# Patient Record
Sex: FEMALE | Race: WHITE | NOT HISPANIC OR LATINO | Employment: OTHER | URBAN - NONMETROPOLITAN AREA
[De-identification: names, ages, dates, MRNs, and addresses within clinical notes are randomized per-mention and may not be internally consistent; named-entity substitution may affect disease eponyms.]

---

## 2018-03-15 PROBLEM — R31.0 GROSS HEMATURIA: Status: ACTIVE | Noted: 2018-03-15

## 2018-03-15 PROBLEM — R39.15 URGENCY OF MICTURITION: Status: ACTIVE | Noted: 2018-03-15

## 2018-03-15 PROBLEM — R35.0 FREQUENCY OF MICTURITION: Status: ACTIVE | Noted: 2018-03-15

## 2018-03-15 PROBLEM — R10.9 FLANK PAIN, ACUTE: Status: ACTIVE | Noted: 2018-03-15

## 2020-04-16 ENCOUNTER — HISTORICAL (OUTPATIENT)
Dept: ADMINISTRATIVE | Facility: HOSPITAL | Age: 65
End: 2020-04-16

## 2020-04-16 LAB
ADENOVIRUS: NOT DETECTED
BORDETELLA PARAPERTUSSIS (IS1001): NOT DETECTED
CHLAMYDIA PNEUMONIAE: NOT DETECTED
CORONAVIRUS 229E, COMMON COLD VIRUS: NOT DETECTED
CORONAVIRUS HKU1, COMMON COLD VIRUS: NOT DETECTED
CORONAVIRUS NL63, COMMON COLD VIRUS: NOT DETECTED
CORONAVIRUS OC43, COMMON COLD VIRUS: NOT DETECTED
FLUBV RNA NPH QL NAA+NON-PROBE: NOT DETECTED
HPIV1 RNA NPH QL NAA+NON-PROBE: NOT DETECTED
HPIV2 RNA NPH QL NAA+NON-PROBE: NOT DETECTED
HPIV3 RNA NPH QL NAA+NON-PROBE: NOT DETECTED
HPIV4 RNA NPH QL NAA+NON-PROBE: NOT DETECTED
HUMAN METAPNEUMOVIRUS: NOT DETECTED
INFLUENZA A (SUBTYPES H1,H1-2009,H3): NORMAL
INFLUENZA A (SUBTYPES H1,H1-2009,H3): NORMAL
INFLUENZA A - H1N1-09: NORMAL
INFLUENZA A, MOLECULAR: NOT DETECTED
MYCOPLASMA PNEUMONIAE: NOT DETECTED
RESPIRATORY PANEL CONTROL: NORMAL
RSV RNA NPH QL NAA+NON-PROBE: NOT DETECTED
RV+EV RNA NPH QL NAA+NON-PROBE: NOT DETECTED

## 2020-04-20 LAB — SARS-COV-2 RNA RESP QL NAA+PROBE: NOT DETECTED

## 2020-08-10 DIAGNOSIS — R14.0 BLOATING: ICD-10-CM

## 2020-08-10 DIAGNOSIS — R10.9 ABDOMINAL PAIN, UNSPECIFIED ABDOMINAL LOCATION: Primary | ICD-10-CM

## 2020-08-10 DIAGNOSIS — R10.9 UNSPECIFIED ABDOMINAL PAIN: ICD-10-CM

## 2020-08-10 DIAGNOSIS — C85.90 LYMPHOMA: ICD-10-CM

## 2020-08-12 ENCOUNTER — HOSPITAL ENCOUNTER (OUTPATIENT)
Dept: RADIOLOGY | Facility: HOSPITAL | Age: 65
Discharge: HOME | End: 2020-08-12
Attending: SURGERY
Payer: MEDICARE

## 2020-08-12 DIAGNOSIS — R10.9 UNSPECIFIED ABDOMINAL PAIN: ICD-10-CM

## 2020-08-12 PROCEDURE — 25500020 PHARM REV CODE 255: Performed by: SURGERY

## 2020-08-12 PROCEDURE — 74177 CT ABD & PELVIS W/CONTRAST: CPT | Mod: TC

## 2020-08-12 RX ADMIN — IOHEXOL 100 ML: 350 INJECTION, SOLUTION INTRAVENOUS at 09:08

## 2020-08-17 DIAGNOSIS — A04.72 COLITIS DUE TO CLOSTRIDIUM DIFFICILE: Primary | ICD-10-CM

## 2020-08-17 RX ORDER — SODIUM CHLORIDE 0.9 % (FLUSH) 0.9 %
10 SYRINGE (ML) INJECTION
Status: CANCELLED | OUTPATIENT
Start: 2020-08-17

## 2020-08-17 RX ORDER — SODIUM CHLORIDE 9 MG/ML
INJECTION, SOLUTION INTRAVENOUS CONTINUOUS
Status: CANCELLED | OUTPATIENT
Start: 2020-08-17

## 2020-09-14 ENCOUNTER — OFFICE VISIT (OUTPATIENT)
Dept: OBSTETRICS AND GYNECOLOGY | Facility: CLINIC | Age: 65
End: 2020-09-14
Payer: MEDICARE

## 2020-09-14 VITALS
DIASTOLIC BLOOD PRESSURE: 72 MMHG | HEIGHT: 62 IN | SYSTOLIC BLOOD PRESSURE: 133 MMHG | WEIGHT: 113 LBS | BODY MASS INDEX: 20.8 KG/M2 | HEART RATE: 76 BPM

## 2020-09-14 DIAGNOSIS — N39.0 URINARY TRACT INFECTION WITH HEMATURIA, SITE UNSPECIFIED: Primary | ICD-10-CM

## 2020-09-14 DIAGNOSIS — N89.8 VAGINAL DISCHARGE: ICD-10-CM

## 2020-09-14 DIAGNOSIS — R31.9 URINARY TRACT INFECTION WITH HEMATURIA, SITE UNSPECIFIED: Primary | ICD-10-CM

## 2020-09-14 DIAGNOSIS — N95.2 ATROPHIC VAGINITIS: ICD-10-CM

## 2020-09-14 DIAGNOSIS — R31.9 HEMATURIA, UNSPECIFIED TYPE: ICD-10-CM

## 2020-09-14 LAB
BILIRUB SERPL-MCNC: NEGATIVE MG/DL
BLOOD URINE, POC: 5
CLARITY, POC UA: CLEAR
COLOR, POC UA: ABNORMAL
GLUCOSE UR QL STRIP: NEGATIVE
KETONES UR QL STRIP: NEGATIVE
LEUKOCYTE ESTERASE URINE, POC: 75
NITRITE, POC UA: NEGATIVE
PH, POC UA: 5.5
PROTEIN, POC: NEGATIVE
SPECIFIC GRAVITY, POC UA: 1.01
UROBILINOGEN, POC UA: NORMAL

## 2020-09-14 PROCEDURE — 99212 PR OFFICE/OUTPT VISIT, EST, LEVL II, 10-19 MIN: ICD-10-PCS | Mod: S$PBB,,, | Performed by: NURSE PRACTITIONER

## 2020-09-14 PROCEDURE — 99999 PR PBB SHADOW E&M-EST. PATIENT-LVL IV: CPT | Mod: PBBFAC,,, | Performed by: NURSE PRACTITIONER

## 2020-09-14 PROCEDURE — 99999 PR PBB SHADOW E&M-EST. PATIENT-LVL IV: ICD-10-PCS | Mod: PBBFAC,,, | Performed by: NURSE PRACTITIONER

## 2020-09-14 PROCEDURE — 99214 OFFICE O/P EST MOD 30 MIN: CPT | Mod: PBBFAC | Performed by: NURSE PRACTITIONER

## 2020-09-14 PROCEDURE — 99212 OFFICE O/P EST SF 10 MIN: CPT | Mod: S$PBB,,, | Performed by: NURSE PRACTITIONER

## 2020-09-14 PROCEDURE — 81002 URINALYSIS NONAUTO W/O SCOPE: CPT | Mod: PBBFAC | Performed by: NURSE PRACTITIONER

## 2020-09-14 RX ORDER — METOCLOPRAMIDE 10 MG/1
TABLET ORAL
COMMUNITY
Start: 2020-07-23 | End: 2021-02-05

## 2020-09-14 RX ORDER — METHYLNALTREXONE BROMIDE 150 MG/1
TABLET ORAL
COMMUNITY
End: 2021-02-05

## 2020-09-14 RX ORDER — MEMANTINE HYDROCHLORIDE 28 MG/1
CAPSULE, EXTENDED RELEASE ORAL
COMMUNITY
End: 2021-02-05

## 2020-09-14 RX ORDER — HYDROXYZINE HYDROCHLORIDE 50 MG/1
TABLET, FILM COATED ORAL
COMMUNITY
Start: 2020-06-30 | End: 2021-02-05

## 2020-09-14 RX ORDER — DONEPEZIL HYDROCHLORIDE 10 MG/1
TABLET, FILM COATED ORAL
COMMUNITY
End: 2021-02-05

## 2020-09-14 RX ORDER — MORPHINE SULFATE 15 MG/1
TABLET, FILM COATED, EXTENDED RELEASE ORAL
COMMUNITY
End: 2021-08-04

## 2020-09-14 RX ORDER — NITROFURANTOIN 25; 75 MG/1; MG/1
100 CAPSULE ORAL 2 TIMES DAILY
Qty: 14 CAPSULE | Refills: 0 | Status: SHIPPED | OUTPATIENT
Start: 2020-09-14 | End: 2020-09-21

## 2020-09-14 RX ORDER — CARIPRAZINE 4.5 MG/1
CAPSULE, GELATIN COATED ORAL
COMMUNITY
Start: 2020-09-01 | End: 2021-02-05 | Stop reason: ALTCHOICE

## 2020-09-14 RX ORDER — DICYCLOMINE HYDROCHLORIDE 20 MG/1
TABLET ORAL
COMMUNITY
End: 2021-02-05

## 2020-09-14 RX ORDER — BUSPIRONE HYDROCHLORIDE 10 MG/1
TABLET ORAL
COMMUNITY
Start: 2020-07-30 | End: 2021-02-05

## 2020-09-14 RX ORDER — ONDANSETRON 8 MG/1
TABLET, ORALLY DISINTEGRATING ORAL
COMMUNITY
Start: 2020-09-01 | End: 2021-05-18 | Stop reason: SDUPTHER

## 2020-09-14 RX ORDER — OMEPRAZOLE 40 MG/1
CAPSULE, DELAYED RELEASE ORAL
COMMUNITY
End: 2021-05-18 | Stop reason: SDUPTHER

## 2020-09-14 RX ORDER — TERCONAZOLE 4 MG/G
1 CREAM VAGINAL NIGHTLY
Qty: 1 TUBE | Refills: 0 | Status: SHIPPED | OUTPATIENT
Start: 2020-09-14 | End: 2020-09-21

## 2020-09-14 RX ORDER — FLUTICASONE PROPIONATE 50 MCG
SPRAY, SUSPENSION (ML) NASAL
COMMUNITY
Start: 2020-07-29 | End: 2021-12-14

## 2020-09-14 RX ORDER — PANTOPRAZOLE SODIUM 40 MG/1
40 TABLET, DELAYED RELEASE ORAL DAILY
COMMUNITY
Start: 2020-08-24 | End: 2021-11-01

## 2020-09-14 NOTE — PROGRESS NOTES
Subjective:       Patient ID: Desiree Blake is a 65 y.o. female.    Chief Complaint: Vaginal Pain (for a month now, patient states this has never happened before) and Vaginal Discharge (patient states every time she wipes she has blood ,  she states the discharge has a bad odor.  )    Pt here with c/o vaginal pain and discharge with odor; also c/o blood when wiping    Review of Systems   Constitutional: Negative.    Gastrointestinal: Negative.    Genitourinary: Positive for vaginal discharge.   Allergic/Immunologic: Negative.    Neurological: Negative.    Psychiatric/Behavioral: Negative.      Past Medical History:  Past Medical History:   Diagnosis Date    Anticoagulant long-term use     Anxiety     Arthritis     Carotid artery disease     Cervical disc disorder     Chronic back pain     COPD (chronic obstructive pulmonary disease)     Coronary artery disease     Dementia     Depression     DVT (deep venous thrombosis)     CAROTID    GERD (gastroesophageal reflux disease)     Hematuria     Hiatal hernia     High cholesterol     Ill-fitting dentures     STATES DOESN'T WEAR    PVD (peripheral vascular disease)     Renal calculus     Sleep apnea     Sleep apnea     NO C PAP    Urinary frequency     Urinary urgency     Wears glasses     READING        Past Surgical History:   Procedure Laterality Date    BACK SURGERY      CAROTID ARTERY ANGIOPLASTY      CAROTID ARTERY ANGIOPLASTY      CAROTID ARTERY STENT Left     CAROTID ENDARTERECTOMY Right     CHOLECYSTECTOMY      COLONOSCOPY      CYSTOSCOPY      HYSTERECTOMY      TONSILLECTOMY         Social History     Tobacco Use    Smoking status: Current Every Day Smoker     Packs/day: 1.00     Types: Cigarettes     Start date: 1970    Smokeless tobacco: Never Used   Substance Use Topics    Alcohol use: No    Drug use: No       Family History   Problem Relation Age of Onset    Cancer Mother     Stroke Father        Outpatient Medications  Marked as Taking for the 9/14/20 encounter (Office Visit) with Shanika Sweeney NP   Medication Sig Dispense Refill    busPIRone (BUSPAR) 10 MG tablet       citalopram (CELEXA) 10 MG tablet Take 10 mg by mouth every morning.      citalopram hydrobromide (CELEXA ORAL) Celexa      clopidogrel (PLAVIX) 75 mg tablet Take 75 mg by mouth every morning.      dicyclomine (BENTYL) 20 mg tablet dicyclomine 20 mg tablet      donepeziL (ARICEPT) 10 MG tablet donepezil 10 mg tablet   TAKE 1 TABLET BY MOUTH DAILY      fluticasone propionate (FLONASE) 50 mcg/actuation nasal spray 2 SQUIRT IN THE NOSTRILS DAILY      hydrOXYzine (ATARAX) 50 MG tablet TAKE 1 TABLET BY MOUTH TWICE DAILY AND 2 TABLETS EVERY NIGHT AT BEDTIME      memantine (NAMENDA XR) 28 mg CSpX Take 28 mg by mouth every morning.      memantine (NAMENDA XR) 28 mg CSpX Namenda XR 28 mg capsule sprinkle,extended release      metoclopramide HCl (REGLAN) 10 MG tablet TAKE 1 TABLET BY MOUTH THREE TIMES DAILY WITH MEALS AS NEEDED      morphine (MS CONTIN) 15 MG 12 hr tablet morphine ER 15 mg tablet,extended release   TAKE ONE TABLET BY MOUTH EVERY TWELVE HOURS      morphine (MSIR) 15 MG tablet Take 15 mg by mouth 2 (two) times daily.      ondansetron (ZOFRAN-ODT) 8 MG TbDL       oxyCODONE-acetaminophen (PERCOCET) 7.5-325 mg per tablet Take 1 tablet by mouth every 4 (four) hours as needed for Pain.      pantoprazole (PROTONIX) 40 MG tablet Take 40 mg by mouth once daily.      pravastatin (PRAVACHOL) 20 MG tablet Take 20 mg by mouth every evening.      traZODone (DESYREL) 100 MG tablet Take 100 mg by mouth every evening.      VRAYLAR 4.5 mg Cap          Review of patient's allergies indicates:  No Known Allergies     .ob         Objective:      Physical Exam  Constitutional:       Appearance: Normal appearance. She is normal weight.   Genitourinary:      Pelvic exam was performed with patient in the lithotomy position.      Vulva exam comments: Atrophic  epithelium.      Vaginal discharge and atrophy present.   Neurological:      Mental Status: She is alert.   Skin:     General: Skin is warm and dry.   Psychiatric:         Mood and Affect: Mood normal.         Behavior: Behavior normal.   Vitals signs and nursing note reviewed.          Assessment:       1. Urinary tract infection with hematuria, site unspecified    2. Hematuria, unspecified type    3. Vaginal discharge    4. Atrophic vaginitis        Plan:       Urinary tract infection with hematuria, site unspecified    Hematuria, unspecified type  -     POCT URINE DIPSTICK WITHOUT MICROSCOPE    Vaginal discharge    Atrophic vaginitis    Other orders  -     nitrofurantoin, macrocrystal-monohydrate, (MACROBID) 100 MG capsule; Take 1 capsule (100 mg total) by mouth 2 (two) times daily. for 7 days  Dispense: 14 capsule; Refill: 0  -     terconazole (TERAZOL 7) 0.4 % Crea; Place 1 applicator vaginally every evening. for 7 days  Dispense: 1 Tube; Refill: 0             Shanika Sweeney NP   09/23/2020   10:44 AM

## 2020-09-22 DIAGNOSIS — N89.8 VAGINAL DISCHARGE: Primary | ICD-10-CM

## 2020-09-22 RX ORDER — FLUCONAZOLE 150 MG/1
150 TABLET ORAL ONCE
Qty: 1 TABLET | Refills: 0 | Status: SHIPPED | OUTPATIENT
Start: 2020-09-22 | End: 2020-09-23 | Stop reason: SDUPTHER

## 2020-09-23 ENCOUNTER — TELEPHONE (OUTPATIENT)
Dept: OBSTETRICS AND GYNECOLOGY | Facility: CLINIC | Age: 65
End: 2020-09-23

## 2020-09-23 DIAGNOSIS — N89.8 VAGINAL DISCHARGE: ICD-10-CM

## 2020-09-23 RX ORDER — FLUCONAZOLE 150 MG/1
150 TABLET ORAL ONCE
Qty: 1 TABLET | Refills: 1 | Status: SHIPPED | OUTPATIENT
Start: 2020-09-23 | End: 2020-09-23

## 2020-09-29 ENCOUNTER — TELEPHONE (OUTPATIENT)
Dept: OBSTETRICS AND GYNECOLOGY | Facility: CLINIC | Age: 65
End: 2020-09-29

## 2020-09-29 RX ORDER — METRONIDAZOLE 500 MG/1
500 TABLET ORAL EVERY 12 HOURS
Qty: 14 TABLET | Refills: 0 | Status: SHIPPED | OUTPATIENT
Start: 2020-09-29 | End: 2020-10-06

## 2020-09-29 NOTE — TELEPHONE ENCOUNTER
Pt states finished taking meds but still has odor and discharge; wants to know what else can be done  spitale

## 2020-10-28 ENCOUNTER — HOSPITAL ENCOUNTER (EMERGENCY)
Facility: HOSPITAL | Age: 65
Discharge: HOME | End: 2020-10-28
Attending: EMERGENCY MEDICINE
Payer: MEDICARE

## 2020-10-28 VITALS
HEART RATE: 108 BPM | HEIGHT: 65 IN | TEMPERATURE: 98 F | BODY MASS INDEX: 18.33 KG/M2 | OXYGEN SATURATION: 96 % | WEIGHT: 110 LBS | SYSTOLIC BLOOD PRESSURE: 119 MMHG | DIASTOLIC BLOOD PRESSURE: 79 MMHG | RESPIRATION RATE: 18 BRPM

## 2020-10-28 DIAGNOSIS — K52.9 GASTROENTERITIS: Primary | ICD-10-CM

## 2020-10-28 LAB
CTP QC/QA: YES
SARS-COV-2 RDRP RESP QL NAA+PROBE: NEGATIVE

## 2020-10-28 PROCEDURE — 99283 EMERGENCY DEPT VISIT LOW MDM: CPT

## 2020-10-28 PROCEDURE — U0002 COVID-19 LAB TEST NON-CDC: HCPCS | Performed by: EMERGENCY MEDICINE

## 2020-10-28 PROCEDURE — 25000003 PHARM REV CODE 250: Performed by: EMERGENCY MEDICINE

## 2020-10-28 RX ORDER — ONDANSETRON 4 MG/1
4 TABLET, FILM COATED ORAL EVERY 6 HOURS PRN
Qty: 12 TABLET | Refills: 0 | Status: SHIPPED | OUTPATIENT
Start: 2020-10-28 | End: 2021-02-05

## 2020-10-28 RX ORDER — LORAZEPAM 1 MG/1
1 TABLET ORAL
Status: COMPLETED | OUTPATIENT
Start: 2020-10-28 | End: 2020-10-28

## 2020-10-28 RX ORDER — ONDANSETRON 4 MG/1
8 TABLET, ORALLY DISINTEGRATING ORAL
Status: COMPLETED | OUTPATIENT
Start: 2020-10-28 | End: 2020-10-28

## 2020-10-28 RX ADMIN — LORAZEPAM 1 MG: 1 TABLET ORAL at 12:10

## 2020-10-28 RX ADMIN — ONDANSETRON 8 MG: 4 TABLET, ORALLY DISINTEGRATING ORAL at 12:10

## 2020-10-28 NOTE — ED PROVIDER NOTES
Encounter Date: 10/28/2020       History     Chief Complaint   Patient presents with    COVID-19 Concerns     I have been having diarrhea and vomiting for the past 2 days.  I cant keep anything down and I started with fever today.  I dont have anymore taste buds.      This is a 65-year-old white female complaining of nausea and diarrhea for the past 2 days.  Patient states she has had fever as well.  Also has lost her sense of taste.  Concerned about COVID.  Denies shortness of breath, denies cough        Review of patient's allergies indicates:  No Known Allergies  Past Medical History:   Diagnosis Date    Anticoagulant long-term use     Anxiety     Arthritis     Carotid artery disease     Cervical disc disorder     Chronic back pain     COPD (chronic obstructive pulmonary disease)     Coronary artery disease     Dementia     Depression     DVT (deep venous thrombosis)     CAROTID    GERD (gastroesophageal reflux disease)     Hematuria     Hiatal hernia     High cholesterol     Ill-fitting dentures     STATES DOESN'T WEAR    PVD (peripheral vascular disease)     Renal calculus     Sleep apnea     Sleep apnea     NO C PAP    Urinary frequency     Urinary urgency     Wears glasses     READING      Past Surgical History:   Procedure Laterality Date    BACK SURGERY      CAROTID ARTERY ANGIOPLASTY      CAROTID ARTERY ANGIOPLASTY      CAROTID ARTERY STENT Left     CAROTID ENDARTERECTOMY Right     CHOLECYSTECTOMY      COLONOSCOPY      CYSTOSCOPY      HYSTERECTOMY      TONSILLECTOMY       Family History   Problem Relation Age of Onset    Cancer Mother     Stroke Father      Social History     Tobacco Use    Smoking status: Current Every Day Smoker     Packs/day: 1.00     Types: Cigarettes     Start date: 1970    Smokeless tobacco: Never Used   Substance Use Topics    Alcohol use: No    Drug use: No     Review of Systems   Constitutional: Negative for fever.   HENT: Negative for sore  throat.    Respiratory: Negative for shortness of breath.    Cardiovascular: Negative for chest pain.   Gastrointestinal: Positive for diarrhea and nausea.   Genitourinary: Negative for dysuria.   Musculoskeletal: Negative for back pain.   Skin: Negative for rash.   Neurological: Negative for weakness.   Hematological: Does not bruise/bleed easily.   All other systems reviewed and are negative.      Physical Exam     Initial Vitals [10/28/20 1201]   BP Pulse Resp Temp SpO2   119/79 108 18 97.6 °F (36.4 °C) 96 %      MAP       --         Physical Exam    Nursing note and vitals reviewed.  Constitutional: She appears well-developed and well-nourished. She is not diaphoretic. No distress.   Anxious white female   HENT:   Head: Normocephalic and atraumatic.   Eyes: Conjunctivae and EOM are normal. Pupils are equal, round, and reactive to light.   Neck: Normal range of motion. Neck supple.   Cardiovascular: Normal rate, regular rhythm, normal heart sounds and intact distal pulses.   No murmur heard.  Pulmonary/Chest: Breath sounds normal. No respiratory distress. She has no wheezes. She has no rhonchi. She has no rales. She exhibits no tenderness.   Abdominal: Soft. Bowel sounds are normal.   Musculoskeletal: Normal range of motion. No tenderness or edema.   Neurological: She is alert and oriented to person, place, and time. She has normal strength and normal reflexes. No cranial nerve deficit. GCS score is 15. GCS eye subscore is 4. GCS verbal subscore is 5. GCS motor subscore is 6.   Skin: Skin is warm and dry. Capillary refill takes less than 2 seconds.         ED Course   Procedures  Labs Reviewed   SARS-COV-2 RDRP GENE    Narrative:     This test utilizes isothermal nucleic acid amplification   technology to detect the SARS-CoV-2 RdRp nucleic acid segment.   The analytical sensitivity (limit of detection) is 125 genome   equivalents/mL.   A POSITIVE result implies infection with the SARS-CoV-2 virus;   the patient  is presumed to be contagious.     A NEGATIVE result means that SARS-CoV-2 nucleic acids are not   present above the limit of detection. A NEGATIVE result should be   treated as presumptive. It does not rule out the possibility of   COVID-19 and should not be the sole basis for treatment decisions.   If COVID-19 is strongly suspected based on clinical and exposure   history, re-testing using an alternate molecular assay should be   considered.   This test is only for use under the Food and Drug   Administration s Emergency Use Authorization (EUA).   Commercial kits are provided by [employer].   Performance characteristics of the EUA have been independently   verified by [facility] Department of   Pathology and Laboratory Medicine.   _________________________________________________________________   The authorized Fact Sheet for Healthcare Providers and the authorized Fact   Sheet for Patients of the ID NOW COVID-19 are available on the FDA   website:     https://www.fda.gov/media/153286/download  https://www.fda.gov/media/646868/download              Imaging Results    None          Medical Decision Making:   Differential Diagnosis:   Gastroenteritis, COVID-19                   ED Course as of Oct 28 1236   Wed Oct 28, 2020   1230 Rapid COVID negative    [SD]      ED Course User Index  [SD] Marek Rai MD            Clinical Impression:     ICD-10-CM ICD-9-CM   1. Gastroenteritis  K52.9 558.9                          ED Disposition Condition    Discharge Stable        ED Prescriptions     Medication Sig Dispense Start Date End Date Auth. Provider    ondansetron (ZOFRAN) 4 MG tablet Take 1 tablet (4 mg total) by mouth every 6 (six) hours as needed for Nausea. 12 tablet 10/28/2020  Marek Rai MD        Follow-up Information     Follow up With Specialties Details Why Contact Info    Primary care physician  In 2 days                                         Marek Rai MD  10/28/20  1232       Marek Rai MD  10/28/20 1230

## 2020-11-11 DIAGNOSIS — Z13.820 OSTEOPOROSIS SCREENING: ICD-10-CM

## 2020-11-11 DIAGNOSIS — Z12.31 ENCOUNTER FOR SCREENING MAMMOGRAM FOR BREAST CANCER: Primary | ICD-10-CM

## 2020-11-11 DIAGNOSIS — M81.0 AGE-RELATED OSTEOPOROSIS WITHOUT CURRENT PATHOLOGICAL FRACTURE: ICD-10-CM

## 2020-12-10 ENCOUNTER — OFFICE VISIT (OUTPATIENT)
Dept: OBSTETRICS AND GYNECOLOGY | Facility: CLINIC | Age: 65
End: 2020-12-10
Attending: OBSTETRICS & GYNECOLOGY
Payer: MEDICARE

## 2020-12-10 VITALS
HEIGHT: 62 IN | BODY MASS INDEX: 20.06 KG/M2 | DIASTOLIC BLOOD PRESSURE: 63 MMHG | SYSTOLIC BLOOD PRESSURE: 135 MMHG | WEIGHT: 109 LBS | HEART RATE: 93 BPM

## 2020-12-10 DIAGNOSIS — R35.0 URINARY FREQUENCY: Primary | ICD-10-CM

## 2020-12-10 DIAGNOSIS — R82.71 BACTERIURIA: ICD-10-CM

## 2020-12-10 DIAGNOSIS — R31.9 HEMATURIA, UNSPECIFIED TYPE: ICD-10-CM

## 2020-12-10 DIAGNOSIS — N95.2 ATROPHIC VAGINITIS: ICD-10-CM

## 2020-12-10 LAB
BILIRUB SERPL-MCNC: ABNORMAL MG/DL
BLOOD URINE, POC: ABNORMAL
CLARITY, POC UA: CLEAR
COLOR, POC UA: ABNORMAL
GLUCOSE UR QL STRIP: ABNORMAL
KETONES UR QL STRIP: ABNORMAL
LEUKOCYTE ESTERASE URINE, POC: ABNORMAL
NITRITE, POC UA: ABNORMAL
PH, POC UA: 5.5
PROTEIN, POC: ABNORMAL
SPECIFIC GRAVITY, POC UA: 1.02
UROBILINOGEN, POC UA: ABNORMAL

## 2020-12-10 PROCEDURE — 99999 PR PBB SHADOW E&M-EST. PATIENT-LVL IV: ICD-10-PCS | Mod: PBBFAC,,, | Performed by: OBSTETRICS & GYNECOLOGY

## 2020-12-10 PROCEDURE — 99214 PR OFFICE/OUTPT VISIT, EST, LEVL IV, 30-39 MIN: ICD-10-PCS | Mod: S$PBB,,, | Performed by: OBSTETRICS & GYNECOLOGY

## 2020-12-10 PROCEDURE — 99999 PR PBB SHADOW E&M-EST. PATIENT-LVL IV: CPT | Mod: PBBFAC,,, | Performed by: OBSTETRICS & GYNECOLOGY

## 2020-12-10 PROCEDURE — 87086 URINE CULTURE/COLONY COUNT: CPT

## 2020-12-10 PROCEDURE — 99214 OFFICE O/P EST MOD 30 MIN: CPT | Mod: S$PBB,,, | Performed by: OBSTETRICS & GYNECOLOGY

## 2020-12-10 PROCEDURE — 81002 URINALYSIS NONAUTO W/O SCOPE: CPT | Mod: PBBFAC | Performed by: OBSTETRICS & GYNECOLOGY

## 2020-12-10 PROCEDURE — 99214 OFFICE O/P EST MOD 30 MIN: CPT | Mod: PBBFAC | Performed by: OBSTETRICS & GYNECOLOGY

## 2020-12-10 RX ORDER — ESTRADIOL 0.1 MG/G
CREAM VAGINAL
Qty: 42.5 G | Refills: 2 | Status: SHIPPED | OUTPATIENT
Start: 2020-12-10 | End: 2021-02-05

## 2020-12-10 RX ORDER — ALBUTEROL SULFATE 90 UG/1
AEROSOL, METERED RESPIRATORY (INHALATION)
COMMUNITY
Start: 2020-11-24 | End: 2021-07-31

## 2020-12-10 RX ORDER — NITROFURANTOIN 25; 75 MG/1; MG/1
100 CAPSULE ORAL 2 TIMES DAILY
Qty: 14 CAPSULE | Refills: 0 | Status: SHIPPED | OUTPATIENT
Start: 2020-12-10 | End: 2020-12-17

## 2020-12-10 RX ORDER — GABAPENTIN 100 MG/1
100 CAPSULE ORAL NIGHTLY
COMMUNITY
Start: 2020-11-09 | End: 2021-02-05

## 2020-12-10 NOTE — PROGRESS NOTES
"  Chief Complaint      Chief Complaint   Patient presents with    Urinary Frequency     she states it's the same thing as last time, she states urinating all the time, dark discharge, and when she wipes there is a lot of blood.  her last appointment was in sept 2020        History of Present Illness      Desiree Blake is a 65 y.o. female who presents for patient presents today complaining of of vaginal discharge with odor and pain in the lower pelvis and bladder area.  She says discharge is dark brown in color and has a history of hematuria with a negative cystoscope by urologist in 2018.  She does have blood in her urine dip today as well as 2+ leukocytes.    LMP:  No LMP recorded. Patient has had a hysterectomy.    PAP:      Vital Signs:     Vital Signs  Pulse: 93  BP: 135/63  Height and Weight  Height: 5' 2" (157.5 cm)  Weight: 49.4 kg (109 lb)  BSA (Calculated - sq m): 1.47 sq meters  BMI (Calculated): 19.9  Weight in (lb) to have BMI = 25: 136.4]    Past Medical History:  Past Medical History:   Diagnosis Date    Anticoagulant long-term use     Anxiety     Arthritis     Carotid artery disease     Cervical disc disorder     Chronic back pain     COPD (chronic obstructive pulmonary disease)     Coronary artery disease     Dementia     Depression     DVT (deep venous thrombosis)     CAROTID    GERD (gastroesophageal reflux disease)     Hematuria     Hiatal hernia     High cholesterol     Ill-fitting dentures     STATES DOESN'T WEAR    PVD (peripheral vascular disease)     Renal calculus     Sleep apnea     Sleep apnea     NO C PAP    Urinary frequency     Urinary urgency     Wears glasses     READING        Past Surgical History:   has a past surgical history that includes Hysterectomy; Cholecystectomy; Back surgery; Carotid angioplasty; CAROTID ARTERY STENT (Left); Tonsillectomy; Colonoscopy; Cystoscopy; Carotid endarterectomy (Right); and Carotid angioplasty.    Family History:  family " history includes Cancer in her mother; Stroke in her father.     Social History:  Social History     Tobacco Use    Smoking status: Current Every Day Smoker     Packs/day: 1.00     Types: Cigarettes     Start date: 1970    Smokeless tobacco: Never Used   Substance Use Topics    Alcohol use: No    Drug use: No       I personally reviewed all past medical, surgical, social and family history.    Review of Systems   Constitutional: Negative for chills and fever.   HENT: Negative for sore throat.    Respiratory: Negative for cough and shortness of breath.    Cardiovascular: Negative for chest pain.   Gastrointestinal: Negative for constipation, diarrhea, nausea and vomiting.   Endocrine: Negative for cold intolerance and heat intolerance.   Genitourinary: Positive for dysuria, pelvic pain, vaginal bleeding and vaginal discharge. Negative for urgency.   Musculoskeletal: Negative for arthralgias.   Neurological: Negative for syncope and headaches.   Psychiatric/Behavioral: Negative for suicidal ideas.      Physical Exam  Vitals signs and nursing note reviewed. Exam conducted with a chaperone present.   Constitutional:       Appearance: Normal appearance.   HENT:      Head: Normocephalic and atraumatic.   Neck:      Musculoskeletal: Normal range of motion.   Pulmonary:      Effort: Pulmonary effort is normal.   Genitourinary:     General: Normal vulva.   Skin:     General: Skin is warm and dry.      Findings: Lesion: Patient > 65yrs opf age.   Neurological:      General: No focal deficit present.      Mental Status: She is alert and oriented to person, place, and time.      Gait: Gait normal.   Psychiatric:         Mood and Affect: Mood normal.         Behavior: Behavior normal.         Judgment: Judgment normal.         Medications:  Current Outpatient Medications   Medication Sig Dispense Refill    busPIRone (BUSPAR) 10 MG tablet       citalopram (CELEXA) 10 MG tablet Take 10 mg by mouth every morning.       citalopram hydrobromide (CELEXA ORAL) Celexa      clopidogrel (PLAVIX) 75 mg tablet Take 75 mg by mouth every morning.      dicyclomine (BENTYL) 20 mg tablet dicyclomine 20 mg tablet      donepeziL (ARICEPT) 10 MG tablet donepezil 10 mg tablet   TAKE 1 TABLET BY MOUTH DAILY      esomeprazole (NEXIUM) 20 MG capsule Take 20 mg by mouth as needed.      fluticasone propionate (FLONASE) 50 mcg/actuation nasal spray 2 SQUIRT IN THE NOSTRILS DAILY      gabapentin (NEURONTIN) 100 MG capsule Take 100 mg by mouth nightly.      hydrOXYzine (ATARAX) 50 MG tablet TAKE 1 TABLET BY MOUTH TWICE DAILY AND 2 TABLETS EVERY NIGHT AT BEDTIME      memantine (NAMENDA XR) 28 mg CSpX Take 28 mg by mouth every morning.      memantine (NAMENDA XR) 28 mg CSpX Namenda XR 28 mg capsule sprinkle,extended release      methylnaltrexone (RELISTOR) 150 mg Tab Relistor 150 mg tablet   TAKE THREE TABLETS BY MOUTH DAILY      metoclopramide HCl (REGLAN) 10 MG tablet TAKE 1 TABLET BY MOUTH THREE TIMES DAILY WITH MEALS AS NEEDED      morphine (MS CONTIN) 15 MG 12 hr tablet morphine ER 15 mg tablet,extended release   TAKE ONE TABLET BY MOUTH EVERY TWELVE HOURS      morphine (MSIR) 15 MG tablet Take 15 mg by mouth 2 (two) times daily.      omeprazole (PRILOSEC) 40 MG capsule omeprazole 40 mg capsule,delayed release   Take 1 capsule(s) every day by oral route for 30 days.      ondansetron (ZOFRAN) 4 MG tablet Take 1 tablet (4 mg total) by mouth every 6 (six) hours as needed for Nausea. 12 tablet 0    ondansetron (ZOFRAN-ODT) 8 MG TbDL       oxyCODONE-acetaminophen (PERCOCET) 7.5-325 mg per tablet Take 1 tablet by mouth every 4 (four) hours as needed for Pain.      pantoprazole (PROTONIX) 40 MG tablet Take 40 mg by mouth once daily.      pravastatin (PRAVACHOL) 20 MG tablet Take 20 mg by mouth every evening.      PROAIR HFA 90 mcg/actuation inhaler       traZODone (DESYREL) 100 MG tablet Take 100 mg by mouth every evening.      VRAYLAR  4.5 mg Cap        No current facility-administered medications for this visit.        Allergies:  Review of patient's allergies indicates:  No Known Allergies    Health Maintenance:    There is no immunization history on file for this patient.   Health Maintenance   Topic Date Due    Hepatitis C Screening  1955    Lipid Panel  1955    TETANUS VACCINE  07/06/1973    Mammogram  07/06/1995    DEXA SCAN  07/06/1995    Pneumococcal Vaccine (65+ High/Highest Risk) (1 of 2 - PCV13) 07/06/2020        Physical Exam       Assessment/Plan     Desiree Blake is a 65 y.o.female with:    1. Urinary frequency  - POCT URINE DIPSTICK WITHOUT MICROSCOPE    2. Hematuria, unspecified type  - POCT URINE DIPSTICK WITHOUT MICROSCOPE     - Will treat for a UTI and send urine for culture   - Vaginal swab for infection collected and sent  - Will send vaginal estrogen cream for vaginal atrophy  - Patient to call to schedule mammogram and Dexa scan  - RTC 3 months  - Discussed no more Paps due to age>65  - Appx 30 min spent in face-to-face time    Eyal Serrano MD

## 2020-12-12 LAB — BACTERIA UR CULT: NO GROWTH

## 2020-12-15 ENCOUNTER — HOSPITAL ENCOUNTER (OUTPATIENT)
Dept: RADIOLOGY | Facility: HOSPITAL | Age: 65
Discharge: HOME | End: 2020-12-15
Attending: INTERNAL MEDICINE
Payer: MEDICARE

## 2020-12-15 VITALS — WEIGHT: 110 LBS | BODY MASS INDEX: 20.24 KG/M2 | HEIGHT: 62 IN

## 2020-12-15 DIAGNOSIS — M81.0 AGE-RELATED OSTEOPOROSIS WITHOUT CURRENT PATHOLOGICAL FRACTURE: ICD-10-CM

## 2020-12-15 DIAGNOSIS — Z13.820 OSTEOPOROSIS SCREENING: ICD-10-CM

## 2020-12-15 DIAGNOSIS — Z12.31 ENCOUNTER FOR SCREENING MAMMOGRAM FOR BREAST CANCER: ICD-10-CM

## 2020-12-15 PROCEDURE — 77080 DXA BONE DENSITY AXIAL: CPT | Mod: TC

## 2020-12-15 PROCEDURE — 77067 SCR MAMMO BI INCL CAD: CPT | Mod: TC

## 2021-02-05 PROBLEM — G47.00 INSOMNIA: Status: ACTIVE | Noted: 2021-02-05

## 2021-02-05 PROBLEM — F41.9 ANXIETY: Status: ACTIVE | Noted: 2021-02-05

## 2021-02-05 PROBLEM — E78.5 HYPERLIPIDEMIA: Status: ACTIVE | Noted: 2021-02-05

## 2021-02-22 ENCOUNTER — OFFICE VISIT (OUTPATIENT)
Dept: OBSTETRICS AND GYNECOLOGY | Facility: CLINIC | Age: 66
End: 2021-02-22
Payer: MEDICARE

## 2021-02-22 VITALS
WEIGHT: 106 LBS | HEIGHT: 61 IN | BODY MASS INDEX: 20.01 KG/M2 | DIASTOLIC BLOOD PRESSURE: 77 MMHG | SYSTOLIC BLOOD PRESSURE: 147 MMHG

## 2021-02-22 DIAGNOSIS — L02.91 CUTANEOUS ABSCESS, UNSPECIFIED SITE: Primary | ICD-10-CM

## 2021-02-22 PROCEDURE — 99999 PR PBB SHADOW E&M-EST. PATIENT-LVL III: CPT | Mod: PBBFAC,,, | Performed by: OBSTETRICS & GYNECOLOGY

## 2021-02-22 PROCEDURE — 99213 OFFICE O/P EST LOW 20 MIN: CPT | Mod: PBBFAC | Performed by: OBSTETRICS & GYNECOLOGY

## 2021-02-22 PROCEDURE — 99999 PR PBB SHADOW E&M-EST. PATIENT-LVL III: ICD-10-PCS | Mod: PBBFAC,,, | Performed by: OBSTETRICS & GYNECOLOGY

## 2021-02-22 PROCEDURE — 99214 OFFICE O/P EST MOD 30 MIN: CPT | Mod: S$PBB,,, | Performed by: OBSTETRICS & GYNECOLOGY

## 2021-02-22 PROCEDURE — 99214 PR OFFICE/OUTPT VISIT, EST, LEVL IV, 30-39 MIN: ICD-10-PCS | Mod: S$PBB,,, | Performed by: OBSTETRICS & GYNECOLOGY

## 2021-02-22 RX ORDER — CARIPRAZINE 4.5 MG/1
CAPSULE, GELATIN COATED ORAL
COMMUNITY
End: 2021-06-01

## 2021-02-22 RX ORDER — SULFAMETHOXAZOLE AND TRIMETHOPRIM 800; 160 MG/1; MG/1
1 TABLET ORAL 2 TIMES DAILY
Qty: 20 TABLET | Refills: 0 | Status: SHIPPED | OUTPATIENT
Start: 2021-02-22 | End: 2021-03-01 | Stop reason: ALTCHOICE

## 2021-03-01 DIAGNOSIS — L02.91 ABSCESS: Primary | ICD-10-CM

## 2021-03-01 RX ORDER — METRONIDAZOLE 500 MG/1
500 TABLET ORAL EVERY 12 HOURS
Qty: 14 TABLET | Refills: 0 | Status: SHIPPED | OUTPATIENT
Start: 2021-03-01 | End: 2021-03-08

## 2021-03-17 ENCOUNTER — OFFICE VISIT (OUTPATIENT)
Dept: OBSTETRICS AND GYNECOLOGY | Facility: CLINIC | Age: 66
End: 2021-03-17
Payer: MEDICARE

## 2021-03-17 VITALS
BODY MASS INDEX: 20.58 KG/M2 | DIASTOLIC BLOOD PRESSURE: 60 MMHG | SYSTOLIC BLOOD PRESSURE: 130 MMHG | WEIGHT: 109 LBS | HEIGHT: 61 IN

## 2021-03-17 DIAGNOSIS — R31.9 HEMATURIA, UNSPECIFIED TYPE: ICD-10-CM

## 2021-03-17 DIAGNOSIS — R10.2 PELVIC PAIN: ICD-10-CM

## 2021-03-17 DIAGNOSIS — N30.01 ACUTE CYSTITIS WITH HEMATURIA: ICD-10-CM

## 2021-03-17 DIAGNOSIS — N89.8 VAGINAL DISCHARGE: Primary | ICD-10-CM

## 2021-03-17 LAB
BILIRUB SERPL-MCNC: ABNORMAL MG/DL
BLOOD URINE, POC: ABNORMAL
CLARITY, POC UA: CLEAR
COLOR, POC UA: ABNORMAL
GLUCOSE UR QL STRIP: ABNORMAL
KETONES UR QL STRIP: ABNORMAL
LEUKOCYTE ESTERASE URINE, POC: ABNORMAL
NITRITE, POC UA: ABNORMAL
PH, POC UA: 6
PROTEIN, POC: ABNORMAL
SPECIFIC GRAVITY, POC UA: 1.01
UROBILINOGEN, POC UA: NORMAL

## 2021-03-17 PROCEDURE — 81002 URINALYSIS NONAUTO W/O SCOPE: CPT | Mod: PBBFAC | Performed by: OBSTETRICS & GYNECOLOGY

## 2021-03-17 PROCEDURE — 99999 PR PBB SHADOW E&M-EST. PATIENT-LVL IV: CPT | Mod: PBBFAC,,, | Performed by: OBSTETRICS & GYNECOLOGY

## 2021-03-17 PROCEDURE — 99213 OFFICE O/P EST LOW 20 MIN: CPT | Mod: S$PBB,,, | Performed by: OBSTETRICS & GYNECOLOGY

## 2021-03-17 PROCEDURE — 99213 PR OFFICE/OUTPT VISIT, EST, LEVL III, 20-29 MIN: ICD-10-PCS | Mod: S$PBB,,, | Performed by: OBSTETRICS & GYNECOLOGY

## 2021-03-17 PROCEDURE — 99999 PR PBB SHADOW E&M-EST. PATIENT-LVL IV: ICD-10-PCS | Mod: PBBFAC,,, | Performed by: OBSTETRICS & GYNECOLOGY

## 2021-03-17 PROCEDURE — 99214 OFFICE O/P EST MOD 30 MIN: CPT | Mod: PBBFAC | Performed by: OBSTETRICS & GYNECOLOGY

## 2021-03-17 PROCEDURE — 87086 URINE CULTURE/COLONY COUNT: CPT | Performed by: OBSTETRICS & GYNECOLOGY

## 2021-03-17 RX ORDER — NITROFURANTOIN 25; 75 MG/1; MG/1
100 CAPSULE ORAL 2 TIMES DAILY
Qty: 14 CAPSULE | Refills: 0 | Status: SHIPPED | OUTPATIENT
Start: 2021-03-17 | End: 2021-03-24

## 2021-03-19 ENCOUNTER — OFFICE VISIT (OUTPATIENT)
Dept: OBSTETRICS AND GYNECOLOGY | Facility: CLINIC | Age: 66
End: 2021-03-19
Payer: MEDICARE

## 2021-03-19 ENCOUNTER — TELEPHONE (OUTPATIENT)
Dept: OBSTETRICS AND GYNECOLOGY | Facility: CLINIC | Age: 66
End: 2021-03-19

## 2021-03-19 ENCOUNTER — PROCEDURE VISIT (OUTPATIENT)
Dept: OBSTETRICS AND GYNECOLOGY | Facility: CLINIC | Age: 66
End: 2021-03-19
Payer: MEDICARE

## 2021-03-19 VITALS
SYSTOLIC BLOOD PRESSURE: 141 MMHG | DIASTOLIC BLOOD PRESSURE: 78 MMHG | HEART RATE: 104 BPM | WEIGHT: 109 LBS | HEIGHT: 61 IN | BODY MASS INDEX: 20.58 KG/M2

## 2021-03-19 DIAGNOSIS — R10.2 PELVIC PAIN: Primary | ICD-10-CM

## 2021-03-19 DIAGNOSIS — N89.8 VAGINAL DISCHARGE: ICD-10-CM

## 2021-03-19 DIAGNOSIS — R35.0 URINARY FREQUENCY: ICD-10-CM

## 2021-03-19 LAB
BACTERIA UR CULT: NORMAL
BACTERIA UR CULT: NORMAL

## 2021-03-19 PROCEDURE — 99213 OFFICE O/P EST LOW 20 MIN: CPT | Mod: S$PBB,25,, | Performed by: OBSTETRICS & GYNECOLOGY

## 2021-03-19 PROCEDURE — 76830 TRANSVAGINAL US NON-OB: CPT | Mod: PBBFAC | Performed by: OBSTETRICS & GYNECOLOGY

## 2021-03-19 PROCEDURE — 99999 PR PBB SHADOW E&M-EST. PATIENT-LVL III: ICD-10-PCS | Mod: PBBFAC,,, | Performed by: OBSTETRICS & GYNECOLOGY

## 2021-03-19 PROCEDURE — 99213 PR OFFICE/OUTPT VISIT, EST, LEVL III, 20-29 MIN: ICD-10-PCS | Mod: S$PBB,25,, | Performed by: OBSTETRICS & GYNECOLOGY

## 2021-03-19 PROCEDURE — 99213 OFFICE O/P EST LOW 20 MIN: CPT | Mod: PBBFAC,25 | Performed by: OBSTETRICS & GYNECOLOGY

## 2021-03-19 PROCEDURE — 99999 PR PBB SHADOW E&M-EST. PATIENT-LVL III: CPT | Mod: PBBFAC,,, | Performed by: OBSTETRICS & GYNECOLOGY

## 2021-03-19 PROCEDURE — 76830 TRANSVAGINAL US NON-OB: CPT | Mod: 26,S$PBB,, | Performed by: OBSTETRICS & GYNECOLOGY

## 2021-03-19 PROCEDURE — 76830 PR  ECHOGRAPHY,TRANSVAGINAL: ICD-10-PCS | Mod: 26,S$PBB,, | Performed by: OBSTETRICS & GYNECOLOGY

## 2021-04-13 PROBLEM — K52.9 COLITIS: Status: ACTIVE | Noted: 2021-04-13

## 2021-04-13 PROBLEM — K59.00 CONSTIPATION: Status: ACTIVE | Noted: 2021-04-13

## 2021-04-13 PROBLEM — F03.90 DEMENTIA WITHOUT BEHAVIORAL DISTURBANCE: Status: ACTIVE | Noted: 2021-04-13

## 2021-04-13 PROBLEM — N39.3 STRESS INCONTINENCE: Status: ACTIVE | Noted: 2021-04-13

## 2021-04-27 PROBLEM — G47.10 HYPERSOMNOLENCE: Status: ACTIVE | Noted: 2021-04-27

## 2021-05-18 PROBLEM — R10.9 ABDOMINAL PAIN: Status: ACTIVE | Noted: 2021-05-18

## 2021-06-01 ENCOUNTER — LAB VISIT (OUTPATIENT)
Dept: LAB | Facility: HOSPITAL | Age: 66
End: 2021-06-01
Attending: INTERNAL MEDICINE
Payer: MEDICARE

## 2021-06-01 DIAGNOSIS — R19.7 DIARRHEA, UNSPECIFIED TYPE: ICD-10-CM

## 2021-06-01 LAB
ASTROVIRUS: NOT DETECTED
C COLI+JEJ+UPSA DNA STL QL NAA+NON-PROBE: NOT DETECTED
C DIF TOX TCDA+TCDB STL QL NAA+NON-PROBE: NORMAL
CYCLOSPORA CAYETANENSIS: NOT DETECTED
ENTEROAGGREGATIVE E COLI: NOT DETECTED
ENTEROPATHOGENIC E COLI: NOT DETECTED
GPP - ADENOVIRUS 40/41: NOT DETECTED
GPP - CRYPTOSPORIDIUM: NOT DETECTED
GPP - ENTAMOEBA HISTOLYTICA: NOT DETECTED
GPP - ENTEROTOXIGENIC E COLI (ETEC): NOT DETECTED
GPP - GIARDIA LAMBLIA: NOT DETECTED
GPP - NOROVIRUS GI/GII: NOT DETECTED
GPP - ROTAVIRUS A: NOT DETECTED
GPP - SALMONELLA: NOT DETECTED
GPP - VIBRIO CHOLERA: NOT DETECTED
GPP - YERSINIA ENTEROCOLITICA: NOT DETECTED
LACTATE PLASV-SCNC: NOT DETECTED MMOL/L
PLESIOMONAS SHIGELLOIDES: NOT DETECTED
SAPOVIRUS: NOT DETECTED
SHIGELLA SP+EIEC IPAH STL QL NAA+PROBE: NOT DETECTED
VIBRIO: NOT DETECTED

## 2021-06-01 PROCEDURE — 87507 IADNA-DNA/RNA PROBE TQ 12-25: CPT | Performed by: INTERNAL MEDICINE

## 2021-06-22 PROBLEM — E88.89 COENZYME Q DEFICIENCY: Status: ACTIVE | Noted: 2021-06-22

## 2021-06-22 PROBLEM — E55.9 VITAMIN D DEFICIENCY: Status: ACTIVE | Noted: 2021-06-22

## 2021-06-22 PROBLEM — E61.8 COENZYME Q DEFICIENCY: Status: ACTIVE | Noted: 2021-06-22

## 2021-06-22 PROBLEM — E63.0 ESSENTIAL FATTY ACID (EFA) DEFICIENCY: Status: ACTIVE | Noted: 2021-06-22

## 2021-07-29 ENCOUNTER — LAB VISIT (OUTPATIENT)
Dept: LAB | Facility: HOSPITAL | Age: 66
End: 2021-07-29
Attending: INTERNAL MEDICINE
Payer: MEDICARE

## 2021-07-29 DIAGNOSIS — J31.0 CHRONIC RHINITIS: ICD-10-CM

## 2021-07-29 LAB — SARS-COV-2 RNA RESP QL NAA+PROBE: NOT DETECTED

## 2021-07-29 PROCEDURE — U0002 COVID-19 LAB TEST NON-CDC: HCPCS | Performed by: INTERNAL MEDICINE

## 2021-07-31 ENCOUNTER — HOSPITAL ENCOUNTER (EMERGENCY)
Facility: HOSPITAL | Age: 66
Discharge: HOME | End: 2021-07-31
Attending: EMERGENCY MEDICINE
Payer: MEDICARE

## 2021-07-31 VITALS
TEMPERATURE: 99 F | BODY MASS INDEX: 19.15 KG/M2 | DIASTOLIC BLOOD PRESSURE: 76 MMHG | RESPIRATION RATE: 18 BRPM | HEIGHT: 61 IN | WEIGHT: 101.44 LBS | SYSTOLIC BLOOD PRESSURE: 152 MMHG | HEART RATE: 86 BPM | OXYGEN SATURATION: 99 %

## 2021-07-31 DIAGNOSIS — R11.2 NAUSEA VOMITING AND DIARRHEA: ICD-10-CM

## 2021-07-31 DIAGNOSIS — R19.7 NAUSEA VOMITING AND DIARRHEA: ICD-10-CM

## 2021-07-31 DIAGNOSIS — B34.9 VIRAL SYNDROME: Primary | ICD-10-CM

## 2021-07-31 DIAGNOSIS — R05.9 COUGH: ICD-10-CM

## 2021-07-31 LAB
CTP QC/QA: YES
SARS-COV-2 RDRP RESP QL NAA+PROBE: NEGATIVE

## 2021-07-31 PROCEDURE — 99284 EMERGENCY DEPT VISIT MOD MDM: CPT

## 2021-07-31 PROCEDURE — U0002 COVID-19 LAB TEST NON-CDC: HCPCS | Performed by: NURSE PRACTITIONER

## 2021-07-31 RX ORDER — PROMETHAZINE HYDROCHLORIDE 25 MG/1
25 SUPPOSITORY RECTAL EVERY 6 HOURS PRN
Qty: 10 SUPPOSITORY | Refills: 0 | Status: SHIPPED | OUTPATIENT
Start: 2021-07-31 | End: 2021-09-01 | Stop reason: SDUPTHER

## 2021-07-31 RX ORDER — DICYCLOMINE HYDROCHLORIDE 20 MG/1
20 TABLET ORAL 2 TIMES DAILY
Qty: 6 TABLET | Refills: 0 | Status: SHIPPED | OUTPATIENT
Start: 2021-07-31 | End: 2021-07-31 | Stop reason: SDUPTHER

## 2021-07-31 RX ORDER — DICYCLOMINE HYDROCHLORIDE 20 MG/1
20 TABLET ORAL 2 TIMES DAILY
Qty: 6 TABLET | Refills: 0 | Status: SHIPPED | OUTPATIENT
Start: 2021-07-31 | End: 2021-08-04

## 2021-07-31 RX ORDER — ALBUTEROL SULFATE 90 UG/1
1-2 AEROSOL, METERED RESPIRATORY (INHALATION)
Qty: 18 G | Refills: 0 | Status: SHIPPED | OUTPATIENT
Start: 2021-07-31 | End: 2022-07-31

## 2021-07-31 RX ORDER — PROMETHAZINE HYDROCHLORIDE 25 MG/1
25 SUPPOSITORY RECTAL EVERY 6 HOURS PRN
Qty: 10 SUPPOSITORY | Refills: 0 | Status: SHIPPED | OUTPATIENT
Start: 2021-07-31 | End: 2021-07-31 | Stop reason: SDUPTHER

## 2021-07-31 RX ORDER — ALBUTEROL SULFATE 90 UG/1
1-2 AEROSOL, METERED RESPIRATORY (INHALATION)
Qty: 18 G | Refills: 0 | Status: SHIPPED | OUTPATIENT
Start: 2021-07-31 | End: 2021-07-31 | Stop reason: SDUPTHER

## 2021-08-03 PROBLEM — B34.9 VIRAL SYNDROME: Status: ACTIVE | Noted: 2021-08-03

## 2021-08-04 PROBLEM — M54.9 CHRONIC BACK PAIN: Status: ACTIVE | Noted: 2021-08-04

## 2021-08-04 PROBLEM — G89.29 CHRONIC BACK PAIN: Status: ACTIVE | Noted: 2021-08-04

## 2021-09-01 PROBLEM — R05.9 COUGH: Status: ACTIVE | Noted: 2021-09-01

## 2021-09-01 PROBLEM — R11.2 NAUSEA AND VOMITING: Status: ACTIVE | Noted: 2021-09-01

## 2021-09-02 ENCOUNTER — LAB VISIT (OUTPATIENT)
Dept: LAB | Facility: HOSPITAL | Age: 66
End: 2021-09-02
Attending: FAMILY MEDICINE
Payer: MEDICARE

## 2021-09-02 DIAGNOSIS — R11.2 NAUSEA AND VOMITING, INTRACTABILITY OF VOMITING NOT SPECIFIED, UNSPECIFIED VOMITING TYPE: ICD-10-CM

## 2021-09-02 PROCEDURE — U0002 COVID-19 LAB TEST NON-CDC: HCPCS | Performed by: FAMILY MEDICINE

## 2021-09-03 LAB — SARS-COV-2 RNA RESP QL NAA+PROBE: NOT DETECTED

## 2021-09-29 PROBLEM — Z00.00 WELLNESS EXAMINATION: Status: ACTIVE | Noted: 2021-09-29

## 2021-11-01 PROBLEM — R19.7 DIARRHEA, UNSPECIFIED: Status: ACTIVE | Noted: 2021-11-01

## 2021-11-17 PROBLEM — G47.10 HYPERSOMNOLENCE: Status: RESOLVED | Noted: 2021-04-27 | Resolved: 2021-11-17

## 2021-12-14 PROBLEM — G62.9 NEUROPATHY: Status: ACTIVE | Noted: 2021-12-14

## 2021-12-14 PROBLEM — G25.81 RESTLESS LEG SYNDROME: Status: RESOLVED | Noted: 2021-12-14 | Resolved: 2021-12-14

## 2021-12-14 PROBLEM — G25.81 RESTLESS LEG SYNDROME: Status: ACTIVE | Noted: 2021-12-14

## 2022-01-03 PROBLEM — Z00.00 WELLNESS EXAMINATION: Status: RESOLVED | Noted: 2021-09-29 | Resolved: 2022-01-03

## 2022-01-08 ENCOUNTER — LAB VISIT (OUTPATIENT)
Dept: LAB | Facility: HOSPITAL | Age: 67
End: 2022-01-08
Payer: MEDICARE

## 2022-01-08 DIAGNOSIS — R19.7 DIARRHEA, UNSPECIFIED TYPE: ICD-10-CM

## 2022-01-08 PROCEDURE — 87449 NOS EACH ORGANISM AG IA: CPT | Performed by: INTERNAL MEDICINE

## 2022-01-08 PROCEDURE — 87507 IADNA-DNA/RNA PROBE TQ 12-25: CPT | Performed by: INTERNAL MEDICINE

## 2022-02-28 ENCOUNTER — LAB VISIT (OUTPATIENT)
Dept: LAB | Facility: HOSPITAL | Age: 67
End: 2022-02-28
Attending: INTERNAL MEDICINE
Payer: MEDICARE

## 2022-02-28 DIAGNOSIS — J06.9 UPPER RESPIRATORY TRACT INFECTION, UNSPECIFIED TYPE: ICD-10-CM

## 2022-02-28 LAB
INFLUENZA A, MOLECULAR: NEGATIVE
INFLUENZA B, MOLECULAR: NEGATIVE
SARS-COV-2 RNA RESP QL NAA+PROBE: NOT DETECTED
SPECIMEN SOURCE: NORMAL

## 2022-02-28 PROCEDURE — 87502 INFLUENZA DNA AMP PROBE: CPT | Performed by: FAMILY MEDICINE

## 2022-02-28 PROCEDURE — U0002 COVID-19 LAB TEST NON-CDC: HCPCS | Performed by: FAMILY MEDICINE

## 2022-03-09 PROBLEM — M25.551 RIGHT HIP PAIN: Status: ACTIVE | Noted: 2022-03-09

## 2022-03-24 ENCOUNTER — HOSPITAL ENCOUNTER (OUTPATIENT)
Dept: RADIOLOGY | Facility: HOSPITAL | Age: 67
Discharge: HOME | End: 2022-03-24
Attending: FAMILY MEDICINE
Payer: MEDICARE

## 2022-03-24 DIAGNOSIS — G89.29 CHRONIC BILATERAL LOW BACK PAIN WITH BILATERAL SCIATICA: ICD-10-CM

## 2022-03-24 DIAGNOSIS — M54.41 CHRONIC BILATERAL LOW BACK PAIN WITH BILATERAL SCIATICA: ICD-10-CM

## 2022-03-24 DIAGNOSIS — M54.42 CHRONIC BILATERAL LOW BACK PAIN WITH BILATERAL SCIATICA: ICD-10-CM

## 2022-03-24 PROCEDURE — 72110 X-RAY EXAM L-2 SPINE 4/>VWS: CPT | Mod: TC

## 2022-03-24 PROCEDURE — 73502 X-RAY EXAM HIP UNI 2-3 VIEWS: CPT | Mod: TC,RT

## 2022-06-07 PROBLEM — R51.9 HEADACHE: Status: ACTIVE | Noted: 2022-06-07

## 2022-07-30 ENCOUNTER — HOSPITAL ENCOUNTER (EMERGENCY)
Facility: HOSPITAL | Age: 67
Discharge: HOME | End: 2022-07-30
Attending: EMERGENCY MEDICINE
Payer: MEDICARE

## 2022-07-30 VITALS
BODY MASS INDEX: 20.13 KG/M2 | RESPIRATION RATE: 16 BRPM | SYSTOLIC BLOOD PRESSURE: 178 MMHG | HEART RATE: 95 BPM | WEIGHT: 106.63 LBS | DIASTOLIC BLOOD PRESSURE: 84 MMHG | TEMPERATURE: 98 F | OXYGEN SATURATION: 100 % | HEIGHT: 61 IN

## 2022-07-30 DIAGNOSIS — R53.1 WEAKNESS: ICD-10-CM

## 2022-07-30 DIAGNOSIS — K52.9 COLITIS: Primary | ICD-10-CM

## 2022-07-30 DIAGNOSIS — N39.0 URINARY TRACT INFECTION WITHOUT HEMATURIA, SITE UNSPECIFIED: ICD-10-CM

## 2022-07-30 LAB
ALBUMIN SERPL BCP-MCNC: 3.9 G/DL (ref 3.5–5.2)
ALP SERPL-CCNC: 79 U/L (ref 55–135)
ALT SERPL W/O P-5'-P-CCNC: 13 U/L (ref 10–44)
ANION GAP SERPL CALC-SCNC: 6 MMOL/L (ref 8–16)
AST SERPL-CCNC: 10 U/L (ref 10–40)
BACTERIA #/AREA URNS HPF: NEGATIVE /HPF
BASOPHILS # BLD AUTO: 0.04 K/UL (ref 0–0.2)
BASOPHILS NFR BLD: 0.4 % (ref 0–1.9)
BILIRUB SERPL-MCNC: 0.3 MG/DL (ref 0.1–1)
BILIRUB UR QL STRIP: NEGATIVE
BUN SERPL-MCNC: 10 MG/DL (ref 8–23)
CALCIUM SERPL-MCNC: 9 MG/DL (ref 8.7–10.5)
CHLORIDE SERPL-SCNC: 109 MMOL/L (ref 95–110)
CLARITY UR: ABNORMAL
CO2 SERPL-SCNC: 23 MMOL/L (ref 23–29)
COLOR UR: YELLOW
CREAT SERPL-MCNC: 0.7 MG/DL (ref 0.5–1.4)
CTP QC/QA: YES
CTP QC/QA: YES
DIFFERENTIAL METHOD: ABNORMAL
EOSINOPHIL # BLD AUTO: 0 K/UL (ref 0–0.5)
EOSINOPHIL NFR BLD: 0.3 % (ref 0–8)
ERYTHROCYTE [DISTWIDTH] IN BLOOD BY AUTOMATED COUNT: 13 % (ref 11.5–14.5)
EST. GFR  (AFRICAN AMERICAN): >60 ML/MIN/1.73 M^2
EST. GFR  (NON AFRICAN AMERICAN): >60 ML/MIN/1.73 M^2
GLUCOSE SERPL-MCNC: 115 MG/DL (ref 70–110)
GLUCOSE UR QL STRIP: NEGATIVE
HCT VFR BLD AUTO: 45.6 % (ref 37–48.5)
HGB BLD-MCNC: 15.7 G/DL (ref 12–16)
HGB UR QL STRIP: ABNORMAL
HYALINE CASTS #/AREA URNS LPF: 18.8 /LPF
IMM GRANULOCYTES # BLD AUTO: 0.04 K/UL (ref 0–0.04)
IMM GRANULOCYTES NFR BLD AUTO: 0.4 % (ref 0–0.5)
KETONES UR QL STRIP: ABNORMAL
LEUKOCYTE ESTERASE UR QL STRIP: ABNORMAL
LIPASE SERPL-CCNC: 80 U/L (ref 23–300)
LYMPHOCYTES # BLD AUTO: 2 K/UL (ref 1–4.8)
LYMPHOCYTES NFR BLD: 18.8 % (ref 18–48)
MCH RBC QN AUTO: 31.8 PG (ref 27–31)
MCHC RBC AUTO-ENTMCNC: 34.4 G/DL (ref 32–36)
MCV RBC AUTO: 93 FL (ref 82–98)
MICROSCOPIC COMMENT: ABNORMAL
MONOCYTES # BLD AUTO: 0.6 K/UL (ref 0.3–1)
MONOCYTES NFR BLD: 5.5 % (ref 4–15)
NEUTROPHILS # BLD AUTO: 8.1 K/UL (ref 1.8–7.7)
NEUTROPHILS NFR BLD: 74.6 % (ref 38–73)
NITRITE UR QL STRIP: NEGATIVE
NRBC BLD-RTO: 0 /100 WBC
PH UR STRIP: 6 [PH] (ref 5–8)
PLATELET # BLD AUTO: 250 K/UL (ref 150–450)
PMV BLD AUTO: 10 FL (ref 9.2–12.9)
POC MOLECULAR INFLUENZA A AGN: NEGATIVE
POC MOLECULAR INFLUENZA B AGN: NEGATIVE
POTASSIUM SERPL-SCNC: 3.7 MMOL/L (ref 3.5–5.1)
PROT SERPL-MCNC: 7.3 G/DL (ref 6–8.4)
PROT UR QL STRIP: NEGATIVE
RBC # BLD AUTO: 4.93 M/UL (ref 4–5.4)
RBC #/AREA URNS HPF: 16 /HPF (ref 0–4)
SARS-COV-2 RDRP RESP QL NAA+PROBE: NEGATIVE
SODIUM SERPL-SCNC: 138 MMOL/L (ref 136–145)
SP GR UR STRIP: 1.02 (ref 1–1.03)
SQUAMOUS #/AREA URNS HPF: 2 /HPF
URN SPEC COLLECT METH UR: ABNORMAL
UROBILINOGEN UR STRIP-ACNC: 1 EU/DL
WBC # BLD AUTO: 10.79 K/UL (ref 3.9–12.7)
WBC #/AREA URNS HPF: 69 /HPF (ref 0–5)

## 2022-07-30 PROCEDURE — 96365 THER/PROPH/DIAG IV INF INIT: CPT

## 2022-07-30 PROCEDURE — 80053 COMPREHEN METABOLIC PANEL: CPT | Performed by: CLINICAL NURSE SPECIALIST

## 2022-07-30 PROCEDURE — 83690 ASSAY OF LIPASE: CPT | Performed by: CLINICAL NURSE SPECIALIST

## 2022-07-30 PROCEDURE — 63600175 PHARM REV CODE 636 W HCPCS: Performed by: CLINICAL NURSE SPECIALIST

## 2022-07-30 PROCEDURE — 87086 URINE CULTURE/COLONY COUNT: CPT | Performed by: CLINICAL NURSE SPECIALIST

## 2022-07-30 PROCEDURE — 25000003 PHARM REV CODE 250: Performed by: CLINICAL NURSE SPECIALIST

## 2022-07-30 PROCEDURE — 96361 HYDRATE IV INFUSION ADD-ON: CPT

## 2022-07-30 PROCEDURE — 99285 EMERGENCY DEPT VISIT HI MDM: CPT | Mod: 25

## 2022-07-30 PROCEDURE — U0002 COVID-19 LAB TEST NON-CDC: HCPCS | Performed by: CLINICAL NURSE SPECIALIST

## 2022-07-30 PROCEDURE — 81000 URINALYSIS NONAUTO W/SCOPE: CPT | Performed by: CLINICAL NURSE SPECIALIST

## 2022-07-30 PROCEDURE — 96375 TX/PRO/DX INJ NEW DRUG ADDON: CPT

## 2022-07-30 PROCEDURE — 85025 COMPLETE CBC W/AUTO DIFF WBC: CPT | Performed by: CLINICAL NURSE SPECIALIST

## 2022-07-30 RX ORDER — AMOXICILLIN AND CLAVULANATE POTASSIUM 500; 125 MG/1; MG/1
1 TABLET, FILM COATED ORAL 3 TIMES DAILY
Qty: 21 TABLET | Refills: 0 | Status: ON HOLD | OUTPATIENT
Start: 2022-07-30 | End: 2022-08-05

## 2022-07-30 RX ORDER — ONDANSETRON 2 MG/ML
4 INJECTION INTRAMUSCULAR; INTRAVENOUS
Status: COMPLETED | OUTPATIENT
Start: 2022-07-30 | End: 2022-07-30

## 2022-07-30 RX ORDER — DIPHENHYDRAMINE HYDROCHLORIDE 50 MG/ML
25 INJECTION INTRAMUSCULAR; INTRAVENOUS
Status: COMPLETED | OUTPATIENT
Start: 2022-07-30 | End: 2022-07-30

## 2022-07-30 RX ORDER — ONDANSETRON 4 MG/1
4 TABLET, ORALLY DISINTEGRATING ORAL ONCE
Qty: 1 TABLET | Refills: 0 | Status: SHIPPED | OUTPATIENT
Start: 2022-07-30 | End: 2022-07-30

## 2022-07-30 RX ORDER — PROCHLORPERAZINE EDISYLATE 5 MG/ML
10 INJECTION INTRAMUSCULAR; INTRAVENOUS ONCE
Status: COMPLETED | OUTPATIENT
Start: 2022-07-30 | End: 2022-07-30

## 2022-07-30 RX ORDER — KETOROLAC TROMETHAMINE 30 MG/ML
15 INJECTION, SOLUTION INTRAMUSCULAR; INTRAVENOUS
Status: COMPLETED | OUTPATIENT
Start: 2022-07-30 | End: 2022-07-30

## 2022-07-30 RX ORDER — DIPHENOXYLATE HYDROCHLORIDE AND ATROPINE SULFATE 2.5; .025 MG/1; MG/1
1 TABLET ORAL 4 TIMES DAILY PRN
Qty: 20 TABLET | Refills: 0 | Status: SHIPPED | OUTPATIENT
Start: 2022-07-30 | End: 2022-08-04

## 2022-07-30 RX ADMIN — ONDANSETRON HYDROCHLORIDE 4 MG: 2 SOLUTION INTRAMUSCULAR; INTRAVENOUS at 11:07

## 2022-07-30 RX ADMIN — DIPHENHYDRAMINE HYDROCHLORIDE 25 MG: 50 INJECTION, SOLUTION INTRAMUSCULAR; INTRAVENOUS at 12:07

## 2022-07-30 RX ADMIN — SODIUM CHLORIDE 1000 ML: 0.9 INJECTION, SOLUTION INTRAVENOUS at 11:07

## 2022-07-30 RX ADMIN — PROCHLORPERAZINE EDISYLATE 10 MG: 5 INJECTION INTRAMUSCULAR; INTRAVENOUS at 12:07

## 2022-07-30 RX ADMIN — CEFTRIAXONE 1 G: 1 INJECTION, SOLUTION INTRAVENOUS at 12:07

## 2022-07-30 RX ADMIN — KETOROLAC TROMETHAMINE 15 MG: 30 INJECTION, SOLUTION INTRAMUSCULAR; INTRAVENOUS at 12:07

## 2022-07-30 NOTE — ED PROVIDER NOTES
Encounter Date: 7/30/2022       History     Chief Complaint   Patient presents with    Generalized Body Aches     Patient c/o body aches, lack of appetite, diarrhea beginning last night, nausea but unable to throw up. Patient has been seen at doctor twice but has never been tested for COVID     Desiree Blake is an 67 y.o. female who complains of generalized body aches, loss of appetite, weight loss, diarrhea, nausea the last 2-3 weeks.  Patient has seen a physician twice but never got tested for COVID.  Patient was placed on steroids and given steroid shots patient was given steroids by mouth and steroid shots..  History of anxiety, CAD, COPD, dementia, depression        Review of patient's allergies indicates:  No Known Allergies  Past Medical History:   Diagnosis Date    Anticoagulant long-term use     Anxiety     Arthritis     Carotid artery disease     Cervical disc disorder     Chronic back pain     COPD (chronic obstructive pulmonary disease)     Coronary artery disease     Dementia     Depression     Diarrhea, unspecified 11/1/2021    DVT (deep venous thrombosis)     CAROTID    GERD (gastroesophageal reflux disease)     Hematuria     Hiatal hernia     High cholesterol     Ill-fitting dentures     STATES DOESN'T WEAR    PVD (peripheral vascular disease)     Renal calculus     Sleep apnea     Sleep apnea     NO C PAP    Urinary frequency     Urinary urgency     Wears glasses     READING      Past Surgical History:   Procedure Laterality Date    BACK SURGERY      BREAST LUMPECTOMY Right     CAROTID ARTERY ANGIOPLASTY      CAROTID ARTERY ANGIOPLASTY      CAROTID ARTERY STENT Left     CAROTID ENDARTERECTOMY Right     CHOLECYSTECTOMY      COLONOSCOPY      CYSTOSCOPY      HYSTERECTOMY      OOPHORECTOMY      TONSILLECTOMY       Family History   Problem Relation Age of Onset    Cancer Mother     Stroke Father     No Known Problems Sister     No Known Problems Daughter     No  Known Problems Maternal Aunt     No Known Problems Maternal Uncle     No Known Problems Paternal Aunt     No Known Problems Paternal Uncle     No Known Problems Maternal Grandmother     No Known Problems Maternal Grandfather     No Known Problems Paternal Grandmother     No Known Problems Paternal Grandfather     Breast cancer Neg Hx     Ovarian cancer Neg Hx     BRCA 1/2 Neg Hx      Social History     Tobacco Use    Smoking status: Current Every Day Smoker     Packs/day: 1.00     Types: Cigarettes     Start date: 1970    Smokeless tobacco: Never Used   Substance Use Topics    Alcohol use: No    Drug use: No     Review of Systems   Constitutional: Positive for activity change, appetite change, fatigue and unexpected weight change. Negative for fever.   HENT: Negative for sore throat.    Respiratory: Negative for shortness of breath.    Cardiovascular: Negative for chest pain.   Gastrointestinal: Positive for diarrhea and nausea.   Genitourinary: Negative for dysuria.   Musculoskeletal: Positive for arthralgias. Negative for back pain.   Skin: Negative for rash.   Neurological: Positive for weakness.   Hematological: Does not bruise/bleed easily.   All other systems reviewed and are negative.      Physical Exam     Initial Vitals [07/30/22 1045]   BP Pulse Resp Temp SpO2   133/89 95 18 98.1 °F (36.7 °C) 96 %      MAP       --         Physical Exam    Nursing note and vitals reviewed.  Constitutional: She appears well-developed and well-nourished.   HENT:   Head: Normocephalic and atraumatic.   Eyes: Pupils are equal, round, and reactive to light.   Neck:   Normal range of motion.  Cardiovascular: Normal rate and regular rhythm.   Pulmonary/Chest: Breath sounds normal.   Abdominal: Abdomen is soft. Bowel sounds are normal.   Musculoskeletal:         General: Normal range of motion.      Cervical back: Normal range of motion.     Neurological: She is alert and oriented to person, place, and time.   Skin:  Skin is warm and dry.   Psychiatric: She has a normal mood and affect.         ED Course   Procedures  Labs Reviewed   CBC W/ AUTO DIFFERENTIAL - Abnormal; Notable for the following components:       Result Value    MCH 31.8 (*)     Gran # (ANC) 8.1 (*)     Gran % 74.6 (*)     All other components within normal limits   COMPREHENSIVE METABOLIC PANEL - Abnormal; Notable for the following components:    Glucose 115 (*)     Anion Gap 6 (*)     All other components within normal limits   URINALYSIS, REFLEX TO URINE CULTURE - Abnormal; Notable for the following components:    Appearance, UA Cloudy (*)     Ketones, UA 1+ (*)     Occult Blood UA 2+ (*)     Leukocytes, UA 2+ (*)     All other components within normal limits    Narrative:     Preferred Collection Type->Urine, Clean Catch  Specimen Source->Urine   URINALYSIS MICROSCOPIC - Abnormal; Notable for the following components:    RBC, UA 16 (*)     WBC, UA 69 (*)     Hyaline Casts, UA 18.8 (*)     All other components within normal limits    Narrative:     Preferred Collection Type->Urine, Clean Catch  Specimen Source->Urine   CULTURE, URINE   LIPASE   SARS-COV-2 RDRP GENE   POCT INFLUENZA A/B MOLECULAR          Imaging Results          CT Abdomen Pelvis  Without Contrast (Final result)  Result time 07/30/22 12:33:35    Final result by Mary Ann Javier MD (07/30/22 12:33:35)                 Impression:      Nonspecific descending colitis    No other evidence of an acute process      Electronically signed by: Mary Ann Javier MD  Date:    07/30/2022  Time:    12:33             Narrative:    EXAMINATION:  CT ABDOMEN PELVIS WITHOUT CONTRAST    CLINICAL HISTORY:  Weight loss, unintended;    CT/Cardiac Nuclear exams in prior 12 months: 0    TECHNIQUE:  Axial CT abdomen and pelvis without IV contrast.  Iterative reconstruction utilized.  Coronal reformats prepared.    COMPARISON:  CT abdomen pelvis 04/05/2021    FINDINGS:  Similar pattern to prior, wall thickening of  descending colon.    Liquid stool in the colon.  No dilated small bowel.  No free air or ascites.  Small hepatic cyst.  Prior cholecystectomy.  Right extrarenal pelvis.  Unremarkable adrenals, spleen, liver and pancreas.  Chronic vascular and osseous changes.  No suspicious mass or lymphadenopathy detected.                               X-Ray Chest AP Portable (Final result)  Result time 07/30/22 12:25:45    Final result by Mary Ann Javier MD (07/30/22 12:25:45)                 Impression:      No acute findings      Electronically signed by: Mary Ann Javier MD  Date:    07/30/2022  Time:    12:25             Narrative:    EXAMINATION:  XR CHEST AP PORTABLE    CLINICAL HISTORY:  Weakness    COMPARISON:  12/23/2018    FINDINGS:  No acute infiltrates or signs of CHF.  No change from prior.    Unremarkable cardiomediastinal silhouette.                                 Medications   cefTRIAXone (ROCEPHIN) 1 g/50 mL D5W IVPB (1 g Intravenous New Bag 7/30/22 1231)   sodium chloride 0.9% bolus 1,000 mL (0 mLs Intravenous Stopped 7/30/22 1216)   ondansetron injection 4 mg (4 mg Intravenous Given 7/30/22 1104)   diphenhydrAMINE injection 25 mg (25 mg Intravenous Given 7/30/22 1228)   prochlorperazine injection Soln 10 mg (10 mg Intravenous Given 7/30/22 1229)   ketorolac injection 15 mg (15 mg Intravenous Given 7/30/22 1228)     Medical Decision Making:   Differential Diagnosis:   COVID, flu, URI  Clinical Tests:   Lab Tests: Ordered and Reviewed                      Clinical Impression:   Final diagnoses:  [R53.1] Weakness  [K52.9] Colitis (Primary)  [N39.0] Urinary tract infection without hematuria, site unspecified          ED Disposition Condition    Discharge Stable        ED Prescriptions     Medication Sig Dispense Start Date End Date Auth. Provider    amoxicillin-clavulanate 500-125mg (AUGMENTIN) 500-125 mg Tab Take 1 tablet (500 mg total) by mouth 3 (three) times daily. 21 tablet 7/30/2022  Honey Abdi,  NP    ondansetron (ZOFRAN-ODT) 4 MG TbDL (Expires today) Take 1 tablet (4 mg total) by mouth once. for 1 dose 1 tablet 7/30/2022 7/30/2022 Honey Abdi NP    diphenoxylate-atropine 2.5-0.025 mg (LOMOTIL) 2.5-0.025 mg per tablet Take 1 tablet by mouth 4 (four) times daily as needed for Diarrhea. 20 tablet 7/30/2022 8/9/2022 Honey Abdi NP        Follow-up Information     Follow up With Specialties Details Why Contact Info    Kt Pozo Jr., MD Internal Medicine Schedule an appointment as soon as possible for a visit   [address] [place] [id number]  [phone]             Honey Abdi NP  07/30/22 3585

## 2022-07-31 LAB — BACTERIA UR CULT: NORMAL

## 2022-08-02 PROBLEM — R03.0 ELEVATED BP WITHOUT DIAGNOSIS OF HYPERTENSION: Status: ACTIVE | Noted: 2022-08-02

## 2022-08-02 PROBLEM — N39.0 UTI (URINARY TRACT INFECTION): Status: ACTIVE | Noted: 2022-08-02

## 2022-08-03 ENCOUNTER — LAB VISIT (OUTPATIENT)
Dept: LAB | Facility: HOSPITAL | Age: 67
End: 2022-08-03
Attending: FAMILY MEDICINE
Payer: MEDICARE

## 2022-08-03 DIAGNOSIS — K52.9 COLITIS: ICD-10-CM

## 2022-08-03 LAB
ASTROVIRUS: NOT DETECTED
C COLI+JEJ+UPSA DNA STL QL NAA+NON-PROBE: NOT DETECTED
C DIF TOX TCDA+TCDB STL QL NAA+NON-PROBE: NOT DETECTED
CYCLOSPORA CAYETANENSIS: NOT DETECTED
ENTEROAGGREGATIVE E COLI: NOT DETECTED
ENTEROPATHOGENIC E COLI: NOT DETECTED
GPP - ADENOVIRUS 40/41: NOT DETECTED
GPP - CRYPTOSPORIDIUM: NOT DETECTED
GPP - E COLI O157: NOT DETECTED
GPP - ENTAMOEBA HISTOLYTICA: NOT DETECTED
GPP - ENTEROTOXIGENIC E COLI (ETEC): NOT DETECTED
GPP - GIARDIA LAMBLIA: NOT DETECTED
GPP - NOROVIRUS GI/GII: NOT DETECTED
GPP - ROTAVIRUS A: NOT DETECTED
GPP - SALMONELLA: NOT DETECTED
GPP - VIBRIO CHOLERA: NOT DETECTED
GPP - YERSINIA ENTEROCOLITICA: NOT DETECTED
LACTATE PLASV-SCNC: NOT DETECTED MMOL/L
PLESIOMONAS SHIGELLOIDES: NOT DETECTED
SAPOVIRUS: NOT DETECTED
SHIGELLA SP+EIEC IPAH STL QL NAA+PROBE: NOT DETECTED
VIBRIO: NOT DETECTED

## 2022-08-03 PROCEDURE — 87493 C DIFF AMPLIFIED PROBE: CPT | Mod: 59 | Performed by: FAMILY MEDICINE

## 2022-08-03 PROCEDURE — 87449 NOS EACH ORGANISM AG IA: CPT | Performed by: FAMILY MEDICINE

## 2022-08-03 PROCEDURE — 87507 IADNA-DNA/RNA PROBE TQ 12-25: CPT | Performed by: FAMILY MEDICINE

## 2022-08-04 ENCOUNTER — HOSPITAL ENCOUNTER (EMERGENCY)
Facility: HOSPITAL | Age: 67
Discharge: HOME | End: 2022-08-04
Attending: STUDENT IN AN ORGANIZED HEALTH CARE EDUCATION/TRAINING PROGRAM
Payer: MEDICARE

## 2022-08-04 VITALS
BODY MASS INDEX: 18.3 KG/M2 | HEART RATE: 60 BPM | RESPIRATION RATE: 18 BRPM | HEIGHT: 64 IN | WEIGHT: 107.19 LBS | SYSTOLIC BLOOD PRESSURE: 156 MMHG | OXYGEN SATURATION: 99 % | DIASTOLIC BLOOD PRESSURE: 75 MMHG | TEMPERATURE: 98 F

## 2022-08-04 DIAGNOSIS — R19.7 NAUSEA VOMITING AND DIARRHEA: ICD-10-CM

## 2022-08-04 DIAGNOSIS — R11.2 NAUSEA VOMITING AND DIARRHEA: ICD-10-CM

## 2022-08-04 DIAGNOSIS — A04.72 C. DIFFICILE DIARRHEA: Primary | ICD-10-CM

## 2022-08-04 LAB
ALBUMIN SERPL BCP-MCNC: 3.9 G/DL (ref 3.5–5.2)
ALP SERPL-CCNC: 76 U/L (ref 55–135)
ALT SERPL W/O P-5'-P-CCNC: 24 U/L (ref 10–44)
ANION GAP SERPL CALC-SCNC: 3 MMOL/L (ref 8–16)
AST SERPL-CCNC: 19 U/L (ref 10–40)
BASOPHILS # BLD AUTO: 0.05 K/UL (ref 0–0.2)
BASOPHILS NFR BLD: 0.4 % (ref 0–1.9)
BILIRUB SERPL-MCNC: 0.2 MG/DL (ref 0.1–1)
BUN SERPL-MCNC: 10 MG/DL (ref 8–23)
C DIFF GDH STL QL: POSITIVE
C DIFF TOX A+B STL QL IA: NEGATIVE
C DIFF TOX GENS STL QL NAA+PROBE: NEGATIVE
CALCIUM SERPL-MCNC: 9.1 MG/DL (ref 8.7–10.5)
CHLORIDE SERPL-SCNC: 110 MMOL/L (ref 95–110)
CO2 SERPL-SCNC: 28 MMOL/L (ref 23–29)
CREAT SERPL-MCNC: 0.7 MG/DL (ref 0.5–1.4)
CTP QC/QA: YES
DIFFERENTIAL METHOD: ABNORMAL
EOSINOPHIL # BLD AUTO: 0.1 K/UL (ref 0–0.5)
EOSINOPHIL NFR BLD: 0.8 % (ref 0–8)
ERYTHROCYTE [DISTWIDTH] IN BLOOD BY AUTOMATED COUNT: 13.2 % (ref 11.5–14.5)
EST. GFR  (NO RACE VARIABLE): >60 ML/MIN/1.73 M^2
GLUCOSE SERPL-MCNC: 96 MG/DL (ref 70–110)
HCT VFR BLD AUTO: 46 % (ref 37–48.5)
HGB BLD-MCNC: 15.8 G/DL (ref 12–16)
IMM GRANULOCYTES # BLD AUTO: 0.03 K/UL (ref 0–0.04)
IMM GRANULOCYTES NFR BLD AUTO: 0.3 % (ref 0–0.5)
LIPASE SERPL-CCNC: 172 U/L (ref 23–300)
LYMPHOCYTES # BLD AUTO: 2.3 K/UL (ref 1–4.8)
LYMPHOCYTES NFR BLD: 19.4 % (ref 18–48)
MAGNESIUM SERPL-MCNC: 2.2 MG/DL (ref 1.6–2.6)
MCH RBC QN AUTO: 31.9 PG (ref 27–31)
MCHC RBC AUTO-ENTMCNC: 34.3 G/DL (ref 32–36)
MCV RBC AUTO: 93 FL (ref 82–98)
MONOCYTES # BLD AUTO: 0.6 K/UL (ref 0.3–1)
MONOCYTES NFR BLD: 5.2 % (ref 4–15)
NEUTROPHILS # BLD AUTO: 8.6 K/UL (ref 1.8–7.7)
NEUTROPHILS NFR BLD: 73.9 % (ref 38–73)
NRBC BLD-RTO: 0 /100 WBC
PLATELET # BLD AUTO: 244 K/UL (ref 150–450)
PMV BLD AUTO: 9.7 FL (ref 9.2–12.9)
POTASSIUM SERPL-SCNC: 3.8 MMOL/L (ref 3.5–5.1)
PROT SERPL-MCNC: 7.5 G/DL (ref 6–8.4)
RBC # BLD AUTO: 4.95 M/UL (ref 4–5.4)
SARS-COV-2 RDRP RESP QL NAA+PROBE: NEGATIVE
SODIUM SERPL-SCNC: 141 MMOL/L (ref 136–145)
WBC # BLD AUTO: 11.57 K/UL (ref 3.9–12.7)

## 2022-08-04 PROCEDURE — 96372 THER/PROPH/DIAG INJ SC/IM: CPT | Performed by: STUDENT IN AN ORGANIZED HEALTH CARE EDUCATION/TRAINING PROGRAM

## 2022-08-04 PROCEDURE — 96375 TX/PRO/DX INJ NEW DRUG ADDON: CPT

## 2022-08-04 PROCEDURE — 96374 THER/PROPH/DIAG INJ IV PUSH: CPT

## 2022-08-04 PROCEDURE — S0166 INJ OLANZAPINE 2.5MG: HCPCS | Performed by: STUDENT IN AN ORGANIZED HEALTH CARE EDUCATION/TRAINING PROGRAM

## 2022-08-04 PROCEDURE — U0002 COVID-19 LAB TEST NON-CDC: HCPCS | Performed by: STUDENT IN AN ORGANIZED HEALTH CARE EDUCATION/TRAINING PROGRAM

## 2022-08-04 PROCEDURE — 99284 EMERGENCY DEPT VISIT MOD MDM: CPT | Mod: 25

## 2022-08-04 PROCEDURE — 85025 COMPLETE CBC W/AUTO DIFF WBC: CPT | Performed by: STUDENT IN AN ORGANIZED HEALTH CARE EDUCATION/TRAINING PROGRAM

## 2022-08-04 PROCEDURE — 96361 HYDRATE IV INFUSION ADD-ON: CPT

## 2022-08-04 PROCEDURE — 80053 COMPREHEN METABOLIC PANEL: CPT | Performed by: STUDENT IN AN ORGANIZED HEALTH CARE EDUCATION/TRAINING PROGRAM

## 2022-08-04 PROCEDURE — 36415 COLL VENOUS BLD VENIPUNCTURE: CPT | Performed by: STUDENT IN AN ORGANIZED HEALTH CARE EDUCATION/TRAINING PROGRAM

## 2022-08-04 PROCEDURE — 25000003 PHARM REV CODE 250: Performed by: STUDENT IN AN ORGANIZED HEALTH CARE EDUCATION/TRAINING PROGRAM

## 2022-08-04 PROCEDURE — 83690 ASSAY OF LIPASE: CPT | Performed by: STUDENT IN AN ORGANIZED HEALTH CARE EDUCATION/TRAINING PROGRAM

## 2022-08-04 PROCEDURE — 63600175 PHARM REV CODE 636 W HCPCS: Performed by: STUDENT IN AN ORGANIZED HEALTH CARE EDUCATION/TRAINING PROGRAM

## 2022-08-04 PROCEDURE — 83735 ASSAY OF MAGNESIUM: CPT | Performed by: STUDENT IN AN ORGANIZED HEALTH CARE EDUCATION/TRAINING PROGRAM

## 2022-08-04 RX ORDER — OLANZAPINE 10 MG/2ML
5 INJECTION, POWDER, FOR SOLUTION INTRAMUSCULAR ONCE AS NEEDED
Status: COMPLETED | OUTPATIENT
Start: 2022-08-04 | End: 2022-08-04

## 2022-08-04 RX ORDER — ACETAMINOPHEN 325 MG/1
650 TABLET ORAL
Status: COMPLETED | OUTPATIENT
Start: 2022-08-04 | End: 2022-08-04

## 2022-08-04 RX ORDER — LABETALOL HCL 20 MG/4 ML
10 SYRINGE (ML) INTRAVENOUS
Status: COMPLETED | OUTPATIENT
Start: 2022-08-04 | End: 2022-08-04

## 2022-08-04 RX ORDER — KETOROLAC TROMETHAMINE 30 MG/ML
15 INJECTION, SOLUTION INTRAMUSCULAR; INTRAVENOUS
Status: COMPLETED | OUTPATIENT
Start: 2022-08-04 | End: 2022-08-04

## 2022-08-04 RX ORDER — ONDANSETRON 2 MG/ML
4 INJECTION INTRAMUSCULAR; INTRAVENOUS
Status: DISCONTINUED | OUTPATIENT
Start: 2022-08-04 | End: 2022-08-04

## 2022-08-04 RX ORDER — ALPRAZOLAM 0.5 MG/1
0.5 TABLET ORAL
Status: COMPLETED | OUTPATIENT
Start: 2022-08-04 | End: 2022-08-04

## 2022-08-04 RX ORDER — FAMOTIDINE 10 MG/ML
20 INJECTION INTRAVENOUS
Status: COMPLETED | OUTPATIENT
Start: 2022-08-04 | End: 2022-08-04

## 2022-08-04 RX ADMIN — FAMOTIDINE 20 MG: 10 INJECTION INTRAVENOUS at 12:08

## 2022-08-04 RX ADMIN — ALPRAZOLAM 0.5 MG: 0.5 TABLET ORAL at 01:08

## 2022-08-04 RX ADMIN — OLANZAPINE 5 MG: 10 INJECTION, POWDER, LYOPHILIZED, FOR SOLUTION INTRAMUSCULAR at 11:08

## 2022-08-04 RX ADMIN — LABETALOL HYDROCHLORIDE 10 MG: 5 INJECTION, SOLUTION INTRAVENOUS at 02:08

## 2022-08-04 RX ADMIN — SODIUM CHLORIDE, SODIUM LACTATE, POTASSIUM CHLORIDE, AND CALCIUM CHLORIDE 1000 ML: .6; .31; .03; .02 INJECTION, SOLUTION INTRAVENOUS at 11:08

## 2022-08-04 RX ADMIN — ACETAMINOPHEN 325MG 650 MG: 325 TABLET ORAL at 12:08

## 2022-08-04 RX ADMIN — KETOROLAC TROMETHAMINE 15 MG: 30 INJECTION, SOLUTION INTRAMUSCULAR at 12:08

## 2022-08-04 NOTE — ED PROVIDER NOTES
Encounter Date: 8/4/2022       History     Chief Complaint   Patient presents with    Diarrhea     I've been sick with stomach pains, diarrhea, and weakness.  Also c/o bad headache and not able to sleep.  Jessica at [facility]'s office called me this morning and told me that I had C Diff.  She called me in something and I was too sick to go get it so I came here.     67-year-old male with history of coronary artery disease with history of COPD, high cholesterol presents with crampy abdominal pain generalized weakness and nonbloody diarrhea for the past few days.  Patient was seen here a few days ago for similar complaint and found to have C diff.  Patient was prescribed medication but has been unable to take medication.  Patient denies any chest pain, fever, sick contacts        Review of patient's allergies indicates:  No Known Allergies  Past Medical History:   Diagnosis Date    Anticoagulant long-term use     Anxiety     Arthritis     Carotid artery disease     Cervical disc disorder     Chronic back pain     COPD (chronic obstructive pulmonary disease)     Coronary artery disease     Dementia     Depression     Diarrhea, unspecified 11/1/2021    DVT (deep venous thrombosis)     CAROTID    GERD (gastroesophageal reflux disease)     Hematuria     Hiatal hernia     High cholesterol     Ill-fitting dentures     STATES DOESN'T WEAR    PVD (peripheral vascular disease)     Renal calculus     Sleep apnea     Sleep apnea     NO C PAP    Urinary frequency     Urinary urgency     Wears glasses     READING      Past Surgical History:   Procedure Laterality Date    BACK SURGERY      BREAST LUMPECTOMY Right     CAROTID ARTERY ANGIOPLASTY      CAROTID ARTERY ANGIOPLASTY      CAROTID ARTERY STENT Left     CAROTID ENDARTERECTOMY Right     CHOLECYSTECTOMY      COLONOSCOPY      CYSTOSCOPY      HYSTERECTOMY      OOPHORECTOMY      TONSILLECTOMY       Family History   Problem Relation Age of Onset     Cancer Mother     Stroke Father     No Known Problems Sister     No Known Problems Daughter     No Known Problems Maternal Aunt     No Known Problems Maternal Uncle     No Known Problems Paternal Aunt     No Known Problems Paternal Uncle     No Known Problems Maternal Grandmother     No Known Problems Maternal Grandfather     No Known Problems Paternal Grandmother     No Known Problems Paternal Grandfather     Breast cancer Neg Hx     Ovarian cancer Neg Hx     BRCA 1/2 Neg Hx      Social History     Tobacco Use    Smoking status: Current Every Day Smoker     Packs/day: 0.50     Types: Cigarettes     Start date: 1970    Smokeless tobacco: Never Used   Substance Use Topics    Alcohol use: No    Drug use: No     Review of Systems   Constitutional: Positive for fatigue.   HENT: Negative.    Respiratory: Negative.    Cardiovascular: Negative.    Gastrointestinal: Positive for abdominal pain, diarrhea, nausea and vomiting.   Genitourinary: Negative.    Musculoskeletal: Negative.    Skin: Negative.    Neurological: Positive for weakness.   Psychiatric/Behavioral: Negative.    All other systems reviewed and are negative.      Physical Exam     Initial Vitals   BP Pulse Resp Temp SpO2   08/04/22 1045 08/04/22 1045 08/04/22 1039 08/04/22 1045 08/04/22 1045   (!) 186/91 98 18 98.1 °F (36.7 °C) 99 %      MAP       --                Physical Exam    Nursing note and vitals reviewed.  Constitutional: Vital signs are normal.   Frail    HENT:   Head: Normocephalic and atraumatic.   Eyes: Conjunctivae and lids are normal.   Neck: Trachea normal. Neck supple.   Cardiovascular: Normal rate, regular rhythm, normal heart sounds, intact distal pulses and normal pulses.   No murmur heard.  Pulmonary/Chest: Breath sounds normal. No respiratory distress.   Abdominal: Abdomen is soft. Bowel sounds are normal. She exhibits no distension. There is abdominal tenderness. There is guarding.   Musculoskeletal:          General: Normal range of motion.      Cervical back: Neck supple.     Neurological: She is alert and oriented to person, place, and time. She has normal strength. No cranial nerve deficit or sensory deficit.   Skin: Skin is warm. Capillary refill takes less than 2 seconds.   Psychiatric: She has a normal mood and affect. Her speech is normal. Thought content normal.         ED Course   Procedures  Labs Reviewed   CBC W/ AUTO DIFFERENTIAL - Abnormal; Notable for the following components:       Result Value    MCH 31.9 (*)     Gran # (ANC) 8.6 (*)     Gran % 73.9 (*)     All other components within normal limits   COMPREHENSIVE METABOLIC PANEL - Abnormal; Notable for the following components:    Anion Gap 3 (*)     All other components within normal limits   LIPASE   MAGNESIUM   SARS-COV-2 RDRP GENE    Narrative:     This test utilizes isothermal nucleic acid amplification   technology to detect the SARS-CoV-2 RdRp nucleic acid segment.   The analytical sensitivity (limit of detection) is 125 genome   equivalents/mL.   A POSITIVE result implies infection with the SARS-CoV-2 virus;   the patient is presumed to be contagious.     A NEGATIVE result means that SARS-CoV-2 nucleic acids are not   present above the limit of detection. A NEGATIVE result should be   treated as presumptive. It does not rule out the possibility of   COVID-19 and should not be the sole basis for treatment decisions.   If COVID-19 is strongly suspected based on clinical and exposure   history, re-testing using an alternate molecular assay should be   considered.   This test is only for use under the Food and Drug   Administration s Emergency Use Authorization (EUA).   Commercial kits are provided by [employer].   Performance characteristics of the EUA have been independently   verified by [facility] Department of   Pathology and Laboratory Medicine.   _________________________________________________________________   The  authorized Fact Sheet for Healthcare Providers and the authorized Fact   Sheet for Patients of the ID NOW COVID-19 are available on the FDA   website:     https://www.fda.gov/media/425277/download  https://www.fda.gov/media/564687/download                  Imaging Results    None          Medications   lactated ringers bolus 1,000 mL (0 mLs Intravenous Stopped 8/4/22 1207)   OLANZapine injection 5 mg (5 mg Intramuscular Given 8/4/22 1127)   ketorolac injection 15 mg (15 mg Intravenous Given 8/4/22 1218)   famotidine (PF) injection 20 mg (20 mg Intravenous Given 8/4/22 1219)   acetaminophen tablet 650 mg (650 mg Oral Given 8/4/22 1259)   ALPRAZolam tablet 0.5 mg (0.5 mg Oral Given 8/4/22 1310)   labetalol 20 mg/4 mL (5 mg/mL) IV syring (10 mg Intravenous Given 8/4/22 1408)     Medical Decision Making:   Initial Assessment:   67-year-old male with history of coronary artery disease with history of COPD, high cholesterol presents with crampy abdominal pain generalized weakness and nonbloody diarrhea for the past few days.  Afebrile vitals stable.  Physical noted.  Will get labs and imaging to rule out electrolyte derangement or COVID.  Fluid.  Symptomatic treatment.  Re-evaluate  Clinical Tests:   Lab Tests: Ordered and Reviewed  The following lab test(s) were unremarkable: CBC and CMP             ED Course as of 08/04/22 1424   Thu Aug 04, 2022   1423 Abdomen remains soft non peritonitic.  Patient tolerating p.o..  Will discharge patient home.  Patient already has antibiotics for C diff  [HD]      ED Course User Index  [HD] Vinicio Gautam MD             Clinical Impression:   Final diagnoses:  [R11.2, R19.7] Nausea vomiting and diarrhea  [A04.72] C. difficile diarrhea (Primary)          ED Disposition Condition    Discharge Stable        ED Prescriptions     None        Follow-up Information     Follow up With Specialties Details Why Contact Info    Kt Pozo Jr., MD Internal Medicine In 2 days  [address]  [facility] [address]  [phone]             Vinicio Gautam MD  08/04/22 0820

## 2022-08-05 ENCOUNTER — HOSPITAL ENCOUNTER (OUTPATIENT)
Facility: HOSPITAL | Age: 67
Discharge: HOME | End: 2022-08-09
Attending: STUDENT IN AN ORGANIZED HEALTH CARE EDUCATION/TRAINING PROGRAM | Admitting: EMERGENCY MEDICINE
Payer: MEDICARE

## 2022-08-05 DIAGNOSIS — E87.6 HYPOKALEMIA: ICD-10-CM

## 2022-08-05 DIAGNOSIS — R11.2 NON-INTRACTABLE VOMITING WITH NAUSEA, UNSPECIFIED VOMITING TYPE: ICD-10-CM

## 2022-08-05 DIAGNOSIS — F41.1 GAD (GENERALIZED ANXIETY DISORDER): ICD-10-CM

## 2022-08-05 DIAGNOSIS — A04.72 C. DIFFICILE COLITIS: Primary | ICD-10-CM

## 2022-08-05 LAB
ALBUMIN SERPL BCP-MCNC: 3.4 G/DL (ref 3.5–5.2)
ALP SERPL-CCNC: 64 U/L (ref 55–135)
ALT SERPL W/O P-5'-P-CCNC: 18 U/L (ref 10–44)
ANION GAP SERPL CALC-SCNC: 3 MMOL/L (ref 8–16)
AST SERPL-CCNC: 11 U/L (ref 10–40)
BASOPHILS # BLD AUTO: 0.05 K/UL (ref 0–0.2)
BASOPHILS NFR BLD: 0.3 % (ref 0–1.9)
BILIRUB SERPL-MCNC: 0.2 MG/DL (ref 0.1–1)
BUN SERPL-MCNC: 11 MG/DL (ref 8–23)
CALCIUM SERPL-MCNC: 8.7 MG/DL (ref 8.7–10.5)
CHLORIDE SERPL-SCNC: 110 MMOL/L (ref 95–110)
CO2 SERPL-SCNC: 28 MMOL/L (ref 23–29)
CREAT SERPL-MCNC: 1 MG/DL (ref 0.5–1.4)
DIFFERENTIAL METHOD: ABNORMAL
EOSINOPHIL # BLD AUTO: 0.2 K/UL (ref 0–0.5)
EOSINOPHIL NFR BLD: 1 % (ref 0–8)
ERYTHROCYTE [DISTWIDTH] IN BLOOD BY AUTOMATED COUNT: 13.6 % (ref 11.5–14.5)
EST. GFR  (NO RACE VARIABLE): >60 ML/MIN/1.73 M^2
GLUCOSE SERPL-MCNC: 96 MG/DL (ref 70–110)
HCT VFR BLD AUTO: 45.6 % (ref 37–48.5)
HGB BLD-MCNC: 15.7 G/DL (ref 12–16)
IMM GRANULOCYTES # BLD AUTO: 0.06 K/UL (ref 0–0.04)
IMM GRANULOCYTES NFR BLD AUTO: 0.4 % (ref 0–0.5)
LIPASE SERPL-CCNC: 120 U/L (ref 23–300)
LYMPHOCYTES # BLD AUTO: 2.2 K/UL (ref 1–4.8)
LYMPHOCYTES NFR BLD: 13.9 % (ref 18–48)
MAGNESIUM SERPL-MCNC: 2.1 MG/DL (ref 1.6–2.6)
MCH RBC QN AUTO: 31.3 PG (ref 27–31)
MCHC RBC AUTO-ENTMCNC: 34.4 G/DL (ref 32–36)
MCV RBC AUTO: 91 FL (ref 82–98)
MONOCYTES # BLD AUTO: 0.9 K/UL (ref 0.3–1)
MONOCYTES NFR BLD: 5.6 % (ref 4–15)
NEUTROPHILS # BLD AUTO: 12.3 K/UL (ref 1.8–7.7)
NEUTROPHILS NFR BLD: 78.8 % (ref 38–73)
NRBC BLD-RTO: 0 /100 WBC
PLATELET # BLD AUTO: 262 K/UL (ref 150–450)
PMV BLD AUTO: 9.8 FL (ref 9.2–12.9)
POTASSIUM SERPL-SCNC: 3.2 MMOL/L (ref 3.5–5.1)
PROT SERPL-MCNC: 6.6 G/DL (ref 6–8.4)
RBC # BLD AUTO: 5.01 M/UL (ref 4–5.4)
SODIUM SERPL-SCNC: 141 MMOL/L (ref 136–145)
WBC # BLD AUTO: 15.57 K/UL (ref 3.9–12.7)

## 2022-08-05 PROCEDURE — 63600175 PHARM REV CODE 636 W HCPCS: Performed by: STUDENT IN AN ORGANIZED HEALTH CARE EDUCATION/TRAINING PROGRAM

## 2022-08-05 PROCEDURE — 96375 TX/PRO/DX INJ NEW DRUG ADDON: CPT

## 2022-08-05 PROCEDURE — G0378 HOSPITAL OBSERVATION PER HR: HCPCS

## 2022-08-05 PROCEDURE — 25000003 PHARM REV CODE 250: Performed by: INTERNAL MEDICINE

## 2022-08-05 PROCEDURE — 85025 COMPLETE CBC W/AUTO DIFF WBC: CPT | Performed by: STUDENT IN AN ORGANIZED HEALTH CARE EDUCATION/TRAINING PROGRAM

## 2022-08-05 PROCEDURE — 96365 THER/PROPH/DIAG IV INF INIT: CPT

## 2022-08-05 PROCEDURE — 25000003 PHARM REV CODE 250: Performed by: STUDENT IN AN ORGANIZED HEALTH CARE EDUCATION/TRAINING PROGRAM

## 2022-08-05 PROCEDURE — 99285 EMERGENCY DEPT VISIT HI MDM: CPT | Mod: 25

## 2022-08-05 PROCEDURE — S0030 INJECTION, METRONIDAZOLE: HCPCS | Performed by: STUDENT IN AN ORGANIZED HEALTH CARE EDUCATION/TRAINING PROGRAM

## 2022-08-05 PROCEDURE — 83735 ASSAY OF MAGNESIUM: CPT | Performed by: STUDENT IN AN ORGANIZED HEALTH CARE EDUCATION/TRAINING PROGRAM

## 2022-08-05 PROCEDURE — 36415 COLL VENOUS BLD VENIPUNCTURE: CPT | Performed by: STUDENT IN AN ORGANIZED HEALTH CARE EDUCATION/TRAINING PROGRAM

## 2022-08-05 PROCEDURE — 83690 ASSAY OF LIPASE: CPT | Performed by: STUDENT IN AN ORGANIZED HEALTH CARE EDUCATION/TRAINING PROGRAM

## 2022-08-05 PROCEDURE — 80053 COMPREHEN METABOLIC PANEL: CPT | Performed by: STUDENT IN AN ORGANIZED HEALTH CARE EDUCATION/TRAINING PROGRAM

## 2022-08-05 PROCEDURE — 96361 HYDRATE IV INFUSION ADD-ON: CPT

## 2022-08-05 RX ORDER — AMLODIPINE BESYLATE 5 MG/1
5 TABLET ORAL DAILY
Status: DISCONTINUED | OUTPATIENT
Start: 2022-08-05 | End: 2022-08-08

## 2022-08-05 RX ORDER — SODIUM CHLORIDE 9 MG/ML
INJECTION, SOLUTION INTRAVENOUS CONTINUOUS
Status: DISCONTINUED | OUTPATIENT
Start: 2022-08-05 | End: 2022-08-08

## 2022-08-05 RX ORDER — TALC
6 POWDER (GRAM) TOPICAL NIGHTLY PRN
Status: DISCONTINUED | OUTPATIENT
Start: 2022-08-05 | End: 2022-08-09 | Stop reason: HOSPADM

## 2022-08-05 RX ORDER — ONDANSETRON 2 MG/ML
4 INJECTION INTRAMUSCULAR; INTRAVENOUS EVERY 8 HOURS PRN
Status: DISCONTINUED | OUTPATIENT
Start: 2022-08-05 | End: 2022-08-05

## 2022-08-05 RX ORDER — KETOROLAC TROMETHAMINE 30 MG/ML
15 INJECTION, SOLUTION INTRAMUSCULAR; INTRAVENOUS
Status: COMPLETED | OUTPATIENT
Start: 2022-08-05 | End: 2022-08-05

## 2022-08-05 RX ORDER — KETOROLAC TROMETHAMINE 30 MG/ML
15 INJECTION, SOLUTION INTRAMUSCULAR; INTRAVENOUS EVERY 6 HOURS PRN
Status: DISPENSED | OUTPATIENT
Start: 2022-08-05 | End: 2022-08-08

## 2022-08-05 RX ORDER — SODIUM CHLORIDE 0.9 % (FLUSH) 0.9 %
10 SYRINGE (ML) INJECTION
Status: DISCONTINUED | OUTPATIENT
Start: 2022-08-05 | End: 2022-08-09 | Stop reason: HOSPADM

## 2022-08-05 RX ORDER — METRONIDAZOLE 500 MG/100ML
500 INJECTION, SOLUTION INTRAVENOUS
Status: DISCONTINUED | OUTPATIENT
Start: 2022-08-05 | End: 2022-08-07

## 2022-08-05 RX ORDER — ONDANSETRON 2 MG/ML
4 INJECTION INTRAMUSCULAR; INTRAVENOUS EVERY 6 HOURS PRN
Status: DISCONTINUED | OUTPATIENT
Start: 2022-08-05 | End: 2022-08-09 | Stop reason: HOSPADM

## 2022-08-05 RX ORDER — METOCLOPRAMIDE HYDROCHLORIDE 5 MG/ML
10 INJECTION INTRAMUSCULAR; INTRAVENOUS EVERY 6 HOURS PRN
Status: DISCONTINUED | OUTPATIENT
Start: 2022-08-05 | End: 2022-08-09 | Stop reason: HOSPADM

## 2022-08-05 RX ORDER — SUMATRIPTAN SUCCINATE 25 MG/1
25 TABLET ORAL
Status: DISCONTINUED | OUTPATIENT
Start: 2022-08-05 | End: 2022-08-09 | Stop reason: HOSPADM

## 2022-08-05 RX ORDER — ALPRAZOLAM 0.5 MG/1
0.5 TABLET ORAL
Status: COMPLETED | OUTPATIENT
Start: 2022-08-05 | End: 2022-08-05

## 2022-08-05 RX ORDER — IBUPROFEN 200 MG
1 TABLET ORAL DAILY
Status: DISCONTINUED | OUTPATIENT
Start: 2022-08-06 | End: 2022-08-09 | Stop reason: HOSPADM

## 2022-08-05 RX ORDER — TRAZODONE HYDROCHLORIDE 50 MG/1
50 TABLET ORAL NIGHTLY
Status: DISCONTINUED | OUTPATIENT
Start: 2022-08-05 | End: 2022-08-09 | Stop reason: HOSPADM

## 2022-08-05 RX ORDER — QUETIAPINE FUMARATE 100 MG/1
200 TABLET, FILM COATED ORAL EVERY EVENING
Status: DISCONTINUED | OUTPATIENT
Start: 2022-08-05 | End: 2022-08-09 | Stop reason: HOSPADM

## 2022-08-05 RX ADMIN — SUMATRIPTAN SUCCINATE 25 MG: 25 TABLET ORAL at 08:08

## 2022-08-05 RX ADMIN — QUETIAPINE FUMARATE 200 MG: 100 TABLET ORAL at 08:08

## 2022-08-05 RX ADMIN — SODIUM CHLORIDE: 0.9 INJECTION, SOLUTION INTRAVENOUS at 10:08

## 2022-08-05 RX ADMIN — METRONIDAZOLE 500 MG: 500 INJECTION, SOLUTION INTRAVENOUS at 05:08

## 2022-08-05 RX ADMIN — ALPRAZOLAM 0.5 MG: 0.5 TABLET ORAL at 05:08

## 2022-08-05 RX ADMIN — TRAZODONE HYDROCHLORIDE 50 MG: 50 TABLET ORAL at 08:08

## 2022-08-05 RX ADMIN — KETOROLAC TROMETHAMINE 15 MG: 30 INJECTION, SOLUTION INTRAMUSCULAR at 04:08

## 2022-08-05 RX ADMIN — AMLODIPINE BESYLATE 5 MG: 5 TABLET ORAL at 10:08

## 2022-08-05 RX ADMIN — SODIUM CHLORIDE 1000 ML: 0.9 INJECTION, SOLUTION INTRAVENOUS at 04:08

## 2022-08-05 RX ADMIN — POTASSIUM BICARBONATE 20 MEQ: 391 TABLET, EFFERVESCENT ORAL at 05:08

## 2022-08-05 NOTE — ED PROVIDER NOTES
Encounter Date: 8/5/2022       History     Chief Complaint   Patient presents with    Vomiting     Dx with C Diff yesterday. Returns to ED with continued diarrhea and states she is vomiting up the antibiotics.      67-year-old female with multiple comorbidities recently discharged with C diff colitis presents with intractable vomiting and diarrhea.  This is patient's 3rd visit for similar complaint.  Patient unable to take her antibiotics due to vomiting.  Says that she continued to have similar crampy abdominal pain.  Denies any fever, chills,        Review of patient's allergies indicates:  No Known Allergies  Past Medical History:   Diagnosis Date    Anticoagulant long-term use     Anxiety     Arthritis     Carotid artery disease     Cervical disc disorder     Chronic back pain     COPD (chronic obstructive pulmonary disease)     Coronary artery disease     Dementia     Depression     Diarrhea, unspecified 11/1/2021    DVT (deep venous thrombosis)     CAROTID    GERD (gastroesophageal reflux disease)     Hematuria     Hiatal hernia     High cholesterol     Ill-fitting dentures     STATES DOESN'T WEAR    PVD (peripheral vascular disease)     Renal calculus     Sleep apnea     Sleep apnea     NO C PAP    Urinary frequency     Urinary urgency     Wears glasses     READING      Past Surgical History:   Procedure Laterality Date    BACK SURGERY      BREAST LUMPECTOMY Right     CAROTID ARTERY ANGIOPLASTY      CAROTID ARTERY ANGIOPLASTY      CAROTID ARTERY STENT Left     CAROTID ENDARTERECTOMY Right     CHOLECYSTECTOMY      COLONOSCOPY      CYSTOSCOPY      HYSTERECTOMY      OOPHORECTOMY      TONSILLECTOMY       Family History   Problem Relation Age of Onset    Cancer Mother     Stroke Father     No Known Problems Sister     No Known Problems Daughter     No Known Problems Maternal Aunt     No Known Problems Maternal Uncle     No Known Problems Paternal Aunt     No Known  Problems Paternal Uncle     No Known Problems Maternal Grandmother     No Known Problems Maternal Grandfather     No Known Problems Paternal Grandmother     No Known Problems Paternal Grandfather     Breast cancer Neg Hx     Ovarian cancer Neg Hx     BRCA 1/2 Neg Hx      Social History     Tobacco Use    Smoking status: Current Every Day Smoker     Packs/day: 0.50     Types: Cigarettes     Start date: 1970    Smokeless tobacco: Never Used   Substance Use Topics    Alcohol use: No    Drug use: No     Review of Systems   Constitutional: Negative.    HENT: Negative.    Respiratory: Negative.    Cardiovascular: Negative.    Gastrointestinal: Positive for abdominal pain, diarrhea, nausea and vomiting.   Genitourinary: Negative.    Musculoskeletal: Negative.    Skin: Negative.    Neurological: Negative.    Psychiatric/Behavioral: Negative.    All other systems reviewed and are negative.      Physical Exam     Initial Vitals [08/05/22 1438]   BP Pulse Resp Temp SpO2   (!) 166/93 91 18 98.3 °F (36.8 °C) 97 %      MAP       --         Physical Exam    Nursing note and vitals reviewed.  Constitutional: Vital signs are normal. She appears well-developed and well-nourished.   HENT:   Head: Normocephalic and atraumatic.   Eyes: Conjunctivae and lids are normal.   Neck: Trachea normal. Neck supple.   Cardiovascular: Normal rate, regular rhythm, normal heart sounds, intact distal pulses and normal pulses.   No murmur heard.  Pulmonary/Chest: Breath sounds normal. No respiratory distress.   Abdominal: Abdomen is soft. Bowel sounds are normal. There is abdominal tenderness.   Diffuse tenderness to palpation none peritonitic There is guarding.   Musculoskeletal:         General: Normal range of motion.      Cervical back: Neck supple.     Neurological: She is alert and oriented to person, place, and time. She has normal strength. No cranial nerve deficit or sensory deficit.   Skin: Skin is warm. Capillary refill takes less  than 2 seconds.   Psychiatric: She has a normal mood and affect. Her speech is normal. Thought content normal.         ED Course   Procedures  Labs Reviewed   CBC W/ AUTO DIFFERENTIAL - Abnormal; Notable for the following components:       Result Value    WBC 15.57 (*)     MCH 31.3 (*)     Gran # (ANC) 12.3 (*)     Immature Grans (Abs) 0.06 (*)     Gran % 78.8 (*)     Lymph % 13.9 (*)     All other components within normal limits   COMPREHENSIVE METABOLIC PANEL - Abnormal; Notable for the following components:    Potassium 3.2 (*)     Albumin 3.4 (*)     Anion Gap 3 (*)     All other components within normal limits   LIPASE   MAGNESIUM          Imaging Results    None          Medications   sodium chloride 0.9% bolus 1,000 mL (1,000 mLs Intravenous New Bag 8/5/22 1643)   metronidazole IVPB 500 mg (500 mg Intravenous New Bag 8/5/22 1701)   ondansetron injection 4 mg (has no administration in time range)   metoclopramide HCl injection 10 mg (has no administration in time range)   potassium bicarbonate disintegrating tablet 20 mEq (has no administration in time range)   ketorolac injection 15 mg (15 mg Intravenous Given 8/5/22 1643)   ALPRAZolam tablet 0.5 mg (0.5 mg Oral Given 8/5/22 1707)     Medical Decision Making:   Initial Assessment:   67-year-old female with multiple comorbidities recently discharged with C diff colitis presents with intractable vomiting and diarrhea.  Afebrile vitals stable.  Physical noted.  Electrolyte shows hypokalemia.  Will admit patient for failed outpatient therapy  Clinical Tests:   Lab Tests: Ordered and Reviewed  The following lab test(s) were unremarkable: CBC and CMP                      Clinical Impression:   Final diagnoses:  [R11.2] Non-intractable vomiting with nausea, unspecified vomiting type  [A04.72] C. difficile colitis (Primary)  [E87.6] Hypokalemia          ED Disposition Condition    Observation               Vinicio Gautam MD  08/05/22 [id number]       Vinicio Gautam  MD  08/05/22 9786

## 2022-08-06 PROBLEM — R11.10 NON-INTRACTABLE VOMITING: Status: ACTIVE | Noted: 2021-09-01

## 2022-08-06 PROBLEM — Z72.0 TOBACCO ABUSE: Status: ACTIVE | Noted: 2022-08-06

## 2022-08-06 PROBLEM — E87.6 HYPOKALEMIA: Status: ACTIVE | Noted: 2022-08-06

## 2022-08-06 PROBLEM — I10 HTN (HYPERTENSION): Status: ACTIVE | Noted: 2022-08-06

## 2022-08-06 PROBLEM — A04.72 C. DIFFICILE COLITIS: Status: ACTIVE | Noted: 2022-08-06

## 2022-08-06 LAB
ALBUMIN SERPL BCP-MCNC: 3 G/DL (ref 3.5–5.2)
ALP SERPL-CCNC: 61 U/L (ref 55–135)
ALT SERPL W/O P-5'-P-CCNC: 14 U/L (ref 10–44)
ANION GAP SERPL CALC-SCNC: 4 MMOL/L (ref 8–16)
AST SERPL-CCNC: 10 U/L (ref 10–40)
BASOPHILS # BLD AUTO: 0.04 K/UL (ref 0–0.2)
BASOPHILS NFR BLD: 0.4 % (ref 0–1.9)
BILIRUB SERPL-MCNC: 0.3 MG/DL (ref 0.1–1)
BUN SERPL-MCNC: 8 MG/DL (ref 8–23)
CALCIUM SERPL-MCNC: 8.5 MG/DL (ref 8.7–10.5)
CHLORIDE SERPL-SCNC: 116 MMOL/L (ref 95–110)
CO2 SERPL-SCNC: 24 MMOL/L (ref 23–29)
CREAT SERPL-MCNC: 0.5 MG/DL (ref 0.5–1.4)
DIFFERENTIAL METHOD: ABNORMAL
EOSINOPHIL # BLD AUTO: 0.2 K/UL (ref 0–0.5)
EOSINOPHIL NFR BLD: 2.3 % (ref 0–8)
ERYTHROCYTE [DISTWIDTH] IN BLOOD BY AUTOMATED COUNT: 13.5 % (ref 11.5–14.5)
EST. GFR  (NO RACE VARIABLE): >60 ML/MIN/1.73 M^2
GLUCOSE SERPL-MCNC: 95 MG/DL (ref 70–110)
HCT VFR BLD AUTO: 44.8 % (ref 37–48.5)
HGB BLD-MCNC: 15.1 G/DL (ref 12–16)
IMM GRANULOCYTES # BLD AUTO: 0.03 K/UL (ref 0–0.04)
IMM GRANULOCYTES NFR BLD AUTO: 0.3 % (ref 0–0.5)
LYMPHOCYTES # BLD AUTO: 1.8 K/UL (ref 1–4.8)
LYMPHOCYTES NFR BLD: 17.8 % (ref 18–48)
MCH RBC QN AUTO: 31.3 PG (ref 27–31)
MCHC RBC AUTO-ENTMCNC: 33.7 G/DL (ref 32–36)
MCV RBC AUTO: 93 FL (ref 82–98)
MONOCYTES # BLD AUTO: 0.7 K/UL (ref 0.3–1)
MONOCYTES NFR BLD: 6.3 % (ref 4–15)
NEUTROPHILS # BLD AUTO: 7.5 K/UL (ref 1.8–7.7)
NEUTROPHILS NFR BLD: 72.9 % (ref 38–73)
NRBC BLD-RTO: 0 /100 WBC
PLATELET # BLD AUTO: 221 K/UL (ref 150–450)
PMV BLD AUTO: 10.1 FL (ref 9.2–12.9)
POTASSIUM SERPL-SCNC: 3.3 MMOL/L (ref 3.5–5.1)
PROT SERPL-MCNC: 5.8 G/DL (ref 6–8.4)
RBC # BLD AUTO: 4.82 M/UL (ref 4–5.4)
SODIUM SERPL-SCNC: 144 MMOL/L (ref 136–145)
WBC # BLD AUTO: 10.26 K/UL (ref 3.9–12.7)

## 2022-08-06 PROCEDURE — 80053 COMPREHEN METABOLIC PANEL: CPT | Performed by: STUDENT IN AN ORGANIZED HEALTH CARE EDUCATION/TRAINING PROGRAM

## 2022-08-06 PROCEDURE — 96361 HYDRATE IV INFUSION ADD-ON: CPT

## 2022-08-06 PROCEDURE — 85025 COMPLETE CBC W/AUTO DIFF WBC: CPT | Performed by: STUDENT IN AN ORGANIZED HEALTH CARE EDUCATION/TRAINING PROGRAM

## 2022-08-06 PROCEDURE — 96376 TX/PRO/DX INJ SAME DRUG ADON: CPT

## 2022-08-06 PROCEDURE — G0378 HOSPITAL OBSERVATION PER HR: HCPCS

## 2022-08-06 PROCEDURE — S0030 INJECTION, METRONIDAZOLE: HCPCS | Performed by: STUDENT IN AN ORGANIZED HEALTH CARE EDUCATION/TRAINING PROGRAM

## 2022-08-06 PROCEDURE — 96366 THER/PROPH/DIAG IV INF ADDON: CPT

## 2022-08-06 PROCEDURE — 63600175 PHARM REV CODE 636 W HCPCS: Performed by: STUDENT IN AN ORGANIZED HEALTH CARE EDUCATION/TRAINING PROGRAM

## 2022-08-06 PROCEDURE — 25000003 PHARM REV CODE 250: Performed by: INTERNAL MEDICINE

## 2022-08-06 PROCEDURE — 36415 COLL VENOUS BLD VENIPUNCTURE: CPT | Performed by: STUDENT IN AN ORGANIZED HEALTH CARE EDUCATION/TRAINING PROGRAM

## 2022-08-06 PROCEDURE — S4991 NICOTINE PATCH NONLEGEND: HCPCS | Performed by: INTERNAL MEDICINE

## 2022-08-06 PROCEDURE — 25000003 PHARM REV CODE 250: Performed by: STUDENT IN AN ORGANIZED HEALTH CARE EDUCATION/TRAINING PROGRAM

## 2022-08-06 RX ORDER — PRAVASTATIN SODIUM 20 MG/1
20 TABLET ORAL NIGHTLY
Status: DISCONTINUED | OUTPATIENT
Start: 2022-08-06 | End: 2022-08-09 | Stop reason: HOSPADM

## 2022-08-06 RX ORDER — PREGABALIN 75 MG/1
75 CAPSULE ORAL 2 TIMES DAILY
Status: DISCONTINUED | OUTPATIENT
Start: 2022-08-06 | End: 2022-08-09 | Stop reason: HOSPADM

## 2022-08-06 RX ORDER — L. ACIDOPHILUS/L.BULGARICUS 100MM CELL
1 GRANULES IN PACKET (EA) ORAL 2 TIMES DAILY
Status: DISCONTINUED | OUTPATIENT
Start: 2022-08-06 | End: 2022-08-09 | Stop reason: HOSPADM

## 2022-08-06 RX ORDER — ACETAMINOPHEN 325 MG/1
650 TABLET ORAL EVERY 6 HOURS PRN
Status: DISCONTINUED | OUTPATIENT
Start: 2022-08-06 | End: 2022-08-09 | Stop reason: HOSPADM

## 2022-08-06 RX ORDER — ALPRAZOLAM 0.25 MG/1
0.25 TABLET ORAL 2 TIMES DAILY PRN
Status: DISCONTINUED | OUTPATIENT
Start: 2022-08-06 | End: 2022-08-09 | Stop reason: HOSPADM

## 2022-08-06 RX ORDER — CLOPIDOGREL BISULFATE 75 MG/1
75 TABLET ORAL EVERY MORNING
Status: DISCONTINUED | OUTPATIENT
Start: 2022-08-06 | End: 2022-08-07

## 2022-08-06 RX ADMIN — Medication 1 PACKET: at 01:08

## 2022-08-06 RX ADMIN — PREGABALIN 75 MG: 75 CAPSULE ORAL at 01:08

## 2022-08-06 RX ADMIN — ALPRAZOLAM 0.25 MG: 0.25 TABLET ORAL at 01:08

## 2022-08-06 RX ADMIN — TRAZODONE HYDROCHLORIDE 50 MG: 50 TABLET ORAL at 08:08

## 2022-08-06 RX ADMIN — ACETAMINOPHEN 650 MG: 325 TABLET ORAL at 01:08

## 2022-08-06 RX ADMIN — PREGABALIN 75 MG: 75 CAPSULE ORAL at 08:08

## 2022-08-06 RX ADMIN — LACTOBACILLUS ACIDOPHILUS / LACTOBACILLUS BULGARICUS 1 EACH: 100 MILLION CFU STRENGTH GRANULES at 08:08

## 2022-08-06 RX ADMIN — SODIUM CHLORIDE: 0.9 INJECTION, SOLUTION INTRAVENOUS at 08:08

## 2022-08-06 RX ADMIN — Medication 1 PACKET: at 08:08

## 2022-08-06 RX ADMIN — LACTOBACILLUS ACIDOPHILUS / LACTOBACILLUS BULGARICUS 1 EACH: 100 MILLION CFU STRENGTH GRANULES at 01:08

## 2022-08-06 RX ADMIN — SUMATRIPTAN SUCCINATE 25 MG: 25 TABLET ORAL at 08:08

## 2022-08-06 RX ADMIN — METRONIDAZOLE 500 MG: 500 INJECTION, SOLUTION INTRAVENOUS at 09:08

## 2022-08-06 RX ADMIN — AMLODIPINE BESYLATE 5 MG: 5 TABLET ORAL at 08:08

## 2022-08-06 RX ADMIN — SUMATRIPTAN SUCCINATE 25 MG: 25 TABLET ORAL at 04:08

## 2022-08-06 RX ADMIN — METRONIDAZOLE 500 MG: 500 INJECTION, SOLUTION INTRAVENOUS at 05:08

## 2022-08-06 RX ADMIN — QUETIAPINE FUMARATE 200 MG: 100 TABLET ORAL at 08:08

## 2022-08-06 RX ADMIN — METRONIDAZOLE 500 MG: 500 INJECTION, SOLUTION INTRAVENOUS at 01:08

## 2022-08-06 RX ADMIN — CLOPIDOGREL 75 MG: 75 TABLET, FILM COATED ORAL at 01:08

## 2022-08-06 RX ADMIN — KETOROLAC TROMETHAMINE 15 MG: 30 INJECTION, SOLUTION INTRAMUSCULAR; INTRAVENOUS at 08:08

## 2022-08-06 RX ADMIN — NICOTINE 1 PATCH: 21 PATCH, EXTENDED RELEASE TRANSDERMAL at 08:08

## 2022-08-06 RX ADMIN — PRAVASTATIN SODIUM 20 MG: 20 TABLET ORAL at 08:08

## 2022-08-06 NOTE — PLAN OF CARE
08/06/22 1224   REY Message   Medicare Outpatient and Observation Notification regarding financial responsibility Given to patient/caregiver;Explained to patient/caregiver;Signed/date by patient/caregiver   Date REY was signed 08/06/22   Time REY was signed 1224   Reviewed observation status with patient.  Explained MOON.  Patient verbalizes understanding, MOON, declines to keep copy when offered.

## 2022-08-06 NOTE — ASSESSMENT & PLAN NOTE
Failed outpt tx - on iv flagyl for now due to was unable to hold down po   Start probioitcs and metamucil

## 2022-08-06 NOTE — H&P
[facility] Medicine  History & Physical    Patient Name: Desiree Blake  MRN: [id number]  Patient Class: OP- Observation  Admission Date: 8/5/2022  Attending Physician: Kt Pozo Jr., MD   Primary Care Provider: Kt Pozo Jr, MD         Patient information was obtained from patient and ER records.     Subjective:     Principal Problem:C. difficile colitis    Chief Complaint:   Chief Complaint   Patient presents with    Vomiting     Dx with C Diff yesterday. Returns to ED with continued diarrhea and states she is vomiting up the antibiotics.         HPI: 67-year-old female with history of coronary artery disease with history of COPD, high cholesterol presents with crampy abdominal pain generalized weakness and nonbloody diarrhea for the past few days.  Patient was seen here a few days ago for similar complaint and found to have C diff.  Patient was prescribed medication but has been unable to take medication.  Patient denies any chest pain, fever, sick contacts    Pt repotrs over the last month has been seeign her pcp for abd pain,n/v, diarrhea - has been prescibed nerve pills na dtoher meds.  Denies any recent abx usage or other sick contacts with diarrhea.  Pt fousn to have cdiff a few days ago - given oral vanc.  Went to take first dose but was very nauseated and never completed first dose and then came to er for further eval      Past Medical History:   Diagnosis Date    Anticoagulant long-term use     Anxiety     Arthritis     Carotid artery disease     Cervical disc disorder     Chronic back pain     COPD (chronic obstructive pulmonary disease)     Coronary artery disease     Dementia     Depression     Diarrhea, unspecified 11/1/2021    DVT (deep venous thrombosis)     CAROTID    GERD (gastroesophageal reflux disease)     Hematuria     Hiatal hernia     High cholesterol     Ill-fitting dentures     STATES DOESN'T WEAR    PVD (peripheral vascular disease)      Renal calculus     Sleep apnea     Sleep apnea     NO C PAP    Urinary frequency     Urinary urgency     Wears glasses     READING        Past Surgical History:   Procedure Laterality Date    BACK SURGERY      BREAST LUMPECTOMY Right     CAROTID ARTERY ANGIOPLASTY      CAROTID ARTERY ANGIOPLASTY      CAROTID ARTERY STENT Left     CAROTID ENDARTERECTOMY Right     CHOLECYSTECTOMY      COLONOSCOPY      CYSTOSCOPY      HYSTERECTOMY      OOPHORECTOMY      TONSILLECTOMY         Review of patient's allergies indicates:  No Known Allergies    No current facility-administered medications on file prior to encounter.     Current Outpatient Medications on File Prior to Encounter   Medication Sig    ALPRAZolam (XANAX) 0.25 MG tablet Take 1 tablet (0.25 mg total) by mouth 2 (two) times daily as needed for Anxiety.    clopidogreL (PLAVIX) 75 mg tablet Take 1 tablet (75 mg total) by mouth every morning.    fluticasone propionate (FLONASE) 50 mcg/actuation nasal spray 2 sprays (100 mcg total) by Each Nostril route once daily.    nebulizer and compressor Argelia 1 kit by Misc.(Non-Drug; Combo Route) route every 6 (six) hours as needed (cough wheezing SOB).    pravastatin (PRAVACHOL) 20 MG tablet TAKE 1 TABLET BY MOUTH EVERY EVENING    QUEtiapine (SEROQUEL) 200 MG Tab Take 1 tablet (200 mg total) by mouth every evening.    vancomycin (VANCOCIN) 125 MG capsule Take 1 capsule (125 mg total) by mouth 4 (four) times daily. for 14 days    albuterol (PROVENTIL/VENTOLIN HFA) 90 mcg/actuation inhaler Inhale 1-2 puffs into the lungs every 4 to 6 hours as needed for Wheezing (as needed for cough, wheezing, shortness of breath). Rescue    azelastine (ASTELIN) 137 mcg (0.1 %) nasal spray 1 spray (137 mcg total) by Nasal route 2 (two) times daily.    pregabalin (LYRICA) 75 MG capsule Take 1 capsule (75 mg total) by mouth 2 (two) times daily.    sumatriptan (IMITREX) 25 MG Tab Take 1 tablet (25 mg total) by mouth daily as  needed (headache).    traZODone (DESYREL) 50 MG tablet TAKE 1 TABLET BY MOUTH EVERY EVENING    VRAYLAR 4.5 mg Cap TAKE 1 CAPSULE BY MOUTH EVERY NIGHT AT BEDTIME     Family History       Problem Relation (Age of Onset)    Cancer Mother    No Known Problems Sister, Daughter, Maternal Aunt, Maternal Uncle, Paternal Aunt, Paternal Uncle, Maternal Grandmother, Maternal Grandfather, Paternal Grandmother, Paternal Grandfather    Stroke Father          Tobacco Use    Smoking status: Current Every Day Smoker     Packs/day: 0.50     Types: Cigarettes     Start date: 1970    Smokeless tobacco: Never Used   Substance and Sexual Activity    Alcohol use: No    Drug use: No    Sexual activity: Not on file     Review of Systems   Constitutional:  Positive for activity change and chills. Negative for fever.   HENT:  Negative for ear pain, postnasal drip, sinus pressure and trouble swallowing.    Respiratory:  Negative for cough, shortness of breath, wheezing and stridor.    Cardiovascular:  Negative for chest pain, palpitations and leg swelling.   Gastrointestinal:  Positive for abdominal pain, diarrhea, nausea and vomiting. Negative for blood in stool and constipation.   Genitourinary:  Negative for difficulty urinating and dysuria.   Musculoskeletal:  Positive for arthralgias. Negative for back pain.   Skin:  Negative for pallor and rash.   Neurological:  Positive for weakness. Negative for seizures and syncope.   Objective:     Vital Signs (Most Recent):  Temp: 98.2 °F (36.8 °C) (08/06/22 0751)  Pulse: 74 (08/06/22 0751)  Resp: 20 (08/06/22 0751)  BP: (!) 189/70 (08/06/22 0751)  SpO2: 97 % (08/06/22 0751)   Vital Signs (24h Range):  Temp:  [97.9 °F (36.6 °C)-98.6 °F (37 °C)] 98.2 °F (36.8 °C)  Pulse:  [71-91] 74  Resp:  [18-20] 20  SpO2:  [94 %-100 %] 97 %  BP: (120-206)/(62-93) 189/70     Weight: 48.5 kg (107 lb)  Body mass index is 18.37 kg/m².    Physical Exam  Constitutional:       Appearance: Normal appearance.    HENT:      Mouth/Throat:      Mouth: Mucous membranes are moist.   Eyes:      Extraocular Movements: Extraocular movements intact.      Pupils: Pupils are equal, round, and reactive to light.   Cardiovascular:      Rate and Rhythm: Normal rate and regular rhythm.   Pulmonary:      Effort: Pulmonary effort is normal.      Breath sounds: Normal breath sounds.   Abdominal:      General: There is no distension.      Palpations: Abdomen is soft.      Tenderness: There is no abdominal tenderness. There is no guarding.   Musculoskeletal:         General: Normal range of motion.   Skin:     General: Skin is warm and dry.   Neurological:      Mental Status: She is alert and oriented to person, place, and time.         CRANIAL NERVES     CN III, IV, VI   Pupils are equal, round, and reactive to light.     Significant Labs: All pertinent labs within the past 24 hours have been reviewed.  Recent Lab Results         08/06/22  0510   08/05/22  1511        Lipase Result   120       Albumin 3.0   3.4       Alkaline Phosphatase 61   64       ALT 14   18       Anion Gap 4   3       AST 10   11       Baso # 0.04   0.05       Basophil % 0.4   0.3       BILIRUBIN TOTAL 0.3  Comment: For infants and newborns, interpretation of results should be based  on gestational age, weight and in agreement with clinical  observations.    Premature Infant recommended reference ranges:  Up to 24 hours.............<8.0 mg/dL  Up to 48 hours............<12.0 mg/dL  3-5 days..................<15.0 mg/dL  6-29 days.................<15.0 mg/dL    For patients on Eltrombopag therapy, use of Dimension [place] TBIL is   not   recommended.     0.2  Comment: For infants and newborns, interpretation of results should be based  on gestational age, weight and in agreement with clinical  observations.    Premature Infant recommended reference ranges:  Up to 24 hours.............<8.0 mg/dL  Up to 48 hours............<12.0 mg/dL  3-5 days..................<15.0  mg/dL  6-29 days.................<15.0 mg/dL    For patients on Eltrombopag therapy, use of Dimension [place] TBIL is   not   recommended.         BUN 8   11       Calcium 8.5   8.7       Chloride 116   110       CO2 24   28       Creatinine 0.5   1.0       Differential Method Automated   Automated       eGFR >60.0   >60.0       Eos # 0.2   0.2       Eosinophil % 2.3   1.0       Glucose 95   96       Gran # (ANC) 7.5   12.3       Gran % 72.9   78.8       Hematocrit 44.8   45.6       Hemoglobin 15.1   15.7       Immature Grans (Abs) 0.03  Comment: Mild elevation in immature granulocytes is non specific and   can be seen in a variety of conditions including stress response,   acute inflammation, trauma and pregnancy. Correlation with other   laboratory and clinical findings is essential.     0.06  Comment: Mild elevation in immature granulocytes is non specific and   can be seen in a variety of conditions including stress response,   acute inflammation, trauma and pregnancy. Correlation with other   laboratory and clinical findings is essential.         Immature Granulocytes 0.3   0.4       Lymph # 1.8   2.2       Lymph % 17.8   13.9       Magnesium   2.1       MCH 31.3   31.3       MCHC 33.7   34.4       MCV 93   91       Mono # 0.7   0.9       Mono % 6.3   5.6       MPV 10.1   9.8       nRBC 0   0       Platelets 221   262       Potassium 3.3   3.2       PROTEIN TOTAL 5.8   6.6       RBC 4.82   5.01       RDW 13.5   13.6       Sodium 144   141       WBC 10.26   15.57               Significant Imaging: I have reviewed all pertinent imaging results/findings within the past 24 hours.    Assessment/Plan:     * C. difficile colitis  Failed outpt tx - on iv flagyl for now due to was unable to hold down po   Start probioitcs and metamucil      Tobacco abuse  Quit smoking      HTN (hypertension)  norvasc daily -monitor bp      Hypokalemia  Cont potassium replacment      Non-intractable vomiting  Nausea meds as  needed      Anxiety  Cont home meds        VTE Risk Mitigation (From admission, onward)         Ordered     IP VTE LOW RISK PATIENT  Once         08/05/22 1802     Place VANDANA hose  Until discontinued         08/05/22 1802                   Charline Ybarra MD  Department of Hospital Medicine   [facility]

## 2022-08-06 NOTE — SUBJECTIVE & OBJECTIVE
Past Medical History:   Diagnosis Date    Anticoagulant long-term use     Anxiety     Arthritis     Carotid artery disease     Cervical disc disorder     Chronic back pain     COPD (chronic obstructive pulmonary disease)     Coronary artery disease     Dementia     Depression     Diarrhea, unspecified 11/1/2021    DVT (deep venous thrombosis)     CAROTID    GERD (gastroesophageal reflux disease)     Hematuria     Hiatal hernia     High cholesterol     Ill-fitting dentures     STATES DOESN'T WEAR    PVD (peripheral vascular disease)     Renal calculus     Sleep apnea     Sleep apnea     NO C PAP    Urinary frequency     Urinary urgency     Wears glasses     READING        Past Surgical History:   Procedure Laterality Date    BACK SURGERY      BREAST LUMPECTOMY Right     CAROTID ARTERY ANGIOPLASTY      CAROTID ARTERY ANGIOPLASTY      CAROTID ARTERY STENT Left     CAROTID ENDARTERECTOMY Right     CHOLECYSTECTOMY      COLONOSCOPY      CYSTOSCOPY      HYSTERECTOMY      OOPHORECTOMY      TONSILLECTOMY         Review of patient's allergies indicates:  No Known Allergies    No current facility-administered medications on file prior to encounter.     Current Outpatient Medications on File Prior to Encounter   Medication Sig    ALPRAZolam (XANAX) 0.25 MG tablet Take 1 tablet (0.25 mg total) by mouth 2 (two) times daily as needed for Anxiety.    clopidogreL (PLAVIX) 75 mg tablet Take 1 tablet (75 mg total) by mouth every morning.    fluticasone propionate (FLONASE) 50 mcg/actuation nasal spray 2 sprays (100 mcg total) by Each Nostril route once daily.    nebulizer and compressor Argelia 1 kit by Misc.(Non-Drug; Combo Route) route every 6 (six) hours as needed (cough wheezing SOB).    pravastatin (PRAVACHOL) 20 MG tablet TAKE 1 TABLET BY MOUTH EVERY EVENING    QUEtiapine (SEROQUEL) 200 MG Tab Take 1 tablet (200 mg total) by mouth every evening.    vancomycin (VANCOCIN) 125 MG capsule Take 1 capsule (125 mg total) by mouth 4 (four)  times daily. for 14 days    albuterol (PROVENTIL/VENTOLIN HFA) 90 mcg/actuation inhaler Inhale 1-2 puffs into the lungs every 4 to 6 hours as needed for Wheezing (as needed for cough, wheezing, shortness of breath). Rescue    azelastine (ASTELIN) 137 mcg (0.1 %) nasal spray 1 spray (137 mcg total) by Nasal route 2 (two) times daily.    pregabalin (LYRICA) 75 MG capsule Take 1 capsule (75 mg total) by mouth 2 (two) times daily.    sumatriptan (IMITREX) 25 MG Tab Take 1 tablet (25 mg total) by mouth daily as needed (headache).    traZODone (DESYREL) 50 MG tablet TAKE 1 TABLET BY MOUTH EVERY EVENING    VRAYLAR 4.5 mg Cap TAKE 1 CAPSULE BY MOUTH EVERY NIGHT AT BEDTIME     Family History       Problem Relation (Age of Onset)    Cancer Mother    No Known Problems Sister, Daughter, Maternal Aunt, Maternal Uncle, Paternal Aunt, Paternal Uncle, Maternal Grandmother, Maternal Grandfather, Paternal Grandmother, Paternal Grandfather    Stroke Father          Tobacco Use    Smoking status: Current Every Day Smoker     Packs/day: 0.50     Types: Cigarettes     Start date: 1970    Smokeless tobacco: Never Used   Substance and Sexual Activity    Alcohol use: No    Drug use: No    Sexual activity: Not on file     Review of Systems   Constitutional:  Positive for activity change and chills. Negative for fever.   HENT:  Negative for ear pain, postnasal drip, sinus pressure and trouble swallowing.    Respiratory:  Negative for cough, shortness of breath, wheezing and stridor.    Cardiovascular:  Negative for chest pain, palpitations and leg swelling.   Gastrointestinal:  Positive for abdominal pain, diarrhea, nausea and vomiting. Negative for blood in stool and constipation.   Genitourinary:  Negative for difficulty urinating and dysuria.   Musculoskeletal:  Positive for arthralgias. Negative for back pain.   Skin:  Negative for pallor and rash.   Neurological:  Positive for weakness. Negative for seizures and syncope.   Objective:      Vital Signs (Most Recent):  Temp: 98.2 °F (36.8 °C) (08/06/22 0751)  Pulse: 74 (08/06/22 0751)  Resp: 20 (08/06/22 0751)  BP: (!) 189/70 (08/06/22 0751)  SpO2: 97 % (08/06/22 0751)   Vital Signs (24h Range):  Temp:  [97.9 °F (36.6 °C)-98.6 °F (37 °C)] 98.2 °F (36.8 °C)  Pulse:  [71-91] 74  Resp:  [18-20] 20  SpO2:  [94 %-100 %] 97 %  BP: (120-206)/(62-93) 189/70     Weight: 48.5 kg (107 lb)  Body mass index is 18.37 kg/m².    Physical Exam  Constitutional:       Appearance: Normal appearance.   HENT:      Mouth/Throat:      Mouth: Mucous membranes are moist.   Eyes:      Extraocular Movements: Extraocular movements intact.      Pupils: Pupils are equal, round, and reactive to light.   Cardiovascular:      Rate and Rhythm: Normal rate and regular rhythm.   Pulmonary:      Effort: Pulmonary effort is normal.      Breath sounds: Normal breath sounds.   Abdominal:      General: There is no distension.      Palpations: Abdomen is soft.      Tenderness: There is no abdominal tenderness. There is no guarding.   Musculoskeletal:         General: Normal range of motion.   Skin:     General: Skin is warm and dry.   Neurological:      Mental Status: She is alert and oriented to person, place, and time.         CRANIAL NERVES     CN III, IV, VI   Pupils are equal, round, and reactive to light.     Significant Labs: All pertinent labs within the past 24 hours have been reviewed.  Recent Lab Results         08/06/22  0510   08/05/22  1511        Lipase Result   120       Albumin 3.0   3.4       Alkaline Phosphatase 61   64       ALT 14   18       Anion Gap 4   3       AST 10   11       Baso # 0.04   0.05       Basophil % 0.4   0.3       BILIRUBIN TOTAL 0.3  Comment: For infants and newborns, interpretation of results should be based  on gestational age, weight and in agreement with clinical  observations.    Premature Infant recommended reference ranges:  Up to 24 hours.............<8.0 mg/dL  Up to 48 hours............<12.0  mg/dL  3-5 days..................<15.0 mg/dL  6-29 days.................<15.0 mg/dL    For patients on Eltrombopag therapy, use of Dimension [place] TBIL is   not   recommended.     0.2  Comment: For infants and newborns, interpretation of results should be based  on gestational age, weight and in agreement with clinical  observations.    Premature Infant recommended reference ranges:  Up to 24 hours.............<8.0 mg/dL  Up to 48 hours............<12.0 mg/dL  3-5 days..................<15.0 mg/dL  6-29 days.................<15.0 mg/dL    For patients on Eltrombopag therapy, use of Dimension [place] TBIL is   not   recommended.         BUN 8   11       Calcium 8.5   8.7       Chloride 116   110       CO2 24   28       Creatinine 0.5   1.0       Differential Method Automated   Automated       eGFR >60.0   >60.0       Eos # 0.2   0.2       Eosinophil % 2.3   1.0       Glucose 95   96       Gran # (ANC) 7.5   12.3       Gran % 72.9   78.8       Hematocrit 44.8   45.6       Hemoglobin 15.1   15.7       Immature Grans (Abs) 0.03  Comment: Mild elevation in immature granulocytes is non specific and   can be seen in a variety of conditions including stress response,   acute inflammation, trauma and pregnancy. Correlation with other   laboratory and clinical findings is essential.     0.06  Comment: Mild elevation in immature granulocytes is non specific and   can be seen in a variety of conditions including stress response,   acute inflammation, trauma and pregnancy. Correlation with other   laboratory and clinical findings is essential.         Immature Granulocytes 0.3   0.4       Lymph # 1.8   2.2       Lymph % 17.8   13.9       Magnesium   2.1       MCH 31.3   31.3       MCHC 33.7   34.4       MCV 93   91       Mono # 0.7   0.9       Mono % 6.3   5.6       MPV 10.1   9.8       nRBC 0   0       Platelets 221   262       Potassium 3.3   3.2       PROTEIN TOTAL 5.8   6.6       RBC 4.82   5.01       RDW 13.5   13.6        Sodium 144   141       WBC 10.26   15.57               Significant Imaging: I have reviewed all pertinent imaging results/findings within the past 24 hours.

## 2022-08-06 NOTE — PLAN OF CARE
[facility]  Initial Discharge Assessment       Primary Care Provider: Kt Pozo Jr, MD    Admission Diagnosis: Hypokalemia [E87.6]  C. difficile colitis [A04.72]  Non-intractable vomiting with nausea, unspecified vomiting type [R11.2]    Admission Date: 8/5/2022  Expected Discharge Date:     Discharge Barriers Identified: None    Payor: MEDICARE / Plan: MEDICARE PART A & B / Product Type: Government /     Extended Emergency Contact Information  Primary Emergency Contact: Urbano Blake   [facility]  Home Phone: [phone]  Relation: Spouse    Discharge Plan A: Home  Discharge Plan B: Home with family      [facility] - [place], [place] - [address]  [address]  [facility] [address]  Phone: [phone] Fax: [phone]      Initial Assessment (most recent)     Adult Discharge Assessment - 08/06/22 1229        Discharge Assessment    Assessment Type Discharge Planning Assessment     Confirmed/corrected address, phone number and insurance Yes     Confirmed Demographics Correct on Facesheet     Source of Information patient     Does patient/caregiver understand observation status Yes     Communicated DEEPTI with patient/caregiver No     Reason For Admission cdif failed outpatient     Lives With spouse     Facility Arrived From: home     Prior to hospitilization cognitive status: Alert/Oriented     Current cognitive status: Alert/Oriented     Walking or Climbing Stairs Difficulty none     Dressing/Bathing Difficulty none     Home Layout Able to live on 1st floor     Equipment Currently Used at Home none     Readmission within 30 days? No     Patient currently being followed by outpatient case management? No     Do you currently have service(s) that help you manage your care at home? No     Do you take prescription medications? Yes     Do you have prescription coverage? Yes     Do you have any problems affording any of your prescribed medications? No     Is the  patient taking medications as prescribed? --   states she manages her own meds    How do you get to doctors appointments? family or friend will provide     Are you on dialysis? No     Do you take coumadin? No     Discharge Plan A Home     Discharge Plan B Home with family     DME Needed Upon Discharge  none     Discharge Plan discussed with: Patient     Discharge Barriers Identified None        Relationship/Environment    Name(s) of Who Lives With Patient spouse  Urbano Blake contact number [phone]               Patient confirms demographics and contact information for her .  States she lives with her , Urbano Blake, is independent with ADLs.  Denies having DME at home, states does not need any DME.  Uses [facility], manages her own medications.  Plans to return home upon discharge. Does not feel she wants Home health services.

## 2022-08-06 NOTE — HPI
67-year-old female with history of coronary artery disease with history of COPD, high cholesterol presents with crampy abdominal pain generalized weakness and nonbloody diarrhea for the past few days.  Patient was seen here a few days ago for similar complaint and found to have C diff.  Patient was prescribed medication but has been unable to take medication.  Patient denies any chest pain, fever, sick contacts    Pt kateryna over the last month has been seeign her pcp for abd pain,n/v, diarrhea - has been prescibed nerve pills na dtoher meds.  Denies any recent abx usage or other sick contacts with diarrhea.  Pt fousn to have cdiff a few days ago - given oral vanc.  Went to take first dose but was very nauseated and never completed first dose and then came to er for further eval

## 2022-08-07 LAB
ALBUMIN SERPL BCP-MCNC: 2.8 G/DL (ref 3.5–5.2)
ALP SERPL-CCNC: 55 U/L (ref 55–135)
ALT SERPL W/O P-5'-P-CCNC: 15 U/L (ref 10–44)
ANION GAP SERPL CALC-SCNC: 1 MMOL/L (ref 8–16)
AST SERPL-CCNC: 12 U/L (ref 10–40)
BASOPHILS # BLD AUTO: 0.05 K/UL (ref 0–0.2)
BASOPHILS NFR BLD: 0.5 % (ref 0–1.9)
BILIRUB SERPL-MCNC: 0.2 MG/DL (ref 0.1–1)
BUN SERPL-MCNC: 5 MG/DL (ref 8–23)
CALCIUM SERPL-MCNC: 8.5 MG/DL (ref 8.7–10.5)
CHLORIDE SERPL-SCNC: 115 MMOL/L (ref 95–110)
CO2 SERPL-SCNC: 28 MMOL/L (ref 23–29)
CREAT SERPL-MCNC: 0.6 MG/DL (ref 0.5–1.4)
DIFFERENTIAL METHOD: ABNORMAL
EOSINOPHIL # BLD AUTO: 0.2 K/UL (ref 0–0.5)
EOSINOPHIL NFR BLD: 2.2 % (ref 0–8)
ERYTHROCYTE [DISTWIDTH] IN BLOOD BY AUTOMATED COUNT: 13.5 % (ref 11.5–14.5)
EST. GFR  (NO RACE VARIABLE): >60 ML/MIN/1.73 M^2
GLUCOSE SERPL-MCNC: 168 MG/DL (ref 70–110)
HCT VFR BLD AUTO: 42.5 % (ref 37–48.5)
HGB BLD-MCNC: 13.9 G/DL (ref 12–16)
IMM GRANULOCYTES # BLD AUTO: 0.03 K/UL (ref 0–0.04)
IMM GRANULOCYTES NFR BLD AUTO: 0.3 % (ref 0–0.5)
LYMPHOCYTES # BLD AUTO: 1.2 K/UL (ref 1–4.8)
LYMPHOCYTES NFR BLD: 11.3 % (ref 18–48)
MAGNESIUM SERPL-MCNC: 2 MG/DL (ref 1.6–2.6)
MCH RBC QN AUTO: 31.1 PG (ref 27–31)
MCHC RBC AUTO-ENTMCNC: 32.7 G/DL (ref 32–36)
MCV RBC AUTO: 95 FL (ref 82–98)
MONOCYTES # BLD AUTO: 0.5 K/UL (ref 0.3–1)
MONOCYTES NFR BLD: 5.1 % (ref 4–15)
NEUTROPHILS # BLD AUTO: 8.3 K/UL (ref 1.8–7.7)
NEUTROPHILS NFR BLD: 80.6 % (ref 38–73)
NRBC BLD-RTO: 0 /100 WBC
PLATELET # BLD AUTO: 209 K/UL (ref 150–450)
PMV BLD AUTO: 9.8 FL (ref 9.2–12.9)
POTASSIUM SERPL-SCNC: 4 MMOL/L (ref 3.5–5.1)
PROT SERPL-MCNC: 5.6 G/DL (ref 6–8.4)
RBC # BLD AUTO: 4.47 M/UL (ref 4–5.4)
SODIUM SERPL-SCNC: 144 MMOL/L (ref 136–145)
WBC # BLD AUTO: 10.26 K/UL (ref 3.9–12.7)

## 2022-08-07 PROCEDURE — 36415 COLL VENOUS BLD VENIPUNCTURE: CPT | Performed by: INTERNAL MEDICINE

## 2022-08-07 PROCEDURE — 96366 THER/PROPH/DIAG IV INF ADDON: CPT

## 2022-08-07 PROCEDURE — 25000003 PHARM REV CODE 250: Performed by: INTERNAL MEDICINE

## 2022-08-07 PROCEDURE — G0378 HOSPITAL OBSERVATION PER HR: HCPCS

## 2022-08-07 PROCEDURE — 63600175 PHARM REV CODE 636 W HCPCS: Performed by: STUDENT IN AN ORGANIZED HEALTH CARE EDUCATION/TRAINING PROGRAM

## 2022-08-07 PROCEDURE — 25000003 PHARM REV CODE 250: Performed by: STUDENT IN AN ORGANIZED HEALTH CARE EDUCATION/TRAINING PROGRAM

## 2022-08-07 PROCEDURE — 80053 COMPREHEN METABOLIC PANEL: CPT | Performed by: INTERNAL MEDICINE

## 2022-08-07 PROCEDURE — 96376 TX/PRO/DX INJ SAME DRUG ADON: CPT

## 2022-08-07 PROCEDURE — 83735 ASSAY OF MAGNESIUM: CPT | Performed by: INTERNAL MEDICINE

## 2022-08-07 PROCEDURE — 85025 COMPLETE CBC W/AUTO DIFF WBC: CPT | Performed by: INTERNAL MEDICINE

## 2022-08-07 PROCEDURE — 96375 TX/PRO/DX INJ NEW DRUG ADDON: CPT

## 2022-08-07 PROCEDURE — S4991 NICOTINE PATCH NONLEGEND: HCPCS | Performed by: INTERNAL MEDICINE

## 2022-08-07 PROCEDURE — S0030 INJECTION, METRONIDAZOLE: HCPCS | Performed by: STUDENT IN AN ORGANIZED HEALTH CARE EDUCATION/TRAINING PROGRAM

## 2022-08-07 RX ORDER — CLOPIDOGREL BISULFATE 75 MG/1
75 TABLET ORAL DAILY
Status: DISCONTINUED | OUTPATIENT
Start: 2022-08-07 | End: 2022-08-09 | Stop reason: HOSPADM

## 2022-08-07 RX ORDER — CLONIDINE HYDROCHLORIDE 0.1 MG/1
0.1 TABLET ORAL ONCE
Status: COMPLETED | OUTPATIENT
Start: 2022-08-07 | End: 2022-08-07

## 2022-08-07 RX ORDER — AMLODIPINE BESYLATE 5 MG/1
5 TABLET ORAL ONCE
Status: COMPLETED | OUTPATIENT
Start: 2022-08-07 | End: 2022-08-07

## 2022-08-07 RX ORDER — METRONIDAZOLE 500 MG/1
500 TABLET ORAL EVERY 8 HOURS
Status: DISCONTINUED | OUTPATIENT
Start: 2022-08-07 | End: 2022-08-09 | Stop reason: HOSPADM

## 2022-08-07 RX ORDER — IBUPROFEN 600 MG/1
600 TABLET ORAL EVERY 6 HOURS PRN
Status: DISCONTINUED | OUTPATIENT
Start: 2022-08-07 | End: 2022-08-09 | Stop reason: HOSPADM

## 2022-08-07 RX ORDER — HYDROCODONE BITARTRATE AND ACETAMINOPHEN 10; 325 MG/1; MG/1
1 TABLET ORAL EVERY 6 HOURS PRN
Status: DISCONTINUED | OUTPATIENT
Start: 2022-08-07 | End: 2022-08-09 | Stop reason: HOSPADM

## 2022-08-07 RX ADMIN — METRONIDAZOLE 500 MG: 500 INJECTION, SOLUTION INTRAVENOUS at 02:08

## 2022-08-07 RX ADMIN — TRAZODONE HYDROCHLORIDE 50 MG: 50 TABLET ORAL at 07:08

## 2022-08-07 RX ADMIN — NICOTINE 1 PATCH: 21 PATCH, EXTENDED RELEASE TRANSDERMAL at 09:08

## 2022-08-07 RX ADMIN — HYDROCODONE BITARTRATE AND ACETAMINOPHEN 1 TABLET: 10; 325 TABLET ORAL at 09:08

## 2022-08-07 RX ADMIN — CLOPIDOGREL 75 MG: 75 TABLET, FILM COATED ORAL at 09:08

## 2022-08-07 RX ADMIN — LACTOBACILLUS ACIDOPHILUS / LACTOBACILLUS BULGARICUS 1 EACH: 100 MILLION CFU STRENGTH GRANULES at 07:08

## 2022-08-07 RX ADMIN — Medication 1 PACKET: at 09:08

## 2022-08-07 RX ADMIN — KETOROLAC TROMETHAMINE 15 MG: 30 INJECTION, SOLUTION INTRAMUSCULAR; INTRAVENOUS at 03:08

## 2022-08-07 RX ADMIN — METRONIDAZOLE 500 MG: 500 TABLET ORAL at 01:08

## 2022-08-07 RX ADMIN — SUMATRIPTAN SUCCINATE 25 MG: 25 TABLET ORAL at 01:08

## 2022-08-07 RX ADMIN — AMLODIPINE BESYLATE 5 MG: 5 TABLET ORAL at 09:08

## 2022-08-07 RX ADMIN — Medication 6 MG: at 07:08

## 2022-08-07 RX ADMIN — PREGABALIN 75 MG: 75 CAPSULE ORAL at 09:08

## 2022-08-07 RX ADMIN — LACTOBACILLUS ACIDOPHILUS / LACTOBACILLUS BULGARICUS 1 EACH: 100 MILLION CFU STRENGTH GRANULES at 09:08

## 2022-08-07 RX ADMIN — ALPRAZOLAM 0.25 MG: 0.25 TABLET ORAL at 02:08

## 2022-08-07 RX ADMIN — SUMATRIPTAN SUCCINATE 25 MG: 25 TABLET ORAL at 05:08

## 2022-08-07 RX ADMIN — AMLODIPINE BESYLATE 5 MG: 5 TABLET ORAL at 05:08

## 2022-08-07 RX ADMIN — KETOROLAC TROMETHAMINE 15 MG: 30 INJECTION, SOLUTION INTRAMUSCULAR; INTRAVENOUS at 09:08

## 2022-08-07 RX ADMIN — KETOROLAC TROMETHAMINE 15 MG: 30 INJECTION, SOLUTION INTRAMUSCULAR; INTRAVENOUS at 02:08

## 2022-08-07 RX ADMIN — METOCLOPRAMIDE 10 MG: 5 INJECTION, SOLUTION INTRAMUSCULAR; INTRAVENOUS at 07:08

## 2022-08-07 RX ADMIN — QUETIAPINE FUMARATE 200 MG: 100 TABLET ORAL at 07:08

## 2022-08-07 RX ADMIN — METRONIDAZOLE 500 MG: 500 INJECTION, SOLUTION INTRAVENOUS at 09:08

## 2022-08-07 RX ADMIN — PRAVASTATIN SODIUM 20 MG: 20 TABLET ORAL at 07:08

## 2022-08-07 RX ADMIN — METRONIDAZOLE 500 MG: 500 TABLET ORAL at 09:08

## 2022-08-07 RX ADMIN — PREGABALIN 75 MG: 75 CAPSULE ORAL at 07:08

## 2022-08-07 RX ADMIN — CLONIDINE HYDROCHLORIDE 0.1 MG: 0.1 TABLET ORAL at 06:08

## 2022-08-07 RX ADMIN — ALPRAZOLAM 0.25 MG: 0.25 TABLET ORAL at 01:08

## 2022-08-07 RX ADMIN — IBUPROFEN 600 MG: 600 TABLET ORAL at 06:08

## 2022-08-07 NOTE — SUBJECTIVE & OBJECTIVE
Past Medical History:   Diagnosis Date    Anticoagulant long-term use     Anxiety     Arthritis     Carotid artery disease     Cervical disc disorder     Chronic back pain     COPD (chronic obstructive pulmonary disease)     Coronary artery disease     Dementia     Depression     Diarrhea, unspecified 11/1/2021    DVT (deep venous thrombosis)     CAROTID    GERD (gastroesophageal reflux disease)     Hematuria     Hiatal hernia     High cholesterol     Ill-fitting dentures     STATES DOESN'T WEAR    PVD (peripheral vascular disease)     Renal calculus     Sleep apnea     Sleep apnea     NO C PAP    Urinary frequency     Urinary urgency     Wears glasses     READING        Past Surgical History:   Procedure Laterality Date    BACK SURGERY      BREAST LUMPECTOMY Right     CAROTID ARTERY ANGIOPLASTY      CAROTID ARTERY ANGIOPLASTY      CAROTID ARTERY STENT Left     CAROTID ENDARTERECTOMY Right     CHOLECYSTECTOMY      COLONOSCOPY      CYSTOSCOPY      HYSTERECTOMY      OOPHORECTOMY      TONSILLECTOMY         Review of patient's allergies indicates:  No Known Allergies    No current facility-administered medications on file prior to encounter.     Current Outpatient Medications on File Prior to Encounter   Medication Sig    ALPRAZolam (XANAX) 0.25 MG tablet Take 1 tablet (0.25 mg total) by mouth 2 (two) times daily as needed for Anxiety.    clopidogreL (PLAVIX) 75 mg tablet Take 1 tablet (75 mg total) by mouth every morning.    fluticasone propionate (FLONASE) 50 mcg/actuation nasal spray 2 sprays (100 mcg total) by Each Nostril route once daily.    nebulizer and compressor Argelia 1 kit by Misc.(Non-Drug; Combo Route) route every 6 (six) hours as needed (cough wheezing SOB).    pravastatin (PRAVACHOL) 20 MG tablet TAKE 1 TABLET BY MOUTH EVERY EVENING    QUEtiapine (SEROQUEL) 200 MG Tab Take 1 tablet (200 mg total) by mouth every evening.    vancomycin (VANCOCIN) 125 MG capsule Take  1 capsule (125 mg total) by mouth 4 (four) times daily. for 14 days    albuterol (PROVENTIL/VENTOLIN HFA) 90 mcg/actuation inhaler Inhale 1-2 puffs into the lungs every 4 to 6 hours as needed for Wheezing (as needed for cough, wheezing, shortness of breath). Rescue    azelastine (ASTELIN) 137 mcg (0.1 %) nasal spray 1 spray (137 mcg total) by Nasal route 2 (two) times daily.    pregabalin (LYRICA) 75 MG capsule Take 1 capsule (75 mg total) by mouth 2 (two) times daily.    sumatriptan (IMITREX) 25 MG Tab Take 1 tablet (25 mg total) by mouth daily as needed (headache).    traZODone (DESYREL) 50 MG tablet TAKE 1 TABLET BY MOUTH EVERY EVENING    VRAYLAR 4.5 mg Cap TAKE 1 CAPSULE BY MOUTH EVERY NIGHT AT BEDTIME     Family History       Problem Relation (Age of Onset)    Cancer Mother    No Known Problems Sister, Daughter, Maternal Aunt, Maternal Uncle, Paternal Aunt, Paternal Uncle, Maternal Grandmother, Maternal Grandfather, Paternal Grandmother, Paternal Grandfather    Stroke Father          Tobacco Use    Smoking status: Current Every Day Smoker     Packs/day: 0.50     Types: Cigarettes     Start date: 1970    Smokeless tobacco: Never Used   Substance and Sexual Activity    Alcohol use: No    Drug use: No    Sexual activity: Not on file     Review of Systems   Constitutional:  Positive for activity change and chills. Negative for fever.   HENT:  Negative for ear pain, postnasal drip, sinus pressure and trouble swallowing.    Respiratory:  Negative for cough, shortness of breath, wheezing and stridor.    Cardiovascular:  Negative for chest pain, palpitations and leg swelling.   Gastrointestinal:  Positive for abdominal pain, diarrhea, nausea and vomiting. Negative for blood in stool and constipation.   Genitourinary:  Negative for difficulty urinating and dysuria.   Musculoskeletal:  Positive for arthralgias. Negative for back pain.   Skin:  Negative for pallor and rash.   Neurological:  Positive for  weakness. Negative for seizures and syncope.   Objective:     Vital Signs (Most Recent):  Temp: 98 °F (36.7 °C) (08/07/22 1128)  Pulse: 75 (08/07/22 1128)  Resp: 20 (08/07/22 1128)  BP: (!) 148/70 (08/07/22 1128)  SpO2: 95 % (08/07/22 1128)   Vital Signs (24h Range):  Temp:  [97.6 °F (36.4 °C)-98.3 °F (36.8 °C)] 98 °F (36.7 °C)  Pulse:  [67-75] 75  Resp:  [16-20] 20  SpO2:  [93 %-99 %] 95 %  BP: (107-178)/(53-76) 148/70     Weight: 48.5 kg (107 lb)  Body mass index is 18.37 kg/m².    Physical Exam  Constitutional:       Appearance: Normal appearance.   HENT:      Mouth/Throat:      Mouth: Mucous membranes are moist.   Eyes:      Extraocular Movements: Extraocular movements intact.      Pupils: Pupils are equal, round, and reactive to light.   Cardiovascular:      Rate and Rhythm: Normal rate and regular rhythm.   Pulmonary:      Effort: Pulmonary effort is normal.      Breath sounds: Normal breath sounds.   Abdominal:      General: There is no distension.      Palpations: Abdomen is soft.      Tenderness: There is no abdominal tenderness. There is no guarding.   Musculoskeletal:         General: Normal range of motion.   Skin:     General: Skin is warm and dry.   Neurological:      Mental Status: She is alert and oriented to person, place, and time.         CRANIAL NERVES     CN III, IV, VI   Pupils are equal, round, and reactive to light.     Significant Labs: All pertinent labs within the past 24 hours have been reviewed.  Recent Lab Results         08/07/22  0834        Albumin 2.8       Alkaline Phosphatase 55       ALT 15       Anion Gap 1       AST 12       Baso # 0.05       Basophil % 0.5       BILIRUBIN TOTAL 0.2  Comment: For infants and newborns, interpretation of results should be based  on gestational age, weight and in agreement with clinical  observations.    Premature Infant recommended reference ranges:  Up to 24 hours.............<8.0 mg/dL  Up to 48 hours............<12.0 mg/dL  3-5  days..................<15.0 mg/dL  6-29 days.................<15.0 mg/dL    For patients on Eltrombopag therapy, use of Dimension [place] TBIL is   not   recommended.         BUN 5       Calcium 8.5       Chloride 115       CO2 28       Creatinine 0.6       Differential Method Automated       eGFR >60.0       Eos # 0.2       Eosinophil % 2.2       Glucose 168       Gran # (ANC) 8.3       Gran % 80.6       Hematocrit 42.5       Hemoglobin 13.9       Immature Grans (Abs) 0.03  Comment: Mild elevation in immature granulocytes is non specific and   can be seen in a variety of conditions including stress response,   acute inflammation, trauma and pregnancy. Correlation with other   laboratory and clinical findings is essential.         Immature Granulocytes 0.3       Lymph # 1.2       Lymph % 11.3       Magnesium 2.0       MCH 31.1       MCHC 32.7       MCV 95       Mono # 0.5       Mono % 5.1       MPV 9.8       nRBC 0       Platelets 209       Potassium 4.0       PROTEIN TOTAL 5.6       RBC 4.47       RDW 13.5       Sodium 144       WBC 10.26               Significant Imaging: I have reviewed all pertinent imaging results/findings within the past 24 hours.

## 2022-08-07 NOTE — PROGRESS NOTES
[facility] Medicine  Progress Note    Patient Name: Desiree Blake  MRN: [id number]  Patient Class: OP- Observation   Admission Date: 8/5/2022  Length of Stay: 0 days  Attending Physician: Kt Pozo Jr., MD  Primary Care Provider: Kt Pozo Jr, MD        Subjective:     Principal Problem:C. difficile colitis        HPI:  67-year-old female with history of coronary artery disease with history of COPD, high cholesterol presents with crampy abdominal pain generalized weakness and nonbloody diarrhea for the past few days.  Patient was seen here a few days ago for similar complaint and found to have C diff.  Patient was prescribed medication but has been unable to take medication.  Patient denies any chest pain, fever, sick contacts    Pt repotrs over the last month has been seeign her pcp for abd pain,n/v, diarrhea - has been prescibed nerve pills na dtoher meds.  Denies any recent abx usage or other sick contacts with diarrhea.  Pt fousn to have cdiff a few days ago - given oral vanc.  Went to take first dose but was very nauseated and never completed first dose and then came to er for further eval      Overview/Hospital Course:  8/7 Nd feeling better - tolerating po intake this am - still with some loose stools      Past Medical History:   Diagnosis Date    Anticoagulant long-term use     Anxiety     Arthritis     Carotid artery disease     Cervical disc disorder     Chronic back pain     COPD (chronic obstructive pulmonary disease)     Coronary artery disease     Dementia     Depression     Diarrhea, unspecified 11/1/2021    DVT (deep venous thrombosis)     CAROTID    GERD (gastroesophageal reflux disease)     Hematuria     Hiatal hernia     High cholesterol     Ill-fitting dentures     STATES DOESN'T WEAR    PVD (peripheral vascular disease)     Renal calculus     Sleep apnea     Sleep apnea     NO C PAP    Urinary frequency     Urinary urgency     Wears glasses      READING        Past Surgical History:   Procedure Laterality Date    BACK SURGERY      BREAST LUMPECTOMY Right     CAROTID ARTERY ANGIOPLASTY      CAROTID ARTERY ANGIOPLASTY      CAROTID ARTERY STENT Left     CAROTID ENDARTERECTOMY Right     CHOLECYSTECTOMY      COLONOSCOPY      CYSTOSCOPY      HYSTERECTOMY      OOPHORECTOMY      TONSILLECTOMY         Review of patient's allergies indicates:  No Known Allergies    No current facility-administered medications on file prior to encounter.     Current Outpatient Medications on File Prior to Encounter   Medication Sig    ALPRAZolam (XANAX) 0.25 MG tablet Take 1 tablet (0.25 mg total) by mouth 2 (two) times daily as needed for Anxiety.    clopidogreL (PLAVIX) 75 mg tablet Take 1 tablet (75 mg total) by mouth every morning.    fluticasone propionate (FLONASE) 50 mcg/actuation nasal spray 2 sprays (100 mcg total) by Each Nostril route once daily.    nebulizer and compressor Argelia 1 kit by Misc.(Non-Drug; Combo Route) route every 6 (six) hours as needed (cough wheezing SOB).    pravastatin (PRAVACHOL) 20 MG tablet TAKE 1 TABLET BY MOUTH EVERY EVENING    QUEtiapine (SEROQUEL) 200 MG Tab Take 1 tablet (200 mg total) by mouth every evening.    vancomycin (VANCOCIN) 125 MG capsule Take 1 capsule (125 mg total) by mouth 4 (four) times daily. for 14 days    albuterol (PROVENTIL/VENTOLIN HFA) 90 mcg/actuation inhaler Inhale 1-2 puffs into the lungs every 4 to 6 hours as needed for Wheezing (as needed for cough, wheezing, shortness of breath). Rescue    azelastine (ASTELIN) 137 mcg (0.1 %) nasal spray 1 spray (137 mcg total) by Nasal route 2 (two) times daily.    pregabalin (LYRICA) 75 MG capsule Take 1 capsule (75 mg total) by mouth 2 (two) times daily.    sumatriptan (IMITREX) 25 MG Tab Take 1 tablet (25 mg total) by mouth daily as needed (headache).    traZODone (DESYREL) 50 MG tablet TAKE 1 TABLET BY MOUTH EVERY EVENING    VRAYLAR 4.5 mg Cap TAKE 1  CAPSULE BY MOUTH EVERY NIGHT AT BEDTIME     Family History       Problem Relation (Age of Onset)    Cancer Mother    No Known Problems Sister, Daughter, Maternal Aunt, Maternal Uncle, Paternal Aunt, Paternal Uncle, Maternal Grandmother, Maternal Grandfather, Paternal Grandmother, Paternal Grandfather    Stroke Father          Tobacco Use    Smoking status: Current Every Day Smoker     Packs/day: 0.50     Types: Cigarettes     Start date: 1970    Smokeless tobacco: Never Used   Substance and Sexual Activity    Alcohol use: No    Drug use: No    Sexual activity: Not on file     Review of Systems   Constitutional:  Positive for activity change and chills. Negative for fever.   HENT:  Negative for ear pain, postnasal drip, sinus pressure and trouble swallowing.    Respiratory:  Negative for cough, shortness of breath, wheezing and stridor.    Cardiovascular:  Negative for chest pain, palpitations and leg swelling.   Gastrointestinal:  Positive for abdominal pain, diarrhea, nausea and vomiting. Negative for blood in stool and constipation.   Genitourinary:  Negative for difficulty urinating and dysuria.   Musculoskeletal:  Positive for arthralgias. Negative for back pain.   Skin:  Negative for pallor and rash.   Neurological:  Positive for weakness. Negative for seizures and syncope.   Objective:     Vital Signs (Most Recent):  Temp: 98 °F (36.7 °C) (08/07/22 1128)  Pulse: 75 (08/07/22 1128)  Resp: 20 (08/07/22 1128)  BP: (!) 148/70 (08/07/22 1128)  SpO2: 95 % (08/07/22 1128)   Vital Signs (24h Range):  Temp:  [97.6 °F (36.4 °C)-98.3 °F (36.8 °C)] 98 °F (36.7 °C)  Pulse:  [67-75] 75  Resp:  [16-20] 20  SpO2:  [93 %-99 %] 95 %  BP: (107-178)/(53-76) 148/70     Weight: 48.5 kg (107 lb)  Body mass index is 18.37 kg/m².    Physical Exam  Constitutional:       Appearance: Normal appearance.   HENT:      Mouth/Throat:      Mouth: Mucous membranes are moist.   Eyes:      Extraocular Movements: Extraocular movements intact.       Pupils: Pupils are equal, round, and reactive to light.   Cardiovascular:      Rate and Rhythm: Normal rate and regular rhythm.   Pulmonary:      Effort: Pulmonary effort is normal.      Breath sounds: Normal breath sounds.   Abdominal:      General: There is no distension.      Palpations: Abdomen is soft.      Tenderness: There is no abdominal tenderness. There is no guarding.   Musculoskeletal:         General: Normal range of motion.   Skin:     General: Skin is warm and dry.   Neurological:      Mental Status: She is alert and oriented to person, place, and time.         CRANIAL NERVES     CN III, IV, VI   Pupils are equal, round, and reactive to light.     Significant Labs: All pertinent labs within the past 24 hours have been reviewed.  Recent Lab Results         08/07/22  0834        Albumin 2.8       Alkaline Phosphatase 55       ALT 15       Anion Gap 1       AST 12       Baso # 0.05       Basophil % 0.5       BILIRUBIN TOTAL 0.2  Comment: For infants and newborns, interpretation of results should be based  on gestational age, weight and in agreement with clinical  observations.    Premature Infant recommended reference ranges:  Up to 24 hours.............<8.0 mg/dL  Up to 48 hours............<12.0 mg/dL  3-5 days..................<15.0 mg/dL  6-29 days.................<15.0 mg/dL    For patients on Eltrombopag therapy, use of Dimension [place] TBIL is   not   recommended.         BUN 5       Calcium 8.5       Chloride 115       CO2 28       Creatinine 0.6       Differential Method Automated       eGFR >60.0       Eos # 0.2       Eosinophil % 2.2       Glucose 168       Gran # (ANC) 8.3       Gran % 80.6       Hematocrit 42.5       Hemoglobin 13.9       Immature Grans (Abs) 0.03  Comment: Mild elevation in immature granulocytes is non specific and   can be seen in a variety of conditions including stress response,   acute inflammation, trauma and pregnancy. Correlation with other   laboratory and  clinical findings is essential.         Immature Granulocytes 0.3       Lymph # 1.2       Lymph % 11.3       Magnesium 2.0       MCH 31.1       MCHC 32.7       MCV 95       Mono # 0.5       Mono % 5.1       MPV 9.8       nRBC 0       Platelets 209       Potassium 4.0       PROTEIN TOTAL 5.6       RBC 4.47       RDW 13.5       Sodium 144       WBC 10.26               Significant Imaging: I have reviewed all pertinent imaging results/findings within the past 24 hours.      Assessment/Plan:      * C. difficile colitis  Failed outpt tx - on iv flagyl for now due to was unable to hold down po   Start probioitcs and metamucil  8/7 change to po flagyl      Tobacco abuse  Quit smoking      HTN (hypertension)  norvasc daily -monitor bp      Hypokalemia  Cont potassium replacment  8/7 improved    Non-intractable vomiting  Nausea meds as needed      Anxiety  Cont home meds        VTE Risk Mitigation (From admission, onward)         Ordered     IP VTE LOW RISK PATIENT  Once         08/05/22 1802     Place VANDANA hose  Until discontinued         08/05/22 1802                Discharge Planning   DEEPTI:      Code Status: Full Code   Is the patient medically ready for discharge?:     Reason for patient still in hospital (select all that apply): Treatment  Discharge Plan A: Home                  Charline Ybarra MD  Department of Hospital Medicine   [facility]

## 2022-08-07 NOTE — ASSESSMENT & PLAN NOTE
Failed outpt tx - on iv flagyl for now due to was unable to hold down po   Start probioitcs and metamucil  8/7 change to po flagyl

## 2022-08-07 NOTE — HOSPITAL COURSE
8/7 Nd feeling better - tolerating po intake this am - still with some loose stools.  8/8:  tolerating PO.  Pain persists will continue to monitor.   8/9:  tolerating PO will dc with outpatient course of therapy.

## 2022-08-08 PROBLEM — R53.83 FATIGUE: Status: ACTIVE | Noted: 2022-08-08

## 2022-08-08 LAB
ALBUMIN SERPL BCP-MCNC: 3.1 G/DL (ref 3.5–5.2)
ALP SERPL-CCNC: 73 U/L (ref 55–135)
ALT SERPL W/O P-5'-P-CCNC: 23 U/L (ref 10–44)
ANION GAP SERPL CALC-SCNC: 3 MMOL/L (ref 8–16)
AST SERPL-CCNC: 19 U/L (ref 10–40)
BASOPHILS # BLD AUTO: 0.04 K/UL (ref 0–0.2)
BASOPHILS NFR BLD: 0.5 % (ref 0–1.9)
BILIRUB SERPL-MCNC: 0.2 MG/DL (ref 0.1–1)
BUN SERPL-MCNC: 7 MG/DL (ref 8–23)
CALCIUM SERPL-MCNC: 8.6 MG/DL (ref 8.7–10.5)
CHLORIDE SERPL-SCNC: 115 MMOL/L (ref 95–110)
CO2 SERPL-SCNC: 27 MMOL/L (ref 23–29)
CREAT SERPL-MCNC: 0.6 MG/DL (ref 0.5–1.4)
DIFFERENTIAL METHOD: ABNORMAL
EOSINOPHIL # BLD AUTO: 0.2 K/UL (ref 0–0.5)
EOSINOPHIL NFR BLD: 2.5 % (ref 0–8)
ERYTHROCYTE [DISTWIDTH] IN BLOOD BY AUTOMATED COUNT: 13.3 % (ref 11.5–14.5)
EST. GFR  (NO RACE VARIABLE): >60 ML/MIN/1.73 M^2
GLUCOSE SERPL-MCNC: 121 MG/DL (ref 70–110)
HCT VFR BLD AUTO: 39.8 % (ref 37–48.5)
HGB BLD-MCNC: 13.5 G/DL (ref 12–16)
IMM GRANULOCYTES # BLD AUTO: 0.02 K/UL (ref 0–0.04)
IMM GRANULOCYTES NFR BLD AUTO: 0.2 % (ref 0–0.5)
LYMPHOCYTES # BLD AUTO: 1.7 K/UL (ref 1–4.8)
LYMPHOCYTES NFR BLD: 20.2 % (ref 18–48)
MAGNESIUM SERPL-MCNC: 2 MG/DL (ref 1.6–2.6)
MCH RBC QN AUTO: 31.8 PG (ref 27–31)
MCHC RBC AUTO-ENTMCNC: 33.9 G/DL (ref 32–36)
MCV RBC AUTO: 94 FL (ref 82–98)
MONOCYTES # BLD AUTO: 0.7 K/UL (ref 0.3–1)
MONOCYTES NFR BLD: 8.1 % (ref 4–15)
NEUTROPHILS # BLD AUTO: 5.7 K/UL (ref 1.8–7.7)
NEUTROPHILS NFR BLD: 68.5 % (ref 38–73)
NRBC BLD-RTO: 0 /100 WBC
PLATELET # BLD AUTO: 193 K/UL (ref 150–450)
PMV BLD AUTO: 9.9 FL (ref 9.2–12.9)
POTASSIUM SERPL-SCNC: 4.5 MMOL/L (ref 3.5–5.1)
PROT SERPL-MCNC: 6.2 G/DL (ref 6–8.4)
RBC # BLD AUTO: 4.25 M/UL (ref 4–5.4)
SODIUM SERPL-SCNC: 145 MMOL/L (ref 136–145)
WBC # BLD AUTO: 8.3 K/UL (ref 3.9–12.7)

## 2022-08-08 PROCEDURE — 63600175 PHARM REV CODE 636 W HCPCS: Performed by: STUDENT IN AN ORGANIZED HEALTH CARE EDUCATION/TRAINING PROGRAM

## 2022-08-08 PROCEDURE — 80053 COMPREHEN METABOLIC PANEL: CPT | Performed by: INTERNAL MEDICINE

## 2022-08-08 PROCEDURE — 25000003 PHARM REV CODE 250: Performed by: INTERNAL MEDICINE

## 2022-08-08 PROCEDURE — G0378 HOSPITAL OBSERVATION PER HR: HCPCS

## 2022-08-08 PROCEDURE — 83735 ASSAY OF MAGNESIUM: CPT | Performed by: INTERNAL MEDICINE

## 2022-08-08 PROCEDURE — 25000003 PHARM REV CODE 250: Performed by: STUDENT IN AN ORGANIZED HEALTH CARE EDUCATION/TRAINING PROGRAM

## 2022-08-08 PROCEDURE — S4991 NICOTINE PATCH NONLEGEND: HCPCS | Performed by: INTERNAL MEDICINE

## 2022-08-08 PROCEDURE — 96376 TX/PRO/DX INJ SAME DRUG ADON: CPT

## 2022-08-08 PROCEDURE — 36415 COLL VENOUS BLD VENIPUNCTURE: CPT | Performed by: INTERNAL MEDICINE

## 2022-08-08 PROCEDURE — 85025 COMPLETE CBC W/AUTO DIFF WBC: CPT | Performed by: INTERNAL MEDICINE

## 2022-08-08 RX ORDER — AMLODIPINE BESYLATE 10 MG/1
10 TABLET ORAL DAILY
Status: DISCONTINUED | OUTPATIENT
Start: 2022-08-08 | End: 2022-08-09 | Stop reason: HOSPADM

## 2022-08-08 RX ADMIN — HYDROCODONE BITARTRATE AND ACETAMINOPHEN 1 TABLET: 10; 325 TABLET ORAL at 04:08

## 2022-08-08 RX ADMIN — METRONIDAZOLE 500 MG: 500 TABLET ORAL at 05:08

## 2022-08-08 RX ADMIN — Medication 1 PACKET: at 08:08

## 2022-08-08 RX ADMIN — QUETIAPINE FUMARATE 200 MG: 100 TABLET ORAL at 09:08

## 2022-08-08 RX ADMIN — IBUPROFEN 600 MG: 600 TABLET ORAL at 08:08

## 2022-08-08 RX ADMIN — AMLODIPINE BESYLATE 10 MG: 10 TABLET ORAL at 08:08

## 2022-08-08 RX ADMIN — HYDROCODONE BITARTRATE AND ACETAMINOPHEN 1 TABLET: 10; 325 TABLET ORAL at 01:08

## 2022-08-08 RX ADMIN — METOCLOPRAMIDE 10 MG: 5 INJECTION, SOLUTION INTRAMUSCULAR; INTRAVENOUS at 04:08

## 2022-08-08 RX ADMIN — LACTOBACILLUS ACIDOPHILUS / LACTOBACILLUS BULGARICUS 1 EACH: 100 MILLION CFU STRENGTH GRANULES at 09:08

## 2022-08-08 RX ADMIN — PREGABALIN 75 MG: 75 CAPSULE ORAL at 09:08

## 2022-08-08 RX ADMIN — PREGABALIN 75 MG: 75 CAPSULE ORAL at 08:08

## 2022-08-08 RX ADMIN — METRONIDAZOLE 500 MG: 500 TABLET ORAL at 09:08

## 2022-08-08 RX ADMIN — METRONIDAZOLE 500 MG: 500 TABLET ORAL at 01:08

## 2022-08-08 RX ADMIN — CLOPIDOGREL 75 MG: 75 TABLET, FILM COATED ORAL at 08:08

## 2022-08-08 RX ADMIN — Medication 6 MG: at 09:08

## 2022-08-08 RX ADMIN — ALPRAZOLAM 0.25 MG: 0.25 TABLET ORAL at 09:08

## 2022-08-08 RX ADMIN — PRAVASTATIN SODIUM 20 MG: 20 TABLET ORAL at 09:08

## 2022-08-08 RX ADMIN — HYDROCODONE BITARTRATE AND ACETAMINOPHEN 1 TABLET: 10; 325 TABLET ORAL at 09:08

## 2022-08-08 RX ADMIN — LACTOBACILLUS ACIDOPHILUS / LACTOBACILLUS BULGARICUS 1 EACH: 100 MILLION CFU STRENGTH GRANULES at 08:08

## 2022-08-08 RX ADMIN — NICOTINE 1 PATCH: 21 PATCH, EXTENDED RELEASE TRANSDERMAL at 08:08

## 2022-08-08 RX ADMIN — IBUPROFEN 600 MG: 600 TABLET ORAL at 04:08

## 2022-08-08 RX ADMIN — ALPRAZOLAM 0.25 MG: 0.25 TABLET ORAL at 04:08

## 2022-08-08 RX ADMIN — TRAZODONE HYDROCHLORIDE 50 MG: 50 TABLET ORAL at 09:08

## 2022-08-08 NOTE — PLAN OF CARE
Plan of care reviewed with pt at bedside, on continuous contact isolation for positive C-diff status, on RA, room near unit station, takes meds whole, chronic back pain, prn pain medication administered as ordered, continuous IV fluids as ordered, call bell in reach, will continue to monitor.

## 2022-08-08 NOTE — PLAN OF CARE
Care plan reviewed with patient on medication and isolation and c-diff with verbal response also reviewed smoking cessation unable to fine in the are plan

## 2022-08-08 NOTE — ASSESSMENT & PLAN NOTE
Patient has hypokalemia.  Last electrolytes reviewed- Recent Labs   Lab 08/07/22  0834 08/08/22  0600   K 4.0 4.5   Will replace potassium as per sliding scale as needed and monitor electrolytes closely.

## 2022-08-08 NOTE — ASSESSMENT & PLAN NOTE
Failed outpt tx - on iv flagyl for now due to was unable to hold down po   Start probioitcs and metamucil  8/7 change to po flagyl    Tolerating well thus far continue to monitor.

## 2022-08-08 NOTE — PROGRESS NOTES
[facility] Medicine  Progress Note    Patient Name: Desiree Blake  MRN: [id number]  Patient Class: OP- Observation   Admission Date: 8/5/2022  Length of Stay: 0 days  Attending Physician: Kt Pozo Jr., MD  Primary Care Provider: Kt Pozo Jr, MD        Subjective:     Principal Problem:C. difficile colitis        HPI:  67-year-old female with history of coronary artery disease with history of COPD, high cholesterol presents with crampy abdominal pain generalized weakness and nonbloody diarrhea for the past few days.  Patient was seen here a few days ago for similar complaint and found to have C diff.  Patient was prescribed medication but has been unable to take medication.  Patient denies any chest pain, fever, sick contacts    Pt repotrs over the last month has been seeign her pcp for abd pain,n/v, diarrhea - has been prescibed nerve pills na dtoher meds.  Denies any recent abx usage or other sick contacts with diarrhea.  Pt fousn to have cdiff a few days ago - given oral vanc.  Went to take first dose but was very nauseated and never completed first dose and then came to er for further eval      Overview/Hospital Course:  8/7 Nd feeling better - tolerating po intake this am - still with some loose stools.  8/8:  tolerating PO.  Pain persists will continue to monitor.       Past Medical History:   Diagnosis Date    Anticoagulant long-term use     Anxiety     Arthritis     Carotid artery disease     Cervical disc disorder     Chronic back pain     COPD (chronic obstructive pulmonary disease)     Coronary artery disease     Dementia     Depression     Diarrhea, unspecified 11/1/2021    DVT (deep venous thrombosis)     CAROTID    GERD (gastroesophageal reflux disease)     Hematuria     Hiatal hernia     High cholesterol     Ill-fitting dentures     STATES DOESN'T WEAR    PVD (peripheral vascular disease)     Renal calculus     Sleep apnea     Sleep apnea     NO C PAP     Urinary frequency     Urinary urgency     Wears glasses     READING        Past Surgical History:   Procedure Laterality Date    BACK SURGERY      BREAST LUMPECTOMY Right     CAROTID ARTERY ANGIOPLASTY      CAROTID ARTERY ANGIOPLASTY      CAROTID ARTERY STENT Left     CAROTID ENDARTERECTOMY Right     CHOLECYSTECTOMY      COLONOSCOPY      CYSTOSCOPY      HYSTERECTOMY      OOPHORECTOMY      TONSILLECTOMY         Review of patient's allergies indicates:  No Known Allergies    No current facility-administered medications on file prior to encounter.     Current Outpatient Medications on File Prior to Encounter   Medication Sig    ALPRAZolam (XANAX) 0.25 MG tablet Take 1 tablet (0.25 mg total) by mouth 2 (two) times daily as needed for Anxiety.    clopidogreL (PLAVIX) 75 mg tablet Take 1 tablet (75 mg total) by mouth every morning.    fluticasone propionate (FLONASE) 50 mcg/actuation nasal spray 2 sprays (100 mcg total) by Each Nostril route once daily.    nebulizer and compressor Argelia 1 kit by Misc.(Non-Drug; Combo Route) route every 6 (six) hours as needed (cough wheezing SOB).    pravastatin (PRAVACHOL) 20 MG tablet TAKE 1 TABLET BY MOUTH EVERY EVENING    QUEtiapine (SEROQUEL) 200 MG Tab Take 1 tablet (200 mg total) by mouth every evening.    vancomycin (VANCOCIN) 125 MG capsule Take 1 capsule (125 mg total) by mouth 4 (four) times daily. for 14 days    albuterol (PROVENTIL/VENTOLIN HFA) 90 mcg/actuation inhaler Inhale 1-2 puffs into the lungs every 4 to 6 hours as needed for Wheezing (as needed for cough, wheezing, shortness of breath). Rescue    azelastine (ASTELIN) 137 mcg (0.1 %) nasal spray 1 spray (137 mcg total) by Nasal route 2 (two) times daily.    pregabalin (LYRICA) 75 MG capsule Take 1 capsule (75 mg total) by mouth 2 (two) times daily.    sumatriptan (IMITREX) 25 MG Tab Take 1 tablet (25 mg total) by mouth daily as needed (headache).    traZODone (DESYREL) 50 MG tablet TAKE 1  TABLET BY MOUTH EVERY EVENING    VRAYLAR 4.5 mg Cap TAKE 1 CAPSULE BY MOUTH EVERY NIGHT AT BEDTIME     Family History       Problem Relation (Age of Onset)    Cancer Mother    No Known Problems Sister, Daughter, Maternal Aunt, Maternal Uncle, Paternal Aunt, Paternal Uncle, Maternal Grandmother, Maternal Grandfather, Paternal Grandmother, Paternal Grandfather    Stroke Father          Tobacco Use    Smoking status: Current Every Day Smoker     Packs/day: 0.50     Types: Cigarettes     Start date: 1970    Smokeless tobacco: Never Used   Substance and Sexual Activity    Alcohol use: No    Drug use: No    Sexual activity: Not on file     Review of Systems   Constitutional:  Positive for activity change and chills. Negative for fever.   HENT:  Negative for ear pain, postnasal drip, sinus pressure and trouble swallowing.    Respiratory:  Negative for cough, shortness of breath, wheezing and stridor.    Cardiovascular:  Negative for chest pain, palpitations and leg swelling.   Gastrointestinal:  Positive for abdominal pain, diarrhea, nausea and vomiting. Negative for blood in stool and constipation.   Genitourinary:  Negative for difficulty urinating and dysuria.   Musculoskeletal:  Positive for arthralgias. Negative for back pain.   Skin:  Negative for pallor and rash.   Neurological:  Positive for weakness. Negative for seizures and syncope.   Objective:     Vital Signs (Most Recent):  Temp: 98.2 °F (36.8 °C) (08/08/22 0729)  Pulse: 65 (08/08/22 0729)  Resp: 18 (08/08/22 0729)  BP: (!) 165/77 (08/08/22 0729)  SpO2: 96 % (08/08/22 0729)   Vital Signs (24h Range):  Temp:  [97.7 °F (36.5 °C)-98.7 °F (37.1 °C)] 98.2 °F (36.8 °C)  Pulse:  [65-80] 65  Resp:  [16-20] 18  SpO2:  [92 %-97 %] 96 %  BP: (108-213)/(55-93) 165/77     Weight: 48.5 kg (107 lb)  Body mass index is 18.37 kg/m².    Physical Exam  Constitutional:       Appearance: Normal appearance.   HENT:      Mouth/Throat:      Mouth: Mucous membranes are moist.    Eyes:      Extraocular Movements: Extraocular movements intact.      Pupils: Pupils are equal, round, and reactive to light.   Cardiovascular:      Rate and Rhythm: Normal rate and regular rhythm.   Pulmonary:      Effort: Pulmonary effort is normal.      Breath sounds: Normal breath sounds.   Abdominal:      General: There is no distension.      Palpations: Abdomen is soft.      Tenderness: There is no abdominal tenderness. There is no guarding.   Musculoskeletal:         General: Normal range of motion.   Skin:     General: Skin is warm and dry.   Neurological:      Mental Status: She is alert and oriented to person, place, and time.         CRANIAL NERVES     CN III, IV, VI   Pupils are equal, round, and reactive to light.     Significant Labs: All pertinent labs within the past 24 hours have been reviewed.  Recent Lab Results         08/08/22  0600   08/07/22  0834        Albumin 3.1   2.8       Alkaline Phosphatase 73   55       ALT 23   15       Anion Gap 3   1       AST 19   12       Baso # 0.04   0.05       Basophil % 0.5   0.5       BILIRUBIN TOTAL 0.2  Comment: For infants and newborns, interpretation of results should be based  on gestational age, weight and in agreement with clinical  observations.    Premature Infant recommended reference ranges:  Up to 24 hours.............<8.0 mg/dL  Up to 48 hours............<12.0 mg/dL  3-5 days..................<15.0 mg/dL  6-29 days.................<15.0 mg/dL    For patients on Eltrombopag therapy, use of Dimension [place] TBIL is   not   recommended.     0.2  Comment: For infants and newborns, interpretation of results should be based  on gestational age, weight and in agreement with clinical  observations.    Premature Infant recommended reference ranges:  Up to 24 hours.............<8.0 mg/dL  Up to 48 hours............<12.0 mg/dL  3-5 days..................<15.0 mg/dL  6-29 days.................<15.0 mg/dL    For patients on Eltrombopag therapy, use of  Dimension [place] TBIL is   not   recommended.         BUN 7   5       Calcium 8.6   8.5       Chloride 115   115       CO2 27   28       Creatinine 0.6   0.6       Differential Method Automated   Automated       eGFR >60.0   >60.0       Eos # 0.2   0.2       Eosinophil % 2.5   2.2       Glucose 121   168       Gran # (ANC) 5.7   8.3       Gran % 68.5   80.6       Hematocrit 39.8   42.5       Hemoglobin 13.5   13.9       Immature Grans (Abs) 0.02  Comment: Mild elevation in immature granulocytes is non specific and   can be seen in a variety of conditions including stress response,   acute inflammation, trauma and pregnancy. Correlation with other   laboratory and clinical findings is essential.     0.03  Comment: Mild elevation in immature granulocytes is non specific and   can be seen in a variety of conditions including stress response,   acute inflammation, trauma and pregnancy. Correlation with other   laboratory and clinical findings is essential.         Immature Granulocytes 0.2   0.3       Lymph # 1.7   1.2       Lymph % 20.2   11.3       Magnesium 2.0   2.0       MCH 31.8   31.1       MCHC 33.9   32.7       MCV 94   95       Mono # 0.7   0.5       Mono % 8.1   5.1       MPV 9.9   9.8       nRBC 0   0       Platelets 193   209       Potassium 4.5   4.0       PROTEIN TOTAL 6.2   5.6       RBC 4.25   4.47       RDW 13.3   13.5       Sodium 145   144       WBC 8.30   10.26               Significant Imaging: I have reviewed all pertinent imaging results/findings within the past 24 hours.      Assessment/Plan:      * C. difficile colitis  Failed outpt tx - on iv flagyl for now due to was unable to hold down po   Start probioitcs and metamucil  8/7 change to po flagyl    Tolerating well thus far continue to monitor.       Tobacco abuse  Quit smoking      HTN (hypertension)  norvasc daily -monitor bp    BP Readings from Last 3 Encounters:   08/08/22 (!) 165/77   08/04/22 (!) 156/75   08/02/22 130/77            Hypokalemia  Patient has hypokalemia.  Last electrolytes reviewed- Recent Labs   Lab 08/07/22  0834 08/08/22  0600   K 4.0 4.5   Will replace potassium as per sliding scale as needed and monitor electrolytes closely.       Non-intractable vomiting  Nausea meds as needed      Anxiety  Cont home meds        VTE Risk Mitigation (From admission, onward)         Ordered     IP VTE LOW RISK PATIENT  Once         08/05/22 1802     Place VANDANA hose  Until discontinued         08/05/22 1802                Discharge Planning   DEEPTI:      Code Status: Full Code   Is the patient medically ready for discharge?:     Reason for patient still in hospital (select all that apply): Treatment  Discharge Plan A: Home                  Kt Pozo Jr, MD  Department of Hospital Medicine   [facility]

## 2022-08-08 NOTE — SUBJECTIVE & OBJECTIVE
Past Medical History:   Diagnosis Date    Anticoagulant long-term use     Anxiety     Arthritis     Carotid artery disease     Cervical disc disorder     Chronic back pain     COPD (chronic obstructive pulmonary disease)     Coronary artery disease     Dementia     Depression     Diarrhea, unspecified 11/1/2021    DVT (deep venous thrombosis)     CAROTID    GERD (gastroesophageal reflux disease)     Hematuria     Hiatal hernia     High cholesterol     Ill-fitting dentures     STATES DOESN'T WEAR    PVD (peripheral vascular disease)     Renal calculus     Sleep apnea     Sleep apnea     NO C PAP    Urinary frequency     Urinary urgency     Wears glasses     READING        Past Surgical History:   Procedure Laterality Date    BACK SURGERY      BREAST LUMPECTOMY Right     CAROTID ARTERY ANGIOPLASTY      CAROTID ARTERY ANGIOPLASTY      CAROTID ARTERY STENT Left     CAROTID ENDARTERECTOMY Right     CHOLECYSTECTOMY      COLONOSCOPY      CYSTOSCOPY      HYSTERECTOMY      OOPHORECTOMY      TONSILLECTOMY         Review of patient's allergies indicates:  No Known Allergies    No current facility-administered medications on file prior to encounter.     Current Outpatient Medications on File Prior to Encounter   Medication Sig    ALPRAZolam (XANAX) 0.25 MG tablet Take 1 tablet (0.25 mg total) by mouth 2 (two) times daily as needed for Anxiety.    clopidogreL (PLAVIX) 75 mg tablet Take 1 tablet (75 mg total) by mouth every morning.    fluticasone propionate (FLONASE) 50 mcg/actuation nasal spray 2 sprays (100 mcg total) by Each Nostril route once daily.    nebulizer and compressor Argelia 1 kit by Misc.(Non-Drug; Combo Route) route every 6 (six) hours as needed (cough wheezing SOB).    pravastatin (PRAVACHOL) 20 MG tablet TAKE 1 TABLET BY MOUTH EVERY EVENING    QUEtiapine (SEROQUEL) 200 MG Tab Take 1 tablet (200 mg total) by mouth every evening.    vancomycin (VANCOCIN) 125 MG capsule Take  1 capsule (125 mg total) by mouth 4 (four) times daily. for 14 days    albuterol (PROVENTIL/VENTOLIN HFA) 90 mcg/actuation inhaler Inhale 1-2 puffs into the lungs every 4 to 6 hours as needed for Wheezing (as needed for cough, wheezing, shortness of breath). Rescue    azelastine (ASTELIN) 137 mcg (0.1 %) nasal spray 1 spray (137 mcg total) by Nasal route 2 (two) times daily.    pregabalin (LYRICA) 75 MG capsule Take 1 capsule (75 mg total) by mouth 2 (two) times daily.    sumatriptan (IMITREX) 25 MG Tab Take 1 tablet (25 mg total) by mouth daily as needed (headache).    traZODone (DESYREL) 50 MG tablet TAKE 1 TABLET BY MOUTH EVERY EVENING    VRAYLAR 4.5 mg Cap TAKE 1 CAPSULE BY MOUTH EVERY NIGHT AT BEDTIME     Family History       Problem Relation (Age of Onset)    Cancer Mother    No Known Problems Sister, Daughter, Maternal Aunt, Maternal Uncle, Paternal Aunt, Paternal Uncle, Maternal Grandmother, Maternal Grandfather, Paternal Grandmother, Paternal Grandfather    Stroke Father          Tobacco Use    Smoking status: Current Every Day Smoker     Packs/day: 0.50     Types: Cigarettes     Start date: 1970    Smokeless tobacco: Never Used   Substance and Sexual Activity    Alcohol use: No    Drug use: No    Sexual activity: Not on file     Review of Systems   Constitutional:  Positive for activity change and chills. Negative for fever.   HENT:  Negative for ear pain, postnasal drip, sinus pressure and trouble swallowing.    Respiratory:  Negative for cough, shortness of breath, wheezing and stridor.    Cardiovascular:  Negative for chest pain, palpitations and leg swelling.   Gastrointestinal:  Positive for abdominal pain, diarrhea, nausea and vomiting. Negative for blood in stool and constipation.   Genitourinary:  Negative for difficulty urinating and dysuria.   Musculoskeletal:  Positive for arthralgias. Negative for back pain.   Skin:  Negative for pallor and rash.   Neurological:  Positive for  weakness. Negative for seizures and syncope.   Objective:     Vital Signs (Most Recent):  Temp: 98.2 °F (36.8 °C) (08/08/22 0729)  Pulse: 65 (08/08/22 0729)  Resp: 18 (08/08/22 0729)  BP: (!) 165/77 (08/08/22 0729)  SpO2: 96 % (08/08/22 0729)   Vital Signs (24h Range):  Temp:  [97.7 °F (36.5 °C)-98.7 °F (37.1 °C)] 98.2 °F (36.8 °C)  Pulse:  [65-80] 65  Resp:  [16-20] 18  SpO2:  [92 %-97 %] 96 %  BP: (108-213)/(55-93) 165/77     Weight: 48.5 kg (107 lb)  Body mass index is 18.37 kg/m².    Physical Exam  Constitutional:       Appearance: Normal appearance.   HENT:      Mouth/Throat:      Mouth: Mucous membranes are moist.   Eyes:      Extraocular Movements: Extraocular movements intact.      Pupils: Pupils are equal, round, and reactive to light.   Cardiovascular:      Rate and Rhythm: Normal rate and regular rhythm.   Pulmonary:      Effort: Pulmonary effort is normal.      Breath sounds: Normal breath sounds.   Abdominal:      General: There is no distension.      Palpations: Abdomen is soft.      Tenderness: There is no abdominal tenderness. There is no guarding.   Musculoskeletal:         General: Normal range of motion.   Skin:     General: Skin is warm and dry.   Neurological:      Mental Status: She is alert and oriented to person, place, and time.         CRANIAL NERVES     CN III, IV, VI   Pupils are equal, round, and reactive to light.     Significant Labs: All pertinent labs within the past 24 hours have been reviewed.  Recent Lab Results         08/08/22  0600   08/07/22  0834        Albumin 3.1   2.8       Alkaline Phosphatase 73   55       ALT 23   15       Anion Gap 3   1       AST 19   12       Baso # 0.04   0.05       Basophil % 0.5   0.5       BILIRUBIN TOTAL 0.2  Comment: For infants and newborns, interpretation of results should be based  on gestational age, weight and in agreement with clinical  observations.    Premature Infant recommended reference ranges:  Up to 24 hours.............<8.0  mg/dL  Up to 48 hours............<12.0 mg/dL  3-5 days..................<15.0 mg/dL  6-29 days.................<15.0 mg/dL    For patients on Eltrombopag therapy, use of Dimension [place] TBIL is   not   recommended.     0.2  Comment: For infants and newborns, interpretation of results should be based  on gestational age, weight and in agreement with clinical  observations.    Premature Infant recommended reference ranges:  Up to 24 hours.............<8.0 mg/dL  Up to 48 hours............<12.0 mg/dL  3-5 days..................<15.0 mg/dL  6-29 days.................<15.0 mg/dL    For patients on Eltrombopag therapy, use of Dimension [place] TBIL is   not   recommended.         BUN 7   5       Calcium 8.6   8.5       Chloride 115   115       CO2 27   28       Creatinine 0.6   0.6       Differential Method Automated   Automated       eGFR >60.0   >60.0       Eos # 0.2   0.2       Eosinophil % 2.5   2.2       Glucose 121   168       Gran # (ANC) 5.7   8.3       Gran % 68.5   80.6       Hematocrit 39.8   42.5       Hemoglobin 13.5   13.9       Immature Grans (Abs) 0.02  Comment: Mild elevation in immature granulocytes is non specific and   can be seen in a variety of conditions including stress response,   acute inflammation, trauma and pregnancy. Correlation with other   laboratory and clinical findings is essential.     0.03  Comment: Mild elevation in immature granulocytes is non specific and   can be seen in a variety of conditions including stress response,   acute inflammation, trauma and pregnancy. Correlation with other   laboratory and clinical findings is essential.         Immature Granulocytes 0.2   0.3       Lymph # 1.7   1.2       Lymph % 20.2   11.3       Magnesium 2.0   2.0       MCH 31.8   31.1       MCHC 33.9   32.7       MCV 94   95       Mono # 0.7   0.5       Mono % 8.1   5.1       MPV 9.9   9.8       nRBC 0   0       Platelets 193   209       Potassium 4.5   4.0       PROTEIN TOTAL 6.2   5.6       RBC  4.25   4.47       RDW 13.3   13.5       Sodium 145   144       WBC 8.30   10.26               Significant Imaging: I have reviewed all pertinent imaging results/findings within the past 24 hours.

## 2022-08-09 VITALS
OXYGEN SATURATION: 96 % | HEART RATE: 75 BPM | TEMPERATURE: 99 F | DIASTOLIC BLOOD PRESSURE: 60 MMHG | WEIGHT: 107 LBS | SYSTOLIC BLOOD PRESSURE: 136 MMHG | BODY MASS INDEX: 18.27 KG/M2 | HEIGHT: 64 IN | RESPIRATION RATE: 18 BRPM

## 2022-08-09 LAB
ALBUMIN SERPL BCP-MCNC: 3.1 G/DL (ref 3.5–5.2)
ALP SERPL-CCNC: 66 U/L (ref 55–135)
ALT SERPL W/O P-5'-P-CCNC: 24 U/L (ref 10–44)
ANION GAP SERPL CALC-SCNC: 2 MMOL/L (ref 8–16)
AST SERPL-CCNC: 20 U/L (ref 10–40)
BASOPHILS # BLD AUTO: 0.04 K/UL (ref 0–0.2)
BASOPHILS NFR BLD: 0.5 % (ref 0–1.9)
BILIRUB SERPL-MCNC: 0.2 MG/DL (ref 0.1–1)
BUN SERPL-MCNC: 12 MG/DL (ref 8–23)
CALCIUM SERPL-MCNC: 9.1 MG/DL (ref 8.7–10.5)
CHLORIDE SERPL-SCNC: 110 MMOL/L (ref 95–110)
CO2 SERPL-SCNC: 29 MMOL/L (ref 23–29)
CREAT SERPL-MCNC: 0.5 MG/DL (ref 0.5–1.4)
DIFFERENTIAL METHOD: ABNORMAL
EOSINOPHIL # BLD AUTO: 0.2 K/UL (ref 0–0.5)
EOSINOPHIL NFR BLD: 2.4 % (ref 0–8)
ERYTHROCYTE [DISTWIDTH] IN BLOOD BY AUTOMATED COUNT: 13.2 % (ref 11.5–14.5)
EST. GFR  (NO RACE VARIABLE): >60 ML/MIN/1.73 M^2
GLUCOSE SERPL-MCNC: 103 MG/DL (ref 70–110)
HCT VFR BLD AUTO: 40.1 % (ref 37–48.5)
HGB BLD-MCNC: 13.6 G/DL (ref 12–16)
IMM GRANULOCYTES # BLD AUTO: 0.02 K/UL (ref 0–0.04)
IMM GRANULOCYTES NFR BLD AUTO: 0.3 % (ref 0–0.5)
LYMPHOCYTES # BLD AUTO: 1.4 K/UL (ref 1–4.8)
LYMPHOCYTES NFR BLD: 18.6 % (ref 18–48)
MAGNESIUM SERPL-MCNC: 2.1 MG/DL (ref 1.6–2.6)
MCH RBC QN AUTO: 31.4 PG (ref 27–31)
MCHC RBC AUTO-ENTMCNC: 33.9 G/DL (ref 32–36)
MCV RBC AUTO: 93 FL (ref 82–98)
MONOCYTES # BLD AUTO: 0.7 K/UL (ref 0.3–1)
MONOCYTES NFR BLD: 9 % (ref 4–15)
NEUTROPHILS # BLD AUTO: 5.1 K/UL (ref 1.8–7.7)
NEUTROPHILS NFR BLD: 69.2 % (ref 38–73)
NRBC BLD-RTO: 0 /100 WBC
PLATELET # BLD AUTO: 186 K/UL (ref 150–450)
PMV BLD AUTO: 10 FL (ref 9.2–12.9)
POTASSIUM SERPL-SCNC: 4 MMOL/L (ref 3.5–5.1)
PROT SERPL-MCNC: 6.3 G/DL (ref 6–8.4)
RBC # BLD AUTO: 4.33 M/UL (ref 4–5.4)
SODIUM SERPL-SCNC: 141 MMOL/L (ref 136–145)
WBC # BLD AUTO: 7.42 K/UL (ref 3.9–12.7)

## 2022-08-09 PROCEDURE — 80053 COMPREHEN METABOLIC PANEL: CPT | Performed by: INTERNAL MEDICINE

## 2022-08-09 PROCEDURE — S4991 NICOTINE PATCH NONLEGEND: HCPCS | Performed by: INTERNAL MEDICINE

## 2022-08-09 PROCEDURE — 36415 COLL VENOUS BLD VENIPUNCTURE: CPT | Performed by: INTERNAL MEDICINE

## 2022-08-09 PROCEDURE — 63600175 PHARM REV CODE 636 W HCPCS: Performed by: STUDENT IN AN ORGANIZED HEALTH CARE EDUCATION/TRAINING PROGRAM

## 2022-08-09 PROCEDURE — 25000003 PHARM REV CODE 250: Performed by: INTERNAL MEDICINE

## 2022-08-09 PROCEDURE — 85025 COMPLETE CBC W/AUTO DIFF WBC: CPT | Performed by: INTERNAL MEDICINE

## 2022-08-09 PROCEDURE — 96376 TX/PRO/DX INJ SAME DRUG ADON: CPT

## 2022-08-09 PROCEDURE — 83735 ASSAY OF MAGNESIUM: CPT | Performed by: INTERNAL MEDICINE

## 2022-08-09 PROCEDURE — G0378 HOSPITAL OBSERVATION PER HR: HCPCS

## 2022-08-09 RX ORDER — AMLODIPINE BESYLATE 10 MG/1
10 TABLET ORAL DAILY
Qty: 30 TABLET | Refills: 2 | Status: SHIPPED | OUTPATIENT
Start: 2022-08-10 | End: 2022-10-12 | Stop reason: SDUPTHER

## 2022-08-09 RX ORDER — HYDROCODONE BITARTRATE AND ACETAMINOPHEN 10; 325 MG/1; MG/1
1 TABLET ORAL EVERY 6 HOURS PRN
Qty: 20 TABLET | Refills: 0 | Status: SHIPPED | OUTPATIENT
Start: 2022-08-09 | End: 2022-08-17 | Stop reason: SDUPTHER

## 2022-08-09 RX ADMIN — AMLODIPINE BESYLATE 10 MG: 10 TABLET ORAL at 08:08

## 2022-08-09 RX ADMIN — CLOPIDOGREL 75 MG: 75 TABLET, FILM COATED ORAL at 08:08

## 2022-08-09 RX ADMIN — PREGABALIN 75 MG: 75 CAPSULE ORAL at 08:08

## 2022-08-09 RX ADMIN — METOCLOPRAMIDE 10 MG: 5 INJECTION, SOLUTION INTRAMUSCULAR; INTRAVENOUS at 05:08

## 2022-08-09 RX ADMIN — HYDROCODONE BITARTRATE AND ACETAMINOPHEN 1 TABLET: 10; 325 TABLET ORAL at 11:08

## 2022-08-09 RX ADMIN — METRONIDAZOLE 500 MG: 500 TABLET ORAL at 05:08

## 2022-08-09 RX ADMIN — HYDROCODONE BITARTRATE AND ACETAMINOPHEN 1 TABLET: 10; 325 TABLET ORAL at 04:08

## 2022-08-09 RX ADMIN — NICOTINE 1 PATCH: 21 PATCH, EXTENDED RELEASE TRANSDERMAL at 08:08

## 2022-08-09 RX ADMIN — ALPRAZOLAM 0.25 MG: 0.25 TABLET ORAL at 08:08

## 2022-08-09 RX ADMIN — LACTOBACILLUS ACIDOPHILUS / LACTOBACILLUS BULGARICUS 1 EACH: 100 MILLION CFU STRENGTH GRANULES at 08:08

## 2022-08-09 RX ADMIN — METRONIDAZOLE 500 MG: 500 TABLET ORAL at 02:08

## 2022-08-09 RX ADMIN — Medication 1 PACKET: at 08:08

## 2022-08-09 NOTE — PLAN OF CARE
Pt. Resting well through the night, continuous contact isolation for C-Diff positive status, takes meds whole, room near unit station, up to BSC, plan of care discussed with pt. at bedside, call bell in reach, will continue to monitor.

## 2022-08-09 NOTE — NURSING
Called into room by patient because she wanted a band aid because she bumped her hand on the beside table causing a skin tear. Cleansed and applied band aide to site, will continue to monitor.

## 2022-08-09 NOTE — DISCHARGE SUMMARY
[facility]  Discharge Summary      Patient Name: Desiree Blake  MRN: [id number]  Patient Class: OP- Observation  Admission Date: 8/5/2022  Hospital Length of Stay: 0 days  Discharge Date and Time:  08/09/2022 12:07 PM  Attending Physician: Kt Pozo Jr., MD   Discharging Provider: Kt Pozo Jr, MD  Primary Care Provider: Kt Pozo Jr, MD      HPI:   67-year-old female with history of coronary artery disease with history of COPD, high cholesterol presents with crampy abdominal pain generalized weakness and nonbloody diarrhea for the past few days.  Patient was seen here a few days ago for similar complaint and found to have C diff.  Patient was prescribed medication but has been unable to take medication.  Patient denies any chest pain, fever, sick contacts    Pt repotrs over the last month has been seeign her pcp for abd pain,n/v, diarrhea - has been prescibed nerve pills na dtoher meds.  Denies any recent abx usage or other sick contacts with diarrhea.  Pt fousn to have cdiff a few days ago - given oral vanc.  Went to take first dose but was very nauseated and never completed first dose and then came to er for further eval      * No surgery found *      Hospital Course:   8/7 Nd feeling better - tolerating po intake this am - still with some loose stools.  8/8:  tolerating PO.  Pain persists will continue to monitor.   8/9:  tolerating PO will dc with outpatient course of therapy.       Goals of Care Treatment Preferences:  Code Status: Full Code      Consults:     * C. difficile colitis  Failed outpt tx - on iv flagyl for now due to was unable to hold down po   Start probioitcs and metamucil  8/7 change to po flagyl    Tolerating well thus far continue to monitor.  DC with close outpatient follow up.      Tobacco abuse  Quit smoking      HTN (hypertension)  norvasc daily -monitor bp    BP Readings from Last 3 Encounters:   08/09/22 136/60   08/04/22 (!) 156/75    08/02/22 130/77           Hypokalemia  Patient has hypokalemia.  Last electrolytes reviewed-   Recent Labs   Lab 08/08/22  0600 08/09/22  0609   K 4.5 4.0   Will replace potassium as per sliding scale as needed and monitor electrolytes closely.       Non-intractable vomiting  Nausea meds as needed      Anxiety  Cont home meds        Final Active Diagnoses:    Diagnosis Date Noted POA    PRINCIPAL PROBLEM:  C. difficile colitis [A04.72] 08/06/2022 Yes    Hypokalemia [E87.6] 08/06/2022 Yes    HTN (hypertension) [I10] 08/06/2022 Yes    Tobacco abuse [Z72.0] 08/06/2022 Yes    Non-intractable vomiting [R11.10] 09/01/2021 Yes    Anxiety [F41.9] 02/05/2021 Yes      Problems Resolved During this Admission:       Discharged Condition: good    Disposition:     Follow Up:    Patient Instructions:   No discharge procedures on file.    Significant Diagnostic Studies: Labs: All labs within the past 24 hours have been reviewed    Pending Diagnostic Studies:     None         Medications:  Reconciled Home Medications:      Medication List      STOP taking these medications    amoxicillin-clavulanate 500-125mg 500-125 mg Tab  Commonly known as: AUGMENTIN     busPIRone 15 MG tablet  Commonly known as: BUSPAR        ASK your doctor about these medications    albuterol 90 mcg/actuation inhaler  Commonly known as: PROVENTIL/VENTOLIN HFA  Inhale 1-2 puffs into the lungs every 4 to 6 hours as needed for Wheezing (as needed for cough, wheezing, shortness of breath). Rescue     ALPRAZolam 0.25 MG tablet  Commonly known as: XANAX  Take 1 tablet (0.25 mg total) by mouth 2 (two) times daily as needed for Anxiety.     azelastine 137 mcg (0.1 %) nasal spray  Commonly known as: ASTELIN  1 spray (137 mcg total) by Nasal route 2 (two) times daily.     clopidogreL 75 mg tablet  Commonly known as: PLAVIX  Take 1 tablet (75 mg total) by mouth every morning.     fluticasone propionate 50 mcg/actuation nasal spray  Commonly known as: FLONASE  2  sprays (100 mcg total) by Each Nostril route once daily.     nebulizer and compressor Argelia  1 kit by Misc.(Non-Drug; Combo Route) route every 6 (six) hours as needed (cough wheezing SOB).     pravastatin 20 MG tablet  Commonly known as: PRAVACHOL  TAKE 1 TABLET BY MOUTH EVERY EVENING     pregabalin 75 MG capsule  Commonly known as: LYRICA  Take 1 capsule (75 mg total) by mouth 2 (two) times daily.     QUEtiapine 200 MG Tab  Commonly known as: SEROQUEL  Take 1 tablet (200 mg total) by mouth every evening.     sumatriptan 25 MG Tab  Commonly known as: IMITREX  Take 1 tablet (25 mg total) by mouth daily as needed (headache).     traZODone 50 MG tablet  Commonly known as: DESYREL  TAKE 1 TABLET BY MOUTH EVERY EVENING     vancomycin 125 MG capsule  Commonly known as: VANCOCIN  Take 1 capsule (125 mg total) by mouth 4 (four) times daily. for 14 days     VRAYLAR 4.5 mg Cap  Generic drug: cariprazine  TAKE 1 CAPSULE BY MOUTH EVERY NIGHT AT BEDTIME            Indwelling Lines/Drains at time of discharge:   Lines/Drains/Airways     None                 Time spent on the discharge of patient: 60 minutes         Kt Pozo Jr, MD  Department of Hospital Medicine  [facility]

## 2022-08-09 NOTE — ASSESSMENT & PLAN NOTE
norvasc daily -monitor bp    BP Readings from Last 3 Encounters:   08/09/22 136/60   08/04/22 (!) 156/75   08/02/22 130/77

## 2022-08-09 NOTE — NURSING
Saline lock removed intact with out edema or redness noted, discharge orders given with verbal response

## 2022-08-09 NOTE — ASSESSMENT & PLAN NOTE
Patient has hypokalemia.  Last electrolytes reviewed-   Recent Labs   Lab 08/08/22  0600 08/09/22  0609   K 4.5 4.0   Will replace potassium as per sliding scale as needed and monitor electrolytes closely.

## 2022-08-09 NOTE — ASSESSMENT & PLAN NOTE
Failed outpt tx - on iv flagyl for now due to was unable to hold down po   Start probioitcs and metamucil  8/7 change to po flagyl    Tolerating well thus far continue to monitor.  DC with close outpatient follow up.

## 2022-08-09 NOTE — NURSING
Skin tear to left hand from last pm, band aide changed, cleaned with saline and reapplied band aide

## 2022-08-22 NOTE — TELEPHONE ENCOUNTER
Spoke with patient  
fluconazole was sent to wrong pharmacy can we send it to [facility]  
Not applicable

## 2022-10-07 PROBLEM — Z00.00 ROUTINE GENERAL MEDICAL EXAMINATION AT A HEALTH CARE FACILITY: Status: ACTIVE | Noted: 2022-10-07

## 2022-10-07 PROBLEM — I10 HTN (HYPERTENSION): Status: RESOLVED | Noted: 2022-08-06 | Resolved: 2022-10-07

## 2022-10-07 PROBLEM — I10 PRIMARY HYPERTENSION: Status: ACTIVE | Noted: 2022-08-02

## 2022-10-07 PROBLEM — B34.9 VIRAL SYNDROME: Status: RESOLVED | Noted: 2021-08-03 | Resolved: 2022-10-07

## 2022-10-13 ENCOUNTER — APPOINTMENT (OUTPATIENT)
Dept: LAB | Facility: HOSPITAL | Age: 67
End: 2022-10-13
Attending: NURSE PRACTITIONER
Payer: MEDICARE

## 2022-10-13 DIAGNOSIS — J45.991 COUGH VARIANT ASTHMA: Primary | ICD-10-CM

## 2022-10-13 PROBLEM — J44.1 CHRONIC OBSTRUCTIVE PULMONARY DISEASE WITH ACUTE EXACERBATION: Status: ACTIVE | Noted: 2022-10-13

## 2022-10-13 PROCEDURE — 87798 DETECT AGENT NOS DNA AMP: CPT | Performed by: NURSE PRACTITIONER

## 2022-10-14 LAB

## 2022-11-03 PROBLEM — B34.9 VIRAL SYNDROME: Status: ACTIVE | Noted: 2022-11-03

## 2022-11-26 ENCOUNTER — HOSPITAL ENCOUNTER (EMERGENCY)
Facility: HOSPITAL | Age: 67
Discharge: HOME | End: 2022-11-26
Attending: EMERGENCY MEDICINE
Payer: MEDICARE

## 2022-11-26 VITALS
HEART RATE: 70 BPM | SYSTOLIC BLOOD PRESSURE: 122 MMHG | RESPIRATION RATE: 18 BRPM | OXYGEN SATURATION: 96 % | DIASTOLIC BLOOD PRESSURE: 56 MMHG | HEIGHT: 64 IN | TEMPERATURE: 99 F | BODY MASS INDEX: 18.95 KG/M2 | WEIGHT: 111 LBS

## 2022-11-26 DIAGNOSIS — A08.4 VIRAL GASTROENTERITIS: Primary | ICD-10-CM

## 2022-11-26 LAB
ALBUMIN SERPL BCP-MCNC: 3.7 G/DL (ref 3.5–5.2)
ALP SERPL-CCNC: 85 U/L (ref 55–135)
ALT SERPL W/O P-5'-P-CCNC: 18 U/L (ref 10–44)
ANION GAP SERPL CALC-SCNC: 5 MMOL/L (ref 8–16)
AST SERPL-CCNC: 19 U/L (ref 10–40)
BACTERIA #/AREA URNS HPF: NEGATIVE /HPF
BASOPHILS # BLD AUTO: 0.04 K/UL (ref 0–0.2)
BASOPHILS NFR BLD: 0.4 % (ref 0–1.9)
BILIRUB SERPL-MCNC: 0.3 MG/DL (ref 0.1–1)
BILIRUB UR QL STRIP: NEGATIVE
BUN SERPL-MCNC: 11 MG/DL (ref 8–23)
CALCIUM SERPL-MCNC: 9 MG/DL (ref 8.7–10.5)
CHLORIDE SERPL-SCNC: 108 MMOL/L (ref 95–110)
CLARITY UR: CLEAR
CO2 SERPL-SCNC: 24 MMOL/L (ref 23–29)
COLOR UR: YELLOW
CREAT SERPL-MCNC: 0.6 MG/DL (ref 0.5–1.4)
DIFFERENTIAL METHOD: ABNORMAL
EOSINOPHIL # BLD AUTO: 0.1 K/UL (ref 0–0.5)
EOSINOPHIL NFR BLD: 0.5 % (ref 0–8)
ERYTHROCYTE [DISTWIDTH] IN BLOOD BY AUTOMATED COUNT: 12.9 % (ref 11.5–14.5)
EST. GFR  (NO RACE VARIABLE): >60 ML/MIN/1.73 M^2
GLUCOSE SERPL-MCNC: 98 MG/DL (ref 70–110)
GLUCOSE UR QL STRIP: NEGATIVE
HCT VFR BLD AUTO: 45.4 % (ref 37–48.5)
HGB BLD-MCNC: 15.3 G/DL (ref 12–16)
HGB UR QL STRIP: ABNORMAL
HYALINE CASTS #/AREA URNS LPF: 4.3 /LPF
IMM GRANULOCYTES # BLD AUTO: 0.02 K/UL (ref 0–0.04)
IMM GRANULOCYTES NFR BLD AUTO: 0.2 % (ref 0–0.5)
KETONES UR QL STRIP: ABNORMAL
LEUKOCYTE ESTERASE UR QL STRIP: ABNORMAL
LIPASE SERPL-CCNC: 87 U/L (ref 23–300)
LYMPHOCYTES # BLD AUTO: 1.4 K/UL (ref 1–4.8)
LYMPHOCYTES NFR BLD: 13 % (ref 18–48)
MCH RBC QN AUTO: 31.5 PG (ref 27–31)
MCHC RBC AUTO-ENTMCNC: 33.7 G/DL (ref 32–36)
MCV RBC AUTO: 93 FL (ref 82–98)
MICROSCOPIC COMMENT: ABNORMAL
MONOCYTES # BLD AUTO: 0.5 K/UL (ref 0.3–1)
MONOCYTES NFR BLD: 4.1 % (ref 4–15)
NEUTROPHILS # BLD AUTO: 9.1 K/UL (ref 1.8–7.7)
NEUTROPHILS NFR BLD: 81.8 % (ref 38–73)
NITRITE UR QL STRIP: NEGATIVE
NRBC BLD-RTO: 0 /100 WBC
PH UR STRIP: 6 [PH] (ref 5–8)
PLATELET # BLD AUTO: 226 K/UL (ref 150–450)
PMV BLD AUTO: 10.4 FL (ref 9.2–12.9)
POTASSIUM SERPL-SCNC: 3.6 MMOL/L (ref 3.5–5.1)
PROT SERPL-MCNC: 7.4 G/DL (ref 6–8.4)
PROT UR QL STRIP: NEGATIVE
RBC # BLD AUTO: 4.86 M/UL (ref 4–5.4)
RBC #/AREA URNS HPF: 1 /HPF (ref 0–4)
SODIUM SERPL-SCNC: 137 MMOL/L (ref 136–145)
SP GR UR STRIP: 1.01 (ref 1–1.03)
SQUAMOUS #/AREA URNS HPF: 2 /HPF
URN SPEC COLLECT METH UR: ABNORMAL
UROBILINOGEN UR STRIP-ACNC: NEGATIVE EU/DL
WBC # BLD AUTO: 11.1 K/UL (ref 3.9–12.7)
WBC #/AREA URNS HPF: 37 /HPF (ref 0–5)

## 2022-11-26 PROCEDURE — 25000003 PHARM REV CODE 250: Performed by: NURSE PRACTITIONER

## 2022-11-26 PROCEDURE — 87086 URINE CULTURE/COLONY COUNT: CPT | Performed by: NURSE PRACTITIONER

## 2022-11-26 PROCEDURE — 85025 COMPLETE CBC W/AUTO DIFF WBC: CPT | Performed by: NURSE PRACTITIONER

## 2022-11-26 PROCEDURE — 99284 EMERGENCY DEPT VISIT MOD MDM: CPT | Mod: 25

## 2022-11-26 PROCEDURE — 36415 COLL VENOUS BLD VENIPUNCTURE: CPT | Performed by: NURSE PRACTITIONER

## 2022-11-26 PROCEDURE — 96361 HYDRATE IV INFUSION ADD-ON: CPT

## 2022-11-26 PROCEDURE — 81000 URINALYSIS NONAUTO W/SCOPE: CPT | Performed by: NURSE PRACTITIONER

## 2022-11-26 PROCEDURE — 83690 ASSAY OF LIPASE: CPT | Performed by: NURSE PRACTITIONER

## 2022-11-26 PROCEDURE — 96374 THER/PROPH/DIAG INJ IV PUSH: CPT

## 2022-11-26 PROCEDURE — 80053 COMPREHEN METABOLIC PANEL: CPT | Performed by: NURSE PRACTITIONER

## 2022-11-26 PROCEDURE — 63600175 PHARM REV CODE 636 W HCPCS: Performed by: NURSE PRACTITIONER

## 2022-11-26 RX ORDER — ONDANSETRON 2 MG/ML
8 INJECTION INTRAMUSCULAR; INTRAVENOUS
Status: COMPLETED | OUTPATIENT
Start: 2022-11-26 | End: 2022-11-26

## 2022-11-26 RX ORDER — LOPERAMIDE HYDROCHLORIDE 2 MG/1
2 CAPSULE ORAL 4 TIMES DAILY PRN
Qty: 12 CAPSULE | Refills: 0 | Status: SHIPPED | OUTPATIENT
Start: 2022-11-26 | End: 2022-12-06

## 2022-11-26 RX ORDER — LOPERAMIDE HYDROCHLORIDE 2 MG/1
4 CAPSULE ORAL
Status: COMPLETED | OUTPATIENT
Start: 2022-11-26 | End: 2022-11-26

## 2022-11-26 RX ORDER — ONDANSETRON 4 MG/1
4 TABLET, ORALLY DISINTEGRATING ORAL ONCE
Qty: 1 TABLET | Refills: 0 | Status: SHIPPED | OUTPATIENT
Start: 2022-11-26 | End: 2022-11-26

## 2022-11-26 RX ADMIN — SODIUM CHLORIDE 1000 ML: 0.9 INJECTION, SOLUTION INTRAVENOUS at 11:11

## 2022-11-26 RX ADMIN — ONDANSETRON HYDROCHLORIDE 8 MG: 2 SOLUTION INTRAMUSCULAR; INTRAVENOUS at 11:11

## 2022-11-26 RX ADMIN — LOPERAMIDE HYDROCHLORIDE 4 MG: 2 CAPSULE ORAL at 12:11

## 2022-11-26 NOTE — ED PROVIDER NOTES
Encounter Date: 11/26/2022       History     Chief Complaint   Patient presents with    Emesis     N/V/D x 3 days.      67 year old female with complaints of diarrhea x 3 days. Associated nausea, vomiting. Denies fever, other illness.Patient has history of C-diff in Sept.     Review of patient's allergies indicates:  No Known Allergies  Past Medical History:   Diagnosis Date    Anticoagulant long-term use     Anxiety     Arthritis     Carotid artery disease     Cervical disc disorder     Chronic back pain     COPD (chronic obstructive pulmonary disease)     Coronary artery disease     Dementia     Depression     Diarrhea, unspecified 11/1/2021    DVT (deep venous thrombosis)     CAROTID    GERD (gastroesophageal reflux disease)     Hematuria     Hiatal hernia     High cholesterol     Ill-fitting dentures     STATES DOESN'T WEAR    PVD (peripheral vascular disease)     Renal calculus     Sleep apnea     Sleep apnea     NO C PAP    Urinary frequency     Urinary urgency     Wears glasses     READING      Past Surgical History:   Procedure Laterality Date    BACK SURGERY      BREAST LUMPECTOMY Right     CAROTID ARTERY ANGIOPLASTY      CAROTID ARTERY ANGIOPLASTY      CAROTID ARTERY STENT Left     CAROTID ENDARTERECTOMY Right     CHOLECYSTECTOMY      COLONOSCOPY      CYSTOSCOPY      HYSTERECTOMY      OOPHORECTOMY      TONSILLECTOMY       Family History   Problem Relation Age of Onset    Cancer Mother     Stroke Father     No Known Problems Sister     No Known Problems Daughter     No Known Problems Maternal Aunt     No Known Problems Maternal Uncle     No Known Problems Paternal Aunt     No Known Problems Paternal Uncle     No Known Problems Maternal Grandmother     No Known Problems Maternal Grandfather     No Known Problems Paternal Grandmother     No Known Problems Paternal Grandfather     Breast cancer Neg Hx     Ovarian cancer Neg Hx     BRCA 1/2 Neg Hx      Social History     Tobacco Use    Smoking status: Every Day      Packs/day: 0.50     Types: Cigarettes     Start date: 1970    Smokeless tobacco: Never   Substance Use Topics    Alcohol use: No    Drug use: No     Review of Systems   Constitutional:  Negative for fever.   HENT:  Negative for sore throat.    Respiratory:  Negative for shortness of breath.    Cardiovascular:  Negative for chest pain.   Gastrointestinal:  Positive for diarrhea, nausea and vomiting.   Genitourinary:  Negative for dysuria.   Musculoskeletal:  Negative for back pain.   Skin:  Negative for rash.   Neurological:  Negative for weakness.   Hematological:  Does not bruise/bleed easily.     Physical Exam     Initial Vitals [11/26/22 1112]   BP Pulse Resp Temp SpO2   (!) 122/56 72 18 98.9 °F (37.2 °C) 96 %      MAP       --         Physical Exam    Nursing note and vitals reviewed.  Constitutional: Vital signs are normal. She appears well-developed and well-nourished. She is cooperative.   HENT:   Head: Normocephalic and atraumatic.   Right Ear: Hearing and external ear normal.   Left Ear: Hearing and external ear normal.   Nose: Nose normal.   Mouth/Throat: Uvula is midline, oropharynx is clear and moist and mucous membranes are normal.   Eyes: Conjunctivae, EOM and lids are normal. Pupils are equal, round, and reactive to light.   Neck: Trachea normal. Neck supple.   Normal range of motion.   Full passive range of motion without pain.     Cardiovascular:  Normal rate, regular rhythm, S1 normal, S2 normal, normal heart sounds and normal pulses.           Pulmonary/Chest: Effort normal and breath sounds normal.   Musculoskeletal:      Cervical back: Full passive range of motion without pain, normal range of motion and neck supple.     Neurological: She is alert.       ED Course   Procedures  Labs Reviewed   CBC W/ AUTO DIFFERENTIAL - Abnormal; Notable for the following components:       Result Value    MCH 31.5 (*)     Gran # (ANC) 9.1 (*)     Gran % 81.8 (*)     Lymph % 13.0 (*)     All other components  within normal limits   COMPREHENSIVE METABOLIC PANEL - Abnormal; Notable for the following components:    Anion Gap 5 (*)     All other components within normal limits   URINALYSIS, REFLEX TO URINE CULTURE - Abnormal; Notable for the following components:    Ketones, UA Trace (*)     Occult Blood UA 1+ (*)     Leukocytes, UA 2+ (*)     All other components within normal limits    Narrative:     Preferred Collection Type->Urine, Clean Catch  Specimen Source->Urine   URINALYSIS MICROSCOPIC - Abnormal; Notable for the following components:    WBC, UA 37 (*)     Hyaline Casts, UA 4.3 (*)     All other components within normal limits    Narrative:     Preferred Collection Type->Urine, Clean Catch  Specimen Source->Urine   CULTURE, URINE   LIPASE          Imaging Results    None          Medications   ondansetron injection 8 mg (8 mg Intravenous Given 11/26/22 1139)   sodium chloride 0.9% bolus 1,000 mL (0 mLs Intravenous Stopped 11/26/22 1240)   loperamide capsule 4 mg (4 mg Oral Given 11/26/22 1256)     Medical Decision Making:   Differential Diagnosis:   Gastro enteritis, Diarrhea, viral illness,   ED Management:  After history and physical exam discussed with patient that she probably has a viral gastroenteritis                        Clinical Impression:   Final diagnoses:  [A08.4] Viral gastroenteritis (Primary)      ED Disposition Condition    Discharge Stable          ED Prescriptions       Medication Sig Dispense Start Date End Date Auth. Provider    ondansetron (ZOFRAN-ODT) 4 MG TbDL (Expires today) Take 1 tablet (4 mg total) by mouth once. for 1 dose 1 tablet 11/26/2022 11/26/2022 Darron Kapoor III, NP    loperamide (IMODIUM) 2 mg capsule Take 1 capsule (2 mg total) by mouth 4 (four) times daily as needed for Diarrhea. 12 capsule 11/26/2022 12/6/2022 Darron Kapoor III, NP          Follow-up Information       Follow up With Specialties Details Why Contact Info    Kt Pozo Jr., MD Internal Medicine In 3  days If symptoms worsen [address] [facility] [address]  [phone]               Darron Kapoor III, NP  11/26/22 8897

## 2022-11-27 LAB — BACTERIA UR CULT: NO GROWTH

## 2023-01-04 PROBLEM — N39.0 UTI (URINARY TRACT INFECTION): Status: RESOLVED | Noted: 2022-08-02 | Resolved: 2023-01-04

## 2023-01-04 PROBLEM — M94.0 COSTOCHONDRITIS: Status: ACTIVE | Noted: 2023-01-04

## 2023-01-09 PROBLEM — Z00.00 ROUTINE GENERAL MEDICAL EXAMINATION AT A HEALTH CARE FACILITY: Status: RESOLVED | Noted: 2022-10-07 | Resolved: 2023-01-09

## 2023-02-14 PROBLEM — Z01.818 PREOP EXAM FOR INTERNAL MEDICINE: Status: ACTIVE | Noted: 2023-02-14

## 2023-02-15 ENCOUNTER — HOSPITAL ENCOUNTER (OUTPATIENT)
Dept: PULMONOLOGY | Facility: HOSPITAL | Age: 68
Discharge: HOME | End: 2023-02-15
Attending: ORTHOPAEDIC SURGERY
Payer: MEDICARE

## 2023-02-15 ENCOUNTER — HOSPITAL ENCOUNTER (OUTPATIENT)
Dept: RADIOLOGY | Facility: HOSPITAL | Age: 68
Discharge: HOME | End: 2023-02-15
Attending: ORTHOPAEDIC SURGERY
Payer: MEDICARE

## 2023-02-15 DIAGNOSIS — Z01.818 PRE-OP EXAMINATION: ICD-10-CM

## 2023-02-15 DIAGNOSIS — Z01.811 PRE-OP CHEST EXAM: Primary | ICD-10-CM

## 2023-02-15 DIAGNOSIS — Z01.818 PRE-OP EXAMINATION: Primary | ICD-10-CM

## 2023-02-15 DIAGNOSIS — Z01.811 PRE-OP CHEST EXAM: ICD-10-CM

## 2023-02-15 PROCEDURE — 93005 ELECTROCARDIOGRAM TRACING: CPT

## 2023-02-15 PROCEDURE — 71046 X-RAY EXAM CHEST 2 VIEWS: CPT | Mod: TC

## 2023-02-15 PROCEDURE — 93010 EKG 12-LEAD: ICD-10-PCS | Mod: ,,, | Performed by: INTERNAL MEDICINE

## 2023-02-15 PROCEDURE — 93010 ELECTROCARDIOGRAM REPORT: CPT | Mod: ,,, | Performed by: INTERNAL MEDICINE

## 2023-03-17 ENCOUNTER — PATIENT MESSAGE (OUTPATIENT)
Dept: RESEARCH | Facility: HOSPITAL | Age: 68
End: 2023-03-17
Payer: MEDICARE

## 2023-03-18 ENCOUNTER — HOSPITAL ENCOUNTER (EMERGENCY)
Facility: HOSPITAL | Age: 68
Discharge: HOME | End: 2023-03-18
Attending: EMERGENCY MEDICINE
Payer: MEDICARE

## 2023-03-18 VITALS
TEMPERATURE: 98 F | OXYGEN SATURATION: 96 % | BODY MASS INDEX: 17.75 KG/M2 | WEIGHT: 104 LBS | SYSTOLIC BLOOD PRESSURE: 178 MMHG | HEIGHT: 64 IN | HEART RATE: 85 BPM | RESPIRATION RATE: 18 BRPM | DIASTOLIC BLOOD PRESSURE: 92 MMHG

## 2023-03-18 DIAGNOSIS — R33.9 URINARY RETENTION: Primary | ICD-10-CM

## 2023-03-18 LAB
ALBUMIN SERPL BCP-MCNC: 3.5 G/DL (ref 3.5–5.2)
ALP SERPL-CCNC: 94 U/L (ref 55–135)
ALT SERPL W/O P-5'-P-CCNC: 22 U/L (ref 10–44)
ANION GAP SERPL CALC-SCNC: 7 MMOL/L (ref 8–16)
AST SERPL-CCNC: 25 U/L (ref 10–40)
BACTERIA #/AREA URNS HPF: NEGATIVE /HPF
BASOPHILS # BLD AUTO: 0.02 K/UL (ref 0–0.2)
BASOPHILS NFR BLD: 0.2 % (ref 0–1.9)
BILIRUB SERPL-MCNC: 0.5 MG/DL (ref 0.1–1)
BILIRUB UR QL STRIP: NEGATIVE
BUN SERPL-MCNC: 18 MG/DL (ref 8–23)
CALCIUM SERPL-MCNC: 8.9 MG/DL (ref 8.7–10.5)
CHLORIDE SERPL-SCNC: 104 MMOL/L (ref 95–110)
CLARITY UR: ABNORMAL
CO2 SERPL-SCNC: 26 MMOL/L (ref 23–29)
COLOR UR: YELLOW
CREAT SERPL-MCNC: 1 MG/DL (ref 0.5–1.4)
DIFFERENTIAL METHOD: ABNORMAL
EOSINOPHIL # BLD AUTO: 0 K/UL (ref 0–0.5)
EOSINOPHIL NFR BLD: 0.2 % (ref 0–8)
ERYTHROCYTE [DISTWIDTH] IN BLOOD BY AUTOMATED COUNT: 12.7 % (ref 11.5–14.5)
EST. GFR  (NO RACE VARIABLE): >60 ML/MIN/1.73 M^2
GLUCOSE SERPL-MCNC: 150 MG/DL (ref 70–110)
GLUCOSE UR QL STRIP: NEGATIVE
HCT VFR BLD AUTO: 36.6 % (ref 37–48.5)
HGB BLD-MCNC: 12.5 G/DL (ref 12–16)
HGB UR QL STRIP: ABNORMAL
HYALINE CASTS #/AREA URNS LPF: 5.5 /LPF
IMM GRANULOCYTES # BLD AUTO: 0.06 K/UL (ref 0–0.04)
IMM GRANULOCYTES NFR BLD AUTO: 0.5 % (ref 0–0.5)
KETONES UR QL STRIP: NEGATIVE
LEUKOCYTE ESTERASE UR QL STRIP: ABNORMAL
LIPASE SERPL-CCNC: 68 U/L (ref 23–300)
LYMPHOCYTES # BLD AUTO: 0.6 K/UL (ref 1–4.8)
LYMPHOCYTES NFR BLD: 4.7 % (ref 18–48)
MCH RBC QN AUTO: 32 PG (ref 27–31)
MCHC RBC AUTO-ENTMCNC: 34.2 G/DL (ref 32–36)
MCV RBC AUTO: 94 FL (ref 82–98)
MICROSCOPIC COMMENT: ABNORMAL
MONOCYTES # BLD AUTO: 0.9 K/UL (ref 0.3–1)
MONOCYTES NFR BLD: 7.1 % (ref 4–15)
NEUTROPHILS # BLD AUTO: 11.3 K/UL (ref 1.8–7.7)
NEUTROPHILS NFR BLD: 87.3 % (ref 38–73)
NITRITE UR QL STRIP: NEGATIVE
NRBC BLD-RTO: 0 /100 WBC
PH UR STRIP: 6 [PH] (ref 5–8)
PLATELET # BLD AUTO: 194 K/UL (ref 150–450)
PMV BLD AUTO: 9.7 FL (ref 9.2–12.9)
POTASSIUM SERPL-SCNC: 3.7 MMOL/L (ref 3.5–5.1)
PROT SERPL-MCNC: 7.2 G/DL (ref 6–8.4)
PROT UR QL STRIP: ABNORMAL
RBC # BLD AUTO: 3.91 M/UL (ref 4–5.4)
RBC #/AREA URNS HPF: >100 /HPF (ref 0–4)
SODIUM SERPL-SCNC: 137 MMOL/L (ref 136–145)
SP GR UR STRIP: 1.01 (ref 1–1.03)
SQUAMOUS #/AREA URNS HPF: 2 /HPF
URN SPEC COLLECT METH UR: ABNORMAL
UROBILINOGEN UR STRIP-ACNC: 1 EU/DL
WBC # BLD AUTO: 12.93 K/UL (ref 3.9–12.7)
WBC #/AREA URNS HPF: >100 /HPF (ref 0–5)

## 2023-03-18 PROCEDURE — 96374 THER/PROPH/DIAG INJ IV PUSH: CPT

## 2023-03-18 PROCEDURE — 25000003 PHARM REV CODE 250: Performed by: EMERGENCY MEDICINE

## 2023-03-18 PROCEDURE — 99285 EMERGENCY DEPT VISIT HI MDM: CPT | Mod: 25

## 2023-03-18 PROCEDURE — 36415 COLL VENOUS BLD VENIPUNCTURE: CPT | Performed by: EMERGENCY MEDICINE

## 2023-03-18 PROCEDURE — 83690 ASSAY OF LIPASE: CPT | Performed by: EMERGENCY MEDICINE

## 2023-03-18 PROCEDURE — 51702 INSERT TEMP BLADDER CATH: CPT

## 2023-03-18 PROCEDURE — 85025 COMPLETE CBC W/AUTO DIFF WBC: CPT | Performed by: EMERGENCY MEDICINE

## 2023-03-18 PROCEDURE — 81000 URINALYSIS NONAUTO W/SCOPE: CPT | Performed by: EMERGENCY MEDICINE

## 2023-03-18 PROCEDURE — 87086 URINE CULTURE/COLONY COUNT: CPT | Performed by: EMERGENCY MEDICINE

## 2023-03-18 PROCEDURE — 80053 COMPREHEN METABOLIC PANEL: CPT | Performed by: EMERGENCY MEDICINE

## 2023-03-18 PROCEDURE — 96375 TX/PRO/DX INJ NEW DRUG ADDON: CPT | Mod: 59

## 2023-03-18 PROCEDURE — 96361 HYDRATE IV INFUSION ADD-ON: CPT | Mod: 59

## 2023-03-18 PROCEDURE — 63600175 PHARM REV CODE 636 W HCPCS: Performed by: EMERGENCY MEDICINE

## 2023-03-18 RX ORDER — KETOROLAC TROMETHAMINE 30 MG/ML
15 INJECTION, SOLUTION INTRAMUSCULAR; INTRAVENOUS
Status: COMPLETED | OUTPATIENT
Start: 2023-03-18 | End: 2023-03-18

## 2023-03-18 RX ORDER — AMLODIPINE BESYLATE 10 MG/1
10 TABLET ORAL
Status: COMPLETED | OUTPATIENT
Start: 2023-03-18 | End: 2023-03-18

## 2023-03-18 RX ORDER — ONDANSETRON 2 MG/ML
4 INJECTION INTRAMUSCULAR; INTRAVENOUS
Status: COMPLETED | OUTPATIENT
Start: 2023-03-18 | End: 2023-03-18

## 2023-03-18 RX ADMIN — KETOROLAC TROMETHAMINE 15 MG: 30 INJECTION, SOLUTION INTRAMUSCULAR at 12:03

## 2023-03-18 RX ADMIN — SODIUM CHLORIDE 1000 ML: 9 INJECTION, SOLUTION INTRAVENOUS at 12:03

## 2023-03-18 RX ADMIN — ONDANSETRON HYDROCHLORIDE 4 MG: 2 SOLUTION INTRAMUSCULAR; INTRAVENOUS at 12:03

## 2023-03-18 RX ADMIN — AMLODIPINE BESYLATE 10 MG: 10 TABLET ORAL at 01:03

## 2023-03-18 NOTE — ED NOTES
Patient discharged with rollins catheter as per Dr. Fernandez order. Educated patient on rollins care. Pt verbalized understanding/.

## 2023-03-18 NOTE — ED PROVIDER NOTES
Encounter Date: 3/18/2023       History     Chief Complaint   Patient presents with    Abdominal Pain     Generalized abd pain with n/v. Onset yesterday.  Constipation-last BM 3-4 days ago. Pain 10  [facility]--d/c yesterday.      66 yo female with extensive pmhx as below here via POV with complaint of constipation x 3 days, low abd pain with N/V x 1 day. No fever. [facility] after c/s surgery yesterday. No numbness or tingling. No incontinence. No saddle anesthesia. Ambulatory. Reports neck pain well controlled with prescribed pain medications. No prior similar.     Review of patient's allergies indicates:  No Known Allergies  Past Medical History:   Diagnosis Date    Anticoagulant long-term use     Anxiety     Arthritis     Carotid artery disease     Cervical disc disorder     Chronic back pain     COPD (chronic obstructive pulmonary disease)     Coronary artery disease     Dementia     Depression     Diarrhea, unspecified 11/1/2021    DVT (deep venous thrombosis)     CAROTID    GERD (gastroesophageal reflux disease)     Hematuria     Hiatal hernia     High cholesterol     Ill-fitting dentures     STATES DOESN'T WEAR    PVD (peripheral vascular disease)     Renal calculus     Sleep apnea     Sleep apnea     NO C PAP    Urinary frequency     Urinary urgency     Wears glasses     READING      Past Surgical History:   Procedure Laterality Date    BACK SURGERY      BREAST LUMPECTOMY Right     CAROTID ARTERY ANGIOPLASTY      CAROTID ARTERY ANGIOPLASTY      CAROTID ARTERY STENT Left     CAROTID ENDARTERECTOMY Right     CHOLECYSTECTOMY      COLONOSCOPY      CYSTOSCOPY      HYSTERECTOMY      OOPHORECTOMY      TONSILLECTOMY       Family History   Problem Relation Age of Onset    Cancer Mother     Stroke Father     No Known Problems Sister     No Known Problems Daughter     No Known Problems Maternal Aunt     No Known Problems Maternal Uncle     No Known Problems Paternal Aunt     No Known  Problems Paternal Uncle     No Known Problems Maternal Grandmother     No Known Problems Maternal Grandfather     No Known Problems Paternal Grandmother     No Known Problems Paternal Grandfather     Breast cancer Neg Hx     Ovarian cancer Neg Hx     BRCA 1/2 Neg Hx      Social History     Tobacco Use    Smoking status: Every Day     Packs/day: 0.50     Types: Cigarettes     Start date: 1970    Smokeless tobacco: Never   Substance Use Topics    Alcohol use: No    Drug use: No     Review of Systems   Constitutional: Negative.    Respiratory: Negative.     Gastrointestinal:  Positive for abdominal pain, constipation, nausea and vomiting.   Genitourinary:  Positive for difficulty urinating.   All other systems reviewed and are negative.    Physical Exam     Initial Vitals [03/18/23 1221]   BP Pulse Resp Temp SpO2   (!) 198/88 85 18 97.7 °F (36.5 °C) 96 %      MAP       --         Physical Exam    Nursing note and vitals reviewed.  Constitutional: She appears well-developed and well-nourished. She is not diaphoretic. No distress.   HENT:   Head: Normocephalic and atraumatic.   Eyes: EOM are normal. Pupils are equal, round, and reactive to light.   Neck: Neck supple.   Normal range of motion.  Cardiovascular:  Normal rate, regular rhythm and intact distal pulses.     Exam reveals no gallop and no friction rub.       No murmur heard.  Pulmonary/Chest: Breath sounds normal. No respiratory distress. She has no wheezes. She has no rales.   Abdominal: Abdomen is soft. Bowel sounds are normal. She exhibits distension. There is abdominal tenderness. There is no rebound.   Musculoskeletal:         General: No tenderness or edema. Normal range of motion.      Cervical back: Normal range of motion and neck supple.     Neurological: She is alert. She has normal strength and normal reflexes.   Skin: Skin is warm and dry. Capillary refill takes less than 2 seconds.       ED Course   Procedures  Labs Reviewed   CBC W/ AUTO  DIFFERENTIAL - Abnormal; Notable for the following components:       Result Value    WBC 12.93 (*)     RBC 3.91 (*)     Hematocrit 36.6 (*)     MCH 32.0 (*)     Gran # (ANC) 11.3 (*)     Immature Grans (Abs) 0.06 (*)     Lymph # 0.6 (*)     Gran % 87.3 (*)     Lymph % 4.7 (*)     All other components within normal limits   COMPREHENSIVE METABOLIC PANEL - Abnormal; Notable for the following components:    Glucose 150 (*)     Anion Gap 7 (*)     All other components within normal limits   URINALYSIS, REFLEX TO URINE CULTURE - Abnormal; Notable for the following components:    Appearance, UA Cloudy (*)     Protein, UA Trace (*)     Occult Blood UA 3+ (*)     Leukocytes, UA 2+ (*)     All other components within normal limits    Narrative:     Preferred Collection Type->Urine, Clean Catch  Specimen Source->Urine   URINALYSIS MICROSCOPIC - Abnormal; Notable for the following components:    RBC, UA >100 (*)     WBC, UA >100 (*)     Hyaline Casts, UA 5.5 (*)     All other components within normal limits    Narrative:     Preferred Collection Type->Urine, Clean Catch  Specimen Source->Urine   CULTURE, URINE   LIPASE          Imaging Results              CT Renal Stone Study ABD Pelvis WO (Final result)  Result time 03/18/23 14:16:53      Final result by Ronny Vyas MD (03/18/23 14:16:53)                   Impression:      Distended urinary bladder with bilateral hydronephrosis.      Electronically signed by: Ronny Vyas MD  Date:    03/18/2023  Time:    14:16               Narrative:    EXAMINATION:  CT RENAL STONE STUDY ABD PELVIS WO    CLINICAL HISTORY:  Flank pain, kidney stone suspected;    TECHNIQUE:  Iterative reconstruction technique was used.    CT/cardiac nuclear exam/s in prior 12 months:  0.    COMPARISON:  None.    FINDINGS:  Lower chest images demonstrate mild right lower lobe atelectasis.  Unremarkable noncontrast liver and spleen.  Previous cholecystectomy.  Unremarkable noncontrast pancreas and adrenal  glands.  Moderate distention of the urinary bladder with moderate right and mild left hydronephrosis.  Moderate bilateral perinephric fat stranding.  No stones in either kidney or ureter.  No bowel obstruction.  No free fluid.  Moderate vascular calcifications.                                       Medications   sodium chloride 0.9% bolus 1,000 mL 1,000 mL (0 mLs Intravenous Stopped 3/18/23 1353)   ketorolac injection 15 mg (15 mg Intravenous Given 3/18/23 1253)   ondansetron injection 4 mg (4 mg Intravenous Given 3/18/23 1253)   amLODIPine tablet 10 mg (10 mg Oral Given 3/18/23 1339)     Medical Decision Making:   Clinical Tests:   Lab Tests: Ordered and Reviewed  Radiological Study: Ordered and Reviewed  ED Management:  Pain resolved with rollins placement. Likely with urinary retention 2/2 opiates. Will leave rollins for now. F/u with PCP Monday for further care. Counseled at length to return here asap for any new or worsening symptoms.                         Clinical Impression:   Final diagnoses:  [R33.9] Urinary retention (Primary)        ED Disposition Condition    Discharge Stable          ED Prescriptions    None       Follow-up Information       Follow up With Specialties Details Why Contact Info    Kt Pozo Jr., MD Internal Medicine Schedule an appointment as soon as possible for a visit   [address] [place] [id number]  [phone]               Nitesh Fernandez MD  03/18/23 6195

## 2023-03-20 LAB — BACTERIA UR CULT: NO GROWTH

## 2023-05-16 PROBLEM — M46.1 SACROILIITIS, NOT ELSEWHERE CLASSIFIED: Status: ACTIVE | Noted: 2023-05-16

## 2023-05-16 PROBLEM — F31.62 BIPOLAR DISORDER, CURRENT EPISODE MIXED, MODERATE: Status: ACTIVE | Noted: 2023-05-16

## 2023-05-16 PROBLEM — I73.9 PERIPHERAL VASCULAR DISEASE, UNSPECIFIED: Status: ACTIVE | Noted: 2023-05-16

## 2023-06-28 PROBLEM — A04.72 C. DIFFICILE COLITIS: Status: RESOLVED | Noted: 2022-08-06 | Resolved: 2023-06-28

## 2023-07-05 PROBLEM — I73.9 PERIPHERAL VASCULAR DISEASE, UNSPECIFIED: Status: RESOLVED | Noted: 2023-05-16 | Resolved: 2023-07-05

## 2023-07-05 PROBLEM — F31.62 BIPOLAR DISORDER, CURRENT EPISODE MIXED, MODERATE: Status: RESOLVED | Noted: 2023-05-16 | Resolved: 2023-07-05

## 2023-07-05 PROBLEM — J06.9 URI (UPPER RESPIRATORY INFECTION): Status: ACTIVE | Noted: 2023-07-05

## 2023-07-05 PROBLEM — M46.1 SACROILIITIS, NOT ELSEWHERE CLASSIFIED: Status: RESOLVED | Noted: 2023-05-16 | Resolved: 2023-07-05

## 2023-07-08 ENCOUNTER — HOSPITAL ENCOUNTER (EMERGENCY)
Facility: HOSPITAL | Age: 68
Discharge: HOME | End: 2023-07-08
Attending: STUDENT IN AN ORGANIZED HEALTH CARE EDUCATION/TRAINING PROGRAM
Payer: MEDICARE

## 2023-07-08 VITALS
WEIGHT: 115.38 LBS | BODY MASS INDEX: 19.7 KG/M2 | SYSTOLIC BLOOD PRESSURE: 137 MMHG | TEMPERATURE: 98 F | HEIGHT: 64 IN | OXYGEN SATURATION: 97 % | DIASTOLIC BLOOD PRESSURE: 71 MMHG | RESPIRATION RATE: 16 BRPM | HEART RATE: 88 BPM

## 2023-07-08 DIAGNOSIS — G43.909 MIGRAINE WITHOUT STATUS MIGRAINOSUS, NOT INTRACTABLE, UNSPECIFIED MIGRAINE TYPE: Primary | ICD-10-CM

## 2023-07-08 LAB
ALBUMIN SERPL BCP-MCNC: 3.9 G/DL (ref 3.5–5.2)
ALP SERPL-CCNC: 85 U/L (ref 55–135)
ALT SERPL W/O P-5'-P-CCNC: 23 U/L (ref 10–44)
ANION GAP SERPL CALC-SCNC: 7 MMOL/L (ref 8–16)
AST SERPL-CCNC: 11 U/L (ref 10–40)
BASOPHILS # BLD AUTO: 0.04 K/UL (ref 0–0.2)
BASOPHILS NFR BLD: 0.4 % (ref 0–1.9)
BILIRUB SERPL-MCNC: 0.2 MG/DL (ref 0.1–1)
BUN SERPL-MCNC: 32 MG/DL (ref 8–23)
CALCIUM SERPL-MCNC: 9.4 MG/DL (ref 8.7–10.5)
CHLORIDE SERPL-SCNC: 106 MMOL/L (ref 95–110)
CO2 SERPL-SCNC: 23 MMOL/L (ref 23–29)
CREAT SERPL-MCNC: 0.7 MG/DL (ref 0.5–1.4)
DIFFERENTIAL METHOD: NORMAL
EOSINOPHIL # BLD AUTO: 0.1 K/UL (ref 0–0.5)
EOSINOPHIL NFR BLD: 0.8 % (ref 0–8)
ERYTHROCYTE [DISTWIDTH] IN BLOOD BY AUTOMATED COUNT: 13.2 % (ref 11.5–14.5)
EST. GFR  (NO RACE VARIABLE): >60 ML/MIN/1.73 M^2
GLUCOSE SERPL-MCNC: 103 MG/DL (ref 70–110)
HCT VFR BLD AUTO: 44.3 % (ref 37–48.5)
HGB BLD-MCNC: 14.8 G/DL (ref 12–16)
IMM GRANULOCYTES # BLD AUTO: 0.03 K/UL (ref 0–0.04)
IMM GRANULOCYTES NFR BLD AUTO: 0.3 % (ref 0–0.5)
LYMPHOCYTES # BLD AUTO: 2.1 K/UL (ref 1–4.8)
LYMPHOCYTES NFR BLD: 19.9 % (ref 18–48)
MAGNESIUM SERPL-MCNC: 1.8 MG/DL (ref 1.6–2.6)
MCH RBC QN AUTO: 31 PG (ref 27–31)
MCHC RBC AUTO-ENTMCNC: 33.4 G/DL (ref 32–36)
MCV RBC AUTO: 93 FL (ref 82–98)
MONOCYTES # BLD AUTO: 0.7 K/UL (ref 0.3–1)
MONOCYTES NFR BLD: 6.7 % (ref 4–15)
NEUTROPHILS # BLD AUTO: 7.6 K/UL (ref 1.8–7.7)
NEUTROPHILS NFR BLD: 71.9 % (ref 38–73)
NRBC BLD-RTO: 0 /100 WBC
PLATELET # BLD AUTO: 239 K/UL (ref 150–450)
PMV BLD AUTO: 9.2 FL (ref 9.2–12.9)
POTASSIUM SERPL-SCNC: 4.2 MMOL/L (ref 3.5–5.1)
PROT SERPL-MCNC: 7.4 G/DL (ref 6–8.4)
RBC # BLD AUTO: 4.78 M/UL (ref 4–5.4)
SODIUM SERPL-SCNC: 136 MMOL/L (ref 136–145)
WBC # BLD AUTO: 10.6 K/UL (ref 3.9–12.7)

## 2023-07-08 PROCEDURE — 83735 ASSAY OF MAGNESIUM: CPT

## 2023-07-08 PROCEDURE — 96375 TX/PRO/DX INJ NEW DRUG ADDON: CPT

## 2023-07-08 PROCEDURE — 96361 HYDRATE IV INFUSION ADD-ON: CPT

## 2023-07-08 PROCEDURE — 25000003 PHARM REV CODE 250

## 2023-07-08 PROCEDURE — 96374 THER/PROPH/DIAG INJ IV PUSH: CPT

## 2023-07-08 PROCEDURE — 36415 COLL VENOUS BLD VENIPUNCTURE: CPT

## 2023-07-08 PROCEDURE — 99285 EMERGENCY DEPT VISIT HI MDM: CPT | Mod: 25

## 2023-07-08 PROCEDURE — 63600175 PHARM REV CODE 636 W HCPCS

## 2023-07-08 PROCEDURE — 85025 COMPLETE CBC W/AUTO DIFF WBC: CPT

## 2023-07-08 PROCEDURE — 80053 COMPREHEN METABOLIC PANEL: CPT

## 2023-07-08 RX ORDER — DIPHENHYDRAMINE HYDROCHLORIDE 50 MG/ML
25 INJECTION INTRAMUSCULAR; INTRAVENOUS
Status: COMPLETED | OUTPATIENT
Start: 2023-07-08 | End: 2023-07-08

## 2023-07-08 RX ORDER — PROCHLORPERAZINE MALEATE 10 MG
10 TABLET ORAL EVERY 6 HOURS PRN
Qty: 15 TABLET | Refills: 0 | Status: SHIPPED | OUTPATIENT
Start: 2023-07-08

## 2023-07-08 RX ORDER — PROCHLORPERAZINE EDISYLATE 5 MG/ML
10 INJECTION INTRAMUSCULAR; INTRAVENOUS
Status: COMPLETED | OUTPATIENT
Start: 2023-07-08 | End: 2023-07-08

## 2023-07-08 RX ORDER — DIPHENHYDRAMINE HCL 25 MG
25 CAPSULE ORAL EVERY 6 HOURS PRN
Qty: 15 CAPSULE | Refills: 0 | Status: SHIPPED | OUTPATIENT
Start: 2023-07-08 | End: 2023-08-09

## 2023-07-08 RX ORDER — KETOROLAC TROMETHAMINE 30 MG/ML
15 INJECTION, SOLUTION INTRAMUSCULAR; INTRAVENOUS
Status: COMPLETED | OUTPATIENT
Start: 2023-07-08 | End: 2023-07-08

## 2023-07-08 RX ORDER — IBUPROFEN 600 MG/1
600 TABLET ORAL EVERY 6 HOURS PRN
Qty: 15 TABLET | Refills: 0 | Status: SHIPPED | OUTPATIENT
Start: 2023-07-08 | End: 2023-11-01

## 2023-07-08 RX ADMIN — DIPHENHYDRAMINE HYDROCHLORIDE 25 MG: 50 INJECTION, SOLUTION INTRAMUSCULAR; INTRAVENOUS at 02:07

## 2023-07-08 RX ADMIN — PROCHLORPERAZINE EDISYLATE 10 MG: 5 INJECTION INTRAMUSCULAR; INTRAVENOUS at 02:07

## 2023-07-08 RX ADMIN — SODIUM CHLORIDE 1000 ML: 9 INJECTION, SOLUTION INTRAVENOUS at 02:07

## 2023-07-08 RX ADMIN — KETOROLAC TROMETHAMINE 15 MG: 30 INJECTION INTRAMUSCULAR; INTRAVENOUS at 02:07

## 2023-07-08 NOTE — DISCHARGE INSTRUCTIONS
You can take the medications every 6 hours as needed for headache. Stay well hydrated. Follow up with primary care if symptoms do not improve.

## 2023-07-08 NOTE — ED PROVIDER NOTES
"Encounter Date: 7/8/2023       History     Chief Complaint   Patient presents with    Headache     Reports headache, neck pain and back pain x 2 days. Also states "having bad muscle spasms"     68-year-old female with history of arthritis, COPD, chronic back pain, depression, CAD, migraines presents to ED with complaints headache, neck pain, back pain x2 days.  Headache is generalized and associated with photophobia.  No relieving factors.  Headache and dizziness worsened with movement.  No improvement with Fioricet.  Denies any nausea.    The history is provided by the patient.   Review of patient's allergies indicates:  No Known Allergies  Past Medical History:   Diagnosis Date    Anticoagulant long-term use     Anxiety     Arthritis     C. difficile colitis 8/6/2022    Carotid artery disease     Cervical disc disorder     Chronic back pain     COPD (chronic obstructive pulmonary disease)     Coronary artery disease     Dementia     Depression     Diarrhea, unspecified 11/1/2021    DVT (deep venous thrombosis)     CAROTID    GERD (gastroesophageal reflux disease)     Hematuria     Hiatal hernia     High cholesterol     Ill-fitting dentures     STATES DOESN'T WEAR    PVD (peripheral vascular disease)     Renal calculus     Sleep apnea     Sleep apnea     NO C PAP    Urinary frequency     Urinary urgency     Wears glasses     READING      Past Surgical History:   Procedure Laterality Date    BACK SURGERY      BREAST LUMPECTOMY Right     CAROTID ARTERY ANGIOPLASTY      CAROTID ARTERY ANGIOPLASTY      CAROTID ARTERY STENT Left     CAROTID ENDARTERECTOMY Right     CHOLECYSTECTOMY      COLONOSCOPY      CYSTOSCOPY      HYSTERECTOMY      OOPHORECTOMY      TONSILLECTOMY       Family History   Problem Relation Age of Onset    Cancer Mother     Stroke Father     No Known Problems Sister     No Known Problems Daughter     No Known Problems Maternal Aunt     No Known Problems Maternal Uncle     No Known Problems Paternal Aunt  "    No Known Problems Paternal Uncle     No Known Problems Maternal Grandmother     No Known Problems Maternal Grandfather     No Known Problems Paternal Grandmother     No Known Problems Paternal Grandfather     Breast cancer Neg Hx     Ovarian cancer Neg Hx     BRCA 1/2 Neg Hx      Social History     Tobacco Use    Smoking status: Every Day     Packs/day: 0.50     Types: Cigarettes     Start date: 1970    Smokeless tobacco: Never   Substance Use Topics    Alcohol use: No    Drug use: No     Review of Systems    Physical Exam     Initial Vitals [07/08/23 1426]   BP Pulse Resp Temp SpO2   (!) 146/69 90 16 98.2 °F (36.8 °C) 97 %      MAP       --         Physical Exam    ED Course   Procedures  Labs Reviewed   COMPREHENSIVE METABOLIC PANEL - Abnormal; Notable for the following components:       Result Value    BUN 32 (*)     Anion Gap 7 (*)     All other components within normal limits   CBC W/ AUTO DIFFERENTIAL   MAGNESIUM          Imaging Results              CT Head Without Contrast (Final result)  Result time 07/08/23 16:34:12      Final result by Johan Mason MD (07/08/23 16:34:12)                   Impression:      No acute intracranial process detected.      Electronically signed by: Johan Mason MD  Date:    07/08/2023  Time:    16:34               Narrative:    EXAMINATION:  CT HEAD WITHOUT CONTRAST    CLINICAL HISTORY:  Headache, chronic, new features or increased frequency;    TECHNIQUE:  Axial CT images were obtained from the skull base to the vertex without contrast. Iterative reconstruction technique was used.  CT/Cardiac nuclear examinations in the past 12 months: 1    COMPARISON:  CT head 06/28/2018    FINDINGS:  No ventricular or basal cistern effacement.  No evidence of acute intracranial hemorrhage, midline shift, mass, or mass effect.  No detected extra-axial fluid collections.  Punctate left basal ganglia calcification.  Gray-white differentiation is overall maintained.  Imaged paranasal sinuses  and mastoid air cells are clear.  Calvarium is intact.                                       Medications   sodium chloride 0.9% bolus 1,000 mL 1,000 mL (0 mLs Intravenous Stopped 7/8/23 1556)   ketorolac injection 15 mg (15 mg Intravenous Given 7/8/23 1456)   prochlorperazine injection Soln 10 mg (10 mg Intravenous Given 7/8/23 1456)   diphenhydrAMINE injection 25 mg (25 mg Intravenous Given 7/8/23 1456)     Medical Decision Making:   History:   Old Medical Records: I decided to obtain old medical records.  Differential Diagnosis:   Migraine, headache, dizziness, intracranial hemorrhage, dehydration  Clinical Tests:   Lab Tests: Ordered and Reviewed  The following lab test(s) were unremarkable: CBC and CMP  Radiological Study: Ordered and Reviewed  ED Management:  68-year-old female to ED for above complaints.  Headache symptoms were gradual in onset and been intermittent for 2 days.  No improvement with Fioricet at home.  She was associated photophobia.  Headache was not described as worst of her life.  Similar symptoms to headaches for the past.  CBC CMP magnesium were all unremarkable.  He was given 1 L of normal saline, Toradol, Compazine, Benadryl while in ED with good improvement in headache symptoms.  CT scan was unremarkable.  She was sent home with prescriptions for Compazine, Benadryl, ibuprofen instructed to follow up with primary care.  Return precautions were given.  She was stable for discharge.                        Clinical Impression:   Final diagnoses:  [G43.909] Migraine without status migrainosus, not intractable, unspecified migraine type (Primary)        ED Disposition Condition    Discharge Stable          ED Prescriptions       Medication Sig Dispense Start Date End Date Auth. Provider    prochlorperazine (COMPAZINE) 10 MG tablet Take 1 tablet (10 mg total) by mouth every 6 (six) hours as needed (headache). 15 tablet 7/8/2023 -- Wayne Dumont NP    diphenhydrAMINE (BENADRYL) 25 mg  capsule Take 1 capsule (25 mg total) by mouth every 6 (six) hours as needed (headache). 15 capsule 7/8/2023 -- Wayne Dumont NP    ibuprofen (ADVIL,MOTRIN) 600 MG tablet Take 1 tablet (600 mg total) by mouth every 6 (six) hours as needed (headache). 15 tablet 7/8/2023 -- Wayne Dumont NP          Follow-up Information       Follow up With Specialties Details Why Contact Info    Kt Pozo Jr., MD Internal Medicine In 2 days  [address] [place] [id number]  [phone]               Wayne Dumont NP  07/09/23 1007

## 2023-07-26 PROBLEM — R30.0 DYSURIA: Status: ACTIVE | Noted: 2023-07-26

## 2023-07-29 ENCOUNTER — LAB VISIT (OUTPATIENT)
Dept: LAB | Facility: HOSPITAL | Age: 68
End: 2023-07-29
Attending: NURSE PRACTITIONER
Payer: MEDICARE

## 2023-07-29 DIAGNOSIS — R19.7 DIARRHEA, UNSPECIFIED TYPE: ICD-10-CM

## 2023-07-29 LAB
ASTROVIRUS: NOT DETECTED
C COLI+JEJ+UPSA DNA STL QL NAA+NON-PROBE: NOT DETECTED
C DIF TOX TCDA+TCDB STL QL NAA+NON-PROBE: NORMAL
C DIFF TOX GENS STL QL NAA+PROBE: POSITIVE
CYCLOSPORA CAYETANENSIS: NOT DETECTED
ENTEROAGGREGATIVE E COLI: NOT DETECTED
ENTEROPATHOGENIC E COLI: NOT DETECTED
GPP - ADENOVIRUS 40/41: NOT DETECTED
GPP - CRYPTOSPORIDIUM: NOT DETECTED
GPP - ENTAMOEBA HISTOLYTICA: NOT DETECTED
GPP - ENTEROTOXIGENIC E COLI (ETEC): NOT DETECTED
GPP - GIARDIA LAMBLIA: NOT DETECTED
GPP - NOROVIRUS GI/GII: NOT DETECTED
GPP - ROTAVIRUS A: NOT DETECTED
GPP - SALMONELLA: NOT DETECTED
GPP - VIBRIO CHOLERA: NOT DETECTED
GPP - YERSINIA ENTEROCOLITICA: NOT DETECTED
LACTATE PLASV-SCNC: NOT DETECTED MMOL/L
PLESIOMONAS SHIGELLOIDES: NOT DETECTED
SAPOVIRUS: NOT DETECTED
SHIGELLA SP+EIEC IPAH STL QL NAA+PROBE: NOT DETECTED
VIBRIO: NOT DETECTED

## 2023-07-29 PROCEDURE — 87507 IADNA-DNA/RNA PROBE TQ 12-25: CPT | Performed by: NURSE PRACTITIONER

## 2023-07-29 PROCEDURE — 87493 C DIFF AMPLIFIED PROBE: CPT | Performed by: NURSE PRACTITIONER

## 2023-07-29 PROCEDURE — 87449 NOS EACH ORGANISM AG IA: CPT | Performed by: NURSE PRACTITIONER

## 2023-07-31 ENCOUNTER — LAB VISIT (OUTPATIENT)
Dept: LAB | Facility: HOSPITAL | Age: 68
End: 2023-07-31
Attending: NURSE PRACTITIONER
Payer: MEDICARE

## 2023-07-31 DIAGNOSIS — R30.0 DYSURIA: Primary | ICD-10-CM

## 2023-07-31 LAB
BACTERIA #/AREA URNS HPF: NEGATIVE /HPF
BILIRUB UR QL STRIP: NEGATIVE
CLARITY UR: ABNORMAL
COLOR UR: YELLOW
GLUCOSE UR QL STRIP: NEGATIVE
HGB UR QL STRIP: ABNORMAL
HYALINE CASTS #/AREA URNS LPF: 1.6 /LPF
KETONES UR QL STRIP: NEGATIVE
LEUKOCYTE ESTERASE UR QL STRIP: ABNORMAL
MICROSCOPIC COMMENT: ABNORMAL
NITRITE UR QL STRIP: NEGATIVE
PH UR STRIP: 6 [PH] (ref 5–8)
PROT UR QL STRIP: NEGATIVE
RBC #/AREA URNS HPF: 16 /HPF (ref 0–4)
SP GR UR STRIP: 1.02 (ref 1–1.03)
SQUAMOUS #/AREA URNS HPF: 4 /HPF
URN SPEC COLLECT METH UR: ABNORMAL
UROBILINOGEN UR STRIP-ACNC: NEGATIVE EU/DL
WBC #/AREA URNS HPF: 27 /HPF (ref 0–5)

## 2023-07-31 PROCEDURE — 81000 URINALYSIS NONAUTO W/SCOPE: CPT | Performed by: NURSE PRACTITIONER

## 2023-07-31 PROCEDURE — 87086 URINE CULTURE/COLONY COUNT: CPT | Performed by: NURSE PRACTITIONER

## 2023-08-01 LAB
C DIFF GDH STL QL: POSITIVE
C DIFF TOX A+B STL QL IA: NEGATIVE

## 2023-08-02 LAB
BACTERIA UR CULT: NORMAL
BACTERIA UR CULT: NORMAL

## 2023-08-25 ENCOUNTER — HOSPITAL ENCOUNTER (EMERGENCY)
Facility: HOSPITAL | Age: 68
Discharge: HOME | End: 2023-08-25
Attending: EMERGENCY MEDICINE
Payer: MEDICARE

## 2023-08-25 VITALS
HEART RATE: 76 BPM | TEMPERATURE: 99 F | HEIGHT: 64 IN | RESPIRATION RATE: 16 BRPM | BODY MASS INDEX: 20.49 KG/M2 | SYSTOLIC BLOOD PRESSURE: 137 MMHG | DIASTOLIC BLOOD PRESSURE: 66 MMHG | WEIGHT: 120 LBS | OXYGEN SATURATION: 95 %

## 2023-08-25 DIAGNOSIS — N30.00 ACUTE CYSTITIS WITHOUT HEMATURIA: Primary | ICD-10-CM

## 2023-08-25 LAB
BACTERIA #/AREA URNS HPF: ABNORMAL /HPF
BILIRUB UR QL STRIP: NEGATIVE
CLARITY UR: ABNORMAL
COLOR UR: YELLOW
CTP QC/QA: YES
GLUCOSE UR QL STRIP: NEGATIVE
HGB UR QL STRIP: ABNORMAL
HYALINE CASTS #/AREA URNS LPF: 0 /LPF
KETONES UR QL STRIP: NEGATIVE
LEUKOCYTE ESTERASE UR QL STRIP: ABNORMAL
MICROSCOPIC COMMENT: ABNORMAL
NITRITE UR QL STRIP: POSITIVE
PH UR STRIP: 6 [PH] (ref 5–8)
PROT UR QL STRIP: NEGATIVE
RBC #/AREA URNS HPF: 19 /HPF (ref 0–4)
SARS-COV-2 RDRP RESP QL NAA+PROBE: NEGATIVE
SP GR UR STRIP: 1.01 (ref 1–1.03)
SQUAMOUS #/AREA URNS HPF: 4 /HPF
URN SPEC COLLECT METH UR: ABNORMAL
UROBILINOGEN UR STRIP-ACNC: NEGATIVE EU/DL
WBC #/AREA URNS HPF: >100 /HPF (ref 0–5)
WBC CLUMPS URNS QL MICRO: ABNORMAL

## 2023-08-25 PROCEDURE — 81000 URINALYSIS NONAUTO W/SCOPE: CPT | Performed by: EMERGENCY MEDICINE

## 2023-08-25 PROCEDURE — 87088 URINE BACTERIA CULTURE: CPT | Performed by: EMERGENCY MEDICINE

## 2023-08-25 PROCEDURE — 87186 SC STD MICRODIL/AGAR DIL: CPT | Performed by: EMERGENCY MEDICINE

## 2023-08-25 PROCEDURE — 87077 CULTURE AEROBIC IDENTIFY: CPT | Performed by: EMERGENCY MEDICINE

## 2023-08-25 PROCEDURE — 87635 SARS-COV-2 COVID-19 AMP PRB: CPT | Performed by: EMERGENCY MEDICINE

## 2023-08-25 PROCEDURE — 87086 URINE CULTURE/COLONY COUNT: CPT | Performed by: EMERGENCY MEDICINE

## 2023-08-25 PROCEDURE — 99283 EMERGENCY DEPT VISIT LOW MDM: CPT

## 2023-08-25 RX ORDER — CEFUROXIME AXETIL 250 MG/1
250 TABLET ORAL 2 TIMES DAILY
Qty: 20 TABLET | Refills: 0 | Status: SHIPPED | OUTPATIENT
Start: 2023-08-25 | End: 2023-09-04

## 2023-08-25 NOTE — ED PROVIDER NOTES
"Encounter Date: 8/25/2023       History     Chief Complaint   Patient presents with    Back Pain     Pt stated that for the past couple days she has been experiencing generalized body aches/headache with nausea - with worsened pain noted to right lower back/flank area - ongoing chronic bladder/urinary problems / hematuria.     Nausea     68-year-old female with a history of anxiety, chronic back pain, COPD presents the emergency depart with sinus congestion, postnasal drip, body aches, muscle aches for the past few days.  Denies any fever.  Patient states "she does does not feel good".  No chest pain or shortness of breath.  No nausea vomiting diarrhea.      Review of patient's allergies indicates:  No Known Allergies  Past Medical History:   Diagnosis Date    Anticoagulant long-term use     Anxiety     Arthritis     C. difficile colitis 8/6/2022    Carotid artery disease     Cervical disc disorder     Chronic back pain     COPD (chronic obstructive pulmonary disease)     Coronary artery disease     Dementia     Depression     Diarrhea, unspecified 11/1/2021    DVT (deep venous thrombosis)     CAROTID    GERD (gastroesophageal reflux disease)     Hematuria     Hiatal hernia     High cholesterol     Ill-fitting dentures     STATES DOESN'T WEAR    PVD (peripheral vascular disease)     Renal calculus     Sleep apnea     Sleep apnea     NO C PAP    Urinary frequency     Urinary urgency     Wears glasses     READING      Past Surgical History:   Procedure Laterality Date    BACK SURGERY      BREAST LUMPECTOMY Right     CAROTID ARTERY ANGIOPLASTY      CAROTID ARTERY ANGIOPLASTY      CAROTID ARTERY STENT Left     CAROTID ENDARTERECTOMY Right     CHOLECYSTECTOMY      COLONOSCOPY      CYSTOSCOPY      HYSTERECTOMY      OOPHORECTOMY      TONSILLECTOMY       Family History   Problem Relation Age of Onset    Cancer Mother     Stroke Father     No Known Problems Sister     No Known Problems Daughter     No Known Problems Maternal " Aunt     No Known Problems Maternal Uncle     No Known Problems Paternal Aunt     No Known Problems Paternal Uncle     No Known Problems Maternal Grandmother     No Known Problems Maternal Grandfather     No Known Problems Paternal Grandmother     No Known Problems Paternal Grandfather     Breast cancer Neg Hx     Ovarian cancer Neg Hx     BRCA 1/2 Neg Hx      Social History     Tobacco Use    Smoking status: Every Day     Current packs/day: 0.50     Average packs/day: 0.5 packs/day for 53.6 years (26.8 ttl pk-yrs)     Types: Cigarettes     Start date: 1970    Smokeless tobacco: Never   Substance Use Topics    Alcohol use: No    Drug use: No     Review of Systems   Constitutional:  Negative for fever.   HENT:  Positive for postnasal drip, rhinorrhea and sinus pressure. Negative for sore throat.    Respiratory:  Negative for shortness of breath.    Cardiovascular:  Negative for chest pain.   Gastrointestinal:  Negative for nausea.   Genitourinary:  Negative for dysuria.   Musculoskeletal:  Positive for myalgias. Negative for back pain.   Skin:  Negative for rash.   Neurological:  Negative for weakness.   Hematological:  Does not bruise/bleed easily.   All other systems reviewed and are negative.      Physical Exam     Initial Vitals [08/25/23 1049]   BP Pulse Resp Temp SpO2   137/66 76 16 99.1 °F (37.3 °C) 95 %      MAP       --         Physical Exam    Nursing note and vitals reviewed.  Constitutional: She appears well-developed and well-nourished. She is not diaphoretic. No distress.   HENT:   Head: Normocephalic and atraumatic.   Mouth/Throat: Oropharynx is clear and moist.   Eyes: Conjunctivae and EOM are normal. Pupils are equal, round, and reactive to light.   Neck: Neck supple. No tracheal deviation present. No JVD present.   Normal range of motion.  Cardiovascular:  Normal rate, regular rhythm, normal heart sounds and intact distal pulses.           No murmur heard.  Pulmonary/Chest: Breath sounds normal. No  stridor. No respiratory distress. She has no wheezes. She has no rhonchi. She has no rales. She exhibits no tenderness.   Abdominal: Abdomen is soft. Bowel sounds are normal. She exhibits no distension. There is no abdominal tenderness. There is no rebound and no guarding.   Musculoskeletal:         General: No tenderness or edema. Normal range of motion.      Cervical back: Normal range of motion and neck supple.     Neurological: She is alert and oriented to person, place, and time. She has normal strength. No cranial nerve deficit. GCS score is 15. GCS eye subscore is 4. GCS verbal subscore is 5. GCS motor subscore is 6.   Skin: Skin is warm and dry. Capillary refill takes less than 2 seconds.         ED Course   Procedures  Labs Reviewed   URINALYSIS, REFLEX TO URINE CULTURE - Abnormal; Notable for the following components:       Result Value    Appearance, UA Cloudy (*)     Occult Blood UA 3+ (*)     Nitrite, UA Positive (*)     Leukocytes, UA 3+ (*)     All other components within normal limits    Narrative:     Preferred Collection Type->Urine, Clean Catch  Specimen Source->Urine   URINALYSIS MICROSCOPIC - Abnormal; Notable for the following components:    RBC, UA 19 (*)     WBC, UA >100 (*)     WBC Clumps, UA Occasional (*)     Bacteria Many (*)     All other components within normal limits    Narrative:     Preferred Collection Type->Urine, Clean Catch  Specimen Source->Urine   CULTURE, URINE   SARS-COV-2 RDRP GENE          Imaging Results    None          Medications - No data to display  Medical Decision Making  Differential diagnosis consistent with viral syndrome, COVID-19, myalgia    Amount and/or Complexity of Data Reviewed  Labs: ordered.    Risk  Prescription drug management.               ED Course as of 08/25/23 1148   Fri Aug 25, 2023   1146 Laboratory values consistent with UTI.  She is stable for discharge and follow up with the primary care physician with prescription for antibiotics as an  outpatient.  Strict ER precautions given for any worsening [SD]      ED Course User Index  [SD] Marek Rai MD                    Clinical Impression:   Final diagnoses:  [N30.00] Acute cystitis without hematuria (Primary)        ED Disposition Condition    Discharge Stable          ED Prescriptions       Medication Sig Dispense Start Date End Date Auth. Provider    cefUROXime (CEFTIN) 250 MG tablet Take 1 tablet (250 mg total) by mouth 2 (two) times daily. for 10 days 20 tablet 8/25/2023 9/4/2023 Marek Rai MD          Follow-up Information       Follow up With Specialties Details Why Contact Info Additional Information    Primary care physician  In 2 days       [facility] Emergency Department Emergency Medicine  As needed [address] [facility] [address] [phone] Floor 1             Marek Rai MD  08/25/23 1149

## 2023-08-27 LAB — BACTERIA UR CULT: ABNORMAL

## 2023-08-29 ENCOUNTER — PATIENT OUTREACH (OUTPATIENT)
Dept: EMERGENCY MEDICINE | Facility: HOSPITAL | Age: 68
End: 2023-08-29
Payer: MEDICARE

## 2023-08-29 NOTE — PROGRESS NOTES
Kym Gamble Patient Care Assistant  ED Navigator  Emergency Department    Project: [facility] [place] [facility] ED Navigator  Role: Community Health Worker    Date: 08/29/2023  Patient Name: Desiree Blake  MRN: [id number]  PCP: Kt Pozo Jr., MD    Assessment:     Desiree Blake is a 68 y.o. female who has presented to ED for back pain. Patient has visited the ED 2 times in the past 3 months. Patient did contact PCP.     ED Navigator Initial Assessment    ED Navigator Enrollment Documentation  Consent to Services  Does patient consent to completing the assessment?: Yes  Contact  Method of Initial Contact: Phone  Transportation  Does the patient have issues with Transportation?: No  Does the patient have transportation to and from healthcare appointments?: Yes  Insurance Coverage  Do you have coverage/adequate coverage?: Yes  Type/kind of coverage: [employer]PLETE  Is patient able to afford co-pays/deductibles?: Yes  Is patient able to afford HME or supplies?: Yes  Does patient have an established [facility] PCP?: Yes  Able to access?: Yes  Does the patient have a lack of adequate coverage?: No  Specialist Appointment  Did the patient come to the ED to see a specialist?: No  Does the patient have a pending specialist referral?: Yes  Does the patient have a specialist appointment made?: No  PCP Follow Up Appointment  Has the patient had an appointment with a primary care provider in the past year?: Yes  Approximate date: 8/16/23  Provider: Debra Oviedo NP  Does the patient have a follow up appontment with a PCP?: Yes  Upcoming appointment date: 8/31/23  Provider: Kt Pozo Jr., MD  When was the last time you saw your PCP?: 6/28/23  Medications  Is patient able to afford medication?: Yes  Is patient unable to get medication due to lack of transportation?: No  Psychological  Does the patient have psycho-social concerns?: Yes  What concerns does the patient have?: Anxiety and/or Depression  Food  Does the patient  have concerns about food?: No  Communication/Education  Does the patient have limited English proficiency/English not primary language?: No  Does patient have low literacy and/or low health literacy?: No  Does patient have concerns with care?: No  Does patient have dissatisfaction with care?: No  Other Financial Concerns  Does the patient have immediate financial distress?: No  Does the patient have general financial concerns?: No  Other Social Barriers/Concerns  Does the patient have any additional barriers or concerns?: None  Primary Barrier  Barriers identified: Psychological barrier (mistrust, anxiety, etc.), Cognitive barrier (health literacy, language and communication, etc.), Structural barrier (service availability, waiting times, etc.)  Root Cause of ED Utilization: Chronic Conditions  Plan to address Chronic Conditions: Encourage patient to contact PCP/specialist for follow up per ED discharge instructions  Next steps: Provided Education  Was education/educational materials provided surrounding PCP services/creating a medical home?: Yes Was education verbal or written?: Verbal, Written     Was education/educational materials provided surrounding low cost, healthy foods?: Yes Was education verbal or written?: Written     Was education/educational materials provided surrounding other items? If so, use comment to explain.: Yes Was education verbal or written?: Written   Plan: Provided information for [facility] 24/7 Nurse triage line, [phone] or 1-866-[facility] ([phone])  Expected Date of Follow Up 1: 10/13/23  Additional Documentation: Pt is still in pain, but has all of her appointments taken care of, as stated, and did not need assistance. Pt did not need any resources, either, but had a few questions. Pt asked what she would need to do if she needed help with transportation, and also asked if her  would be able to get reimbursed for bringing her to her appointments. Pt understands her  insurance provides transportation, and she would need to speak with someone from her insurance in order to get 100% accurate information. Pt would like ED navigator's contact information to be mailed to her, and agrees to a follow-up call in 6 weeks or so.    ED navigator ensured there was nothing she could help patient with today. ED navigator informed pt that she would have to call her insurance to schedule transportation and to be educated on how far in advance she needs to schedule, and that for Medicaid it is 48 hours ahead of time, and that it may be the same, but not 100% sure. ED navigator also informed pt with Medicaid, they do ask when scheduling transportation if the pt has someone who can bring them and be reimbursed, so it may be the same for Medicare, too. ED navigator provided patient with the following via mail: her contact card, the [facility] 24/7 Nurse triage line, [phone] or 1-866-[facility] ([phone]) contact information, and education on (The Right Care at the Right Level information, [employer] Virtual Visit information, and Heart healthy diet tips). ED navigator will follow-up with patient on/around 10/13/2023 to see how she is doing, and to assist as needed.    Kym Gamble  ED Navigator- [facility]  [phone]              Social History     Socioeconomic History    Marital status:    Tobacco Use    Smoking status: Every Day     Current packs/day: 0.50     Average packs/day: 0.5 packs/day for 53.7 years (26.8 ttl pk-yrs)     Types: Cigarettes     Start date: 1970    Smokeless tobacco: Never   Substance and Sexual Activity    Alcohol use: No    Drug use: No     Social Determinants of Health     Financial Resource Strain: Low Risk  (8/29/2023)    Overall Financial Resource Strain (CARDIA)     Difficulty of Paying Living Expenses: Not hard at all   Food Insecurity: No Food Insecurity (8/29/2023)    Hunger Vital Sign     Worried About Running Out of Food in  the Last Year: Never true     Ran Out of Food in the Last Year: Never true   Transportation Needs: No Transportation Needs (8/29/2023)    PRAPARE - Transportation     Lack of Transportation (Medical): No     Lack of Transportation (Non-Medical): No   Physical Activity: Sufficiently Active (8/29/2023)    Exercise Vital Sign     Days of Exercise per Week: 3 days     Minutes of Exercise per Session: 60 min   Stress: Stress Concern Present (8/29/2023)    [employer] [place] [employer] - Occupational Stress Questionnaire     Feeling of Stress : Very much   Social Connections: Socially Isolated (8/29/2023)    Social Connection and Isolation Panel [NHANES]     Frequency of Communication with Friends and Family: Never     Frequency of Social Gatherings with Friends and Family: Never     Attends Jehovah's witness Services: Never     Active Member of Clubs or Organizations: No     Attends Club or Organization Meetings: Never     Marital Status:    Housing Stability: Low Risk  (8/29/2023)    Housing Stability Vital Sign     Unable to Pay for Housing in the Last Year: No     Number of Places Lived in the Last Year: 1     Unstable Housing in the Last Year: No       Plan:     Pt is still in pain, but has all of her appointments taken care of, as stated, and did not need assistance. Pt did not need any resources, either, but had a few questions. Pt asked what she would need to do if she needed help with transportation, and also asked if her  would be able to get reimbursed for bringing her to her appointments. Pt understands her insurance provides transportation, and she would need to speak with someone from her insurance in order to get 100% accurate information. Pt would like ED navigator's contact information to be mailed to her, and agrees to a follow-up call in 6 weeks or so.    ED navigator ensured there was nothing she could help patient with today. ED navigator informed pt that she would have to call her  insurance to schedule transportation and to be educated on how far in advance she needs to schedule, and that for Medicaid it is 48 hours ahead of time, and that it may be the same, but not 100% sure. ED navigator also informed pt with Medicaid, they do ask when scheduling transportation if the pt has someone who can bring them and be reimbursed, so it may be the same for Medicare, too. ED navigator provided patient with the following via mail: her contact card, the [facility] 24/7 Nurse triage line, [phone] or [facility] ([phone]) contact information, and education on (The Right Care at the Right Level information, [facility] Virtual Visit information, and Heart healthy diet tips). ED navigator will follow-up with patient on/around 10/13/2023 to see how she is doing, and to assist as needed.    Kym Gamble  ED Navigator- [place]  [phone]

## 2023-08-31 ENCOUNTER — LAB VISIT (OUTPATIENT)
Dept: LAB | Facility: HOSPITAL | Age: 68
End: 2023-08-31
Attending: INTERNAL MEDICINE
Payer: MEDICARE

## 2023-08-31 DIAGNOSIS — I65.29 STENOSIS OF CAROTID ARTERY, UNSPECIFIED LATERALITY: ICD-10-CM

## 2023-08-31 DIAGNOSIS — Z79.899 ENCOUNTER FOR LONG-TERM (CURRENT) USE OF OTHER MEDICATIONS: ICD-10-CM

## 2023-08-31 DIAGNOSIS — E78.2 MIXED HYPERLIPIDEMIA: ICD-10-CM

## 2023-08-31 DIAGNOSIS — I10 PRIMARY HYPERTENSION: ICD-10-CM

## 2023-08-31 DIAGNOSIS — E78.5 HYPERLIPIDEMIA, UNSPECIFIED HYPERLIPIDEMIA TYPE: ICD-10-CM

## 2023-08-31 DIAGNOSIS — E55.9 VITAMIN D DEFICIENCY: ICD-10-CM

## 2023-08-31 DIAGNOSIS — E88.89 COENZYME Q DEFICIENCY: ICD-10-CM

## 2023-08-31 DIAGNOSIS — E63.0 ESSENTIAL FATTY ACID (EFA) DEFICIENCY: ICD-10-CM

## 2023-08-31 LAB
ALBUMIN SERPL BCP-MCNC: 3.8 G/DL (ref 3.5–5.2)
ALP SERPL-CCNC: 98 U/L (ref 55–135)
ALT SERPL W/O P-5'-P-CCNC: 28 U/L (ref 10–44)
ANION GAP SERPL CALC-SCNC: 7 MMOL/L (ref 8–16)
AST SERPL-CCNC: 16 U/L (ref 10–40)
BASOPHILS # BLD AUTO: 0.04 K/UL (ref 0–0.2)
BASOPHILS NFR BLD: 0.5 % (ref 0–1.9)
BILIRUB SERPL-MCNC: 0.2 MG/DL (ref 0.1–1)
BUN SERPL-MCNC: 18 MG/DL (ref 8–23)
CALCIUM SERPL-MCNC: 9 MG/DL (ref 8.7–10.5)
CHLORIDE SERPL-SCNC: 106 MMOL/L (ref 95–110)
CHOLEST SERPL-MCNC: 139 MG/DL (ref 120–199)
CHOLEST/HDLC SERPL: 3.1 {RATIO} (ref 2–5)
CO2 SERPL-SCNC: 23 MMOL/L (ref 23–29)
CREAT SERPL-MCNC: 0.7 MG/DL (ref 0.5–1.4)
DIFFERENTIAL METHOD: ABNORMAL
EOSINOPHIL # BLD AUTO: 0.1 K/UL (ref 0–0.5)
EOSINOPHIL NFR BLD: 0.8 % (ref 0–8)
ERYTHROCYTE [DISTWIDTH] IN BLOOD BY AUTOMATED COUNT: 12.9 % (ref 11.5–14.5)
EST. GFR  (NO RACE VARIABLE): >60 ML/MIN/1.73 M^2
ESTIMATED AVG GLUCOSE: 105 MG/DL (ref 68–131)
GLUCOSE SERPL-MCNC: 119 MG/DL (ref 70–110)
HBA1C MFR BLD: 5.3 % (ref 4–5.6)
HCT VFR BLD AUTO: 41.6 % (ref 37–48.5)
HDLC SERPL-MCNC: 45 MG/DL (ref 40–75)
HDLC SERPL: 32.4 % (ref 20–50)
HGB BLD-MCNC: 14 G/DL (ref 12–16)
IMM GRANULOCYTES # BLD AUTO: 0.03 K/UL (ref 0–0.04)
IMM GRANULOCYTES NFR BLD AUTO: 0.4 % (ref 0–0.5)
LDLC SERPL CALC-MCNC: 56.2 MG/DL (ref 63–159)
LYMPHOCYTES # BLD AUTO: 1.4 K/UL (ref 1–4.8)
LYMPHOCYTES NFR BLD: 16.5 % (ref 18–48)
MAGNESIUM SERPL-MCNC: 2 MG/DL (ref 1.6–2.6)
MCH RBC QN AUTO: 31.7 PG (ref 27–31)
MCHC RBC AUTO-ENTMCNC: 33.7 G/DL (ref 32–36)
MCV RBC AUTO: 94 FL (ref 82–98)
MONOCYTES # BLD AUTO: 0.6 K/UL (ref 0.3–1)
MONOCYTES NFR BLD: 6.6 % (ref 4–15)
NEUTROPHILS # BLD AUTO: 6.3 K/UL (ref 1.8–7.7)
NEUTROPHILS NFR BLD: 75.2 % (ref 38–73)
NONHDLC SERPL-MCNC: 94 MG/DL
NRBC BLD-RTO: 0 /100 WBC
PLATELET # BLD AUTO: 325 K/UL (ref 150–450)
PMV BLD AUTO: 9.9 FL (ref 9.2–12.9)
POTASSIUM SERPL-SCNC: 3.6 MMOL/L (ref 3.5–5.1)
PROT SERPL-MCNC: 8 G/DL (ref 6–8.4)
RBC # BLD AUTO: 4.41 M/UL (ref 4–5.4)
SODIUM SERPL-SCNC: 136 MMOL/L (ref 136–145)
TRIGL SERPL-MCNC: 189 MG/DL (ref 30–150)
WBC # BLD AUTO: 8.31 K/UL (ref 3.9–12.7)

## 2023-08-31 PROCEDURE — 85025 COMPLETE CBC W/AUTO DIFF WBC: CPT | Performed by: INTERNAL MEDICINE

## 2023-08-31 PROCEDURE — 83735 ASSAY OF MAGNESIUM: CPT | Performed by: INTERNAL MEDICINE

## 2023-08-31 PROCEDURE — 80053 COMPREHEN METABOLIC PANEL: CPT | Performed by: INTERNAL MEDICINE

## 2023-08-31 PROCEDURE — 36415 COLL VENOUS BLD VENIPUNCTURE: CPT | Performed by: INTERNAL MEDICINE

## 2023-08-31 PROCEDURE — 80061 LIPID PANEL: CPT | Performed by: INTERNAL MEDICINE

## 2023-08-31 PROCEDURE — 83036 HEMOGLOBIN GLYCOSYLATED A1C: CPT | Performed by: INTERNAL MEDICINE

## 2023-09-07 ENCOUNTER — HOSPITAL ENCOUNTER (EMERGENCY)
Facility: HOSPITAL | Age: 68
Discharge: LEFT BEFORE DISCHARGE | End: 2023-09-07
Attending: STUDENT IN AN ORGANIZED HEALTH CARE EDUCATION/TRAINING PROGRAM
Payer: MEDICARE

## 2023-09-07 VITALS
BODY MASS INDEX: 20.49 KG/M2 | OXYGEN SATURATION: 95 % | DIASTOLIC BLOOD PRESSURE: 97 MMHG | HEIGHT: 64 IN | WEIGHT: 120 LBS | HEART RATE: 75 BPM | SYSTOLIC BLOOD PRESSURE: 208 MMHG | RESPIRATION RATE: 22 BRPM | TEMPERATURE: 98 F

## 2023-09-07 DIAGNOSIS — R13.10 DYSPHAGIA: Primary | ICD-10-CM

## 2023-09-07 DIAGNOSIS — R13.10 DYSPHAGIA: ICD-10-CM

## 2023-09-07 PROCEDURE — 99284 EMERGENCY DEPT VISIT MOD MDM: CPT

## 2023-09-07 PROCEDURE — 63600175 PHARM REV CODE 636 W HCPCS: Mod: JZ,JG | Performed by: STUDENT IN AN ORGANIZED HEALTH CARE EDUCATION/TRAINING PROGRAM

## 2023-09-07 PROCEDURE — 96372 THER/PROPH/DIAG INJ SC/IM: CPT | Performed by: STUDENT IN AN ORGANIZED HEALTH CARE EDUCATION/TRAINING PROGRAM

## 2023-09-07 PROCEDURE — 25000003 PHARM REV CODE 250: Performed by: STUDENT IN AN ORGANIZED HEALTH CARE EDUCATION/TRAINING PROGRAM

## 2023-09-07 RX ORDER — GLUCAGON 1 MG
1 KIT INJECTION
Status: COMPLETED | OUTPATIENT
Start: 2023-09-07 | End: 2023-09-07

## 2023-09-07 RX ORDER — ONDANSETRON 4 MG/1
4 TABLET, ORALLY DISINTEGRATING ORAL
Status: COMPLETED | OUTPATIENT
Start: 2023-09-07 | End: 2023-09-07

## 2023-09-07 RX ADMIN — GLUCAGON HYDROCHLORIDE 1 MG: KIT at 07:09

## 2023-09-07 RX ADMIN — ONDANSETRON 4 MG: 4 TABLET, ORALLY DISINTEGRATING ORAL at 07:09

## 2023-09-07 NOTE — ED PROVIDER NOTES
History  Chief Complaint   Patient presents with    Foreign Body In Throat     Patient states it feels like something is stuck in her throat, reports vomiting for the last 20 minutes.      68-year-old female history of COPD, depression, hiatal hernia and hyperlipidemia presents for evaluation of a foreign body sensation in her throat.  Patient feels as though something is stuck in her throat.  Symptoms ongoing for the last hour.  Patient did report ongoing throat discomfort which initially began 2 days ago.  Within the last hour however she is had nausea with vomiting.  Patient able to tolerate oral secretions but no liquids.        Past Medical History:   Diagnosis Date    Anticoagulant long-term use     Anxiety     Arthritis     C. difficile colitis 8/6/2022    Carotid artery disease     Cervical disc disorder     Chronic back pain     COPD (chronic obstructive pulmonary disease)     Coronary artery disease     Dementia     Depression     Diarrhea, unspecified 11/1/2021    DVT (deep venous thrombosis)     CAROTID    GERD (gastroesophageal reflux disease)     Hematuria     Hiatal hernia     High cholesterol     Ill-fitting dentures     STATES DOESN'T WEAR    PVD (peripheral vascular disease)     Renal calculus     Sleep apnea     Sleep apnea     NO C PAP    Urinary frequency     Urinary urgency     Wears glasses     READING        Past Surgical History:   Procedure Laterality Date    BACK SURGERY      BREAST LUMPECTOMY Right     CAROTID ARTERY ANGIOPLASTY      CAROTID ARTERY ANGIOPLASTY      CAROTID ARTERY STENT Left     CAROTID ENDARTERECTOMY Right     CHOLECYSTECTOMY      COLONOSCOPY      CYSTOSCOPY      HYSTERECTOMY      OOPHORECTOMY      TONSILLECTOMY         Family History   Problem Relation Age of Onset    Cancer Mother     Stroke Father     No Known Problems Sister     No Known Problems Daughter     No Known Problems Maternal Aunt     No Known Problems Maternal Uncle     No Known Problems Paternal Aunt      "No Known Problems Paternal Uncle     No Known Problems Maternal Grandmother     No Known Problems Maternal Grandfather     No Known Problems Paternal Grandmother     No Known Problems Paternal Grandfather     Breast cancer Neg Hx     Ovarian cancer Neg Hx     BRCA 1/2 Neg Hx        Social History     Tobacco Use    Smoking status: Every Day     Current packs/day: 0.50     Average packs/day: 0.5 packs/day for 53.7 years (26.8 ttl pk-yrs)     Types: Cigarettes     Start date: 1970    Smokeless tobacco: Never   Substance Use Topics    Alcohol use: No    Drug use: No       ROS  Review of Systems   HENT:  Positive for trouble swallowing.    Gastrointestinal:  Positive for nausea and vomiting.       Physical Exam  BP (!) 208/97   Pulse 75   Temp 98.2 °F (36.8 °C)   Resp (!) 22   Ht 5' 4" (1.626 m)   Wt 54.4 kg (120 lb)   SpO2 95%   Breastfeeding No   BMI 20.60 kg/m²   Physical Exam    Constitutional: She appears well-developed and well-nourished. She is cooperative.   HENT:   Head: Normocephalic and atraumatic.   Mouth/Throat: Oropharynx is clear and moist. No oropharyngeal exudate, posterior oropharyngeal edema, posterior oropharyngeal erythema or tonsillar abscesses.   Eyes: Conjunctivae, EOM and lids are normal. Pupils are equal, round, and reactive to light.   Neck: Phonation normal.   Normal range of motion.  Cardiovascular:  Normal rate, regular rhythm and intact distal pulses.           Pulmonary/Chest: Breath sounds normal. No stridor. No respiratory distress.   Abdominal: Abdomen is soft. There is no abdominal tenderness.   Musculoskeletal:      Cervical back: Normal range of motion.     Lymphadenopathy:     She has no cervical adenopathy.   Neurological: She is alert and oriented to person, place, and time.   Skin: Skin is warm and dry.   Psychiatric: She has a normal mood and affect. Her speech is normal and behavior is normal.               Labs Reviewed - No data to display      "                 Procedures             Medical Decision Making  Amount and/or Complexity of Data Reviewed  Radiology: ordered.    Risk  Prescription drug management.               ED Course as of 09/09/23 0245   Thu Sep 07, 2023   2057 X-ray notable for 2 small linear foreign bodies anterior to the C3/C4 vertebral bodies that are indeterminate.  Recommend CT scan for further evaluation.  CT was ordered but patient had eloped as result were taking too long. [NA]      ED Course User Index  [NA] Shira Cintron MD       Clinical Impression  The primary encounter diagnosis was Dysphagia. A diagnosis of Dysphagia was also pertinent to this visit.       Shira Cintron MD  09/09/23 0245

## 2023-09-08 NOTE — ED NOTES
"Patient came out of her room stating "this is ridiculous and my  needs to go to work". I informed patient that the MD was in a room with another patient and that he would come speak to her as soon as possible. Patient stated that it was taking too long to receive her results and she would call back later. Patient informed that she would need to sign out AMA. Patient stated "that is what is going to have to happen". Patient left prior to nurse printing AMA form.   "

## 2023-09-14 PROBLEM — R13.19 OTHER DYSPHAGIA: Status: ACTIVE | Noted: 2023-09-14

## 2023-09-14 PROBLEM — B34.9 VIRAL SYNDROME: Status: RESOLVED | Noted: 2022-11-03 | Resolved: 2023-09-14

## 2023-09-14 PROBLEM — R05.9 COUGH: Status: RESOLVED | Noted: 2021-09-01 | Resolved: 2023-09-14

## 2023-09-14 PROBLEM — J06.9 URI (UPPER RESPIRATORY INFECTION): Status: RESOLVED | Noted: 2023-07-05 | Resolved: 2023-09-14

## 2023-09-28 PROBLEM — R91.8 PULMONARY NODULES: Status: ACTIVE | Noted: 2023-09-28

## 2023-09-28 PROBLEM — R13.12 OROPHARYNGEAL DYSPHAGIA: Status: ACTIVE | Noted: 2023-09-14

## 2023-10-13 ENCOUNTER — PATIENT OUTREACH (OUTPATIENT)
Dept: EMERGENCY MEDICINE | Facility: HOSPITAL | Age: 68
End: 2023-10-13
Payer: MEDICARE

## 2023-10-18 NOTE — PROGRESS NOTES
Pt stated she is doing okay. Pt believes she has everything under control, as expressed. Pt has no needs today.    ED navigator ensured there was nothing she could help patient with. ED navigator reminded patient to reach out if there is ever anything she can assist with. ED navigator will follow-up with patient on/around 12/28/2023.    Kym Gamble  ED Navigator- [place]  [phone]

## 2023-11-06 PROBLEM — R68.89 FLU-LIKE SYMPTOMS: Status: ACTIVE | Noted: 2023-11-06

## 2023-11-09 ENCOUNTER — PATIENT MESSAGE (OUTPATIENT)
Dept: RESEARCH | Facility: HOSPITAL | Age: 68
End: 2023-11-09
Payer: MEDICARE

## 2023-11-16 PROBLEM — U07.1 COVID-19: Status: ACTIVE | Noted: 2023-11-06

## 2023-11-25 ENCOUNTER — HOSPITAL ENCOUNTER (EMERGENCY)
Facility: HOSPITAL | Age: 68
Discharge: HOME | End: 2023-11-25
Attending: EMERGENCY MEDICINE
Payer: MEDICARE

## 2023-11-25 VITALS
HEART RATE: 85 BPM | HEIGHT: 64 IN | BODY MASS INDEX: 20.83 KG/M2 | RESPIRATION RATE: 18 BRPM | SYSTOLIC BLOOD PRESSURE: 118 MMHG | WEIGHT: 122 LBS | DIASTOLIC BLOOD PRESSURE: 60 MMHG | TEMPERATURE: 99 F | OXYGEN SATURATION: 95 %

## 2023-11-25 DIAGNOSIS — U09.9 COVID-19 LONG HAULER MANIFESTING CHRONIC ANXIETY: Primary | ICD-10-CM

## 2023-11-25 DIAGNOSIS — F41.9 COVID-19 LONG HAULER MANIFESTING CHRONIC ANXIETY: Primary | ICD-10-CM

## 2023-11-25 LAB
CTP QC/QA: YES
POC MOLECULAR INFLUENZA A AGN: NEGATIVE
POC MOLECULAR INFLUENZA B AGN: NEGATIVE

## 2023-11-25 PROCEDURE — 63600175 PHARM REV CODE 636 W HCPCS: Performed by: EMERGENCY MEDICINE

## 2023-11-25 PROCEDURE — 96372 THER/PROPH/DIAG INJ SC/IM: CPT | Performed by: EMERGENCY MEDICINE

## 2023-11-25 PROCEDURE — 99284 EMERGENCY DEPT VISIT MOD MDM: CPT

## 2023-11-25 PROCEDURE — 87502 INFLUENZA DNA AMP PROBE: CPT

## 2023-11-25 RX ORDER — METHYLPREDNISOLONE ACETATE 80 MG/ML
120 INJECTION, SUSPENSION INTRA-ARTICULAR; INTRALESIONAL; INTRAMUSCULAR; SOFT TISSUE
Status: COMPLETED | OUTPATIENT
Start: 2023-11-25 | End: 2023-11-25

## 2023-11-25 RX ADMIN — METHYLPREDNISOLONE ACETATE 120 MG: 80 INJECTION, SUSPENSION INTRA-ARTICULAR; INTRALESIONAL; INTRAMUSCULAR; SOFT TISSUE at 11:11

## 2023-11-25 NOTE — ED PROVIDER NOTES
Encounter Date: 11/25/2023       History     Chief Complaint   Patient presents with    Shortness of Breath     Reports sob with chest pain, cough, fatigue-onset 11/6/2023 when dx with covid. States she is just tired of it and decided to come in.     68-year-old female complaining of generalized weakness since tl COVID 19 days ago.  History of COPD.  History of anxiety.  She states she is just tired of it and decided to come into the emergency department today.  Has been followed by primary care doctor for this.  Denies any fever.  Chest pain when she coughs.  No nausea vomiting diarrhea.  She is a steady gait, not ill appearing alert oriented x4, GCS is 15      Review of patient's allergies indicates:  No Known Allergies  Past Medical History:   Diagnosis Date    Anticoagulant long-term use     Anxiety     Arthritis     C. difficile colitis 8/6/2022    Carotid artery disease     Cervical disc disorder     Chronic back pain     COPD (chronic obstructive pulmonary disease)     Coronary artery disease     Dementia     Depression     Diarrhea, unspecified 11/1/2021    DVT (deep venous thrombosis)     CAROTID    GERD (gastroesophageal reflux disease)     Hematuria     Hiatal hernia     High cholesterol     Ill-fitting dentures     STATES DOESN'T WEAR    PVD (peripheral vascular disease)     Renal calculus     Sleep apnea     Sleep apnea     NO C PAP    Urinary frequency     Urinary urgency     Wears glasses     READING      Past Surgical History:   Procedure Laterality Date    BACK SURGERY      BREAST LUMPECTOMY Right     CAROTID ARTERY ANGIOPLASTY      CAROTID ARTERY ANGIOPLASTY      CAROTID ARTERY STENT Left     CAROTID ENDARTERECTOMY Right     CHOLECYSTECTOMY      COLONOSCOPY      CYSTOSCOPY      HYSTERECTOMY      OOPHORECTOMY      TONSILLECTOMY       Family History   Problem Relation Age of Onset    Cancer Mother     Stroke Father     No Known Problems Sister     No Known Problems Daughter     No Known  Problems Maternal Aunt     No Known Problems Maternal Uncle     No Known Problems Paternal Aunt     No Known Problems Paternal Uncle     No Known Problems Maternal Grandmother     No Known Problems Maternal Grandfather     No Known Problems Paternal Grandmother     No Known Problems Paternal Grandfather     Breast cancer Neg Hx     Ovarian cancer Neg Hx     BRCA 1/2 Neg Hx      Social History     Tobacco Use    Smoking status: Every Day     Current packs/day: 0.50     Average packs/day: 0.5 packs/day for 53.9 years (26.9 ttl pk-yrs)     Types: Cigarettes     Start date: 1970    Smokeless tobacco: Never   Substance Use Topics    Alcohol use: No    Drug use: No     Review of Systems   Constitutional:  Negative for fever.   HENT:  Negative for sore throat.    Respiratory:  Negative for shortness of breath.    Cardiovascular:  Negative for chest pain.   Gastrointestinal:  Negative for nausea.   Genitourinary:  Negative for dysuria.   Musculoskeletal:  Positive for myalgias. Negative for back pain.   Skin:  Negative for rash.   Neurological:  Negative for weakness.   Hematological:  Does not bruise/bleed easily.   All other systems reviewed and are negative.      Physical Exam     Initial Vitals [11/25/23 1131]   BP Pulse Resp Temp SpO2   (!) 152/70 97 20 98.5 °F (36.9 °C) 99 %      MAP       --         Physical Exam    Nursing note and vitals reviewed.  Constitutional: She appears well-developed and well-nourished. She is not diaphoretic. No distress.   HENT:   Head: Normocephalic and atraumatic.   Mouth/Throat: Oropharynx is clear and moist.   Eyes: Conjunctivae and EOM are normal. Pupils are equal, round, and reactive to light.   Neck: Neck supple. No tracheal deviation present. No JVD present.   Normal range of motion.  Cardiovascular:  Normal rate, regular rhythm, normal heart sounds and intact distal pulses.           No murmur heard.  Pulmonary/Chest: Breath sounds normal. No stridor. No respiratory distress. She  has no wheezes. She has no rhonchi. She has no rales. She exhibits no tenderness.   Abdominal: Abdomen is soft. Bowel sounds are normal. She exhibits no distension. There is no abdominal tenderness. There is no rebound and no guarding.   Musculoskeletal:         General: No tenderness or edema. Normal range of motion.      Cervical back: Normal range of motion and neck supple.     Neurological: She is alert and oriented to person, place, and time. She has normal strength. No cranial nerve deficit. GCS score is 15. GCS eye subscore is 4. GCS verbal subscore is 5. GCS motor subscore is 6.   Skin: Skin is warm and dry. Capillary refill takes less than 2 seconds.         ED Course   Procedures  Labs Reviewed   POCT INFLUENZA A/B MOLECULAR     EKG Readings: (Independently Interpreted)   Initial Reading: No STEMI. Rhythm: Normal Sinus Rhythm. Heart Rate: 92. Ectopy: No Ectopy. Conduction: Normal. ST Segments: Normal ST Segments. T Waves: Normal. Axis: Normal. Clinical Impression: Normal Sinus Rhythm       Imaging Results    None          Medications   methylPREDNISolone acetate injection 120 mg (120 mg Intramuscular Given 11/25/23 1139)     Medical Decision Making  Amount and/or Complexity of Data Reviewed  Labs: ordered.    Risk  Prescription drug management.               ED Course as of 11/25/23 1211   Sat Nov 25, 2023   1210 Flu is negative.  Stable for discharge and follow-up.  Oxygen saturations 100% on room air. [SD]      ED Course User Index  [SD] Marek Rai MD               Medical Decision Making:   Differential Diagnosis:   Long-haul COVID, anxiety          Clinical Impression:  Final diagnoses:  [F41.9, U09.9] COVID-19 long hauler manifesting chronic anxiety (Primary)          ED Disposition Condition    Discharge Stable          ED Prescriptions    None       Follow-up Information       Follow up With Specialties Details Why Contact Info    Kt Pozo Jr., MD Internal Medicine In 2 days  [address]  [facility] [id number]  [phone]               Marek Rai MD  11/25/23 1210

## 2023-11-28 PROBLEM — G93.32 COVID-19 LONG HAULER MANIFESTING CHRONIC FATIGUE: Status: ACTIVE | Noted: 2023-11-28

## 2023-11-28 PROBLEM — U09.9 COVID-19 LONG HAULER MANIFESTING CHRONIC FATIGUE: Status: ACTIVE | Noted: 2023-11-28

## 2023-12-04 PROBLEM — N39.0 UTI (URINARY TRACT INFECTION): Status: RESOLVED | Noted: 2022-08-02 | Resolved: 2023-12-04

## 2023-12-06 ENCOUNTER — HOSPITAL ENCOUNTER (OUTPATIENT)
Dept: RADIOLOGY | Facility: HOSPITAL | Age: 68
Discharge: HOME | End: 2023-12-06
Attending: FAMILY MEDICINE
Payer: MEDICARE

## 2023-12-06 ENCOUNTER — HOSPITAL ENCOUNTER (OUTPATIENT)
Dept: PULMONOLOGY | Facility: HOSPITAL | Age: 68
Discharge: HOME | End: 2023-12-06
Attending: FAMILY MEDICINE
Payer: MEDICARE

## 2023-12-06 DIAGNOSIS — R01.1 MURMUR: ICD-10-CM

## 2023-12-06 DIAGNOSIS — R06.02 SOB (SHORTNESS OF BREATH): ICD-10-CM

## 2023-12-06 DIAGNOSIS — U07.1 COVID-19 VIRUS DETECTED: ICD-10-CM

## 2023-12-06 PROCEDURE — 93010 ELECTROCARDIOGRAM REPORT: CPT | Mod: ,,, | Performed by: INTERNAL MEDICINE

## 2023-12-06 PROCEDURE — 93005 ELECTROCARDIOGRAM TRACING: CPT

## 2023-12-06 PROCEDURE — 71046 X-RAY EXAM CHEST 2 VIEWS: CPT | Mod: TC

## 2023-12-06 PROCEDURE — 93010 EKG 12-LEAD: ICD-10-PCS | Mod: ,,, | Performed by: INTERNAL MEDICINE

## 2023-12-20 ENCOUNTER — LAB VISIT (OUTPATIENT)
Dept: LAB | Facility: HOSPITAL | Age: 68
End: 2023-12-20
Attending: FAMILY MEDICINE
Payer: MEDICARE

## 2023-12-20 DIAGNOSIS — E88.89 COENZYME Q DEFICIENCY: ICD-10-CM

## 2023-12-20 DIAGNOSIS — E55.9 VITAMIN D DEFICIENCY: ICD-10-CM

## 2023-12-20 DIAGNOSIS — E61.8 COENZYME Q DEFICIENCY: ICD-10-CM

## 2023-12-20 DIAGNOSIS — E78.2 MIXED HYPERLIPIDEMIA: ICD-10-CM

## 2023-12-20 DIAGNOSIS — Z79.899 ENCOUNTER FOR LONG-TERM (CURRENT) USE OF OTHER MEDICATIONS: ICD-10-CM

## 2023-12-20 DIAGNOSIS — Z72.0 TOBACCO ABUSE: ICD-10-CM

## 2023-12-20 DIAGNOSIS — I10 PRIMARY HYPERTENSION: ICD-10-CM

## 2023-12-20 LAB
ALBUMIN SERPL BCP-MCNC: 3.6 G/DL (ref 3.5–5.2)
ALBUMIN/CREAT UR: 12.3 UG/MG (ref 0–30)
ALP SERPL-CCNC: 111 U/L (ref 55–135)
ALT SERPL W/O P-5'-P-CCNC: 16 U/L (ref 10–44)
ANION GAP SERPL CALC-SCNC: 4 MMOL/L (ref 3–11)
AST SERPL-CCNC: 14 U/L (ref 10–40)
BASOPHILS # BLD AUTO: 0.04 K/UL (ref 0–0.2)
BASOPHILS NFR BLD: 0.4 % (ref 0–1.9)
BILIRUB SERPL-MCNC: 0.2 MG/DL (ref 0.1–1)
BUN SERPL-MCNC: 17 MG/DL (ref 8–23)
CALCIUM SERPL-MCNC: 8.5 MG/DL (ref 8.7–10.5)
CHLORIDE SERPL-SCNC: 107 MMOL/L (ref 95–110)
CHOLEST SERPL-MCNC: 151 MG/DL (ref 120–199)
CHOLEST/HDLC SERPL: 3.1 {RATIO} (ref 2–5)
CO2 SERPL-SCNC: 27 MMOL/L (ref 23–29)
CREAT SERPL-MCNC: 0.6 MG/DL (ref 0.5–1.4)
CREAT UR-MCNC: 43.1 MG/DL (ref 15–325)
DIFFERENTIAL METHOD BLD: ABNORMAL
EOSINOPHIL # BLD AUTO: 0.1 K/UL (ref 0–0.5)
EOSINOPHIL NFR BLD: 0.8 % (ref 0–8)
ERYTHROCYTE [DISTWIDTH] IN BLOOD BY AUTOMATED COUNT: 14.5 % (ref 11.5–14.5)
EST. GFR  (NO RACE VARIABLE): >60 ML/MIN/1.73 M^2
ESTIMATED AVG GLUCOSE: 100 MG/DL (ref 68–131)
GLUCOSE SERPL-MCNC: 97 MG/DL (ref 70–110)
HBA1C MFR BLD: 5.1 % (ref 4–5.6)
HCT VFR BLD AUTO: 39.9 % (ref 37–48.5)
HDLC SERPL-MCNC: 49 MG/DL (ref 40–75)
HDLC SERPL: 32.5 % (ref 20–50)
HGB BLD-MCNC: 13 G/DL (ref 12–16)
IMM GRANULOCYTES # BLD AUTO: 0.04 K/UL (ref 0–0.04)
IMM GRANULOCYTES NFR BLD AUTO: 0.4 % (ref 0–0.5)
LDLC SERPL CALC-MCNC: 68.6 MG/DL (ref 63–159)
LYMPHOCYTES # BLD AUTO: 1.2 K/UL (ref 1–4.8)
LYMPHOCYTES NFR BLD: 13.1 % (ref 18–48)
MAGNESIUM SERPL-MCNC: 2 MG/DL (ref 1.6–2.6)
MCH RBC QN AUTO: 32.1 PG (ref 27–31)
MCHC RBC AUTO-ENTMCNC: 32.6 G/DL (ref 32–36)
MCV RBC AUTO: 99 FL (ref 82–98)
MICROALBUMIN UR DL<=1MG/L-MCNC: 5.3 MG/L
MONOCYTES # BLD AUTO: 0.7 K/UL (ref 0.3–1)
MONOCYTES NFR BLD: 7.7 % (ref 4–15)
NEUTROPHILS # BLD AUTO: 7.1 K/UL (ref 1.8–7.7)
NEUTROPHILS NFR BLD: 77.6 % (ref 38–73)
NONHDLC SERPL-MCNC: 102 MG/DL
NRBC BLD-RTO: 0 /100 WBC
PLATELET # BLD AUTO: 257 K/UL (ref 150–450)
PMV BLD AUTO: 10.1 FL (ref 9.2–12.9)
POTASSIUM SERPL-SCNC: 4.2 MMOL/L (ref 3.5–5.1)
PROT SERPL-MCNC: 7.5 G/DL (ref 6–8.4)
RBC # BLD AUTO: 4.05 M/UL (ref 4–5.4)
SODIUM SERPL-SCNC: 138 MMOL/L (ref 136–145)
TRIGL SERPL-MCNC: 167 MG/DL (ref 30–150)
WBC # BLD AUTO: 9.14 K/UL (ref 3.9–12.7)

## 2023-12-20 PROCEDURE — 83735 ASSAY OF MAGNESIUM: CPT | Performed by: FAMILY MEDICINE

## 2023-12-20 PROCEDURE — 80053 COMPREHEN METABOLIC PANEL: CPT | Performed by: FAMILY MEDICINE

## 2023-12-20 PROCEDURE — 80061 LIPID PANEL: CPT | Performed by: FAMILY MEDICINE

## 2023-12-20 PROCEDURE — 82043 UR ALBUMIN QUANTITATIVE: CPT | Performed by: FAMILY MEDICINE

## 2023-12-20 PROCEDURE — 83036 HEMOGLOBIN GLYCOSYLATED A1C: CPT | Performed by: FAMILY MEDICINE

## 2023-12-20 PROCEDURE — 36415 COLL VENOUS BLD VENIPUNCTURE: CPT | Performed by: FAMILY MEDICINE

## 2023-12-20 PROCEDURE — 85025 COMPLETE CBC W/AUTO DIFF WBC: CPT | Performed by: FAMILY MEDICINE

## 2023-12-28 ENCOUNTER — PATIENT OUTREACH (OUTPATIENT)
Dept: EMERGENCY MEDICINE | Facility: HOSPITAL | Age: 68
End: 2023-12-28
Payer: MEDICARE

## 2023-12-28 NOTE — PROGRESS NOTES
Pt is unreachable for 2nd F/U. ED navigator will follow-up with patient on/around 1/30/2024 for attempt at 3rd.    Kym Gamble  ED Navigator- [place]  [phone]

## 2024-01-10 ENCOUNTER — HOSPITAL ENCOUNTER (EMERGENCY)
Facility: HOSPITAL | Age: 69
Discharge: HOME | End: 2024-01-10
Attending: EMERGENCY MEDICINE
Payer: MEDICARE

## 2024-01-10 VITALS
HEIGHT: 64 IN | HEART RATE: 84 BPM | SYSTOLIC BLOOD PRESSURE: 185 MMHG | WEIGHT: 120 LBS | RESPIRATION RATE: 18 BRPM | DIASTOLIC BLOOD PRESSURE: 90 MMHG | BODY MASS INDEX: 20.49 KG/M2 | TEMPERATURE: 98 F | OXYGEN SATURATION: 98 %

## 2024-01-10 DIAGNOSIS — J06.9 VIRAL URI WITH COUGH: ICD-10-CM

## 2024-01-10 DIAGNOSIS — F06.4 ANXIETY DISORDER DUE TO GENERAL MEDICAL CONDITION: Primary | ICD-10-CM

## 2024-01-10 PROBLEM — Z00.00 WELLNESS EXAMINATION: Status: ACTIVE | Noted: 2024-01-10

## 2024-01-10 LAB
CTP QC/QA: YES
CTP QC/QA: YES
POC MOLECULAR INFLUENZA A AGN: NEGATIVE
POC MOLECULAR INFLUENZA B AGN: NEGATIVE
SARS-COV-2 RDRP RESP QL NAA+PROBE: NEGATIVE

## 2024-01-10 PROCEDURE — 63600175 PHARM REV CODE 636 W HCPCS: Performed by: EMERGENCY MEDICINE

## 2024-01-10 PROCEDURE — 99900031 HC PATIENT EDUCATION (STAT)

## 2024-01-10 PROCEDURE — 99900035 HC TECH TIME PER 15 MIN (STAT)

## 2024-01-10 PROCEDURE — 87635 SARS-COV-2 COVID-19 AMP PRB: CPT | Performed by: EMERGENCY MEDICINE

## 2024-01-10 PROCEDURE — 87502 INFLUENZA DNA AMP PROBE: CPT

## 2024-01-10 PROCEDURE — 96372 THER/PROPH/DIAG INJ SC/IM: CPT | Performed by: EMERGENCY MEDICINE

## 2024-01-10 PROCEDURE — 94640 AIRWAY INHALATION TREATMENT: CPT

## 2024-01-10 PROCEDURE — 99284 EMERGENCY DEPT VISIT MOD MDM: CPT | Mod: 25

## 2024-01-10 PROCEDURE — 25000242 PHARM REV CODE 250 ALT 637 W/ HCPCS: Performed by: EMERGENCY MEDICINE

## 2024-01-10 RX ORDER — IPRATROPIUM BROMIDE AND ALBUTEROL SULFATE 2.5; .5 MG/3ML; MG/3ML
3 SOLUTION RESPIRATORY (INHALATION)
Status: COMPLETED | OUTPATIENT
Start: 2024-01-10 | End: 2024-01-10

## 2024-01-10 RX ORDER — ALBUTEROL SULFATE 0.83 MG/ML
2.5 SOLUTION RESPIRATORY (INHALATION) EVERY 4 HOURS PRN
COMMUNITY
Start: 2024-01-06

## 2024-01-10 RX ORDER — LEVOFLOXACIN 500 MG/1
500 TABLET, FILM COATED ORAL
COMMUNITY
Start: 2024-01-06

## 2024-01-10 RX ORDER — PROMETHAZINE HYDROCHLORIDE AND CODEINE PHOSPHATE 6.25; 1 MG/5ML; MG/5ML
5 SOLUTION ORAL EVERY 4 HOURS PRN
COMMUNITY
Start: 2024-01-06

## 2024-01-10 RX ORDER — PROMETHAZINE HYDROCHLORIDE AND DEXTROMETHORPHAN HYDROBROMIDE 6.25; 15 MG/5ML; MG/5ML
5 SYRUP ORAL EVERY 4 HOURS PRN
Qty: 180 ML | Refills: 0 | Status: SHIPPED | OUTPATIENT
Start: 2024-01-10 | End: 2024-01-20

## 2024-01-10 RX ORDER — METHYLPREDNISOLONE ACETATE 80 MG/ML
120 INJECTION, SUSPENSION INTRA-ARTICULAR; INTRALESIONAL; INTRAMUSCULAR; SOFT TISSUE
Status: COMPLETED | OUTPATIENT
Start: 2024-01-10 | End: 2024-01-10

## 2024-01-10 RX ADMIN — IPRATROPIUM BROMIDE AND ALBUTEROL SULFATE 3 ML: .5; 3 SOLUTION RESPIRATORY (INHALATION) at 06:01

## 2024-01-10 RX ADMIN — METHYLPREDNISOLONE ACETATE 120 MG: 80 INJECTION, SUSPENSION INTRA-ARTICULAR; INTRALESIONAL; INTRAMUSCULAR; SOFT TISSUE at 06:01

## 2024-01-10 NOTE — ED PROVIDER NOTES
"Encounter Date: 1/10/2024       History     Chief Complaint   Patient presents with    Cough     Pt to the ER w/ complaints of cough, headache, and wheezing x 3-4 days. Pt states she was recently seen at urgent care for symptoms and placed on abx. "Wasn't tested for anything at urgent care."      68-year-old female with a history of anxiety, COPD presents to the emergency department with cough, wheezing for the past week, seen at urgent care, given cough medicine, antibiotics, steroids with minimal help.  Complaining of continued cough and wheezing.  Not ill appearing, oxygen saturations 96% on room air.  Alert oriented x4, GCS is 15.  Complaining of headache as well, body aches      Review of patient's allergies indicates:  No Known Allergies  Past Medical History:   Diagnosis Date    Anticoagulant long-term use     Anxiety     Arthritis     C. difficile colitis 8/6/2022    Carotid artery disease     Cervical disc disorder     Chronic back pain     COPD (chronic obstructive pulmonary disease)     Coronary artery disease     Dementia     Depression     Diarrhea, unspecified 11/1/2021    DVT (deep venous thrombosis)     CAROTID    GERD (gastroesophageal reflux disease)     Hematuria     Hiatal hernia     High cholesterol     Ill-fitting dentures     STATES DOESN'T WEAR    PVD (peripheral vascular disease)     Renal calculus     Sleep apnea     Sleep apnea     NO C PAP    Urinary frequency     Urinary urgency     Wears glasses     READING      Past Surgical History:   Procedure Laterality Date    BACK SURGERY      BREAST LUMPECTOMY Right     CAROTID ARTERY ANGIOPLASTY      CAROTID ARTERY ANGIOPLASTY      CAROTID ARTERY STENT Left     CAROTID ENDARTERECTOMY Right     CHOLECYSTECTOMY      COLONOSCOPY      CYSTOSCOPY      HYSTERECTOMY      OOPHORECTOMY      TONSILLECTOMY       Family History   Problem Relation Age of Onset    Cancer Mother     Stroke Father     No Known Problems Sister     No Known Problems Daughter     " No Known Problems Maternal Aunt     No Known Problems Maternal Uncle     No Known Problems Paternal Aunt     No Known Problems Paternal Uncle     No Known Problems Maternal Grandmother     No Known Problems Maternal Grandfather     No Known Problems Paternal Grandmother     No Known Problems Paternal Grandfather     Breast cancer Neg Hx     Ovarian cancer Neg Hx     BRCA 1/2 Neg Hx      Social History     Tobacco Use    Smoking status: Every Day     Current packs/day: 0.50     Average packs/day: 0.5 packs/day for 54.0 years (27.0 ttl pk-yrs)     Types: Cigarettes     Start date: 1970    Smokeless tobacco: Never   Substance Use Topics    Alcohol use: No    Drug use: No     Review of Systems   Constitutional:  Negative for fever.   HENT:  Positive for congestion. Negative for sore throat.    Respiratory:  Positive for cough and wheezing. Negative for shortness of breath.    Cardiovascular:  Negative for chest pain.   Gastrointestinal:  Negative for nausea.   Genitourinary:  Negative for dysuria.   Musculoskeletal:  Positive for myalgias. Negative for back pain.   Skin:  Negative for rash.   Neurological:  Negative for weakness.   Hematological:  Does not bruise/bleed easily.   All other systems reviewed and are negative.      Physical Exam     Initial Vitals [01/10/24 0542]   BP Pulse Resp Temp SpO2   (!) 185/90 106 18 97.9 °F (36.6 °C) 95 %      MAP       --         Physical Exam    Nursing note and vitals reviewed.  Constitutional: She appears well-developed and well-nourished. She is not diaphoretic. No distress.   HENT:   Head: Normocephalic and atraumatic.   Mouth/Throat: Oropharynx is clear and moist.   Eyes: Conjunctivae and EOM are normal. Pupils are equal, round, and reactive to light.   Neck: Neck supple.   Normal range of motion.  Cardiovascular:  Normal rate, regular rhythm, normal heart sounds and intact distal pulses.           No murmur heard.  Pulmonary/Chest: No respiratory distress. She has wheezes.  She has rhonchi. She has no rales. She exhibits no tenderness.   Abdominal: Abdomen is soft. Bowel sounds are normal. She exhibits no distension. There is no abdominal tenderness. There is no rebound and no guarding.   Musculoskeletal:         General: No tenderness or edema. Normal range of motion.      Cervical back: Normal range of motion and neck supple.     Neurological: She is alert and oriented to person, place, and time. She has normal strength. No cranial nerve deficit. GCS score is 15. GCS eye subscore is 4. GCS verbal subscore is 5. GCS motor subscore is 6.   Skin: Skin is warm and dry. Capillary refill takes less than 2 seconds.         ED Course   Procedures  Labs Reviewed   SARS-COV-2 RDRP GENE   POCT INFLUENZA A/B MOLECULAR          Imaging Results    None          Medications   albuterol-ipratropium 2.5 mg-0.5 mg/3 mL nebulizer solution 3 mL (3 mLs Nebulization Given 1/10/24 0625)   methylPREDNISolone acetate injection 120 mg (120 mg Intramuscular Given 1/10/24 0620)     Medical Decision Making  Amount and/or Complexity of Data Reviewed  Labs: ordered.    Risk  Prescription drug management.                          Medical Decision Making:   Differential Diagnosis:   URI, viral syndrome, COVID-19, influenza, COPD             Clinical Impression:  Final diagnoses:  [J06.9] Viral URI with cough  [F06.4] Anxiety disorder due to general medical condition (Primary)          ED Disposition Condition    Discharge Stable          ED Prescriptions       Medication Sig Dispense Start Date End Date Auth. Provider    promethazine-dextromethorphan (PROMETHAZINE-DM) 6.25-15 mg/5 mL Syrp Take 5 mLs by mouth every 4 (four) hours as needed (Cough congestion). 180 mL 1/10/2024 1/20/2024 Marek Rai MD          Follow-up Information       Follow up With Specialties Details Why Contact Info    Primary care physician  In 2 days               Marek Rai MD  01/10/24 8704

## 2024-01-26 ENCOUNTER — HOSPITAL ENCOUNTER (EMERGENCY)
Facility: HOSPITAL | Age: 69
Discharge: HOME | End: 2024-01-26
Attending: EMERGENCY MEDICINE
Payer: MEDICARE

## 2024-01-26 VITALS
HEIGHT: 64 IN | WEIGHT: 117 LBS | TEMPERATURE: 98 F | SYSTOLIC BLOOD PRESSURE: 157 MMHG | RESPIRATION RATE: 18 BRPM | DIASTOLIC BLOOD PRESSURE: 96 MMHG | HEART RATE: 93 BPM | BODY MASS INDEX: 19.97 KG/M2 | OXYGEN SATURATION: 100 %

## 2024-01-26 DIAGNOSIS — F06.4 ANXIETY DISORDER DUE TO GENERAL MEDICAL CONDITION: Primary | ICD-10-CM

## 2024-01-26 DIAGNOSIS — R11.2 NAUSEA AND VOMITING, UNSPECIFIED VOMITING TYPE: ICD-10-CM

## 2024-01-26 DIAGNOSIS — B34.9 VIRAL SYNDROME: ICD-10-CM

## 2024-01-26 PROCEDURE — 87635 SARS-COV-2 COVID-19 AMP PRB: CPT | Performed by: EMERGENCY MEDICINE

## 2024-01-26 PROCEDURE — 25000003 PHARM REV CODE 250: Performed by: EMERGENCY MEDICINE

## 2024-01-26 PROCEDURE — 87502 INFLUENZA DNA AMP PROBE: CPT

## 2024-01-26 PROCEDURE — 63600175 PHARM REV CODE 636 W HCPCS: Performed by: EMERGENCY MEDICINE

## 2024-01-26 PROCEDURE — 99284 EMERGENCY DEPT VISIT MOD MDM: CPT

## 2024-01-26 PROCEDURE — 96372 THER/PROPH/DIAG INJ SC/IM: CPT | Performed by: EMERGENCY MEDICINE

## 2024-01-26 RX ORDER — ONDANSETRON 4 MG/1
8 TABLET, ORALLY DISINTEGRATING ORAL
Status: COMPLETED | OUTPATIENT
Start: 2024-01-26 | End: 2024-01-26

## 2024-01-26 RX ORDER — METHYLPREDNISOLONE ACETATE 80 MG/ML
120 INJECTION, SUSPENSION INTRA-ARTICULAR; INTRALESIONAL; INTRAMUSCULAR; SOFT TISSUE
Status: COMPLETED | OUTPATIENT
Start: 2024-01-26 | End: 2024-01-26

## 2024-01-26 RX ORDER — ONDANSETRON 4 MG/1
4 TABLET, ORALLY DISINTEGRATING ORAL EVERY 8 HOURS PRN
Qty: 12 TABLET | Refills: 0 | OUTPATIENT
Start: 2024-01-26 | End: 2024-02-17

## 2024-01-26 RX ADMIN — METHYLPREDNISOLONE ACETATE 120 MG: 80 INJECTION, SUSPENSION INTRA-ARTICULAR; INTRALESIONAL; INTRAMUSCULAR; SOFT TISSUE at 04:01

## 2024-01-26 RX ADMIN — ONDANSETRON 8 MG: 4 TABLET, ORALLY DISINTEGRATING ORAL at 04:01

## 2024-01-26 NOTE — ED PROVIDER NOTES
Encounter Date: 1/26/2024       History     Chief Complaint   Patient presents with    Vomiting    Weakness     Pt reports vomiting, diarrhea, cough, abdominal pain, leg cramps, and nausea for a couple days.      68-year-old female with a history of anxiety, COPD, diarrhea, GERD presents the emergency department complaining of cough congestion headache nausea body aches for the past few days.  No fever here, oxygen saturations 100% on room air, no chest pain or shortness of breath, oxygen saturation is 100% on room air.  Not ill appearing, alert and oriented x4, GCS is 15      Review of patient's allergies indicates:  No Known Allergies  Past Medical History:   Diagnosis Date    Anticoagulant long-term use     Anxiety     Arthritis     C. difficile colitis 8/6/2022    Carotid artery disease     Cervical disc disorder     Chronic back pain     COPD (chronic obstructive pulmonary disease)     Coronary artery disease     Dementia     Depression     Diarrhea, unspecified 11/1/2021    DVT (deep venous thrombosis)     CAROTID    GERD (gastroesophageal reflux disease)     Hematuria     Hiatal hernia     High cholesterol     Ill-fitting dentures     STATES DOESN'T WEAR    PVD (peripheral vascular disease)     Renal calculus     Sleep apnea     Sleep apnea     NO C PAP    Urinary frequency     Urinary urgency     Wears glasses     READING      Past Surgical History:   Procedure Laterality Date    BACK SURGERY      BREAST LUMPECTOMY Right     CAROTID ARTERY ANGIOPLASTY      CAROTID ARTERY ANGIOPLASTY      CAROTID ARTERY STENT Left     CAROTID ENDARTERECTOMY Right     CHOLECYSTECTOMY      COLONOSCOPY      CYSTOSCOPY      HYSTERECTOMY      OOPHORECTOMY      TONSILLECTOMY       Family History   Problem Relation Age of Onset    Cancer Mother     Stroke Father     No Known Problems Sister     No Known Problems Daughter     No Known Problems Maternal Aunt     No Known Problems Maternal Uncle     No Known Problems Paternal Aunt      No Known Problems Paternal Uncle     No Known Problems Maternal Grandmother     No Known Problems Maternal Grandfather     No Known Problems Paternal Grandmother     No Known Problems Paternal Grandfather     Breast cancer Neg Hx     Ovarian cancer Neg Hx     BRCA 1/2 Neg Hx      Social History     Tobacco Use    Smoking status: Every Day     Current packs/day: 0.50     Average packs/day: 0.5 packs/day for 54.1 years (27.0 ttl pk-yrs)     Types: Cigarettes     Start date: 1970    Smokeless tobacco: Never   Substance Use Topics    Alcohol use: No    Drug use: No     Review of Systems   Constitutional:  Negative for fever.   HENT:  Positive for congestion. Negative for sore throat.    Respiratory:  Positive for cough. Negative for shortness of breath.    Cardiovascular:  Negative for chest pain.   Gastrointestinal:  Negative for nausea.   Genitourinary:  Negative for dysuria.   Musculoskeletal:  Positive for myalgias. Negative for back pain.   Skin:  Negative for rash.   Neurological:  Negative for weakness.   Hematological:  Does not bruise/bleed easily.   All other systems reviewed and are negative.      Physical Exam     Initial Vitals [01/26/24 1608]   BP Pulse Resp Temp SpO2   (!) 157/96 93 18 98 °F (36.7 °C) 100 %      MAP       --         Physical Exam    Nursing note and vitals reviewed.  Constitutional: She appears well-developed and well-nourished. She is not diaphoretic. No distress.   HENT:   Head: Normocephalic and atraumatic.   Eyes: Conjunctivae and EOM are normal. Pupils are equal, round, and reactive to light.   Neck: Neck supple.   Normal range of motion.  Cardiovascular:  Normal rate, regular rhythm, normal heart sounds and intact distal pulses.           No murmur heard.  Pulmonary/Chest: Breath sounds normal. No respiratory distress. She has no wheezes. She has no rhonchi. She has no rales. She exhibits no tenderness.   Abdominal: Abdomen is soft. Bowel sounds are normal.   Musculoskeletal:          General: No tenderness or edema. Normal range of motion.      Cervical back: Normal range of motion and neck supple.     Neurological: She is alert and oriented to person, place, and time. She has normal strength. No cranial nerve deficit. GCS score is 15. GCS eye subscore is 4. GCS verbal subscore is 5. GCS motor subscore is 6.   Skin: Skin is warm and dry. Capillary refill takes less than 2 seconds.         ED Course   Procedures  Labs Reviewed   SARS-COV-2 RDRP GENE   POCT INFLUENZA A/B MOLECULAR          Imaging Results    None          Medications   methylPREDNISolone acetate injection 120 mg (120 mg Intramuscular Given 1/26/24 1635)   ondansetron disintegrating tablet 8 mg (8 mg Oral Given 1/26/24 1635)     Medical Decision Making  Amount and/or Complexity of Data Reviewed  Labs: ordered.    Risk  Prescription drug management.                          Medical Decision Making:   Differential Diagnosis:   Viral syndrome, COVID-19, influenza, URI             Clinical Impression:  Final diagnoses:  [R11.2] Nausea and vomiting, unspecified vomiting type  [B34.9] Viral syndrome  [F06.4] Anxiety disorder due to general medical condition (Primary)          ED Disposition Condition    Discharge Stable          ED Prescriptions       Medication Sig Dispense Start Date End Date Auth. Provider    ondansetron (ZOFRAN-ODT) 4 MG TbDL Take 1 tablet (4 mg total) by mouth every 8 (eight) hours as needed (Nausea and vomiting). 12 tablet 1/26/2024 -- Marek Rai MD          Follow-up Information       Follow up With Specialties Details Why Contact Info    Primary care physician  In 2 days               Marek Rai MD  01/26/24 3727       Marek Rai MD  01/26/24 0159

## 2024-01-30 ENCOUNTER — PATIENT OUTREACH (OUTPATIENT)
Dept: EMERGENCY MEDICINE | Facility: HOSPITAL | Age: 69
End: 2024-01-30
Payer: MEDICARE

## 2024-02-03 ENCOUNTER — HOSPITAL ENCOUNTER (EMERGENCY)
Facility: HOSPITAL | Age: 69
Discharge: HOME | End: 2024-02-03
Attending: EMERGENCY MEDICINE
Payer: MEDICARE

## 2024-02-03 VITALS
HEIGHT: 64 IN | WEIGHT: 119 LBS | TEMPERATURE: 98 F | RESPIRATION RATE: 18 BRPM | BODY MASS INDEX: 20.32 KG/M2 | OXYGEN SATURATION: 99 % | DIASTOLIC BLOOD PRESSURE: 86 MMHG | SYSTOLIC BLOOD PRESSURE: 189 MMHG | HEART RATE: 83 BPM

## 2024-02-03 DIAGNOSIS — F17.200 TOBACCO USE DISORDER: ICD-10-CM

## 2024-02-03 DIAGNOSIS — J44.9 CHRONIC OBSTRUCTIVE PULMONARY DISEASE, UNSPECIFIED COPD TYPE: ICD-10-CM

## 2024-02-03 DIAGNOSIS — Z79.891 CHRONIC PRESCRIPTION OPIATE USE: ICD-10-CM

## 2024-02-03 DIAGNOSIS — R45.89 ANXIETY ABOUT HEALTH: Primary | ICD-10-CM

## 2024-02-03 DIAGNOSIS — R53.81 MALAISE: ICD-10-CM

## 2024-02-03 DIAGNOSIS — R05.9 COUGH: ICD-10-CM

## 2024-02-03 PROCEDURE — 99283 EMERGENCY DEPT VISIT LOW MDM: CPT | Mod: 25

## 2024-02-03 NOTE — ED PROVIDER NOTES
EMERGENCY DEPARTMENT HISTORY AND PHYSICAL EXAM     This note is dictated on M*Modal word recognition program.  There are word recognition mistakes and grammatical errors that are occasionally missed on review.     Date: 2/3/2024   Patient Name: Desiree Blake       History of Presenting Illness      Chief Complaint   Patient presents with    Cough     Patient to the ER with complaints of productive cough, body aches, nasal congestion/drainage, and nausea onset 2 weeks ago. Patient is also out of her Oxycodone 5 mg.         0753   Desiree Blake is a 68 y.o. female with PMHX of COPD, anxiety, chronic opiate use, vascular dementia, chronic low back pain who presents to the emergency department C/O cough.      Patient has multiple complaints.  Reports lingering cough.  Patient tested positive for COVID in December of last year.  After recovering from that she states she caught another viral infection.  She was treated with albuterol, Z-Faizan, steroid course, and promethazine with codeine cough syrup.  She states she is unsure if she has had fever.  She reports she has been coughing up sputum.    Patient also reports pain all over.  She says she was unable to follow up with her pain management doctor due to COVID and has not had any of her chronic opiate pain medication for over a month.    Patient also reports worsening anxiety and depression.  She denies suicidal ideation.  Daughter states that the patient has been more labile in her mood.    Patient is a current tobacco user.  Previously switched to vapes however when they stopped stocking them in stores she went back to cigarettes.    PCP: Kt Pozo Jr., MD        No current facility-administered medications for this encounter.     Current Outpatient Medications   Medication Sig Dispense Refill    albuterol (PROVENTIL) 2.5 mg /3 mL (0.083 %) nebulizer solution Take 2.5 mg by nebulization every 4 (four) hours as needed.      albuterol-ipratropium (DUO-NEB) 2.5  mg-0.5 mg/3 mL nebulizer solution Take 3 mLs by nebulization every 6 (six) hours as needed for Wheezing. Rescue 75 mL 0    amLODIPine (NORVASC) 10 MG tablet Take 1 tablet (10 mg total) by mouth once daily. 30 tablet 5    busPIRone (BUSPAR) 15 MG tablet Take 1 tablet (15 mg total) by mouth 3 (three) times daily. 90 tablet 2    butalbital-acetaminophen-caffeine -40 mg (FIORICET, ESGIC) -40 mg per tablet Take 1 tablet by mouth once daily. 10 tablet 0    clopidogreL (PLAVIX) 75 mg tablet TAKE 1 TABLET AFTER EVERY MORNING 90 tablet 0    estradioL (ESTRACE) 0.01 % (0.1 mg/gram) vaginal cream Prescribed by Dr. Anahy Samuels      FLUoxetine 40 MG capsule Take 1 capsule (40 mg total) by mouth once daily. 30 capsule 5    gabapentin (NEURONTIN) 300 MG capsule Take 1 capsule by mouth 3 (three) times daily. Prescribed by Dr. Rodas      glycerin adult suppository Place 1 suppository rectally as needed for Constipation. 12 suppository 0    levoFLOXacin (LEVAQUIN) 500 MG tablet Take 500 mg by mouth.      methylPREDNISolone (MEDROL DOSEPACK) 4 mg tablet use as directed 1 each 0    OLANZapine (ZYPREXA) 2.5 MG tablet Take 1 tablet (2.5 mg total) by mouth every evening. 30 tablet 0    ondansetron (ZOFRAN-ODT) 4 MG TbDL Take 1 tablet (4 mg total) by mouth every 8 (eight) hours as needed (Nausea and vomiting). 12 tablet 0    oxyCODONE-acetaminophen (PERCOCET) 5-325 mg per tablet Take 1 tablet by mouth 2 (two) times daily as needed. Prescribed by Dr. Rodas      pravastatin (PRAVACHOL) 20 MG tablet Take 20 mg by mouth once daily.      predniSONE (DELTASONE) 20 MG tablet Take 1 tablet (20 mg total) by mouth once daily. 5 tablet 0    prochlorperazine (COMPAZINE) 10 MG tablet Take 1 tablet (10 mg total) by mouth every 6 (six) hours as needed (headache). 15 tablet 0    promethazine-codeine 6.25-10 mg/5 ml (PHENERGAN WITH CODEINE) 6.25-10 mg/5 mL syrup Take 5 mLs by mouth every 4 (four) hours as needed.      QUEtiapine (SEROQUEL)  300 MG Tab Take 2 tablets (600 mg total) by mouth every evening. 60 tablet 5    topiramate (TOPAMAX) 50 MG tablet Take 1 tablet (50 mg total) by mouth once daily. 30 tablet 2    traZODone (DESYREL) 150 MG tablet Take 1 tablet (150 mg total) by mouth every evening. 30 tablet 11    TYMLOS 80 mcg (3,120 mcg/1.56 mL) PnIj Inject into the skin. Prescribed by Roxana Rai             Past History     Past Medical History:   Past Medical History:   Diagnosis Date    Anticoagulant long-term use     Anxiety     Arthritis     C. difficile colitis 8/6/2022    Carotid artery disease     Cervical disc disorder     Chronic back pain     COPD (chronic obstructive pulmonary disease)     Coronary artery disease     Dementia     Depression     Diarrhea, unspecified 11/1/2021    DVT (deep venous thrombosis)     CAROTID    GERD (gastroesophageal reflux disease)     Hematuria     Hiatal hernia     High cholesterol     Ill-fitting dentures     STATES DOESN'T WEAR    PVD (peripheral vascular disease)     Renal calculus     Sleep apnea     Sleep apnea     NO C PAP    Urinary frequency     Urinary urgency     Wears glasses     READING         Past Surgical History:   Past Surgical History:   Procedure Laterality Date    BACK SURGERY      BREAST LUMPECTOMY Right     CAROTID ARTERY ANGIOPLASTY      CAROTID ARTERY ANGIOPLASTY      CAROTID ARTERY STENT Left     CAROTID ENDARTERECTOMY Right     CHOLECYSTECTOMY      COLONOSCOPY      CYSTOSCOPY      HYSTERECTOMY      OOPHORECTOMY      TONSILLECTOMY          Family History:   Family History   Problem Relation Age of Onset    Cancer Mother     Stroke Father     No Known Problems Sister     No Known Problems Daughter     No Known Problems Maternal Aunt     No Known Problems Maternal Uncle     No Known Problems Paternal Aunt     No Known Problems Paternal Uncle     No Known Problems Maternal Grandmother     No Known Problems Maternal Grandfather     No Known Problems Paternal Grandmother     No  "Known Problems Paternal Grandfather     Breast cancer Neg Hx     Ovarian cancer Neg Hx     BRCA 1/2 Neg Hx         Social History:   Social History     Tobacco Use    Smoking status: Every Day     Current packs/day: 0.50     Average packs/day: 0.5 packs/day for 54.1 years (27.0 ttl pk-yrs)     Types: Cigarettes     Start date: 1970    Smokeless tobacco: Never   Substance Use Topics    Alcohol use: No    Drug use: No        Allergies:   Review of patient's allergies indicates:  No Known Allergies       Review of Systems   Review of Systems   See HPI for pertinent positives and negatives       Physical Exam     Vitals:    02/03/24 0749 02/03/24 0752   BP:  (!) 189/86   Pulse: 83    Resp: 18    Temp: 98.2 °F (36.8 °C)    SpO2: 99%    Weight: 54 kg (119 lb)    Height: 5' 4" (1.626 m)       Physical Exam  Vitals and nursing note reviewed.   Constitutional:       General: She is not in acute distress.     Appearance: Normal appearance. She is not ill-appearing.   HENT:      Head: Normocephalic and atraumatic.      Right Ear: External ear normal.      Left Ear: External ear normal.      Nose: Nose normal. No congestion or rhinorrhea.      Mouth/Throat:      Mouth: Mucous membranes are moist.   Eyes:      Conjunctiva/sclera: Conjunctivae normal.      Pupils: Pupils are equal, round, and reactive to light.   Cardiovascular:      Rate and Rhythm: Normal rate.      Heart sounds: Murmur heard.   Pulmonary:      Effort: Pulmonary effort is normal. No respiratory distress.      Breath sounds: Wheezing present.   Abdominal:      Tenderness: There is no guarding.   Musculoskeletal:         General: No deformity. Normal range of motion.      Cervical back: Normal range of motion. No rigidity.      Right lower leg: No edema.      Left lower leg: No edema.   Skin:     General: Skin is dry.   Neurological:      General: No focal deficit present.      Mental Status: She is alert and oriented to person, place, and time. Mental status is " at baseline.   Psychiatric:         Behavior: Behavior normal.              Diagnostic Study Results      Labs -   No results found for this or any previous visit (from the past 12 hour(s)).     Radiologic Studies -    X-Ray Chest PA And Lateral    (Results Pending)        Medications given in the ED-   Medications - No data to display        Medical Decision Making    I am the first provider for this patient.     I reviewed the vital signs, available nursing notes, past medical history, past surgical history, family history and social history.     Vital Signs:  Reviewed the patient's vital signs.     Pulse Oximetry Analysis and Interpretation:    99% on Room Air, normal        CXR  Interpretation: (Per my independent interpretation, pending formal read)   CXR read by Dr. Anthony Platt     Cardiac silhouette within normal limits, no focal infiltrate     External Test Results (Pertinent to encounter):    Records Reviewed: Nursing Notes, Current Prescription Medications, Old Medical Records, External Medical Records , and Previous Radiology Studies    History Obtained By: Patient and Daughter    Provider Notes: Desiree Blake is a 68 y.o. female with cough    Co-morbidities Considered:  COPD, tobacco use    Differential Diagnosis:  URI, COPD, pneumonia, anxiety/depression, opiate withdrawal      ED Course:      Patient presents with chronic cough in the setting of COPD.  Reviewed PCP documentation and patient has a trelegy inhaler but reportedly forgets to use it.  Daughter states the patient forgets how to use it sometimes and has issues with compliance.  She has albuterol at home is declining a breathing treatment.  Patient is requesting a shot or something.  Discussed with her that vital signs are not suspicious for a bacterial infection and her chest x-ray is not suspicious for pneumonia.  Patient is breathing comfortably on room air and phonating appropriately would not benefit from IM steroids and no  indication for antibiotics.  Additionally she has been seen by multiple providers at this point and I am concerned about her being on frequent steroid burst treatments so will defer to primary medicine if indicated.  She has been out of her chronic pain medication which is contributory towards her pain all over and feeling bad.  Additionally she reports bad anxiety and has been depressed which is contributory towards her symptoms of malaise.  I discussed this all with patient.  She stated since she was not going to be getting a shot and we were not going to do any thing that she was going to leave.  Patient got up from her bed and ambulated out of department with her daughter without difficulty.  Reiterated with patient's daughter importance of primary care follow-up as well as repeat assessment by her chronic pain physician.  Patient's daughter expressed agreement and understanding of this plan.    SMOKING CESSATION:   8:25 AM     The patient was counseled on the dangers of tobacco use, and was?advised to quit. ?Reviewed strategies to maximize success, including removing cigarettes and smoking materials from environment and written materials. Discussion took?3-5?minutes, and patient expressed understanding.             Problems Addressed:  Cough    Procedures:   Procedures       Diagnosis and Disposition     Critical Care:      DISCHARGE NOTE:       Desiree CRAWFORD Hayden's  results have been reviewed with her.  She has been counseled regarding her diagnosis, treatment, and plan.  She verbally conveys understanding and agreement of the signs, symptoms, diagnosis, treatment and prognosis and additionally agrees to follow up as discussed.  She also agrees with the care-plan and conveys that all of her questions have been answered.  I have also provided discharge instructions for her that include: educational information regarding their diagnosis and treatment, and list of reasons why they would want to return to the ED prior  to their follow-up appointment, should her condition change. She has been provided with education for proper emergency department utilization.         CLINICAL IMPRESSION:         1. Anxiety about health    2. Cough    3. Malaise    4. Chronic obstructive pulmonary disease, unspecified COPD type    5. Tobacco use disorder    6. Chronic prescription opiate use              PLAN:   1. Discharge Home  2.      Medication List        ASK your doctor about these medications      albuterol 2.5 mg /3 mL (0.083 %) nebulizer solution  Commonly known as: PROVENTIL     albuterol-ipratropium 2.5 mg-0.5 mg/3 mL nebulizer solution  Commonly known as: DUO-NEB  Take 3 mLs by nebulization every 6 (six) hours as needed for Wheezing. Rescue     amLODIPine 10 MG tablet  Commonly known as: NORVASC  Take 1 tablet (10 mg total) by mouth once daily.     busPIRone 15 MG tablet  Commonly known as: BUSPAR  Take 1 tablet (15 mg total) by mouth 3 (three) times daily.     butalbital-acetaminophen-caffeine -40 mg -40 mg per tablet  Commonly known as: FIORICET, ESGIC  Take 1 tablet by mouth once daily.     clopidogreL 75 mg tablet  Commonly known as: PLAVIX  TAKE 1 TABLET AFTER EVERY MORNING     estradioL 0.01 % (0.1 mg/gram) vaginal cream  Commonly known as: ESTRACE     FLUoxetine 40 MG capsule  Take 1 capsule (40 mg total) by mouth once daily.     gabapentin 300 MG capsule  Commonly known as: NEURONTIN     glycerin adult suppository  Place 1 suppository rectally as needed for Constipation.     levoFLOXacin 500 MG tablet  Commonly known as: LEVAQUIN     methylPREDNISolone 4 mg tablet  Commonly known as: MEDROL DOSEPACK  use as directed     OLANZapine 2.5 MG tablet  Commonly known as: ZyPREXA  Take 1 tablet (2.5 mg total) by mouth every evening.     ondansetron 4 MG Tbdl  Commonly known as: ZOFRAN-ODT  Take 1 tablet (4 mg total) by mouth every 8 (eight) hours as needed (Nausea and vomiting).     oxyCODONE-acetaminophen 5-325 mg per  tablet  Commonly known as: PERCOCET     pravastatin 20 MG tablet  Commonly known as: PRAVACHOL     predniSONE 20 MG tablet  Commonly known as: DELTASONE  Take 1 tablet (20 mg total) by mouth once daily.     prochlorperazine 10 MG tablet  Commonly known as: COMPAZINE  Take 1 tablet (10 mg total) by mouth every 6 (six) hours as needed (headache).     promethazine-codeine 6.25-10 mg/5 ml 6.25-10 mg/5 mL syrup  Commonly known as: PHENERGAN with CODEINE     QUEtiapine 300 MG Tab  Commonly known as: SEROQUEL  Take 2 tablets (600 mg total) by mouth every evening.     topiramate 50 MG tablet  Commonly known as: TOPAMAX  Take 1 tablet (50 mg total) by mouth once daily.     traZODone 150 MG tablet  Commonly known as: DESYREL  Take 1 tablet (150 mg total) by mouth every evening.     TYMLOS 80 mcg (3,120 mcg/1.56 mL) Pnij  Generic drug: abaloparatide             3. Kt Pozo Jr., MD  [address] [place] [id number]  [phone]    Schedule an appointment as soon as possible for a visit   Primary care follow up       _______________________________     Please note that this dictation was completed with [employer], the computer voice recognition software.  Quite often unanticipated grammatical, syntax, homophones, and other interpretive errors are inadvertently transcribed by the computer software.  Please disregard these errors.  Please excuse any errors that have escaped final proofreading.             Anthony Platt MD  02/03/24 0849

## 2024-02-13 ENCOUNTER — HOSPITAL ENCOUNTER (EMERGENCY)
Facility: HOSPITAL | Age: 69
Discharge: HOME | End: 2024-02-13
Attending: FAMILY MEDICINE
Payer: MEDICARE

## 2024-02-13 VITALS
HEIGHT: 64 IN | SYSTOLIC BLOOD PRESSURE: 192 MMHG | RESPIRATION RATE: 18 BRPM | BODY MASS INDEX: 20.32 KG/M2 | WEIGHT: 119 LBS | OXYGEN SATURATION: 97 % | DIASTOLIC BLOOD PRESSURE: 77 MMHG | HEART RATE: 78 BPM | TEMPERATURE: 99 F

## 2024-02-13 DIAGNOSIS — R11.2 NAUSEA AND VOMITING, UNSPECIFIED VOMITING TYPE: Primary | ICD-10-CM

## 2024-02-13 LAB
ALBUMIN SERPL BCP-MCNC: 3.5 G/DL (ref 3.5–5.2)
ALP SERPL-CCNC: 105 U/L (ref 55–135)
ALT SERPL W/O P-5'-P-CCNC: 20 U/L (ref 10–44)
ANION GAP SERPL CALC-SCNC: 2 MMOL/L (ref 3–11)
AST SERPL-CCNC: 10 U/L (ref 10–40)
BASOPHILS # BLD AUTO: 0.06 K/UL (ref 0–0.2)
BASOPHILS NFR BLD: 0.4 % (ref 0–1.9)
BILIRUB SERPL-MCNC: 0.2 MG/DL (ref 0.1–1)
BUN SERPL-MCNC: 10 MG/DL (ref 8–23)
CALCIUM SERPL-MCNC: 9.4 MG/DL (ref 8.7–10.5)
CHLORIDE SERPL-SCNC: 106 MMOL/L (ref 95–110)
CO2 SERPL-SCNC: 31 MMOL/L (ref 23–29)
CREAT SERPL-MCNC: 0.9 MG/DL (ref 0.5–1.4)
DIFFERENTIAL METHOD BLD: ABNORMAL
EOSINOPHIL # BLD AUTO: 0.1 K/UL (ref 0–0.5)
EOSINOPHIL NFR BLD: 0.7 % (ref 0–8)
ERYTHROCYTE [DISTWIDTH] IN BLOOD BY AUTOMATED COUNT: 13.7 % (ref 11.5–14.5)
EST. GFR  (NO RACE VARIABLE): >60 ML/MIN/1.73 M^2
GLUCOSE SERPL-MCNC: 121 MG/DL (ref 70–110)
HCT VFR BLD AUTO: 44.3 % (ref 37–48.5)
HGB BLD-MCNC: 14.3 G/DL (ref 12–16)
IMM GRANULOCYTES # BLD AUTO: 0.05 K/UL (ref 0–0.04)
IMM GRANULOCYTES NFR BLD AUTO: 0.4 % (ref 0–0.5)
LIPASE SERPL-CCNC: 29 U/L (ref 13–75)
LYMPHOCYTES # BLD AUTO: 1.4 K/UL (ref 1–4.8)
LYMPHOCYTES NFR BLD: 10.3 % (ref 18–48)
MCH RBC QN AUTO: 31.6 PG (ref 27–31)
MCHC RBC AUTO-ENTMCNC: 32.3 G/DL (ref 32–36)
MCV RBC AUTO: 98 FL (ref 82–98)
MONOCYTES # BLD AUTO: 0.7 K/UL (ref 0.3–1)
MONOCYTES NFR BLD: 5.2 % (ref 4–15)
NEUTROPHILS # BLD AUTO: 11.2 K/UL (ref 1.8–7.7)
NEUTROPHILS NFR BLD: 83 % (ref 38–73)
NRBC BLD-RTO: 0 /100 WBC
PLATELET # BLD AUTO: 253 K/UL (ref 150–450)
PMV BLD AUTO: 10.2 FL (ref 9.2–12.9)
POTASSIUM SERPL-SCNC: 3.6 MMOL/L (ref 3.5–5.1)
PROT SERPL-MCNC: 7.6 G/DL (ref 6–8.4)
RBC # BLD AUTO: 4.52 M/UL (ref 4–5.4)
SODIUM SERPL-SCNC: 139 MMOL/L (ref 136–145)
WBC # BLD AUTO: 13.53 K/UL (ref 3.9–12.7)

## 2024-02-13 PROCEDURE — 80053 COMPREHEN METABOLIC PANEL: CPT | Performed by: FAMILY MEDICINE

## 2024-02-13 PROCEDURE — 96375 TX/PRO/DX INJ NEW DRUG ADDON: CPT

## 2024-02-13 PROCEDURE — 96361 HYDRATE IV INFUSION ADD-ON: CPT

## 2024-02-13 PROCEDURE — 36415 COLL VENOUS BLD VENIPUNCTURE: CPT | Performed by: FAMILY MEDICINE

## 2024-02-13 PROCEDURE — 25000003 PHARM REV CODE 250: Performed by: FAMILY MEDICINE

## 2024-02-13 PROCEDURE — 99284 EMERGENCY DEPT VISIT MOD MDM: CPT

## 2024-02-13 PROCEDURE — 96374 THER/PROPH/DIAG INJ IV PUSH: CPT

## 2024-02-13 PROCEDURE — 85025 COMPLETE CBC W/AUTO DIFF WBC: CPT | Performed by: FAMILY MEDICINE

## 2024-02-13 PROCEDURE — 83690 ASSAY OF LIPASE: CPT | Performed by: FAMILY MEDICINE

## 2024-02-13 PROCEDURE — 63600175 PHARM REV CODE 636 W HCPCS: Performed by: FAMILY MEDICINE

## 2024-02-13 RX ORDER — ALUMINUM HYDROXIDE, MAGNESIUM HYDROXIDE, AND SIMETHICONE 1200; 120; 1200 MG/30ML; MG/30ML; MG/30ML
30 SUSPENSION ORAL ONCE
Status: COMPLETED | OUTPATIENT
Start: 2024-02-13 | End: 2024-02-13

## 2024-02-13 RX ORDER — KETOROLAC TROMETHAMINE 30 MG/ML
15 INJECTION, SOLUTION INTRAMUSCULAR; INTRAVENOUS
Status: COMPLETED | OUTPATIENT
Start: 2024-02-13 | End: 2024-02-13

## 2024-02-13 RX ORDER — LIDOCAINE HYDROCHLORIDE 20 MG/ML
15 SOLUTION OROPHARYNGEAL ONCE
Status: COMPLETED | OUTPATIENT
Start: 2024-02-13 | End: 2024-02-13

## 2024-02-13 RX ORDER — ONDANSETRON HYDROCHLORIDE 2 MG/ML
4 INJECTION, SOLUTION INTRAVENOUS
Status: COMPLETED | OUTPATIENT
Start: 2024-02-13 | End: 2024-02-13

## 2024-02-13 RX ADMIN — LIDOCAINE HYDROCHLORIDE 15 ML: 20 SOLUTION OROPHARYNGEAL at 05:02

## 2024-02-13 RX ADMIN — SODIUM CHLORIDE 1000 ML: 9 INJECTION, SOLUTION INTRAVENOUS at 05:02

## 2024-02-13 RX ADMIN — ONDANSETRON 4 MG: 2 INJECTION INTRAMUSCULAR; INTRAVENOUS at 05:02

## 2024-02-13 RX ADMIN — KETOROLAC TROMETHAMINE 15 MG: 30 INJECTION, SOLUTION INTRAMUSCULAR; INTRAVENOUS at 05:02

## 2024-02-13 RX ADMIN — ALUMINUM HYDROXIDE, MAGNESIUM HYDROXIDE, AND SIMETHICONE 30 ML: 200; 200; 20 SUSPENSION ORAL at 05:02

## 2024-02-13 NOTE — ED PROVIDER NOTES
Encounter Date: 2/13/2024       History     Chief Complaint   Patient presents with    Emesis     Pt c/o upper abd pain with N/V. Denies diarrhea. Also c/o yellow mucous.       Emesis   This is a new problem. The current episode started today. The problem occurs 2 - 4 times per day. The problem has been unchanged. The emesis has an appearance of stomach contents. Associated symptoms include abdominal pain. Pertinent negatives include no arthralgias, no chills, no cough, no diarrhea, no fever, no headaches, no myalgias, no sweats and no URI.     Review of patient's allergies indicates:  No Known Allergies  Past Medical History:   Diagnosis Date    Anticoagulant long-term use     Anxiety     Arthritis     C. difficile colitis 8/6/2022    Carotid artery disease     Cervical disc disorder     Chronic back pain     COPD (chronic obstructive pulmonary disease)     Coronary artery disease     Dementia     Depression     Diarrhea, unspecified 11/1/2021    DVT (deep venous thrombosis)     CAROTID    GERD (gastroesophageal reflux disease)     Hematuria     Hiatal hernia     High cholesterol     Ill-fitting dentures     STATES DOESN'T WEAR    PVD (peripheral vascular disease)     Renal calculus     Sleep apnea     Sleep apnea     NO C PAP    Urinary frequency     Urinary urgency     Wears glasses     READING      Past Surgical History:   Procedure Laterality Date    BACK SURGERY      BREAST LUMPECTOMY Right     CAROTID ARTERY ANGIOPLASTY      CAROTID ARTERY ANGIOPLASTY      CAROTID ARTERY STENT Left     CAROTID ENDARTERECTOMY Right     CHOLECYSTECTOMY      COLONOSCOPY      CYSTOSCOPY      HYSTERECTOMY      OOPHORECTOMY      TONSILLECTOMY       Family History   Problem Relation Age of Onset    Cancer Mother     Stroke Father     No Known Problems Sister     No Known Problems Daughter     No Known Problems Maternal Aunt     No Known Problems Maternal Uncle     No Known Problems Paternal Aunt     No Known Problems Paternal Uncle      No Known Problems Maternal Grandmother     No Known Problems Maternal Grandfather     No Known Problems Paternal Grandmother     No Known Problems Paternal Grandfather     Breast cancer Neg Hx     Ovarian cancer Neg Hx     BRCA 1/2 Neg Hx      Social History     Tobacco Use    Smoking status: Every Day     Current packs/day: 0.50     Average packs/day: 0.5 packs/day for 54.1 years (27.1 ttl pk-yrs)     Types: Cigarettes     Start date: 1970    Smokeless tobacco: Never   Substance Use Topics    Alcohol use: No    Drug use: No     Review of Systems   Constitutional:  Negative for chills and fever.   Respiratory:  Negative for cough.    Gastrointestinal:  Positive for abdominal pain and vomiting. Negative for diarrhea.   Musculoskeletal:  Negative for arthralgias and myalgias.   Neurological:  Negative for headaches.   All other systems reviewed and are negative.      Physical Exam     Initial Vitals [02/13/24 1653]   BP Pulse Resp Temp SpO2   (!) 196/106 78 20 98.9 °F (37.2 °C) 98 %      MAP       --         Physical Exam    Nursing note and vitals reviewed.  Constitutional: Vital signs are normal. She appears well-developed and well-nourished. She is not diaphoretic.  Non-toxic appearance. She does not have a sickly appearance.   HENT:   Head: Normocephalic and atraumatic.   Right Ear: Hearing, tympanic membrane, external ear and ear canal normal.   Left Ear: Tympanic membrane, external ear and ear canal normal.   Mouth/Throat: Uvula is midline, oropharynx is clear and moist and mucous membranes are normal.   Eyes: Conjunctivae, EOM and lids are normal. Pupils are equal, round, and reactive to light. Lids are everted and swept, no foreign bodies found.   Neck: Trachea normal and phonation normal. Neck supple.   Normal range of motion.   Full passive range of motion without pain.     Cardiovascular:  Normal rate, regular rhythm, normal heart sounds, intact distal pulses and normal pulses.           Pulmonary/Chest:  Breath sounds normal. She has no wheezes. She has no rhonchi. She has no rales. She exhibits no tenderness.   Abdominal: Abdomen is soft. Bowel sounds are normal. A surgical scar is present. There is abdominal tenderness in the epigastric area. No hernia.   No right CVA tenderness.  No left CVA tenderness.     There is no rebound, no guarding, no tenderness at McBurney's point and negative Barrientos's sign. negative obturator sign, negative psoas sign and negative Rovsing's sign  Musculoskeletal:      Cervical back: Normal, full passive range of motion without pain, normal range of motion and neck supple.      Thoracic back: Normal.      Lumbar back: Normal.     Neurological: She is alert and oriented to person, place, and time. She has normal strength and normal reflexes. She displays normal reflexes. No cranial nerve deficit or sensory deficit.   Skin: Skin is intact.   Psychiatric: She has a normal mood and affect. Her speech is normal and behavior is normal.         ED Course   Procedures  Labs Reviewed   COMPREHENSIVE METABOLIC PANEL - Abnormal; Notable for the following components:       Result Value    CO2 31 (*)     Glucose 121 (*)     Anion Gap 2 (*)     All other components within normal limits   CBC W/ AUTO DIFFERENTIAL - Abnormal; Notable for the following components:    WBC 13.53 (*)     MCH 31.6 (*)     Gran # (ANC) 11.2 (*)     Immature Grans (Abs) 0.05 (*)     Gran % 83.0 (*)     Lymph % 10.3 (*)     All other components within normal limits   LIPASE          Imaging Results    None          Medications   sodium chloride 0.9% bolus 1,000 mL 1,000 mL (0 mLs Intravenous Stopped 2/13/24 1815)   ketorolac injection 15 mg (15 mg Intravenous Given 2/13/24 1738)   aluminum-magnesium hydroxide-simethicone 200-200-20 mg/5 mL suspension 30 mL (30 mLs Oral Given 2/13/24 1730)     And   LIDOcaine viscous HCl 2% oral solution 15 mL (15 mLs Oral Given 2/13/24 1731)   ondansetron injection 4 mg (4 mg Intravenous Given  2/13/24 1741)     Medical Decision Making  Amount and/or Complexity of Data Reviewed  Labs: ordered.    Risk  OTC drugs.  Prescription drug management.                          Medical Decision Making:   Differential Diagnosis:   Gastritis, gastroenteritis, small-bowel obstruction, nausea vomiting       Pt care Transfer to Dr. Platt at 1800       Clinical Impression:  Final diagnoses:  [R11.2] Nausea and vomiting, unspecified vomiting type (Primary)          ED Disposition Condition    Discharge Stable          ED Prescriptions    None       Follow-up Information       Follow up With Specialties Details Why Contact Info    Kt Pozo Jr., MD Internal Medicine Schedule an appointment as soon as possible for a visit  Primary care follow up [address] [place] [id number]  [phone]               Jose Piper Jr., MD  02/14/24 0680

## 2024-02-15 PROBLEM — R10.10 PAIN OF UPPER ABDOMEN: Status: ACTIVE | Noted: 2021-05-18

## 2024-02-17 ENCOUNTER — HOSPITAL ENCOUNTER (EMERGENCY)
Facility: HOSPITAL | Age: 69
Discharge: HOME | End: 2024-02-17
Attending: STUDENT IN AN ORGANIZED HEALTH CARE EDUCATION/TRAINING PROGRAM
Payer: MEDICARE

## 2024-02-17 VITALS
WEIGHT: 114 LBS | HEART RATE: 80 BPM | OXYGEN SATURATION: 98 % | BODY MASS INDEX: 19.46 KG/M2 | TEMPERATURE: 99 F | SYSTOLIC BLOOD PRESSURE: 164 MMHG | RESPIRATION RATE: 18 BRPM | DIASTOLIC BLOOD PRESSURE: 70 MMHG | HEIGHT: 64 IN

## 2024-02-17 DIAGNOSIS — K52.9 COLITIS: Primary | ICD-10-CM

## 2024-02-17 LAB
ALBUMIN SERPL BCP-MCNC: 3 G/DL (ref 3.5–5.2)
ALP SERPL-CCNC: 80 U/L (ref 55–135)
ALT SERPL W/O P-5'-P-CCNC: 16 U/L (ref 10–44)
ANION GAP SERPL CALC-SCNC: 2 MMOL/L (ref 3–11)
AST SERPL-CCNC: 12 U/L (ref 10–40)
BASOPHILS # BLD AUTO: 0.02 K/UL (ref 0–0.2)
BASOPHILS NFR BLD: 0.1 % (ref 0–1.9)
BILIRUB SERPL-MCNC: 0.2 MG/DL (ref 0.1–1)
BUN SERPL-MCNC: 12 MG/DL (ref 8–23)
CALCIUM SERPL-MCNC: 9.1 MG/DL (ref 8.7–10.5)
CHLORIDE SERPL-SCNC: 108 MMOL/L (ref 95–110)
CO2 SERPL-SCNC: 29 MMOL/L (ref 23–29)
CREAT SERPL-MCNC: 0.6 MG/DL (ref 0.5–1.4)
DIFFERENTIAL METHOD BLD: ABNORMAL
EOSINOPHIL # BLD AUTO: 0 K/UL (ref 0–0.5)
EOSINOPHIL NFR BLD: 0 % (ref 0–8)
ERYTHROCYTE [DISTWIDTH] IN BLOOD BY AUTOMATED COUNT: 13.6 % (ref 11.5–14.5)
EST. GFR  (NO RACE VARIABLE): >60 ML/MIN/1.73 M^2
GLUCOSE SERPL-MCNC: 141 MG/DL (ref 70–110)
HCT VFR BLD AUTO: 43.8 % (ref 37–48.5)
HGB BLD-MCNC: 14.9 G/DL (ref 12–16)
IMM GRANULOCYTES # BLD AUTO: 0.13 K/UL (ref 0–0.04)
IMM GRANULOCYTES NFR BLD AUTO: 0.6 % (ref 0–0.5)
LIPASE SERPL-CCNC: 23 U/L (ref 13–75)
LYMPHOCYTES # BLD AUTO: 1.3 K/UL (ref 1–4.8)
LYMPHOCYTES NFR BLD: 5.5 % (ref 18–48)
MCH RBC QN AUTO: 32.7 PG (ref 27–31)
MCHC RBC AUTO-ENTMCNC: 34 G/DL (ref 32–36)
MCV RBC AUTO: 96 FL (ref 82–98)
MONOCYTES # BLD AUTO: 1 K/UL (ref 0.3–1)
MONOCYTES NFR BLD: 4.3 % (ref 4–15)
NEUTROPHILS # BLD AUTO: 20.9 K/UL (ref 1.8–7.7)
NEUTROPHILS NFR BLD: 89.5 % (ref 38–73)
NRBC BLD-RTO: 0 /100 WBC
PLATELET # BLD AUTO: 334 K/UL (ref 150–450)
PMV BLD AUTO: 9.9 FL (ref 9.2–12.9)
POTASSIUM SERPL-SCNC: 3.9 MMOL/L (ref 3.5–5.1)
PROT SERPL-MCNC: 6.7 G/DL (ref 6–8.4)
RBC # BLD AUTO: 4.55 M/UL (ref 4–5.4)
SODIUM SERPL-SCNC: 139 MMOL/L (ref 136–145)
WBC # BLD AUTO: 23.29 K/UL (ref 3.9–12.7)

## 2024-02-17 PROCEDURE — 25500020 PHARM REV CODE 255: Performed by: STUDENT IN AN ORGANIZED HEALTH CARE EDUCATION/TRAINING PROGRAM

## 2024-02-17 PROCEDURE — 96374 THER/PROPH/DIAG INJ IV PUSH: CPT

## 2024-02-17 PROCEDURE — 85025 COMPLETE CBC W/AUTO DIFF WBC: CPT | Performed by: CLINICAL NURSE SPECIALIST

## 2024-02-17 PROCEDURE — 96361 HYDRATE IV INFUSION ADD-ON: CPT

## 2024-02-17 PROCEDURE — 96375 TX/PRO/DX INJ NEW DRUG ADDON: CPT

## 2024-02-17 PROCEDURE — 63600175 PHARM REV CODE 636 W HCPCS: Mod: JZ,JG | Performed by: CLINICAL NURSE SPECIALIST

## 2024-02-17 PROCEDURE — 99285 EMERGENCY DEPT VISIT HI MDM: CPT | Mod: 25

## 2024-02-17 PROCEDURE — 80053 COMPREHEN METABOLIC PANEL: CPT | Performed by: CLINICAL NURSE SPECIALIST

## 2024-02-17 PROCEDURE — 83690 ASSAY OF LIPASE: CPT | Performed by: CLINICAL NURSE SPECIALIST

## 2024-02-17 PROCEDURE — 25000003 PHARM REV CODE 250: Performed by: CLINICAL NURSE SPECIALIST

## 2024-02-17 PROCEDURE — 36415 COLL VENOUS BLD VENIPUNCTURE: CPT | Performed by: CLINICAL NURSE SPECIALIST

## 2024-02-17 RX ORDER — ALUMINUM HYDROXIDE, MAGNESIUM HYDROXIDE, AND SIMETHICONE 1200; 120; 1200 MG/30ML; MG/30ML; MG/30ML
30 SUSPENSION ORAL ONCE
Status: COMPLETED | OUTPATIENT
Start: 2024-02-17 | End: 2024-02-17

## 2024-02-17 RX ORDER — DIPHENOXYLATE HYDROCHLORIDE AND ATROPINE SULFATE 2.5; .025 MG/1; MG/1
1 TABLET ORAL 4 TIMES DAILY PRN
Qty: 15 TABLET | Refills: 0 | Status: SHIPPED | OUTPATIENT
Start: 2024-02-17 | End: 2024-02-27

## 2024-02-17 RX ORDER — DICYCLOMINE HYDROCHLORIDE 20 MG/1
20 TABLET ORAL 2 TIMES DAILY
Qty: 20 TABLET | Refills: 0 | Status: SHIPPED | OUTPATIENT
Start: 2024-02-17 | End: 2024-03-18

## 2024-02-17 RX ORDER — ONDANSETRON 4 MG/1
4 TABLET, ORALLY DISINTEGRATING ORAL EVERY 6 HOURS PRN
Qty: 10 TABLET | Refills: 0 | Status: SHIPPED | OUTPATIENT
Start: 2024-02-17 | End: 2024-04-03

## 2024-02-17 RX ORDER — MORPHINE SULFATE 2 MG/ML
2 INJECTION, SOLUTION INTRAMUSCULAR; INTRAVENOUS ONCE
Status: COMPLETED | OUTPATIENT
Start: 2024-02-17 | End: 2024-02-17

## 2024-02-17 RX ORDER — ONDANSETRON HYDROCHLORIDE 2 MG/ML
4 INJECTION, SOLUTION INTRAVENOUS
Status: COMPLETED | OUTPATIENT
Start: 2024-02-17 | End: 2024-02-17

## 2024-02-17 RX ORDER — LIDOCAINE HYDROCHLORIDE 20 MG/ML
15 SOLUTION OROPHARYNGEAL ONCE
Status: COMPLETED | OUTPATIENT
Start: 2024-02-17 | End: 2024-02-17

## 2024-02-17 RX ORDER — AMOXICILLIN AND CLAVULANATE POTASSIUM 875; 125 MG/1; MG/1
1 TABLET, FILM COATED ORAL 2 TIMES DAILY
Qty: 14 TABLET | Refills: 0 | Status: SHIPPED | OUTPATIENT
Start: 2024-02-17 | End: 2024-04-22

## 2024-02-17 RX ADMIN — SODIUM CHLORIDE 1000 ML: 9 INJECTION, SOLUTION INTRAVENOUS at 11:02

## 2024-02-17 RX ADMIN — ALUMINUM HYDROXIDE, MAGNESIUM HYDROXIDE, AND SIMETHICONE 30 ML: 200; 200; 20 SUSPENSION ORAL at 11:02

## 2024-02-17 RX ADMIN — MORPHINE SULFATE 2 MG: 2 INJECTION, SOLUTION INTRAMUSCULAR; INTRAVENOUS at 11:02

## 2024-02-17 RX ADMIN — IOHEXOL 100 ML: 350 INJECTION, SOLUTION INTRAVENOUS at 12:02

## 2024-02-17 RX ADMIN — ONDANSETRON 4 MG: 2 INJECTION INTRAMUSCULAR; INTRAVENOUS at 11:02

## 2024-02-17 RX ADMIN — LIDOCAINE HYDROCHLORIDE 15 ML: 20 SOLUTION OROPHARYNGEAL at 11:02

## 2024-02-17 NOTE — ED PROVIDER NOTES
"Encounter Date: 2/17/2024       History     Chief Complaint   Patient presents with    Abdominal Pain     Pt to the ER with complaints abdominal pain ongoing for the past 1 week. Pt was seen here on the 13th with the same symptoms, and no medicine has helped. Pain and symptoms getting worse as per the Patient. Pt also complaining of nausea and vomiting. LBM couple days ago. Pain 10/10     68-year-old female presents emergency room with severe epigastric pain, nausea, vomiting.  Symptoms began approximately last week with no improvement.  Was seen in the emergency room in which labs were performed in discharged.  Reports bowel movement a few days ago with no issues.  Patient refused to let me do a full belly assessment due to extreme pain.  Patient's behavior is "odd".  History of anxiety, dementia, GERD        Review of patient's allergies indicates:  No Known Allergies  Past Medical History:   Diagnosis Date    Anticoagulant long-term use     Anxiety     Arthritis     C. difficile colitis 8/6/2022    Carotid artery disease     Cervical disc disorder     Chronic back pain     COPD (chronic obstructive pulmonary disease)     Coronary artery disease     Dementia     Depression     Diarrhea, unspecified 11/1/2021    DVT (deep venous thrombosis)     CAROTID    GERD (gastroesophageal reflux disease)     Hematuria     Hiatal hernia     High cholesterol     Ill-fitting dentures     STATES DOESN'T WEAR    PVD (peripheral vascular disease)     Renal calculus     Sleep apnea     Sleep apnea     NO C PAP    Urinary frequency     Urinary urgency     Wears glasses     READING      Past Surgical History:   Procedure Laterality Date    BACK SURGERY      BREAST LUMPECTOMY Right     CAROTID ARTERY ANGIOPLASTY      CAROTID ARTERY ANGIOPLASTY      CAROTID ARTERY STENT Left     CAROTID ENDARTERECTOMY Right     CHOLECYSTECTOMY      COLONOSCOPY      CYSTOSCOPY      HYSTERECTOMY      OOPHORECTOMY      TONSILLECTOMY       Family History "   Problem Relation Age of Onset    Cancer Mother     Stroke Father     No Known Problems Sister     No Known Problems Daughter     No Known Problems Maternal Aunt     No Known Problems Maternal Uncle     No Known Problems Paternal Aunt     No Known Problems Paternal Uncle     No Known Problems Maternal Grandmother     No Known Problems Maternal Grandfather     No Known Problems Paternal Grandmother     No Known Problems Paternal Grandfather     Breast cancer Neg Hx     Ovarian cancer Neg Hx     BRCA 1/2 Neg Hx      Social History     Tobacco Use    Smoking status: Every Day     Current packs/day: 0.50     Average packs/day: 0.5 packs/day for 54.1 years (27.1 ttl pk-yrs)     Types: Cigarettes     Start date: 1970    Smokeless tobacco: Never   Substance Use Topics    Alcohol use: No    Drug use: No     Review of Systems   Constitutional:  Negative for fever.   HENT:  Negative for sore throat.    Respiratory:  Negative for shortness of breath.    Cardiovascular:  Negative for chest pain.   Gastrointestinal:  Positive for abdominal pain, nausea and vomiting.   Genitourinary:  Negative for dysuria.   Musculoskeletal:  Negative for back pain.   Skin:  Negative for rash.   Neurological:  Negative for weakness.   Hematological:  Does not bruise/bleed easily.   All other systems reviewed and are negative.      Physical Exam     Initial Vitals [02/17/24 1107]   BP Pulse Resp Temp SpO2   (!) 142/62 82 20 98.7 °F (37.1 °C) 100 %      MAP       --         Physical Exam    Nursing note and vitals reviewed.  Constitutional: She appears well-developed and well-nourished.   HENT:   Head: Normocephalic and atraumatic.   Eyes: Pupils are equal, round, and reactive to light.   Neck:   Normal range of motion.  Cardiovascular:  Normal rate and regular rhythm.           Pulmonary/Chest: Breath sounds normal.   Abdominal: Abdomen is soft. Bowel sounds are normal.   Musculoskeletal:         General: Normal range of motion.      Cervical  back: Normal range of motion.     Neurological: She is alert and oriented to person, place, and time.   Skin: Skin is warm and dry.   Psychiatric: She has a normal mood and affect.         ED Course   Procedures  Labs Reviewed   CBC W/ AUTO DIFFERENTIAL - Abnormal; Notable for the following components:       Result Value    WBC 23.29 (*)     MCH 32.7 (*)     Immature Granulocytes 0.6 (*)     Gran # (ANC) 20.9 (*)     Immature Grans (Abs) 0.13 (*)     Gran % 89.5 (*)     Lymph % 5.5 (*)     All other components within normal limits   COMPREHENSIVE METABOLIC PANEL - Abnormal; Notable for the following components:    Glucose 141 (*)     Albumin 3.0 (*)     Anion Gap 2 (*)     All other components within normal limits    Narrative:     Recoll. [id number] by [email or web address] at 02/17/2024 11:49, reason: hemolyzed   sample   LIPASE    Narrative:     Recoll. [id number] by [email or web address] at 02/17/2024 11:49, reason: hemolyzed   sample          Imaging Results              CT Abdomen Pelvis With IV Contrast NO Oral Contrast (Final result)  Result time 02/17/24 12:59:28      Final result by Kt Chun MD (02/17/24 12:59:28)                   Impression:      Some thickening of the sigmoid colon without pericolonic fat stranding, could reflect mild colitis.    A noncalcified 8 mm pulmonary nodule in the left lower lobe, previously was a cavitary lesion and this may reflect sequela of healed atypical infection.  Follow-up noncontrast chest CT recommended in 6 months.      Electronically signed by: Kt Chun MD  Date:    02/17/2024  Time:    12:59               Narrative:    EXAMINATION:  CT ABDOMEN PELVIS WITH IV CONTRAST    CLINICAL HISTORY:  Epigastric pain;    TECHNIQUE:  Axial CT images were obtained. Iterative reconstruction technique was used. CT/cardiac nuclear exam/s in prior 12 months: 2.    COMPARISON:  CT abdomen pelvis without IV contrast 03/18/2023.    FINDINGS:  No hepatic abnormality.  Cholecystectomy.  The spleen,  pancreas, adrenal glands and kidneys demonstrate no abnormality.  No gross gastric abnormality.  No dilated bowel.  There is some thickening of the sigmoid colon without pericolonic fat stranding.  The appendix is normal.  No free fluid or free air.  No abnormality of urinary bladder.  Hysterectomy.  There is atherosclerotic disease of the abdominal aorta with ectasia of the infrarenal abdominal aorta, measuring 2 cm in diameter, stable.  No lymph node enlargement.  Visualized lung bases demonstrate a noncalcified 8 mm pulmonary nodule in the lateral aspect of left lower lobe, was previously cavitary, may reflect granuloma related to prior infection.  No osseous abnormality.                                       Medications   sodium chloride 0.9% bolus 1,000 mL 1,000 mL (0 mLs Intravenous Stopped 2/17/24 1315)   morphine injection 2 mg (2 mg Intravenous Given 2/17/24 1144)   ondansetron injection 4 mg (4 mg Intravenous Given 2/17/24 1143)   aluminum-magnesium hydroxide-simethicone 200-200-20 mg/5 mL suspension 30 mL (30 mLs Oral Given 2/17/24 1144)     And   LIDOcaine viscous HCl 2% oral solution 15 mL (15 mLs Oral Given 2/17/24 1144)   iohexoL (OMNIPAQUE 350) injection 100 mL (100 mLs Intravenous Given 2/17/24 1206)     Medical Decision Making  Amount and/or Complexity of Data Reviewed  Labs: ordered.  Radiology: ordered.    Risk  OTC drugs.  Prescription drug management.                                      Clinical Impression:  Final diagnoses:  [K52.9] Colitis (Primary)          ED Disposition Condition    Discharge Stable          ED Prescriptions       Medication Sig Dispense Start Date End Date Auth. Provider    amoxicillin-clavulanate 875-125mg (AUGMENTIN) 875-125 mg per tablet Take 1 tablet by mouth 2 (two) times daily. 14 tablet 2/17/2024 -- Honey Abdi, NP    dicyclomine (BENTYL) 20 mg tablet Take 1 tablet (20 mg total) by mouth 2 (two) times daily. 20 tablet 2/17/2024 3/18/2024 Jin  Honey BRYAN NP    diphenoxylate-atropine 2.5-0.025 mg (LOMOTIL) 2.5-0.025 mg per tablet Take 1 tablet by mouth 4 (four) times daily as needed for Diarrhea. 15 tablet 2/17/2024 2/27/2024 Honey Abdi NP    ondansetron (ZOFRAN-ODT) 4 MG TbDL Take 1 tablet (4 mg total) by mouth every 6 (six) hours as needed. 10 tablet 2/17/2024 -- Honey Abdi NP          Follow-up Information       Follow up With Specialties Details Why Contact Info    Kt Pozo Jr., MD Internal Medicine  As needed [address] [address]  [phone]               Honey Abdi NP  02/17/24 5735

## 2024-02-26 ENCOUNTER — HOSPITAL ENCOUNTER (EMERGENCY)
Facility: HOSPITAL | Age: 69
Discharge: HOME | End: 2024-02-26
Attending: EMERGENCY MEDICINE
Payer: MEDICARE

## 2024-02-26 VITALS
DIASTOLIC BLOOD PRESSURE: 90 MMHG | BODY MASS INDEX: 19.46 KG/M2 | WEIGHT: 114 LBS | OXYGEN SATURATION: 96 % | TEMPERATURE: 98 F | RESPIRATION RATE: 20 BRPM | HEART RATE: 84 BPM | HEIGHT: 64 IN | SYSTOLIC BLOOD PRESSURE: 199 MMHG

## 2024-02-26 DIAGNOSIS — K29.90: Primary | ICD-10-CM

## 2024-02-26 LAB
ALBUMIN SERPL BCP-MCNC: 2.9 G/DL (ref 3.5–5.2)
ALP SERPL-CCNC: 93 U/L (ref 55–135)
ALT SERPL W/O P-5'-P-CCNC: 13 U/L (ref 10–44)
ANION GAP SERPL CALC-SCNC: 2 MMOL/L (ref 3–11)
AST SERPL-CCNC: 10 U/L (ref 10–40)
BASOPHILS # BLD AUTO: 0.05 K/UL (ref 0–0.2)
BASOPHILS NFR BLD: 0.3 % (ref 0–1.9)
BILIRUB SERPL-MCNC: 0.2 MG/DL (ref 0.1–1)
BUN SERPL-MCNC: 9 MG/DL (ref 8–23)
CALCIUM SERPL-MCNC: 9.1 MG/DL (ref 8.7–10.5)
CHLORIDE SERPL-SCNC: 105 MMOL/L (ref 95–110)
CO2 SERPL-SCNC: 31 MMOL/L (ref 23–29)
CREAT SERPL-MCNC: 0.6 MG/DL (ref 0.5–1.4)
DIFFERENTIAL METHOD BLD: ABNORMAL
EOSINOPHIL # BLD AUTO: 0.2 K/UL (ref 0–0.5)
EOSINOPHIL NFR BLD: 1.1 % (ref 0–8)
ERYTHROCYTE [DISTWIDTH] IN BLOOD BY AUTOMATED COUNT: 13.2 % (ref 11.5–14.5)
EST. GFR  (NO RACE VARIABLE): >60 ML/MIN/1.73 M^2
GLUCOSE SERPL-MCNC: 172 MG/DL (ref 70–110)
HCT VFR BLD AUTO: 41.4 % (ref 37–48.5)
HGB BLD-MCNC: 13.9 G/DL (ref 12–16)
IMM GRANULOCYTES # BLD AUTO: 0.1 K/UL (ref 0–0.04)
IMM GRANULOCYTES NFR BLD AUTO: 0.6 % (ref 0–0.5)
LACTATE SERPL-SCNC: 1.3 MMOL/L (ref 0.5–2.2)
LYMPHOCYTES # BLD AUTO: 1.6 K/UL (ref 1–4.8)
LYMPHOCYTES NFR BLD: 9.4 % (ref 18–48)
MCH RBC QN AUTO: 32.1 PG (ref 27–31)
MCHC RBC AUTO-ENTMCNC: 33.6 G/DL (ref 32–36)
MCV RBC AUTO: 96 FL (ref 82–98)
MONOCYTES # BLD AUTO: 0.9 K/UL (ref 0.3–1)
MONOCYTES NFR BLD: 5.4 % (ref 4–15)
NEUTROPHILS # BLD AUTO: 14.5 K/UL (ref 1.8–7.7)
NEUTROPHILS NFR BLD: 83.2 % (ref 38–73)
NRBC BLD-RTO: 0 /100 WBC
PLATELET # BLD AUTO: 409 K/UL (ref 150–450)
PMV BLD AUTO: 9.2 FL (ref 9.2–12.9)
POTASSIUM SERPL-SCNC: 3 MMOL/L (ref 3.5–5.1)
PROT SERPL-MCNC: 7.1 G/DL (ref 6–8.4)
RBC # BLD AUTO: 4.33 M/UL (ref 4–5.4)
SODIUM SERPL-SCNC: 138 MMOL/L (ref 136–145)
WBC # BLD AUTO: 17.44 K/UL (ref 3.9–12.7)

## 2024-02-26 PROCEDURE — 36415 COLL VENOUS BLD VENIPUNCTURE: CPT | Performed by: EMERGENCY MEDICINE

## 2024-02-26 PROCEDURE — 25500020 PHARM REV CODE 255: Performed by: EMERGENCY MEDICINE

## 2024-02-26 PROCEDURE — 63600175 PHARM REV CODE 636 W HCPCS: Performed by: EMERGENCY MEDICINE

## 2024-02-26 PROCEDURE — 96375 TX/PRO/DX INJ NEW DRUG ADDON: CPT

## 2024-02-26 PROCEDURE — 25000003 PHARM REV CODE 250: Performed by: EMERGENCY MEDICINE

## 2024-02-26 PROCEDURE — 99285 EMERGENCY DEPT VISIT HI MDM: CPT | Mod: 25

## 2024-02-26 PROCEDURE — 96374 THER/PROPH/DIAG INJ IV PUSH: CPT | Mod: 59

## 2024-02-26 PROCEDURE — 83605 ASSAY OF LACTIC ACID: CPT | Performed by: EMERGENCY MEDICINE

## 2024-02-26 PROCEDURE — 80053 COMPREHEN METABOLIC PANEL: CPT | Performed by: EMERGENCY MEDICINE

## 2024-02-26 PROCEDURE — 85025 COMPLETE CBC W/AUTO DIFF WBC: CPT | Performed by: EMERGENCY MEDICINE

## 2024-02-26 RX ORDER — ONDANSETRON HYDROCHLORIDE 2 MG/ML
8 INJECTION, SOLUTION INTRAVENOUS
Status: COMPLETED | OUTPATIENT
Start: 2024-02-26 | End: 2024-02-26

## 2024-02-26 RX ORDER — HYDROMORPHONE HYDROCHLORIDE 1 MG/ML
1 INJECTION, SOLUTION INTRAMUSCULAR; INTRAVENOUS; SUBCUTANEOUS
Status: COMPLETED | OUTPATIENT
Start: 2024-02-26 | End: 2024-02-26

## 2024-02-26 RX ORDER — SUCRALFATE 1 G/1
1 TABLET ORAL 4 TIMES DAILY
Qty: 120 TABLET | Refills: 0 | Status: ON HOLD | OUTPATIENT
Start: 2024-02-26 | End: 2024-04-10

## 2024-02-26 RX ADMIN — SODIUM CHLORIDE 1000 ML: 9 INJECTION, SOLUTION INTRAVENOUS at 01:02

## 2024-02-26 RX ADMIN — HYDROMORPHONE HYDROCHLORIDE 1 MG: 1 INJECTION, SOLUTION INTRAMUSCULAR; INTRAVENOUS; SUBCUTANEOUS at 01:02

## 2024-02-26 RX ADMIN — IOHEXOL 100 ML: 350 INJECTION, SOLUTION INTRAVENOUS at 01:02

## 2024-02-26 RX ADMIN — ONDANSETRON 8 MG: 2 INJECTION INTRAMUSCULAR; INTRAVENOUS at 01:02

## 2024-02-26 NOTE — ED PROVIDER NOTES
Encounter Date: 2/26/2024       History     Chief Complaint   Patient presents with    Abdominal Pain     Pt stated that she has been experiencing abdominal pain with nausea / vomiting / diarrhea for the past couple days. Seen in ED recently dx colitis.     68-year-old female with a history of anxiety, colitis, GERD presents the emergency depart with abdominal pain with nausea vomiting and diarrhea for the past few days.  Seen recently in the emergency department and diagnosed with colitis, given an antibiotic and antiemetic with no improvement.  Patient states her abdominal pain is worse.  Denies any blood in stool or vomit.  She appears to be in pain.  Alert and oriented x4, GCS is 15      Review of patient's allergies indicates:  No Known Allergies  Past Medical History:   Diagnosis Date    Anticoagulant long-term use     Anxiety     Arthritis     C. difficile colitis 8/6/2022    Carotid artery disease     Cervical disc disorder     Chronic back pain     COPD (chronic obstructive pulmonary disease)     Coronary artery disease     Dementia     Depression     Diarrhea, unspecified 11/1/2021    DVT (deep venous thrombosis)     CAROTID    GERD (gastroesophageal reflux disease)     Hematuria     Hiatal hernia     High cholesterol     Ill-fitting dentures     STATES DOESN'T WEAR    PVD (peripheral vascular disease)     Renal calculus     Sleep apnea     Sleep apnea     NO C PAP    Urinary frequency     Urinary urgency     Wears glasses     READING      Past Surgical History:   Procedure Laterality Date    BACK SURGERY      BREAST LUMPECTOMY Right     CAROTID ARTERY ANGIOPLASTY      CAROTID ARTERY ANGIOPLASTY      CAROTID ARTERY STENT Left     CAROTID ENDARTERECTOMY Right     CHOLECYSTECTOMY      COLONOSCOPY      CYSTOSCOPY      HYSTERECTOMY      OOPHORECTOMY      TONSILLECTOMY       Family History   Problem Relation Age of Onset    Cancer Mother     Stroke Father     No Known Problems Sister     No Known Problems  Daughter     No Known Problems Maternal Aunt     No Known Problems Maternal Uncle     No Known Problems Paternal Aunt     No Known Problems Paternal Uncle     No Known Problems Maternal Grandmother     No Known Problems Maternal Grandfather     No Known Problems Paternal Grandmother     No Known Problems Paternal Grandfather     Breast cancer Neg Hx     Ovarian cancer Neg Hx     BRCA 1/2 Neg Hx      Social History     Tobacco Use    Smoking status: Every Day     Current packs/day: 0.50     Average packs/day: 0.5 packs/day for 54.2 years (27.1 ttl pk-yrs)     Types: Cigarettes     Start date: 1970    Smokeless tobacco: Never   Substance Use Topics    Alcohol use: No    Drug use: No     Review of Systems   Constitutional:  Negative for fever.   HENT:  Negative for sore throat.    Respiratory:  Negative for shortness of breath.    Cardiovascular:  Negative for chest pain.   Gastrointestinal:  Positive for abdominal pain, diarrhea, nausea and vomiting.   Genitourinary:  Negative for dysuria.   Musculoskeletal:  Negative for back pain.   Skin:  Negative for rash.   Neurological:  Negative for weakness.   Hematological:  Does not bruise/bleed easily.   All other systems reviewed and are negative.      Physical Exam     Initial Vitals   BP Pulse Resp Temp SpO2   02/26/24 1256 02/26/24 1251 02/26/24 1251 02/26/24 1251 02/26/24 1251   (!) 218/105 84 20 98.1 °F (36.7 °C) 96 %      MAP       --                Physical Exam    Nursing note and vitals reviewed.  Constitutional: She appears well-developed and well-nourished. She is not diaphoretic. No distress.   HENT:   Head: Normocephalic and atraumatic.   Mouth/Throat: Oropharynx is clear and moist.   Eyes: Conjunctivae and EOM are normal. Pupils are equal, round, and reactive to light.   Neck: Neck supple. No tracheal deviation present. No JVD present.   Normal range of motion.  Cardiovascular:  Normal rate, regular rhythm, normal heart sounds and intact distal pulses.            No murmur heard.  Pulmonary/Chest: Breath sounds normal. No stridor. No respiratory distress. She has no wheezes. She has no rhonchi. She has no rales. She exhibits no tenderness.   Abdominal: Abdomen is soft. She exhibits no distension and no mass. There is abdominal tenderness. There is guarding.   Musculoskeletal:         General: No tenderness or edema. Normal range of motion.      Cervical back: Normal range of motion and neck supple.     Neurological: She is alert and oriented to person, place, and time. She has normal strength. No cranial nerve deficit. GCS score is 15. GCS eye subscore is 4. GCS verbal subscore is 5. GCS motor subscore is 6.   Skin: Skin is warm and dry. Capillary refill takes less than 2 seconds.         ED Course   Procedures  Labs Reviewed   CBC W/ AUTO DIFFERENTIAL - Abnormal; Notable for the following components:       Result Value    WBC 17.44 (*)     MCH 32.1 (*)     Immature Granulocytes 0.6 (*)     Gran # (ANC) 14.5 (*)     Immature Grans (Abs) 0.10 (*)     Gran % 83.2 (*)     Lymph % 9.4 (*)     All other components within normal limits   COMPREHENSIVE METABOLIC PANEL - Abnormal; Notable for the following components:    Potassium 3.0 (*)     CO2 31 (*)     Glucose 172 (*)     Albumin 2.9 (*)     Anion Gap 2 (*)     All other components within normal limits   LACTIC ACID, PLASMA          Imaging Results              CT Abdomen Pelvis With IV Contrast NO Oral Contrast (Final result)  Result time 02/26/24 13:55:40      Final result by Ronny Vyas MD (02/26/24 13:55:40)                   Impression:      Gastric antral and duodenal wall thickening, which could be exaggerated by peristalsis, but findings are suspicious for gastroduodenitis.      Electronically signed by: Ronny Vyas MD  Date:    02/26/2024  Time:    13:55               Narrative:    EXAMINATION:  CT ABDOMEN PELVIS WITH IV CONTRAST    CLINICAL HISTORY:  Abdominal pain, acute,  nonlocalized;    TECHNIQUE:  Iterative reconstruction technique was used.    CT/cardiac nuclear exam/s in prior 12 months:  3.    COMPARISON:  CT abdomen and pelvis 02/17/2024.    FINDINGS:  1 cm cyst right lobe of the liver.  Otherwise, unremarkable liver and spleen.  Previous cholecystectomy.  Unremarkable pancreas, kidneys, and adrenal glands.  Wall thickening involving the gastric antrum and duodenum, which could be exaggerated by peristalsis, but findings suspicious for gastroduodenitis.  No bowel obstruction.  Normal appendix.  No free fluid.  Small fat containing umbilical hernia.                                       Medications   sodium chloride 0.9% bolus 1,000 mL 1,000 mL (1,000 mLs Intravenous New Bag 2/26/24 1313)   HYDROmorphone injection 1 mg (1 mg Intravenous Given 2/26/24 1313)   ondansetron injection 8 mg (8 mg Intravenous Given 2/26/24 1314)   iohexoL (OMNIPAQUE 350) injection 100 mL (100 mLs Intravenous Given 2/26/24 1330)     Medical Decision Making  Amount and/or Complexity of Data Reviewed  Labs: ordered.  Radiology: ordered.    Risk  Prescription drug management.               ED Course as of 02/26/24 1410   Mon Feb 26, 2024   1408 CT scan reveals gastroduodenitis, patient improved after medications.  Stable for discharge and follow-up to primary care physician [SD]      ED Course User Index  [SD] Marek Rai MD               Medical Decision Making:   Differential Diagnosis:   Colitis, abdominal pain             Clinical Impression:  Final diagnoses:  [K29.90] Gastroduodenitis without hemorrhage (Primary)          ED Disposition Condition    Discharge Stable          ED Prescriptions       Medication Sig Dispense Start Date End Date Auth. Provider    sucralfate (CARAFATE) 1 gram tablet Take 1 tablet (1 g total) by mouth 4 (four) times daily. 120 tablet 2/26/2024 -- Marek Rai MD          Follow-up Information       Follow up With Specialties Details Why Contact Info    Primary  care physician  In 2 days               Marek Rai MD  02/26/24 9288

## 2024-03-02 ENCOUNTER — HOSPITAL ENCOUNTER (EMERGENCY)
Facility: HOSPITAL | Age: 69
Discharge: HOME | End: 2024-03-02
Attending: EMERGENCY MEDICINE
Payer: MEDICARE

## 2024-03-02 VITALS
HEART RATE: 89 BPM | TEMPERATURE: 98 F | DIASTOLIC BLOOD PRESSURE: 79 MMHG | SYSTOLIC BLOOD PRESSURE: 187 MMHG | HEIGHT: 64 IN | OXYGEN SATURATION: 99 % | RESPIRATION RATE: 18 BRPM | WEIGHT: 108 LBS | BODY MASS INDEX: 18.44 KG/M2

## 2024-03-02 DIAGNOSIS — R19.7 DIARRHEA, UNSPECIFIED TYPE: ICD-10-CM

## 2024-03-02 DIAGNOSIS — R10.13 EPIGASTRIC PAIN: Primary | ICD-10-CM

## 2024-03-02 PROCEDURE — 25000003 PHARM REV CODE 250: Performed by: EMERGENCY MEDICINE

## 2024-03-02 PROCEDURE — 99284 EMERGENCY DEPT VISIT MOD MDM: CPT | Mod: 25

## 2024-03-02 PROCEDURE — 63600175 PHARM REV CODE 636 W HCPCS: Performed by: EMERGENCY MEDICINE

## 2024-03-02 PROCEDURE — 96372 THER/PROPH/DIAG INJ SC/IM: CPT | Performed by: EMERGENCY MEDICINE

## 2024-03-02 RX ORDER — DICYCLOMINE HYDROCHLORIDE 10 MG/ML
20 INJECTION INTRAMUSCULAR
Status: COMPLETED | OUTPATIENT
Start: 2024-03-02 | End: 2024-03-02

## 2024-03-02 RX ORDER — ALUMINUM HYDROXIDE, MAGNESIUM HYDROXIDE, AND SIMETHICONE 1200; 120; 1200 MG/30ML; MG/30ML; MG/30ML
30 SUSPENSION ORAL ONCE
Status: COMPLETED | OUTPATIENT
Start: 2024-03-02 | End: 2024-03-02

## 2024-03-02 RX ORDER — LIDOCAINE HYDROCHLORIDE 20 MG/ML
15 SOLUTION OROPHARYNGEAL ONCE
Status: COMPLETED | OUTPATIENT
Start: 2024-03-02 | End: 2024-03-02

## 2024-03-02 RX ORDER — MORPHINE SULFATE 4 MG/ML
4 INJECTION, SOLUTION INTRAMUSCULAR; INTRAVENOUS
Status: COMPLETED | OUTPATIENT
Start: 2024-03-02 | End: 2024-03-02

## 2024-03-02 RX ORDER — LOPERAMIDE HYDROCHLORIDE 2 MG/1
2 CAPSULE ORAL 4 TIMES DAILY PRN
Qty: 30 CAPSULE | Refills: 0 | Status: SHIPPED | OUTPATIENT
Start: 2024-03-02 | End: 2024-03-12

## 2024-03-02 RX ORDER — ONDANSETRON 4 MG/1
8 TABLET, ORALLY DISINTEGRATING ORAL
Status: COMPLETED | OUTPATIENT
Start: 2024-03-02 | End: 2024-03-02

## 2024-03-02 RX ORDER — DICYCLOMINE HYDROCHLORIDE 20 MG/1
20 TABLET ORAL 2 TIMES DAILY PRN
Qty: 20 TABLET | Refills: 0 | Status: SHIPPED | OUTPATIENT
Start: 2024-03-02 | End: 2024-04-01

## 2024-03-02 RX ADMIN — LIDOCAINE HYDROCHLORIDE 15 ML: 20 SOLUTION OROPHARYNGEAL at 03:03

## 2024-03-02 RX ADMIN — ALUMINUM HYDROXIDE, MAGNESIUM HYDROXIDE, AND SIMETHICONE 30 ML: 200; 200; 20 SUSPENSION ORAL at 03:03

## 2024-03-02 RX ADMIN — DICYCLOMINE HYDROCHLORIDE 20 MG: 20 INJECTION, SOLUTION INTRAMUSCULAR at 03:03

## 2024-03-02 RX ADMIN — ONDANSETRON 8 MG: 4 TABLET, ORALLY DISINTEGRATING ORAL at 03:03

## 2024-03-02 RX ADMIN — MORPHINE SULFATE 4 MG: 4 INJECTION, SOLUTION INTRAMUSCULAR; INTRAVENOUS at 03:03

## 2024-03-02 NOTE — ED PROVIDER NOTES
EMERGENCY DEPARTMENT HISTORY AND PHYSICAL EXAM     This note is dictated on [employer] word recognition program.  There are word recognition mistakes and grammatical errors that are occasionally missed on review.     Date: 3/2/2024   Patient Name: Desiree Blake       History of Presenting Illness      Chief Complaint   Patient presents with    Abdominal Pain     Patient to the ER with complaints of abdominal pain, diarrhea, and nausea.           Desiree Blake is a 68 y.o. female with PMHX of  COPD, anxiety, chronic opioid use, vascular dementia, chronic low back pain who presents to the emergency department C/O abdominal pain.      Patient reports epigastric and diffuse abdominal pain for a few weeks.  Reports profuse watery diarrhea, non-bloody.  Patient has been seen for this problem in this emergency department 3 times last month and was seen at [facility] at the end of last month.  Has been diagnosed with colitis.  Was prescribed a course of Augmentin earlier and is currently taking Cipro Flagyl. Patient requesting a pain shot.     PCP: Kt Pozo Jr., MD        No current facility-administered medications for this encounter.     Current Outpatient Medications   Medication Sig Dispense Refill    albuterol (PROVENTIL) 2.5 mg /3 mL (0.083 %) nebulizer solution Take 2.5 mg by nebulization every 4 (four) hours as needed.      albuterol-ipratropium (DUO-NEB) 2.5 mg-0.5 mg/3 mL nebulizer solution Take 3 mLs by nebulization every 6 (six) hours as needed for Wheezing. Rescue 75 mL 0    amLODIPine (NORVASC) 10 MG tablet Take 1 tablet (10 mg total) by mouth once daily. 30 tablet 5    amoxicillin-clavulanate 875-125mg (AUGMENTIN) 875-125 mg per tablet Take 1 tablet by mouth 2 (two) times daily. 14 tablet 0    busPIRone (BUSPAR) 15 MG tablet Take 1 tablet (15 mg total) by mouth 3 (three) times daily. 90 tablet 2    butalbital-acetaminophen-caffeine -40 mg (FIORICET, ESGIC) -40 mg per  tablet Take 1 tablet by mouth once daily. 10 tablet 0    clopidogreL (PLAVIX) 75 mg tablet TAKE 1 TABLET AFTER EVERY MORNING 90 tablet 0    dicyclomine (BENTYL) 20 mg tablet Take 1 tablet (20 mg total) by mouth 2 (two) times daily. 20 tablet 0    dicyclomine (BENTYL) 20 mg tablet Take 1 tablet (20 mg total) by mouth 2 (two) times daily as needed (Abdominal Cramps/Pain). 20 tablet 0    estradioL (ESTRACE) 0.01 % (0.1 mg/gram) vaginal cream Prescribed by Dr. Anahy Samuels      FLUoxetine 40 MG capsule Take 1 capsule (40 mg total) by mouth once daily. 30 capsule 5    gabapentin (NEURONTIN) 300 MG capsule Take 1 capsule by mouth 3 (three) times daily. Prescribed by Dr. Rodas      glycerin adult suppository Place 1 suppository rectally as needed for Constipation. 12 suppository 0    levoFLOXacin (LEVAQUIN) 500 MG tablet Take 500 mg by mouth.      loperamide (IMODIUM) 2 mg capsule Take 1 capsule (2 mg total) by mouth 4 (four) times daily as needed for Diarrhea. 30 capsule 0    OLANZapine (ZYPREXA) 2.5 MG tablet Take 1 tablet (2.5 mg total) by mouth every evening. 30 tablet 0    ondansetron (ZOFRAN-ODT) 4 MG TbDL Take 1 tablet (4 mg total) by mouth every 6 (six) hours as needed. 10 tablet 0    oxyCODONE-acetaminophen (PERCOCET) 5-325 mg per tablet Take 1 tablet by mouth 2 (two) times daily as needed. Prescribed by Dr. Rodas      pravastatin (PRAVACHOL) 20 MG tablet Take 20 mg by mouth once daily.      predniSONE (DELTASONE) 20 MG tablet Take 1 tablet (20 mg total) by mouth 2 (two) times daily. 14 tablet 0    prochlorperazine (COMPAZINE) 10 MG tablet Take 1 tablet (10 mg total) by mouth every 6 (six) hours as needed (headache). 15 tablet 0    promethazine-codeine 6.25-10 mg/5 ml (PHENERGAN WITH CODEINE) 6.25-10 mg/5 mL syrup Take 5 mLs by mouth every 4 (four) hours as needed.      QUEtiapine (SEROQUEL) 300 MG Tab Take 2 tablets (600 mg total) by mouth every evening. 60 tablet 5    sucralfate (CARAFATE) 1 gram tablet Take  1 tablet (1 g total) by mouth 4 (four) times daily. 120 tablet 0    topiramate (TOPAMAX) 50 MG tablet Take 1 tablet (50 mg total) by mouth once daily. 30 tablet 2    traZODone (DESYREL) 150 MG tablet Take 1 tablet (150 mg total) by mouth every evening. 30 tablet 11    TYMLOS 80 mcg (3,120 mcg/1.56 mL) PnIj Inject into the skin. Prescribed by Roxana Rai             Past History     Past Medical History:   Past Medical History:   Diagnosis Date    Anticoagulant long-term use     Anxiety     Arthritis     C. difficile colitis 8/6/2022    Carotid artery disease     Cervical disc disorder     Chronic back pain     COPD (chronic obstructive pulmonary disease)     Coronary artery disease     Dementia     Depression     Diarrhea, unspecified 11/1/2021    DVT (deep venous thrombosis)     CAROTID    GERD (gastroesophageal reflux disease)     Hematuria     Hiatal hernia     High cholesterol     Ill-fitting dentures     STATES DOESN'T WEAR    PVD (peripheral vascular disease)     Renal calculus     Sleep apnea     Sleep apnea     NO C PAP    Urinary frequency     Urinary urgency     Wears glasses     READING         Past Surgical History:   Past Surgical History:   Procedure Laterality Date    BACK SURGERY      BREAST LUMPECTOMY Right     CAROTID ARTERY ANGIOPLASTY      CAROTID ARTERY ANGIOPLASTY      CAROTID ARTERY STENT Left     CAROTID ENDARTERECTOMY Right     CHOLECYSTECTOMY      COLONOSCOPY      CYSTOSCOPY      HYSTERECTOMY      OOPHORECTOMY      TONSILLECTOMY          Family History:   Family History   Problem Relation Age of Onset    Cancer Mother     Stroke Father     No Known Problems Sister     No Known Problems Daughter     No Known Problems Maternal Aunt     No Known Problems Maternal Uncle     No Known Problems Paternal Aunt     No Known Problems Paternal Uncle     No Known Problems Maternal Grandmother     No Known Problems Maternal Grandfather     No Known Problems Paternal Grandmother     No Known Problems  "Paternal Grandfather     Breast cancer Neg Hx     Ovarian cancer Neg Hx     BRCA 1/2 Neg Hx         Social History:   Social History     Tobacco Use    Smoking status: Every Day     Current packs/day: 0.50     Average packs/day: 0.5 packs/day for 54.2 years (27.1 ttl pk-yrs)     Types: Cigarettes     Start date: 1970    Smokeless tobacco: Never   Substance Use Topics    Alcohol use: No    Drug use: No        Allergies:   Review of patient's allergies indicates:  No Known Allergies       Review of Systems   Review of Systems   See HPI for pertinent positives and negatives       Physical Exam     Vitals:    03/02/24 1427 03/02/24 1504 03/02/24 1627   BP: (!) 187/79     Pulse: 92  89   Resp: 18 20 18   Temp: 98.2 °F (36.8 °C)     SpO2: 99%  99%   Weight: 49 kg (108 lb)     Height: 5' 4" (1.626 m)        Physical Exam  Vitals and nursing note reviewed.   Constitutional:       General: She is not in acute distress.     Appearance: Normal appearance. She is not ill-appearing, toxic-appearing or diaphoretic.   HENT:      Head: Normocephalic and atraumatic.      Right Ear: External ear normal.      Left Ear: External ear normal.      Nose: Nose normal. No congestion or rhinorrhea.      Mouth/Throat:      Mouth: Mucous membranes are moist.   Eyes:      Conjunctiva/sclera: Conjunctivae normal.      Pupils: Pupils are equal, round, and reactive to light.   Pulmonary:      Effort: Pulmonary effort is normal. No respiratory distress.   Abdominal:      Tenderness: There is abdominal tenderness in the epigastric area. There is no guarding or rebound.   Musculoskeletal:         General: No deformity. Normal range of motion.      Cervical back: Normal range of motion. No rigidity.   Skin:     General: Skin is dry.   Neurological:      General: No focal deficit present.      Mental Status: She is alert and oriented to person, place, and time. Mental status is at baseline.   Psychiatric:         Mood and Affect: Mood is anxious.       "   Behavior: Behavior normal.              Diagnostic Study Results      Labs -   No results found for this or any previous visit (from the past 12 hour(s)).     Radiologic Studies -    No orders to display        Medications given in the ED-   Medications   dicyclomine injection 20 mg (20 mg Intramuscular Given 3/2/24 1505)   aluminum-magnesium hydroxide-simethicone 200-200-20 mg/5 mL suspension 30 mL (30 mLs Oral Given 3/2/24 1504)     And   LIDOcaine viscous HCl 2% oral solution 15 mL (15 mLs Oral Given 3/2/24 1504)   ondansetron disintegrating tablet 8 mg (8 mg Oral Given 3/2/24 1504)   morphine injection 4 mg (4 mg Subcutaneous Given 3/2/24 1504)           Medical Decision Making    I am the first provider for this patient.     I reviewed the vital signs, available nursing notes, past medical history, past surgical history, family history and social history.     Vital Signs:  Reviewed the patient's vital signs.     Pulse Oximetry Analysis and Interpretation:    99% on Room Air, normal        External Test Results (Pertinent to encounter):  2/26/24 CT A/P  FINDINGS:  1 cm cyst right lobe of the liver.  Otherwise, unremarkable liver and spleen.  Previous cholecystectomy.  Unremarkable pancreas, kidneys, and adrenal glands.  Wall thickening involving the gastric antrum and duodenum, which could be exaggerated by peristalsis, but findings suspicious for gastroduodenitis.  No bowel obstruction.  Normal appendix.  No free fluid.  Small fat containing umbilical hernia.     Impression:     Gastric antral and duodenal wall thickening, which could be exaggerated by peristalsis, but findings are suspicious for gastroduodenitis.    2/17/24 CT A/P  FINDINGS:  No hepatic abnormality.  Cholecystectomy.  The spleen, pancreas, adrenal glands and kidneys demonstrate no abnormality.  No gross gastric abnormality.  No dilated bowel.  There is some thickening of the sigmoid colon without pericolonic fat stranding.  The appendix is  normal.  No free fluid or free air.  No abnormality of urinary bladder.  Hysterectomy.  There is atherosclerotic disease of the abdominal aorta with ectasia of the infrarenal abdominal aorta, measuring 2 cm in diameter, stable.  No lymph node enlargement.  Visualized lung bases demonstrate a noncalcified 8 mm pulmonary nodule in the lateral aspect of left lower lobe, was previously cavitary, may reflect granuloma related to prior infection.  No osseous abnormality.     Impression:     Some thickening of the sigmoid colon without pericolonic fat stranding, could reflect mild colitis.     A noncalcified 8 mm pulmonary nodule in the left lower lobe, previously was a cavitary lesion and this may reflect sequela of healed atypical infection.  Follow-up noncontrast chest CT recommended in 6 months.    Records Reviewed: Nursing Notes and Old Medical Records    History Obtained By: Patient    Provider Notes: Desiree Blake is a 68 y.o. female with abdominal pain, diarrhea    Co-morbidities Considered:  Per history of C diff and recent antibiotic use    Differential Diagnosis:  C diff colitis, colitis infectious versus inflammatory, duodenitis      ED Course:    Reviewed recent results.  Patient has now had 3 CT abdomen pelvis is in the past month.  Most recent CT abdomen pelvis here demonstrated findings suspicious for duodenitis.  Patient primarily reports epigastric pain and diarrhea.  Additionally patient has now had 2 separate courses of antibiotic therapy for colitis.  Will attempt to obtain a stool sample to evaluate for C diff or other bacterial colitis.  If negative for infectious etiology would recommend further workup for possible inflammatory bowel disease.  Reviewed outpatient documentation and patient has a longstanding history of diarrhea  dating back years.  Will treat symptomatically for pain and nausea.  Patient does not appear ill or hypovolemic.         Problems Addressed:  Diarrhea    Procedures:    Procedures       Diagnosis and Disposition     Critical Care:      DISCHARGE NOTE:       Desiree Blake's  results have been reviewed with her.  She has been counseled regarding her diagnosis, treatment, and plan.  She verbally conveys understanding and agreement of the signs, symptoms, diagnosis, treatment and prognosis and additionally agrees to follow up as discussed.  She also agrees with the care-plan and conveys that all of her questions have been answered.  I have also provided discharge instructions for her that include: educational information regarding their diagnosis and treatment, and list of reasons why they would want to return to the ED prior to their follow-up appointment, should her condition change. She has been provided with education for proper emergency department utilization.         CLINICAL IMPRESSION:         1. Epigastric pain    2. Diarrhea, unspecified type              PLAN:   1. Discharge Home  2.      Medication List        START taking these medications      loperamide 2 mg capsule  Commonly known as: IMODIUM  Take 1 capsule (2 mg total) by mouth 4 (four) times daily as needed for Diarrhea.            CHANGE how you take these medications      * dicyclomine 20 mg tablet  Commonly known as: BENTYL  Take 1 tablet (20 mg total) by mouth 2 (two) times daily.  What changed: Another medication with the same name was added. Make sure you understand how and when to take each.     * dicyclomine 20 mg tablet  Commonly known as: BENTYL  Take 1 tablet (20 mg total) by mouth 2 (two) times daily as needed (Abdominal Cramps/Pain).  What changed: You were already taking a medication with the same name, and this prescription was added. Make sure you understand how and when to take each.           * This list has 2 medication(s) that are the same as other medications prescribed for you. Read the directions carefully, and ask your doctor or other care provider to review them with you.                ASK  your doctor about these medications      albuterol 2.5 mg /3 mL (0.083 %) nebulizer solution  Commonly known as: PROVENTIL     albuterol-ipratropium 2.5 mg-0.5 mg/3 mL nebulizer solution  Commonly known as: DUO-NEB  Take 3 mLs by nebulization every 6 (six) hours as needed for Wheezing. Rescue     amLODIPine 10 MG tablet  Commonly known as: NORVASC  Take 1 tablet (10 mg total) by mouth once daily.     amoxicillin-clavulanate 875-125mg 875-125 mg per tablet  Commonly known as: AUGMENTIN  Take 1 tablet by mouth 2 (two) times daily.     busPIRone 15 MG tablet  Commonly known as: BUSPAR  Take 1 tablet (15 mg total) by mouth 3 (three) times daily.     butalbital-acetaminophen-caffeine -40 mg -40 mg per tablet  Commonly known as: FIORICET, ESGIC  Take 1 tablet by mouth once daily.     clopidogreL 75 mg tablet  Commonly known as: PLAVIX  TAKE 1 TABLET AFTER EVERY MORNING     estradioL 0.01 % (0.1 mg/gram) vaginal cream  Commonly known as: ESTRACE     FLUoxetine 40 MG capsule  Take 1 capsule (40 mg total) by mouth once daily.     gabapentin 300 MG capsule  Commonly known as: NEURONTIN     glycerin adult suppository  Place 1 suppository rectally as needed for Constipation.     levoFLOXacin 500 MG tablet  Commonly known as: LEVAQUIN     OLANZapine 2.5 MG tablet  Commonly known as: ZyPREXA  Take 1 tablet (2.5 mg total) by mouth every evening.     ondansetron 4 MG Tbdl  Commonly known as: ZOFRAN-ODT  Take 1 tablet (4 mg total) by mouth every 6 (six) hours as needed.     oxyCODONE-acetaminophen 5-325 mg per tablet  Commonly known as: PERCOCET     pravastatin 20 MG tablet  Commonly known as: PRAVACHOL     predniSONE 20 MG tablet  Commonly known as: DELTASONE  Take 1 tablet (20 mg total) by mouth 2 (two) times daily.     prochlorperazine 10 MG tablet  Commonly known as: COMPAZINE  Take 1 tablet (10 mg total) by mouth every 6 (six) hours as needed (headache).     promethazine-codeine 6.25-10 mg/5 ml 6.25-10 mg/5 mL  syrup  Commonly known as: PHENERGAN with CODEINE     QUEtiapine 300 MG Tab  Commonly known as: SEROQUEL  Take 2 tablets (600 mg total) by mouth every evening.     sucralfate 1 gram tablet  Commonly known as: CARAFATE  Take 1 tablet (1 g total) by mouth 4 (four) times daily.     topiramate 50 MG tablet  Commonly known as: TOPAMAX  Take 1 tablet (50 mg total) by mouth once daily.     traZODone 150 MG tablet  Commonly known as: DESYREL  Take 1 tablet (150 mg total) by mouth every evening.     TYMLOS 80 mcg (3,120 mcg/1.56 mL) Pnij  Generic drug: abaloparatide               Where to Get Your Medications        These medications were sent to [place] [facility] [address]      Phone: [phone]   dicyclomine 20 mg tablet  loperamide 2 mg capsule        3. Kt Pozo Jr., MD  [address] [place] [address]  [phone]    Schedule an appointment as soon as possible for a visit   Primary care follow up    [facility] Emergency Department  [address] [place] [id number] [phone]  Go to   If symptoms worsen       _______________________________     Please note that this dictation was completed with [employer], the computer voice recognition software.  Quite often unanticipated grammatical, syntax, homophones, and other interpretive errors are inadvertently transcribed by the computer software.  Please disregard these errors.  Please excuse any errors that have escaped final proofreading.             Anthony Platt MD  03/03/24 0750

## 2024-04-03 PROBLEM — E88.89 COENZYME Q DEFICIENCY: Status: RESOLVED | Noted: 2021-06-22 | Resolved: 2024-04-03

## 2024-04-08 ENCOUNTER — HOSPITAL ENCOUNTER (INPATIENT)
Facility: HOSPITAL | Age: 69
LOS: 2 days | Discharge: HOME | DRG: 378 | End: 2024-04-10
Attending: EMERGENCY MEDICINE | Admitting: EMERGENCY MEDICINE
Payer: MEDICARE

## 2024-04-08 DIAGNOSIS — K27.9 PEPTIC ULCER DISEASE: ICD-10-CM

## 2024-04-08 DIAGNOSIS — K92.0 HEMATEMESIS WITH NAUSEA: Primary | ICD-10-CM

## 2024-04-08 DIAGNOSIS — K27.4 GASTROINTESTINAL HEMORRHAGE ASSOCIATED WITH PEPTIC ULCER: ICD-10-CM

## 2024-04-08 DIAGNOSIS — Z01.818 PRE-OP EXAM: ICD-10-CM

## 2024-04-08 LAB
ABO + RH BLD: NORMAL
ALBUMIN SERPL BCP-MCNC: 2.5 G/DL (ref 3.5–5.2)
ALP SERPL-CCNC: 90 U/L (ref 55–135)
ALT SERPL W/O P-5'-P-CCNC: 12 U/L (ref 10–44)
ANION GAP SERPL CALC-SCNC: 7 MMOL/L (ref 3–11)
AST SERPL-CCNC: 7 U/L (ref 10–40)
BASOPHILS # BLD AUTO: 0.05 K/UL (ref 0–0.2)
BASOPHILS NFR BLD: 0.4 % (ref 0–1.9)
BILIRUB SERPL-MCNC: 0.2 MG/DL (ref 0.1–1)
BLD GP AB SCN CELLS X3 SERPL QL: NORMAL
BUN SERPL-MCNC: 28 MG/DL (ref 8–23)
CALCIUM SERPL-MCNC: 8.7 MG/DL (ref 8.7–10.5)
CHLORIDE SERPL-SCNC: 104 MMOL/L (ref 95–110)
CO2 SERPL-SCNC: 25 MMOL/L (ref 23–29)
CREAT SERPL-MCNC: 0.6 MG/DL (ref 0.5–1.4)
DIFFERENTIAL METHOD BLD: ABNORMAL
EOSINOPHIL # BLD AUTO: 0.1 K/UL (ref 0–0.5)
EOSINOPHIL NFR BLD: 1 % (ref 0–8)
ERYTHROCYTE [DISTWIDTH] IN BLOOD BY AUTOMATED COUNT: 13.6 % (ref 11.5–14.5)
EST. GFR  (NO RACE VARIABLE): >60 ML/MIN/1.73 M^2
GLUCOSE SERPL-MCNC: 143 MG/DL (ref 70–110)
HCT VFR BLD AUTO: 25.4 % (ref 37–48.5)
HCT VFR BLD AUTO: 30.4 % (ref 37–48.5)
HGB BLD-MCNC: 8 G/DL (ref 12–16)
HGB BLD-MCNC: 9.8 G/DL (ref 12–16)
IMM GRANULOCYTES # BLD AUTO: 0.08 K/UL (ref 0–0.04)
IMM GRANULOCYTES NFR BLD AUTO: 0.6 % (ref 0–0.5)
LIPASE SERPL-CCNC: 15 U/L (ref 13–75)
LYMPHOCYTES # BLD AUTO: 1.8 K/UL (ref 1–4.8)
LYMPHOCYTES NFR BLD: 13.3 % (ref 18–48)
MAGNESIUM SERPL-MCNC: 2 MG/DL (ref 1.6–2.6)
MCH RBC QN AUTO: 29.8 PG (ref 27–31)
MCHC RBC AUTO-ENTMCNC: 32.2 G/DL (ref 32–36)
MCV RBC AUTO: 92 FL (ref 82–98)
MONOCYTES # BLD AUTO: 0.7 K/UL (ref 0.3–1)
MONOCYTES NFR BLD: 5.4 % (ref 4–15)
NEUTROPHILS # BLD AUTO: 10.5 K/UL (ref 1.8–7.7)
NEUTROPHILS NFR BLD: 79.3 % (ref 38–73)
NRBC BLD-RTO: 0 /100 WBC
PLATELET # BLD AUTO: 405 K/UL (ref 150–450)
PMV BLD AUTO: 9.8 FL (ref 9.2–12.9)
POTASSIUM SERPL-SCNC: 3.8 MMOL/L (ref 3.5–5.1)
PROT SERPL-MCNC: 6.6 G/DL (ref 6–8.4)
RBC # BLD AUTO: 3.29 M/UL (ref 4–5.4)
SODIUM SERPL-SCNC: 136 MMOL/L (ref 136–145)
SPECIMEN OUTDATE: NORMAL
WBC # BLD AUTO: 13.27 K/UL (ref 3.9–12.7)

## 2024-04-08 PROCEDURE — 25000003 PHARM REV CODE 250: Performed by: EMERGENCY MEDICINE

## 2024-04-08 PROCEDURE — 96374 THER/PROPH/DIAG INJ IV PUSH: CPT

## 2024-04-08 PROCEDURE — 80053 COMPREHEN METABOLIC PANEL: CPT | Performed by: EMERGENCY MEDICINE

## 2024-04-08 PROCEDURE — 96375 TX/PRO/DX INJ NEW DRUG ADDON: CPT

## 2024-04-08 PROCEDURE — 86850 RBC ANTIBODY SCREEN: CPT | Performed by: EMERGENCY MEDICINE

## 2024-04-08 PROCEDURE — 99900035 HC TECH TIME PER 15 MIN (STAT)

## 2024-04-08 PROCEDURE — 85018 HEMOGLOBIN: CPT | Performed by: EMERGENCY MEDICINE

## 2024-04-08 PROCEDURE — 83690 ASSAY OF LIPASE: CPT | Performed by: EMERGENCY MEDICINE

## 2024-04-08 PROCEDURE — 96361 HYDRATE IV INFUSION ADD-ON: CPT

## 2024-04-08 PROCEDURE — 85025 COMPLETE CBC W/AUTO DIFF WBC: CPT | Performed by: EMERGENCY MEDICINE

## 2024-04-08 PROCEDURE — C9113 INJ PANTOPRAZOLE SODIUM, VIA: HCPCS | Performed by: EMERGENCY MEDICINE

## 2024-04-08 PROCEDURE — 99285 EMERGENCY DEPT VISIT HI MDM: CPT | Mod: 25

## 2024-04-08 PROCEDURE — 63600175 PHARM REV CODE 636 W HCPCS: Mod: JZ,JG | Performed by: EMERGENCY MEDICINE

## 2024-04-08 PROCEDURE — 83735 ASSAY OF MAGNESIUM: CPT | Performed by: EMERGENCY MEDICINE

## 2024-04-08 PROCEDURE — 99900031 HC PATIENT EDUCATION (STAT)

## 2024-04-08 PROCEDURE — 85014 HEMATOCRIT: CPT | Performed by: EMERGENCY MEDICINE

## 2024-04-08 PROCEDURE — 21400001 HC TELEMETRY ROOM

## 2024-04-08 PROCEDURE — 25000003 PHARM REV CODE 250: Performed by: INTERNAL MEDICINE

## 2024-04-08 PROCEDURE — 36415 COLL VENOUS BLD VENIPUNCTURE: CPT | Performed by: EMERGENCY MEDICINE

## 2024-04-08 RX ORDER — ONDANSETRON HYDROCHLORIDE 2 MG/ML
12 INJECTION, SOLUTION INTRAVENOUS
Status: COMPLETED | OUTPATIENT
Start: 2024-04-08 | End: 2024-04-08

## 2024-04-08 RX ORDER — QUETIAPINE FUMARATE 100 MG/1
600 TABLET, FILM COATED ORAL NIGHTLY
Status: DISCONTINUED | OUTPATIENT
Start: 2024-04-08 | End: 2024-04-10 | Stop reason: HOSPADM

## 2024-04-08 RX ORDER — ACETAMINOPHEN 325 MG/1
650 TABLET ORAL EVERY 6 HOURS PRN
Status: DISCONTINUED | OUTPATIENT
Start: 2024-04-08 | End: 2024-04-10 | Stop reason: HOSPADM

## 2024-04-08 RX ORDER — SODIUM CHLORIDE 9 MG/ML
INJECTION, SOLUTION INTRAVENOUS CONTINUOUS
Status: DISCONTINUED | OUTPATIENT
Start: 2024-04-08 | End: 2024-04-10 | Stop reason: HOSPADM

## 2024-04-08 RX ORDER — IPRATROPIUM BROMIDE AND ALBUTEROL SULFATE 2.5; .5 MG/3ML; MG/3ML
3 SOLUTION RESPIRATORY (INHALATION) EVERY 4 HOURS PRN
Status: DISCONTINUED | OUTPATIENT
Start: 2024-04-08 | End: 2024-04-10 | Stop reason: HOSPADM

## 2024-04-08 RX ORDER — ONDANSETRON HYDROCHLORIDE 2 MG/ML
8 INJECTION, SOLUTION INTRAVENOUS EVERY 6 HOURS PRN
Status: DISCONTINUED | OUTPATIENT
Start: 2024-04-08 | End: 2024-04-10 | Stop reason: HOSPADM

## 2024-04-08 RX ORDER — PANTOPRAZOLE SODIUM 40 MG/10ML
80 INJECTION, POWDER, LYOPHILIZED, FOR SOLUTION INTRAVENOUS
Status: COMPLETED | OUTPATIENT
Start: 2024-04-08 | End: 2024-04-08

## 2024-04-08 RX ORDER — SODIUM CHLORIDE 0.9 % (FLUSH) 0.9 %
10 SYRINGE (ML) INJECTION
Status: DISCONTINUED | OUTPATIENT
Start: 2024-04-08 | End: 2024-04-10 | Stop reason: HOSPADM

## 2024-04-08 RX ORDER — MORPHINE SULFATE 2 MG/ML
2 INJECTION, SOLUTION INTRAMUSCULAR; INTRAVENOUS EVERY 4 HOURS PRN
Status: DISCONTINUED | OUTPATIENT
Start: 2024-04-08 | End: 2024-04-10

## 2024-04-08 RX ORDER — MORPHINE SULFATE 4 MG/ML
4 INJECTION, SOLUTION INTRAMUSCULAR; INTRAVENOUS EVERY 4 HOURS PRN
Status: DISCONTINUED | OUTPATIENT
Start: 2024-04-08 | End: 2024-04-09

## 2024-04-08 RX ORDER — MORPHINE SULFATE 2 MG/ML
2 INJECTION, SOLUTION INTRAMUSCULAR; INTRAVENOUS
Status: COMPLETED | OUTPATIENT
Start: 2024-04-08 | End: 2024-04-08

## 2024-04-08 RX ORDER — TALC
6 POWDER (GRAM) TOPICAL NIGHTLY PRN
Status: DISCONTINUED | OUTPATIENT
Start: 2024-04-08 | End: 2024-04-10 | Stop reason: HOSPADM

## 2024-04-08 RX ADMIN — PANTOPRAZOLE SODIUM 8 MG/HR: 40 INJECTION, POWDER, FOR SOLUTION INTRAVENOUS at 08:04

## 2024-04-08 RX ADMIN — MORPHINE SULFATE 4 MG: 4 INJECTION, SOLUTION INTRAMUSCULAR; INTRAVENOUS at 08:04

## 2024-04-08 RX ADMIN — SODIUM CHLORIDE 1000 ML: 9 INJECTION, SOLUTION INTRAVENOUS at 03:04

## 2024-04-08 RX ADMIN — MORPHINE SULFATE 2 MG: 2 INJECTION, SOLUTION INTRAMUSCULAR; INTRAVENOUS at 05:04

## 2024-04-08 RX ADMIN — SODIUM CHLORIDE: 9 INJECTION, SOLUTION INTRAVENOUS at 05:04

## 2024-04-08 RX ADMIN — PANTOPRAZOLE SODIUM 8 MG/HR: 40 INJECTION, POWDER, FOR SOLUTION INTRAVENOUS at 03:04

## 2024-04-08 RX ADMIN — TRAZODONE HYDROCHLORIDE 150 MG: 100 TABLET ORAL at 08:04

## 2024-04-08 RX ADMIN — ONDANSETRON 12 MG: 2 INJECTION INTRAMUSCULAR; INTRAVENOUS at 03:04

## 2024-04-08 RX ADMIN — MORPHINE SULFATE 2 MG: 2 INJECTION, SOLUTION INTRAMUSCULAR; INTRAVENOUS at 03:04

## 2024-04-08 RX ADMIN — ACETAMINOPHEN 650 MG: 325 TABLET ORAL at 07:04

## 2024-04-08 RX ADMIN — QUETIAPINE FUMARATE 600 MG: 100 TABLET ORAL at 08:04

## 2024-04-08 RX ADMIN — PANTOPRAZOLE SODIUM 80 MG: 40 INJECTION, POWDER, FOR SOLUTION INTRAVENOUS at 03:04

## 2024-04-08 NOTE — ED PROVIDER NOTES
Encounter Date: 4/8/2024       History     Chief Complaint   Patient presents with    Vomiting     Pt c/o vomiting dark brown emesis x2 days     68-year-old female with a history of abdominal pain, colitis in the past presents the ER with nausea vomiting that began yesterday, was seen in the ER yesterday as well, complete workup performed including laboratory values and CT scan which showed gastritis and probable peptic ulcer disease.  Today she comes to the emergency department stating she is vomiting with blood in it.  Complaining of epigastric abdominal pain as well.  Alert oriented x4, GCS is 15.  Denies any fever      Review of patient's allergies indicates:  No Known Allergies  Past Medical History:   Diagnosis Date    Anticoagulant long-term use     Anxiety     Arthritis     C. difficile colitis 8/6/2022    Carotid artery disease     Cervical disc disorder     Chronic back pain     COPD (chronic obstructive pulmonary disease)     Coronary artery disease     Dementia     Depression     Diarrhea, unspecified 11/1/2021    DVT (deep venous thrombosis)     CAROTID    GERD (gastroesophageal reflux disease)     Hematuria     Hiatal hernia     High cholesterol     Ill-fitting dentures     STATES DOESN'T WEAR    PVD (peripheral vascular disease)     Renal calculus     Sleep apnea     Sleep apnea     NO C PAP    Urinary frequency     Urinary urgency     Wears glasses     READING      Past Surgical History:   Procedure Laterality Date    BACK SURGERY      BREAST LUMPECTOMY Right     CAROTID ARTERY ANGIOPLASTY      CAROTID ARTERY ANGIOPLASTY      CAROTID ARTERY STENT Left     CAROTID ENDARTERECTOMY Right     CHOLECYSTECTOMY      COLONOSCOPY      CYSTOSCOPY      HYSTERECTOMY      OOPHORECTOMY      TONSILLECTOMY       Family History   Problem Relation Age of Onset    Cancer Mother     Stroke Father     No Known Problems Sister     No Known Problems Daughter     No Known Problems Maternal Aunt     No Known Problems Maternal  Uncle     No Known Problems Paternal Aunt     No Known Problems Paternal Uncle     No Known Problems Maternal Grandmother     No Known Problems Maternal Grandfather     No Known Problems Paternal Grandmother     No Known Problems Paternal Grandfather     Breast cancer Neg Hx     Ovarian cancer Neg Hx     BRCA 1/2 Neg Hx      Social History     Tobacco Use    Smoking status: Every Day     Current packs/day: 0.50     Average packs/day: 0.5 packs/day for 54.3 years (27.1 ttl pk-yrs)     Types: Cigarettes     Start date: 1970    Smokeless tobacco: Never   Substance Use Topics    Alcohol use: No    Drug use: No     Review of Systems   Constitutional:  Negative for fever.   HENT:  Negative for sore throat.    Respiratory:  Negative for shortness of breath.    Cardiovascular:  Negative for chest pain.   Gastrointestinal:  Positive for abdominal pain, nausea and vomiting.   Genitourinary:  Negative for dysuria.   Musculoskeletal:  Negative for back pain.   Skin:  Negative for rash.   Neurological:  Negative for weakness.   Hematological:  Does not bruise/bleed easily.   All other systems reviewed and are negative.      Physical Exam     Initial Vitals [04/08/24 1532]   BP Pulse Resp Temp SpO2   111/67 95 18 98.4 °F (36.9 °C) 98 %      MAP       --         Physical Exam    Nursing note and vitals reviewed.  Constitutional: She appears well-developed and well-nourished. She is not diaphoretic. No distress.   HENT:   Head: Normocephalic and atraumatic.   Mouth/Throat: Oropharynx is clear and moist.   Eyes: Conjunctivae and EOM are normal. Pupils are equal, round, and reactive to light.   Neck: Neck supple. No tracheal deviation present. No JVD present.   Normal range of motion.  Cardiovascular:  Normal rate, regular rhythm, normal heart sounds and intact distal pulses.           No murmur heard.  Pulmonary/Chest: Breath sounds normal. No stridor. No respiratory distress. She has no wheezes. She has no rhonchi. She has no  rales. She exhibits no tenderness.   Abdominal: Abdomen is soft. Bowel sounds are normal. She exhibits no distension. There is abdominal tenderness. There is no rebound and no guarding.   Musculoskeletal:         General: No tenderness or edema. Normal range of motion.      Cervical back: Normal range of motion and neck supple.     Neurological: She is alert and oriented to person, place, and time. She has normal strength. No cranial nerve deficit. GCS score is 15. GCS eye subscore is 4. GCS verbal subscore is 5. GCS motor subscore is 6.   Skin: Skin is warm and dry. Capillary refill takes less than 2 seconds.         ED Course   Critical Care    Date/Time: 4/8/2024 4:15 PM    Performed by: Marek Ria MD  Authorized by: Kt Pozo Jr., MD  Direct patient critical care time: 10 minutes  Additional history critical care time: 10 minutes  Ordering / reviewing critical care time: 10 minutes  Documentation critical care time: 10 minutes  Consulting other physicians critical care time: 10 minutes  Consult with family critical care time: 10 minutes  Total critical care time (exclusive of procedural time) : 60 minutes  Critical care was necessary to treat or prevent imminent or life-threatening deterioration of the following conditions: Hematemesis.  Critical care was time spent personally by me on the following activities: review of old charts, re-evaluation of patient's condition, pulse oximetry, ordering and review of laboratory studies, ordering and performing treatments and interventions, obtaining history from patient or surrogate, examination of patient, evaluation of patient's response to treatment, discussions with primary provider, discussions with consultants and development of treatment plan with patient or surrogate.        Labs Reviewed   CBC W/ AUTO DIFFERENTIAL - Abnormal; Notable for the following components:       Result Value    WBC 13.27 (*)     RBC 3.29 (*)     Hemoglobin 9.8 (*)      Hematocrit 30.4 (*)     Immature Granulocytes 0.6 (*)     Gran # (ANC) 10.5 (*)     Immature Grans (Abs) 0.08 (*)     Gran % 79.3 (*)     Lymph % 13.3 (*)     All other components within normal limits   COMPREHENSIVE METABOLIC PANEL - Abnormal; Notable for the following components:    Glucose 143 (*)     BUN 28 (*)     Albumin 2.5 (*)     AST 7 (*)     All other components within normal limits   LIPASE   MAGNESIUM   TYPE & SCREEN          Imaging Results    None          Medications   sodium chloride 0.9% bolus 1,000 mL 1,000 mL (1,000 mLs Intravenous New Bag 4/8/24 1542)   pantoprazole (PROTONIX) 40 mg in sodium chloride 0.9 % 100 mL IVPB (MB+) (8 mg/hr Intravenous New Bag 4/8/24 1550)   pantoprazole injection 80 mg (80 mg Intravenous Given 4/8/24 1542)   ondansetron injection 12 mg (12 mg Intravenous Given 4/8/24 1542)   morphine injection 2 mg (2 mg Intravenous Given 4/8/24 1552)     Medical Decision Making  Amount and/or Complexity of Data Reviewed  Labs: ordered.    Risk  Prescription drug management.  Decision regarding hospitalization.               ED Course as of 04/08/24 1622 Mon Apr 08, 2024   1610 Laboratory values discussed with  - patient to be NPO after midnight, serial H&Hs [SD]   1621 Patient dropped her hemoglobin from 12.9 down to 9.8.  Will continue IV Protonix, serial H&Hs.  EGD tomorrow [SD]      ED Course User Index  [SD] Marek Rai MD               Medical Decision Making:   Differential Diagnosis:   Gastritis, peptic ulcer disease, GI bleed  ED Management:  Discussed case with  as well as  - will admit for IV Protonix, EGD, continued monitoring             Clinical Impression:  Final diagnoses:  [K92.0] Hematemesis with nausea (Primary)  [K27.9] Peptic ulcer disease          ED Disposition Condition    Admit Stable                Marek Rai MD  04/08/24 1616       Marek Rai MD  04/08/24 1622

## 2024-04-09 ENCOUNTER — ANESTHESIA EVENT (OUTPATIENT)
Dept: ENDOSCOPY | Facility: HOSPITAL | Age: 69
DRG: 378 | End: 2024-04-09
Payer: MEDICARE

## 2024-04-09 PROBLEM — D64.9 SYMPTOMATIC ANEMIA: Status: ACTIVE | Noted: 2024-04-09

## 2024-04-09 PROBLEM — K27.4 GASTROINTESTINAL HEMORRHAGE ASSOCIATED WITH PEPTIC ULCER: Status: ACTIVE | Noted: 2024-04-09

## 2024-04-09 PROBLEM — K27.9 PUD (PEPTIC ULCER DISEASE): Status: ACTIVE | Noted: 2024-04-09

## 2024-04-09 LAB
BLD PROD TYP BPU: NORMAL
BLD PROD TYP BPU: NORMAL
BLOOD UNIT EXPIRATION DATE: NORMAL
BLOOD UNIT EXPIRATION DATE: NORMAL
BLOOD UNIT TYPE CODE: 5100
BLOOD UNIT TYPE CODE: 5100
BLOOD UNIT TYPE: NORMAL
BLOOD UNIT TYPE: NORMAL
CODING SYSTEM: NORMAL
CODING SYSTEM: NORMAL
CROSSMATCH INTERPRETATION: NORMAL
CROSSMATCH INTERPRETATION: NORMAL
DISPENSE STATUS: NORMAL
DISPENSE STATUS: NORMAL
HCT VFR BLD AUTO: 22.6 % (ref 37–48.5)
HCT VFR BLD AUTO: 22.6 % (ref 37–48.5)
HCT VFR BLD AUTO: 23.8 % (ref 37–48.5)
HCT VFR BLD AUTO: 23.8 % (ref 37–48.5)
HGB BLD-MCNC: 7.2 G/DL (ref 12–16)
HGB BLD-MCNC: 7.3 G/DL (ref 12–16)
HGB BLD-MCNC: 7.5 G/DL (ref 12–16)
HGB BLD-MCNC: 7.5 G/DL (ref 12–16)
NUM UNITS TRANS PACKED RBC: NORMAL
NUM UNITS TRANS PACKED RBC: NORMAL
OHS QRS DURATION: 76 MS
OHS QTC CALCULATION: 460 MS

## 2024-04-09 PROCEDURE — P9016 RBC LEUKOCYTES REDUCED: HCPCS | Performed by: INTERNAL MEDICINE

## 2024-04-09 PROCEDURE — 85014 HEMATOCRIT: CPT | Mod: 91 | Performed by: EMERGENCY MEDICINE

## 2024-04-09 PROCEDURE — 36415 COLL VENOUS BLD VENIPUNCTURE: CPT | Performed by: EMERGENCY MEDICINE

## 2024-04-09 PROCEDURE — 99900035 HC TECH TIME PER 15 MIN (STAT)

## 2024-04-09 PROCEDURE — 36430 TRANSFUSION BLD/BLD COMPNT: CPT

## 2024-04-09 PROCEDURE — 99900031 HC PATIENT EDUCATION (STAT)

## 2024-04-09 PROCEDURE — 85014 HEMATOCRIT: CPT | Performed by: EMERGENCY MEDICINE

## 2024-04-09 PROCEDURE — 99223 1ST HOSP IP/OBS HIGH 75: CPT | Mod: ,,, | Performed by: STUDENT IN AN ORGANIZED HEALTH CARE EDUCATION/TRAINING PROGRAM

## 2024-04-09 PROCEDURE — 30233N1 TRANSFUSION OF NONAUTOLOGOUS RED BLOOD CELLS INTO PERIPHERAL VEIN, PERCUTANEOUS APPROACH: ICD-10-PCS | Performed by: INTERNAL MEDICINE

## 2024-04-09 PROCEDURE — 25000003 PHARM REV CODE 250: Performed by: INTERNAL MEDICINE

## 2024-04-09 PROCEDURE — 85018 HEMOGLOBIN: CPT | Mod: 91 | Performed by: EMERGENCY MEDICINE

## 2024-04-09 PROCEDURE — C9113 INJ PANTOPRAZOLE SODIUM, VIA: HCPCS | Performed by: EMERGENCY MEDICINE

## 2024-04-09 PROCEDURE — S4991 NICOTINE PATCH NONLEGEND: HCPCS | Performed by: INTERNAL MEDICINE

## 2024-04-09 PROCEDURE — 27000221 HC OXYGEN, UP TO 24 HOURS

## 2024-04-09 PROCEDURE — 21400001 HC TELEMETRY ROOM

## 2024-04-09 PROCEDURE — 93005 ELECTROCARDIOGRAM TRACING: CPT

## 2024-04-09 PROCEDURE — 63600175 PHARM REV CODE 636 W HCPCS: Performed by: INTERNAL MEDICINE

## 2024-04-09 PROCEDURE — 93010 ELECTROCARDIOGRAM REPORT: CPT | Mod: ,,, | Performed by: INTERNAL MEDICINE

## 2024-04-09 PROCEDURE — C9113 INJ PANTOPRAZOLE SODIUM, VIA: HCPCS | Performed by: INTERNAL MEDICINE

## 2024-04-09 PROCEDURE — 63600175 PHARM REV CODE 636 W HCPCS: Mod: JZ,JG | Performed by: EMERGENCY MEDICINE

## 2024-04-09 PROCEDURE — 94761 N-INVAS EAR/PLS OXIMETRY MLT: CPT

## 2024-04-09 PROCEDURE — 85018 HEMOGLOBIN: CPT | Performed by: EMERGENCY MEDICINE

## 2024-04-09 PROCEDURE — 86920 COMPATIBILITY TEST SPIN: CPT | Performed by: INTERNAL MEDICINE

## 2024-04-09 PROCEDURE — 63600175 PHARM REV CODE 636 W HCPCS: Mod: JZ,JG | Performed by: INTERNAL MEDICINE

## 2024-04-09 PROCEDURE — 25000003 PHARM REV CODE 250: Performed by: EMERGENCY MEDICINE

## 2024-04-09 RX ORDER — SUCRALFATE 1 G/10ML
1 SUSPENSION ORAL EVERY 6 HOURS
Status: DISCONTINUED | OUTPATIENT
Start: 2024-04-09 | End: 2024-04-10 | Stop reason: HOSPADM

## 2024-04-09 RX ORDER — HYDROCODONE BITARTRATE AND ACETAMINOPHEN 500; 5 MG/1; MG/1
TABLET ORAL
Status: DISCONTINUED | OUTPATIENT
Start: 2024-04-09 | End: 2024-04-10 | Stop reason: HOSPADM

## 2024-04-09 RX ORDER — FLUOXETINE HYDROCHLORIDE 20 MG/1
40 CAPSULE ORAL DAILY
Status: DISCONTINUED | OUTPATIENT
Start: 2024-04-09 | End: 2024-04-10 | Stop reason: HOSPADM

## 2024-04-09 RX ORDER — PRAVASTATIN SODIUM 20 MG/1
20 TABLET ORAL DAILY
Status: DISCONTINUED | OUTPATIENT
Start: 2024-04-09 | End: 2024-04-10 | Stop reason: HOSPADM

## 2024-04-09 RX ORDER — IBUPROFEN 200 MG
1 TABLET ORAL DAILY
Status: DISCONTINUED | OUTPATIENT
Start: 2024-04-09 | End: 2024-04-10 | Stop reason: HOSPADM

## 2024-04-09 RX ORDER — HYDROMORPHONE HYDROCHLORIDE 1 MG/ML
0.5 INJECTION, SOLUTION INTRAMUSCULAR; INTRAVENOUS; SUBCUTANEOUS
Status: DISCONTINUED | OUTPATIENT
Start: 2024-04-09 | End: 2024-04-10 | Stop reason: HOSPADM

## 2024-04-09 RX ORDER — HYDROMORPHONE HYDROCHLORIDE 1 MG/ML
0.5 INJECTION, SOLUTION INTRAMUSCULAR; INTRAVENOUS; SUBCUTANEOUS EVERY 6 HOURS PRN
Status: DISCONTINUED | OUTPATIENT
Start: 2024-04-09 | End: 2024-04-09

## 2024-04-09 RX ADMIN — PANTOPRAZOLE SODIUM 8 MG/HR: 40 INJECTION, POWDER, FOR SOLUTION INTRAVENOUS at 08:04

## 2024-04-09 RX ADMIN — SUCRALFATE 1 G: 1 SUSPENSION ORAL at 05:04

## 2024-04-09 RX ADMIN — MORPHINE SULFATE 2 MG: 2 INJECTION, SOLUTION INTRAMUSCULAR; INTRAVENOUS at 11:04

## 2024-04-09 RX ADMIN — SUCRALFATE 1 G: 1 SUSPENSION ORAL at 11:04

## 2024-04-09 RX ADMIN — MORPHINE SULFATE 4 MG: 4 INJECTION, SOLUTION INTRAMUSCULAR; INTRAVENOUS at 06:04

## 2024-04-09 RX ADMIN — HYDROMORPHONE HYDROCHLORIDE 0.5 MG: 0.5 INJECTION, SOLUTION INTRAMUSCULAR; INTRAVENOUS; SUBCUTANEOUS at 05:04

## 2024-04-09 RX ADMIN — HYDROMORPHONE HYDROCHLORIDE 0.5 MG: 0.5 INJECTION, SOLUTION INTRAMUSCULAR; INTRAVENOUS; SUBCUTANEOUS at 01:04

## 2024-04-09 RX ADMIN — PRAVASTATIN SODIUM 20 MG: 20 TABLET ORAL at 12:04

## 2024-04-09 RX ADMIN — SODIUM CHLORIDE: 9 INJECTION, SOLUTION INTRAVENOUS at 08:04

## 2024-04-09 RX ADMIN — MORPHINE SULFATE 2 MG: 2 INJECTION, SOLUTION INTRAMUSCULAR; INTRAVENOUS at 09:04

## 2024-04-09 RX ADMIN — NICOTINE 1 PATCH: 21 PATCH, EXTENDED RELEASE TRANSDERMAL at 08:04

## 2024-04-09 RX ADMIN — TRAZODONE HYDROCHLORIDE 150 MG: 100 TABLET ORAL at 09:04

## 2024-04-09 RX ADMIN — MORPHINE SULFATE 2 MG: 2 INJECTION, SOLUTION INTRAMUSCULAR; INTRAVENOUS at 03:04

## 2024-04-09 RX ADMIN — MORPHINE SULFATE 4 MG: 4 INJECTION, SOLUTION INTRAMUSCULAR; INTRAVENOUS at 01:04

## 2024-04-09 RX ADMIN — ONDANSETRON 8 MG: 2 INJECTION INTRAMUSCULAR; INTRAVENOUS at 09:04

## 2024-04-09 RX ADMIN — QUETIAPINE FUMARATE 600 MG: 100 TABLET ORAL at 09:04

## 2024-04-09 RX ADMIN — PANTOPRAZOLE SODIUM 8 MG/HR: 40 INJECTION, POWDER, FOR SOLUTION INTRAVENOUS at 11:04

## 2024-04-09 RX ADMIN — SODIUM CHLORIDE: 9 INJECTION, SOLUTION INTRAVENOUS at 01:04

## 2024-04-09 RX ADMIN — PANTOPRAZOLE SODIUM 8 MG/HR: 40 INJECTION, POWDER, FOR SOLUTION INTRAVENOUS at 03:04

## 2024-04-09 RX ADMIN — FLUOXETINE HYDROCHLORIDE 40 MG: 20 CAPSULE ORAL at 12:04

## 2024-04-09 RX ADMIN — HYDROMORPHONE HYDROCHLORIDE 0.5 MG: 0.5 INJECTION, SOLUTION INTRAMUSCULAR; INTRAVENOUS; SUBCUTANEOUS at 08:04

## 2024-04-09 NOTE — CONSULTS
[facility]  General Surgery  Consult Note    Patient Name: Desiree Blake  MRN: [id number]  Code Status: Full Code  Admission Date: 4/8/2024  Hospital Length of Stay: 1 days  Attending Physician: Kt Pozo Jr., MD  Primary Care Provider: Kt Pozo Jr., MD    Patient information was obtained from patient, past medical records, and ER records.     Consults  Subjective:     Principal Problem: Gastrointestinal hemorrhage associated with peptic ulcer    History of Present Illness: 67yo female with history of GERD and tobacco abuse who presents with abdominal pain and hematemesis.  Initially Presented to the ED 2 days prior to admission with abdominal pain.  CT abdomen and pelvis demonstrated possible gastritis versus peptic ulcer disease.  Discharge home with appropriate medication but re-presented 24 hours later with recurrent symptoms and also new-found hematemesis.  Hgb decreased to 9 from 12.  GS consulted for evaluation.     No current facility-administered medications on file prior to encounter.     Current Outpatient Medications on File Prior to Encounter   Medication Sig    albuterol (PROVENTIL) 2.5 mg /3 mL (0.083 %) nebulizer solution Take 2.5 mg by nebulization every 4 (four) hours as needed.    albuterol-ipratropium (DUO-NEB) 2.5 mg-0.5 mg/3 mL nebulizer solution Take 3 mLs by nebulization every 6 (six) hours as needed for Wheezing. Rescue    amLODIPine (NORVASC) 10 MG tablet Take 1 tablet (10 mg total) by mouth once daily.    amoxicillin-clavulanate 875-125mg (AUGMENTIN) 875-125 mg per tablet Take 1 tablet by mouth 2 (two) times daily.    busPIRone (BUSPAR) 15 MG tablet Take 1 tablet (15 mg total) by mouth 3 (three) times daily.    butalbital-acetaminophen-caffeine -40 mg (FIORICET, ESGIC) -40 mg per tablet Take 1 tablet by mouth once daily.    ciprofloxacin HCl (CIPRO) 500 MG tablet Take 1 tablet (500 mg total) by mouth every 12 (twelve) hours.    estradioL (ESTRACE) 0.01 %  (0.1 mg/gram) vaginal cream Prescribed by Dr. Anahy Samuels    famotidine (PEPCID) 20 MG tablet Take 1 tablet (20 mg total) by mouth 2 (two) times daily.    gabapentin (NEURONTIN) 300 MG capsule Take 1 capsule by mouth 3 (three) times daily. Prescribed by Dr. Rodas    glycerin adult suppository Place 1 suppository rectally as needed for Constipation.    HYDROcodone-acetaminophen (NORCO)  mg per tablet Take 1 tablet by mouth every 6 (six) hours as needed for Pain.    ondansetron (ZOFRAN-ODT) 4 MG TbDL Take 2 tablets (8 mg total) by mouth 3 (three) times daily as needed (nausea/vomiting).    oxyCODONE-acetaminophen (PERCOCET) 5-325 mg per tablet Take 1 tablet by mouth 2 (two) times daily as needed. Prescribed by Dr. Rodas    pravastatin (PRAVACHOL) 20 MG tablet Take 20 mg by mouth once daily.    prochlorperazine (COMPAZINE) 10 MG tablet Take 1 tablet (10 mg total) by mouth every 6 (six) hours as needed (headache).    QUEtiapine (SEROQUEL) 300 MG Tab Take 2 tablets (600 mg total) by mouth every evening.    sucralfate (CARAFATE) 1 gram tablet Take 1 tablet (1 g total) by mouth 4 (four) times daily.    traZODone (DESYREL) 150 MG tablet Take 1 tablet (150 mg total) by mouth every evening.    TYMLOS 80 mcg (3,120 mcg/1.56 mL) PnIj Inject into the skin. Prescribed by Roxana Rai    FLUoxetine 40 MG capsule Take 1 capsule (40 mg total) by mouth once daily.    topiramate (TOPAMAX) 50 MG tablet Take 1 tablet (50 mg total) by mouth once daily.    [DISCONTINUED] clopidogreL (PLAVIX) 75 mg tablet TAKE 1 TABLET AFTER EVERY MORNING       Review of patient's allergies indicates:  No Known Allergies    Past Medical History:   Diagnosis Date    Anticoagulant long-term use     Anxiety     Arthritis     C. difficile colitis 8/6/2022    Carotid artery disease     Cervical disc disorder     Chronic back pain     COPD (chronic obstructive pulmonary disease)     Coronary artery disease     Dementia     Depression     Diarrhea,  unspecified 11/1/2021    DVT (deep venous thrombosis)     CAROTID    GERD (gastroesophageal reflux disease)     Hematuria     Hiatal hernia     High cholesterol     Ill-fitting dentures     STATES DOESN'T WEAR    PVD (peripheral vascular disease)     Renal calculus     Sleep apnea     Sleep apnea     NO C PAP    Urinary frequency     Urinary urgency     Wears glasses     READING      Past Surgical History:   Procedure Laterality Date    BACK SURGERY      BREAST LUMPECTOMY Right     CAROTID ARTERY ANGIOPLASTY      CAROTID ARTERY ANGIOPLASTY      CAROTID ARTERY STENT Left     CAROTID ENDARTERECTOMY Right     CHOLECYSTECTOMY      COLONOSCOPY      CYSTOSCOPY      HYSTERECTOMY      OOPHORECTOMY      TONSILLECTOMY       Family History       Problem Relation (Age of Onset)    Cancer Mother    No Known Problems Sister, Daughter, Maternal Aunt, Maternal Uncle, Paternal Aunt, Paternal Uncle, Maternal Grandmother, Maternal Grandfather, Paternal Grandmother, Paternal Grandfather    Stroke Father          Tobacco Use    Smoking status: Every Day     Current packs/day: 0.50     Average packs/day: 0.5 packs/day for 54.3 years (27.1 ttl pk-yrs)     Types: Cigarettes     Start date: 1970    Smokeless tobacco: Never   Substance and Sexual Activity    Alcohol use: No    Drug use: No    Sexual activity: Not on file     Review of Systems   Constitutional:  Negative for activity change, appetite change, fatigue and fever.   Respiratory:  Negative for apnea, cough, chest tightness and shortness of breath.    Cardiovascular:  Negative for chest pain.   Gastrointestinal:  Positive for abdominal pain. Negative for abdominal distention, anal bleeding, blood in stool, constipation, diarrhea, nausea, rectal pain and vomiting.   All other systems reviewed and are negative.    Objective:     Vital Signs (Most Recent):  Temp: 99.3 °F (37.4 °C) (04/09/24 1107)  Pulse: 84 (04/09/24 1107)  Resp: 20 (04/09/24 1107)  BP: 130/60 (04/09/24 1107)  SpO2:  (!) 94 % (04/09/24 1107) Vital Signs (24h Range):  Temp:  [98 °F (36.7 °C)-99.3 °F (37.4 °C)] 99.3 °F (37.4 °C)  Pulse:  [84-95] 84  Resp:  [18-20] 20  SpO2:  [92 %-98 %] 94 %  BP: (111-148)/(60-76) 130/60     Weight: 50.4 kg (111 lb 3.2 oz)  Body mass index is 19.09 kg/m².     Physical Exam  Constitutional:       General: She is not in acute distress.     Appearance: She is not ill-appearing or toxic-appearing.   Cardiovascular:      Rate and Rhythm: Normal rate and regular rhythm.   Pulmonary:      Effort: Pulmonary effort is normal. No respiratory distress.   Abdominal:      General: There is no distension.      Palpations: Abdomen is soft.      Tenderness: There is abdominal tenderness (epigastric). There is no guarding.      Hernia: No hernia is present.   Skin:     Capillary Refill: Capillary refill takes less than 2 seconds.   Neurological:      Mental Status: She is alert. Mental status is at baseline.            I have reviewed all pertinent lab results within the past 24 hours.  CBC:   Recent Labs   Lab 04/08/24  1550 04/08/24 2008 04/09/24  1151   WBC 13.27*  --   --    RBC 3.29*  --   --    HGB 9.8*   < > 7.5*   HCT 30.4*   < > 23.8*     --   --    MCV 92  --   --    MCH 29.8  --   --    MCHC 32.2  --   --     < > = values in this interval not displayed.     CMP:   Recent Labs   Lab 04/08/24  1550   *   CALCIUM 8.7   ALBUMIN 2.5*   PROT 6.6      K 3.8   CO2 25      BUN 28*   CREATININE 0.6   ALKPHOS 90   ALT 12   AST 7*   BILITOT 0.2       Significant Diagnostics:  I have reviewed all pertinent imaging results/findings within the past 24 hours.    Assessment/Plan:     * Gastrointestinal hemorrhage associated with peptic ulcer  To endo 4/10 for EGD   CLD today  NPO midnight   Continue PPI and carafate   Tobacco cessation emphasized - currently tolerating nicotine patch   Pre-op EKG   Hgb 7.5 - transfuse per primary       VTE Risk Mitigation (From admission, onward)            Ordered     IP VTE HIGH RISK PATIENT  Once         04/08/24 1631     Place sequential compression device  Until discontinued         04/08/24 1631     Place VANDANA hose  Until discontinued         04/08/24 1631                    Thank you for your consult. I will follow-up with patient. Please contact us if you have any additional questions.    Meg Ayon MD  General Surgery  [facility]

## 2024-04-09 NOTE — HPI
Chief Complaint   Patient presents with    Vomiting       Pt c/o vomiting dark brown emesis x2 days      68-year-old female with a history of abdominal pain, colitis in the past presents the ER with nausea vomiting that began yesterday, was seen in the ER yesterday as well, complete workup performed including laboratory values and CT scan which showed gastritis and probable peptic ulcer disease.  Today she comes to the emergency department stating she is vomiting with blood in it.  Complaining of epigastric abdominal pain as well.  Alert oriented x4, GCS is 15.  Denies any fever

## 2024-04-09 NOTE — ANESTHESIA PREPROCEDURE EVALUATION
04/09/2024  Desiree Blake is a 68 y.o., female.      Pre-op Assessment    I have reviewed the Patient Summary Reports.    I have reviewed the NPO Status.   I have reviewed the Medications.     Review of Systems  Anesthesia Hx:  No problems with previous Anesthesia             Denies Family Hx of Anesthesia complications.    Denies Personal Hx of Anesthesia complications.                    Social:  Smoker       Cardiovascular:     Hypertension, well controlled   CAD  asymptomatic        PVD                              Pulmonary:   COPD, mild Asthma mild Shortness of breath  Sleep Apnea           Education provided regarding risk of obstructive sleep apnea            Renal/:   renal calculi               Hepatic/GI:   PUD, Hiatal Hernia, GERD, poorly controlled             Musculoskeletal:  Arthritis               Neurological:  Neurology Normal                                      Endocrine:  Endocrine Normal            Psych:  Psychiatric History anxiety depression              Lab Results   Component Value Date    WBC 13.27 (H) 04/08/2024    HGB 7.5 (L) 04/09/2024    HCT 23.8 (L) 04/09/2024    MCV 92 04/08/2024     04/08/2024      CMP  Sodium   Date Value Ref Range Status   04/08/2024 136 136 - 145 mmol/L Final     Potassium   Date Value Ref Range Status   04/08/2024 3.8 3.5 - 5.1 mmol/L Final     Chloride   Date Value Ref Range Status   04/08/2024 104 95 - 110 mmol/L Final     CO2   Date Value Ref Range Status   04/08/2024 25 23 - 29 mmol/L Final     Glucose   Date Value Ref Range Status   04/08/2024 143 (H) 70 - 110 mg/dL Final   01/26/2017 82 74 - 106 MG/DL Final     BUN   Date Value Ref Range Status   04/08/2024 28 (H) 8 - 23 mg/dL Final     Creatinine   Date Value Ref Range Status   04/08/2024 0.6 0.5 - 1.4 mg/dL Final     Calcium   Date Value Ref Range Status   04/08/2024 8.7 8.7 - 10.5 mg/dL  Final     Total Protein   Date Value Ref Range Status   04/08/2024 6.6 6.0 - 8.4 g/dL Final     Albumin   Date Value Ref Range Status   04/08/2024 2.5 (L) 3.5 - 5.2 g/dL Final     Total Bilirubin   Date Value Ref Range Status   04/08/2024 0.2 0.1 - 1.0 mg/dL Final     Comment:     For infants and newborns, interpretation of results should be based  on gestational age, weight and in agreement with clinical  observations.    Premature Infant recommended reference ranges:  Up to 24 hours.............<8.0 mg/dL  Up to 48 hours............<12.0 mg/dL  3-5 days..................<15.0 mg/dL  6-29 days.................<15.0 mg/dL    For patients on Eltrombopag therapy, use of Dimension [place] TBIL is   not   recommended.       Alkaline Phosphatase   Date Value Ref Range Status   04/08/2024 90 55 - 135 U/L Final     AST   Date Value Ref Range Status   04/08/2024 7 (L) 10 - 40 U/L Final     ALT   Date Value Ref Range Status   04/08/2024 12 10 - 44 U/L Final     Anion Gap   Date Value Ref Range Status   04/08/2024 7 3 - 11 mmol/L Final     eGFR   Date Value Ref Range Status   04/08/2024 >60.0 >60 mL/min/1.73 m^2 Final   01/04/2023 96 > OR = 60 mL/min/1.73m2 Final     Comment:     The eGFR is based on the CKD-EPI 2021 equation. To calculate   the new eGFR from a previous Creatinine or Cystatin C  result, go to https://www.kidney.org/professionals/  kdoqi/gfr%5Fcalculator          Physical Exam  General: Well nourished    Airway:  Mallampati: II / II  Mouth Opening: Normal  TM Distance: Normal  Tongue: Normal  Neck ROM: Normal ROM    Dental:  Periodontal disease, Partial Dentures    Chest/Lungs:  Clear to auscultation    Heart:  Rate: Normal  Rhythm: Regular Rhythm  Sounds: Normal        Anesthesia Plan  Type of Anesthesia, risks & benefits discussed:    Anesthesia Type: MAC  Intra-op Monitoring Plan: Standard ASA Monitors  Post Op Pain Control Plan: multimodal analgesia  Induction:  IV  Airway Plan: Direct  Informed Consent:  Informed consent signed with the Patient and all parties understand the risks and agree with anesthesia plan.  All questions answered.   ASA Score: 4  Day of Surgery Review of History & Physical: I have interviewed and examined the patient. I have reviewed the patient's H&P dated: There are no significant changes.     Ready For Surgery From Anesthesia Perspective.     .

## 2024-04-09 NOTE — PLAN OF CARE
04/09/24 1205   Medicare Message   Important Message from Medicare regarding Discharge Appeal Rights Given to patient/caregiver;Explained to patient/caregiver;Signed/date by patient/caregiver   Date IMM was signed 04/09/24   Time IMM was signed 1208

## 2024-04-09 NOTE — ASSESSMENT & PLAN NOTE
To endo 4/10 for EGD   CLD today  NPO midnight   Continue PPI and carafate   Tobacco cessation emphasized - currently tolerating nicotine patch   Pre-op EKG   Hgb 7.5 - transfuse per primary

## 2024-04-09 NOTE — SUBJECTIVE & OBJECTIVE
No current facility-administered medications on file prior to encounter.     Current Outpatient Medications on File Prior to Encounter   Medication Sig    albuterol (PROVENTIL) 2.5 mg /3 mL (0.083 %) nebulizer solution Take 2.5 mg by nebulization every 4 (four) hours as needed.    albuterol-ipratropium (DUO-NEB) 2.5 mg-0.5 mg/3 mL nebulizer solution Take 3 mLs by nebulization every 6 (six) hours as needed for Wheezing. Rescue    amLODIPine (NORVASC) 10 MG tablet Take 1 tablet (10 mg total) by mouth once daily.    amoxicillin-clavulanate 875-125mg (AUGMENTIN) 875-125 mg per tablet Take 1 tablet by mouth 2 (two) times daily.    busPIRone (BUSPAR) 15 MG tablet Take 1 tablet (15 mg total) by mouth 3 (three) times daily.    butalbital-acetaminophen-caffeine -40 mg (FIORICET, ESGIC) -40 mg per tablet Take 1 tablet by mouth once daily.    ciprofloxacin HCl (CIPRO) 500 MG tablet Take 1 tablet (500 mg total) by mouth every 12 (twelve) hours.    estradioL (ESTRACE) 0.01 % (0.1 mg/gram) vaginal cream Prescribed by Dr. Anahy Samuels    famotidine (PEPCID) 20 MG tablet Take 1 tablet (20 mg total) by mouth 2 (two) times daily.    gabapentin (NEURONTIN) 300 MG capsule Take 1 capsule by mouth 3 (three) times daily. Prescribed by Dr. Rodas    glycerin adult suppository Place 1 suppository rectally as needed for Constipation.    HYDROcodone-acetaminophen (NORCO)  mg per tablet Take 1 tablet by mouth every 6 (six) hours as needed for Pain.    ondansetron (ZOFRAN-ODT) 4 MG TbDL Take 2 tablets (8 mg total) by mouth 3 (three) times daily as needed (nausea/vomiting).    oxyCODONE-acetaminophen (PERCOCET) 5-325 mg per tablet Take 1 tablet by mouth 2 (two) times daily as needed. Prescribed by Dr. Rodas    pravastatin (PRAVACHOL) 20 MG tablet Take 20 mg by mouth once daily.    prochlorperazine (COMPAZINE) 10 MG tablet Take 1 tablet (10 mg total) by mouth every 6 (six) hours as needed (headache).    QUEtiapine (SEROQUEL)  300 MG Tab Take 2 tablets (600 mg total) by mouth every evening.    sucralfate (CARAFATE) 1 gram tablet Take 1 tablet (1 g total) by mouth 4 (four) times daily.    traZODone (DESYREL) 150 MG tablet Take 1 tablet (150 mg total) by mouth every evening.    TYMLOS 80 mcg (3,120 mcg/1.56 mL) PnIj Inject into the skin. Prescribed by Roxana Rai    FLUoxetine 40 MG capsule Take 1 capsule (40 mg total) by mouth once daily.    topiramate (TOPAMAX) 50 MG tablet Take 1 tablet (50 mg total) by mouth once daily.    [DISCONTINUED] clopidogreL (PLAVIX) 75 mg tablet TAKE 1 TABLET AFTER EVERY MORNING       Review of patient's allergies indicates:  No Known Allergies    Past Medical History:   Diagnosis Date    Anticoagulant long-term use     Anxiety     Arthritis     C. difficile colitis 8/6/2022    Carotid artery disease     Cervical disc disorder     Chronic back pain     COPD (chronic obstructive pulmonary disease)     Coronary artery disease     Dementia     Depression     Diarrhea, unspecified 11/1/2021    DVT (deep venous thrombosis)     CAROTID    GERD (gastroesophageal reflux disease)     Hematuria     Hiatal hernia     High cholesterol     Ill-fitting dentures     STATES DOESN'T WEAR    PVD (peripheral vascular disease)     Renal calculus     Sleep apnea     Sleep apnea     NO C PAP    Urinary frequency     Urinary urgency     Wears glasses     READING      Past Surgical History:   Procedure Laterality Date    BACK SURGERY      BREAST LUMPECTOMY Right     CAROTID ARTERY ANGIOPLASTY      CAROTID ARTERY ANGIOPLASTY      CAROTID ARTERY STENT Left     CAROTID ENDARTERECTOMY Right     CHOLECYSTECTOMY      COLONOSCOPY      CYSTOSCOPY      HYSTERECTOMY      OOPHORECTOMY      TONSILLECTOMY       Family History       Problem Relation (Age of Onset)    Cancer Mother    No Known Problems Sister, Daughter, Maternal Aunt, Maternal Uncle, Paternal Aunt, Paternal Uncle, Maternal Grandmother, Maternal Grandfather, Paternal  Grandmother, Paternal Grandfather    Stroke Father          Tobacco Use    Smoking status: Every Day     Current packs/day: 0.50     Average packs/day: 0.5 packs/day for 54.3 years (27.1 ttl pk-yrs)     Types: Cigarettes     Start date: 1970    Smokeless tobacco: Never   Substance and Sexual Activity    Alcohol use: No    Drug use: No    Sexual activity: Not on file     Review of Systems   Constitutional:  Negative for activity change, appetite change, fatigue and fever.   Respiratory:  Negative for apnea, cough, chest tightness and shortness of breath.    Cardiovascular:  Negative for chest pain.   Gastrointestinal:  Positive for abdominal pain. Negative for abdominal distention, anal bleeding, blood in stool, constipation, diarrhea, nausea, rectal pain and vomiting.   All other systems reviewed and are negative.    Objective:     Vital Signs (Most Recent):  Temp: 99.3 °F (37.4 °C) (04/09/24 1107)  Pulse: 84 (04/09/24 1107)  Resp: 20 (04/09/24 1107)  BP: 130/60 (04/09/24 1107)  SpO2: (!) 94 % (04/09/24 1107) Vital Signs (24h Range):  Temp:  [98 °F (36.7 °C)-99.3 °F (37.4 °C)] 99.3 °F (37.4 °C)  Pulse:  [84-95] 84  Resp:  [18-20] 20  SpO2:  [92 %-98 %] 94 %  BP: (111-148)/(60-76) 130/60     Weight: 50.4 kg (111 lb 3.2 oz)  Body mass index is 19.09 kg/m².     Physical Exam  Constitutional:       General: She is not in acute distress.     Appearance: She is not ill-appearing or toxic-appearing.   Cardiovascular:      Rate and Rhythm: Normal rate and regular rhythm.   Pulmonary:      Effort: Pulmonary effort is normal. No respiratory distress.   Abdominal:      General: There is no distension.      Palpations: Abdomen is soft.      Tenderness: There is abdominal tenderness (epigastric). There is no guarding.      Hernia: No hernia is present.   Skin:     Capillary Refill: Capillary refill takes less than 2 seconds.   Neurological:      Mental Status: She is alert. Mental status is at baseline.            I have  reviewed all pertinent lab results within the past 24 hours.  CBC:   Recent Labs   Lab 04/08/24  1550 04/08/24 2008 04/09/24  1151   WBC 13.27*  --   --    RBC 3.29*  --   --    HGB 9.8*   < > 7.5*   HCT 30.4*   < > 23.8*     --   --    MCV 92  --   --    MCH 29.8  --   --    MCHC 32.2  --   --     < > = values in this interval not displayed.     CMP:   Recent Labs   Lab 04/08/24  1550   *   CALCIUM 8.7   ALBUMIN 2.5*   PROT 6.6      K 3.8   CO2 25      BUN 28*   CREATININE 0.6   ALKPHOS 90   ALT 12   AST 7*   BILITOT 0.2       Significant Diagnostics:  I have reviewed all pertinent imaging results/findings within the past 24 hours.

## 2024-04-09 NOTE — HPI
67yo female with history of GERD and tobacco abuse who presents with abdominal pain and hematemesis.  Initially Presented to the ED 2 days prior to admission with abdominal pain.  CT abdomen and pelvis demonstrated possible gastritis versus peptic ulcer disease.  Discharge home with appropriate medication but re-presented 24 hours later with recurrent symptoms and also new-found hematemesis.  Hgb decreased to 9 from 12.  GS consulted for evaluation.

## 2024-04-09 NOTE — H&P
[facility] Medicine  History & Physical    Patient Name: Desiree Blake  MRN: [id number]  Patient Class: IP- Inpatient  Admission Date: 4/8/2024  Attending Physician: Kt Pozo Jr., MD   Primary Care Provider: Kt Pozo Jr., MD         Patient information was obtained from ER records.     Subjective:     Principal Problem:Gastrointestinal hemorrhage associated with peptic ulcer    Chief Complaint:   Chief Complaint   Patient presents with    Vomiting     Pt c/o vomiting dark brown emesis x2 days        HPI:   Chief Complaint   Patient presents with    Vomiting       Pt c/o vomiting dark brown emesis x2 days      68-year-old female with a history of abdominal pain, colitis in the past presents the ER with nausea vomiting that began yesterday, was seen in the ER yesterday as well, complete workup performed including laboratory values and CT scan which showed gastritis and probable peptic ulcer disease.  Today she comes to the emergency department stating she is vomiting with blood in it.  Complaining of epigastric abdominal pain as well.  Alert oriented x4, GCS is 15.  Denies any fever       Past Medical History:   Diagnosis Date    Anticoagulant long-term use     Anxiety     Arthritis     C. difficile colitis 8/6/2022    Carotid artery disease     Cervical disc disorder     Chronic back pain     COPD (chronic obstructive pulmonary disease)     Coronary artery disease     Dementia     Depression     Diarrhea, unspecified 11/1/2021    DVT (deep venous thrombosis)     CAROTID    GERD (gastroesophageal reflux disease)     Hematuria     Hiatal hernia     High cholesterol     Ill-fitting dentures     STATES DOESN'T WEAR    PVD (peripheral vascular disease)     Renal calculus     Sleep apnea     Sleep apnea     NO C PAP    Urinary frequency     Urinary urgency     Wears glasses     READING        Past Surgical History:   Procedure Laterality Date    BACK SURGERY      BREAST LUMPECTOMY Right      CAROTID ARTERY ANGIOPLASTY      CAROTID ARTERY ANGIOPLASTY      CAROTID ARTERY STENT Left     CAROTID ENDARTERECTOMY Right     CHOLECYSTECTOMY      COLONOSCOPY      CYSTOSCOPY      HYSTERECTOMY      OOPHORECTOMY      TONSILLECTOMY         Review of patient's allergies indicates:  No Known Allergies    No current facility-administered medications on file prior to encounter.     Current Outpatient Medications on File Prior to Encounter   Medication Sig    QUEtiapine (SEROQUEL) 300 MG Tab Take 2 tablets (600 mg total) by mouth every evening.    albuterol (PROVENTIL) 2.5 mg /3 mL (0.083 %) nebulizer solution Take 2.5 mg by nebulization every 4 (four) hours as needed.    albuterol-ipratropium (DUO-NEB) 2.5 mg-0.5 mg/3 mL nebulizer solution Take 3 mLs by nebulization every 6 (six) hours as needed for Wheezing. Rescue    amLODIPine (NORVASC) 10 MG tablet Take 1 tablet (10 mg total) by mouth once daily.    amoxicillin-clavulanate 875-125mg (AUGMENTIN) 875-125 mg per tablet Take 1 tablet by mouth 2 (two) times daily.    busPIRone (BUSPAR) 15 MG tablet Take 1 tablet (15 mg total) by mouth 3 (three) times daily.    butalbital-acetaminophen-caffeine -40 mg (FIORICET, ESGIC) -40 mg per tablet Take 1 tablet by mouth once daily.    ciprofloxacin HCl (CIPRO) 500 MG tablet Take 1 tablet (500 mg total) by mouth every 12 (twelve) hours.    clopidogreL (PLAVIX) 75 mg tablet TAKE 1 TABLET AFTER EVERY MORNING    estradioL (ESTRACE) 0.01 % (0.1 mg/gram) vaginal cream Prescribed by Dr. Anahy Samuels    famotidine (PEPCID) 20 MG tablet Take 1 tablet (20 mg total) by mouth 2 (two) times daily.    FLUoxetine 40 MG capsule Take 1 capsule (40 mg total) by mouth once daily.    gabapentin (NEURONTIN) 300 MG capsule Take 1 capsule by mouth 3 (three) times daily. Prescribed by Dr. Rodas    glycerin adult suppository Place 1 suppository rectally as needed for Constipation.    HYDROcodone-acetaminophen (NORCO)  mg per tablet  Take 1 tablet by mouth every 6 (six) hours as needed for Pain.    ondansetron (ZOFRAN-ODT) 4 MG TbDL Take 2 tablets (8 mg total) by mouth 3 (three) times daily as needed (nausea/vomiting).    oxyCODONE-acetaminophen (PERCOCET) 5-325 mg per tablet Take 1 tablet by mouth 2 (two) times daily as needed. Prescribed by Dr. Rodas    pravastatin (PRAVACHOL) 20 MG tablet Take 20 mg by mouth once daily.    prochlorperazine (COMPAZINE) 10 MG tablet Take 1 tablet (10 mg total) by mouth every 6 (six) hours as needed (headache).    sucralfate (CARAFATE) 1 gram tablet Take 1 tablet (1 g total) by mouth 4 (four) times daily.    topiramate (TOPAMAX) 50 MG tablet Take 1 tablet (50 mg total) by mouth once daily.    traZODone (DESYREL) 150 MG tablet Take 1 tablet (150 mg total) by mouth every evening.    TYMLOS 80 mcg (3,120 mcg/1.56 mL) PnIj Inject into the skin. Prescribed by Roxana Rai     Family History       Problem Relation (Age of Onset)    Cancer Mother    No Known Problems Sister, Daughter, Maternal Aunt, Maternal Uncle, Paternal Aunt, Paternal Uncle, Maternal Grandmother, Maternal Grandfather, Paternal Grandmother, Paternal Grandfather    Stroke Father          Tobacco Use    Smoking status: Every Day     Current packs/day: 0.50     Average packs/day: 0.5 packs/day for 54.3 years (27.1 ttl pk-yrs)     Types: Cigarettes     Start date: 1970    Smokeless tobacco: Never   Substance and Sexual Activity    Alcohol use: No    Drug use: No    Sexual activity: Not on file     Review of Systems   Constitutional:  Negative for chills and fever.   HENT:  Negative for rhinorrhea, sneezing and sore throat.    Eyes:  Negative for pain and visual disturbance.   Respiratory:  Negative for cough and shortness of breath.    Cardiovascular:  Negative for chest pain.   Gastrointestinal:  Positive for abdominal pain, nausea and vomiting. Negative for constipation and diarrhea.   Endocrine: Negative for cold intolerance and heat intolerance.    Genitourinary:  Negative for difficulty urinating.   Musculoskeletal:  Negative for arthralgias and joint swelling.   Skin:  Negative for rash.   Allergic/Immunologic: Negative for environmental allergies.   Neurological:  Negative for dizziness, syncope and weakness.   Hematological:  Does not bruise/bleed easily.   Psychiatric/Behavioral:  Negative for dysphoric mood. The patient is not nervous/anxious.      Objective:     Vital Signs (Most Recent):  Temp: 99.3 °F (37.4 °C) (04/09/24 1107)  Pulse: 84 (04/09/24 1107)  Resp: 20 (04/09/24 1107)  BP: 130/60 (04/09/24 1107)  SpO2: (!) 94 % (04/09/24 1107) Vital Signs (24h Range):  Temp:  [98 °F (36.7 °C)-99.3 °F (37.4 °C)] 99.3 °F (37.4 °C)  Pulse:  [84-95] 84  Resp:  [18-20] 20  SpO2:  [92 %-98 %] 94 %  BP: (111-148)/(60-76) 130/60     Weight: 50.4 kg (111 lb 3.2 oz)  Body mass index is 19.09 kg/m².     Physical Exam  Vitals and nursing note reviewed.   Constitutional:       Appearance: Normal appearance.   HENT:      Head: Normocephalic and atraumatic.      Nose: Nose normal.   Eyes:      Extraocular Movements: Extraocular movements intact.      Pupils: Pupils are equal, round, and reactive to light.   Cardiovascular:      Rate and Rhythm: Normal rate and regular rhythm.      Heart sounds: No murmur heard.     No friction rub. No gallop.   Pulmonary:      Effort: Pulmonary effort is normal.      Breath sounds: Normal breath sounds.   Abdominal:      General: Abdomen is flat. Bowel sounds are normal.      Palpations: Abdomen is soft.      Tenderness: There is abdominal tenderness. There is no guarding or rebound.   Musculoskeletal:         General: No swelling or deformity.      Cervical back: Normal range of motion and neck supple.   Skin:     General: Skin is warm and dry.      Capillary Refill: Capillary refill takes less than 2 seconds.   Neurological:      General: No focal deficit present.      Mental Status: She is alert and oriented to person, place, and  time.   Psychiatric:         Mood and Affect: Mood normal.         Behavior: Behavior normal.              CRANIAL NERVES     CN III, IV, VI   Pupils are equal, round, and reactive to light.       Significant Labs: All pertinent labs within the past 24 hours have been reviewed.    Significant Imaging: I have reviewed all pertinent imaging results/findings within the past 24 hours.  Assessment/Plan:     * Gastrointestinal hemorrhage associated with peptic ulcer  Provide PPI and Carafate.  Continue to trend hemoglobin      PUD (peptic ulcer disease)  Visible by CT scan.  Provide Protonix at b.i.d. dosing in addition to Carafate.      Symptomatic anemia  Patient's anemia is currently uncontrolled. Has received 2 units of PRBCs on 4/9/24 . Etiology likely d/t acute blood loss which was from PUD  Current CBC reviewed-   Lab Results   Component Value Date    HGB 7.5 (L) 04/09/2024    HCT 23.8 (L) 04/09/2024     Monitor serial CBC and transfuse if patient becomes hemodynamically unstable, symptomatic or H/H drops below 7/21.    Tobacco abuse  Patient counseled on health consequences associated with tobacco abuse.  She endorses understanding.  Nicotine patch will be provided if necessary as to avoid cravings.        Anxiety  Continue home medical therapy.          VTE Risk Mitigation (From admission, onward)           Ordered     IP VTE HIGH RISK PATIENT  Once         04/08/24 1631     Place sequential compression device  Until discontinued         04/08/24 1631     Place VANDANA hose  Until discontinued         04/08/24 1631                                    Kt Pozo Jr, MD  Department of Hospital Medicine  [facility]

## 2024-04-09 NOTE — PLAN OF CARE
Plan of care reviewed with patient. Pt is free of falls and injury. NADN, VSS, afebrile. Pt tolerating medications well, IVF and Protonix infusing. Pain controlled with PRN pain meds. Questions and concerns addressed. NPO since MN for EGD this AM.    Problem: Adult Inpatient Plan of Care  Goal: Plan of Care Review  Outcome: Ongoing, Progressing  Goal: Patient-Specific Goal (Individualized)  Outcome: Ongoing, Progressing  Goal: Absence of Hospital-Acquired Illness or Injury  Outcome: Ongoing, Progressing  Goal: Optimal Comfort and Wellbeing  Outcome: Ongoing, Progressing  Goal: Readiness for Transition of Care  Outcome: Ongoing, Progressing     Problem: Behavioral Health Comorbidity  Goal: Maintenance of Behavioral Health Symptom Control  Outcome: Ongoing, Progressing     Problem: COPD (Chronic Obstructive Pulmonary Disease) Comorbidity  Goal: Maintenance of COPD Symptom Control  Outcome: Ongoing, Progressing     Problem: Hypertension Comorbidity  Goal: Blood Pressure in Desired Range  Outcome: Ongoing, Progressing     Problem: Obstructive Sleep Apnea Risk or Actual Comorbidity Management  Goal: Unobstructed Breathing During Sleep  Outcome: Ongoing, Progressing     Problem: Nausea and Vomiting  Goal: Fluid and Electrolyte Balance  Outcome: Ongoing, Progressing     Problem: Anemia  Goal: Anemia Symptom Improvement  Outcome: Ongoing, Progressing

## 2024-04-09 NOTE — H&P (VIEW-ONLY)
[facility]  General Surgery  Consult Note    Patient Name: Desiree Blake  MRN: [id number]  Code Status: Full Code  Admission Date: 4/8/2024  Hospital Length of Stay: 1 days  Attending Physician: Kt Pozo Jr., MD  Primary Care Provider: Kt Pozo Jr., MD    Patient information was obtained from patient, past medical records, and ER records.     Consults  Subjective:     Principal Problem: Gastrointestinal hemorrhage associated with peptic ulcer    History of Present Illness: 67yo female with history of GERD and tobacco abuse who presents with abdominal pain and hematemesis.  Initially Presented to the ED 2 days prior to admission with abdominal pain.  CT abdomen and pelvis demonstrated possible gastritis versus peptic ulcer disease.  Discharge home with appropriate medication but re-presented 24 hours later with recurrent symptoms and also new-found hematemesis.  Hgb decreased to 9 from 12.  GS consulted for evaluation.     No current facility-administered medications on file prior to encounter.     Current Outpatient Medications on File Prior to Encounter   Medication Sig    albuterol (PROVENTIL) 2.5 mg /3 mL (0.083 %) nebulizer solution Take 2.5 mg by nebulization every 4 (four) hours as needed.    albuterol-ipratropium (DUO-NEB) 2.5 mg-0.5 mg/3 mL nebulizer solution Take 3 mLs by nebulization every 6 (six) hours as needed for Wheezing. Rescue    amLODIPine (NORVASC) 10 MG tablet Take 1 tablet (10 mg total) by mouth once daily.    amoxicillin-clavulanate 875-125mg (AUGMENTIN) 875-125 mg per tablet Take 1 tablet by mouth 2 (two) times daily.    busPIRone (BUSPAR) 15 MG tablet Take 1 tablet (15 mg total) by mouth 3 (three) times daily.    butalbital-acetaminophen-caffeine -40 mg (FIORICET, ESGIC) -40 mg per tablet Take 1 tablet by mouth once daily.    ciprofloxacin HCl (CIPRO) 500 MG tablet Take 1 tablet (500 mg total) by mouth every 12 (twelve) hours.    estradioL (ESTRACE) 0.01 %  (0.1 mg/gram) vaginal cream Prescribed by Dr. Anahy Samuels    famotidine (PEPCID) 20 MG tablet Take 1 tablet (20 mg total) by mouth 2 (two) times daily.    gabapentin (NEURONTIN) 300 MG capsule Take 1 capsule by mouth 3 (three) times daily. Prescribed by Dr. Rodas    glycerin adult suppository Place 1 suppository rectally as needed for Constipation.    HYDROcodone-acetaminophen (NORCO)  mg per tablet Take 1 tablet by mouth every 6 (six) hours as needed for Pain.    ondansetron (ZOFRAN-ODT) 4 MG TbDL Take 2 tablets (8 mg total) by mouth 3 (three) times daily as needed (nausea/vomiting).    oxyCODONE-acetaminophen (PERCOCET) 5-325 mg per tablet Take 1 tablet by mouth 2 (two) times daily as needed. Prescribed by Dr. Rodas    pravastatin (PRAVACHOL) 20 MG tablet Take 20 mg by mouth once daily.    prochlorperazine (COMPAZINE) 10 MG tablet Take 1 tablet (10 mg total) by mouth every 6 (six) hours as needed (headache).    QUEtiapine (SEROQUEL) 300 MG Tab Take 2 tablets (600 mg total) by mouth every evening.    sucralfate (CARAFATE) 1 gram tablet Take 1 tablet (1 g total) by mouth 4 (four) times daily.    traZODone (DESYREL) 150 MG tablet Take 1 tablet (150 mg total) by mouth every evening.    TYMLOS 80 mcg (3,120 mcg/1.56 mL) PnIj Inject into the skin. Prescribed by Roxana Rai    FLUoxetine 40 MG capsule Take 1 capsule (40 mg total) by mouth once daily.    topiramate (TOPAMAX) 50 MG tablet Take 1 tablet (50 mg total) by mouth once daily.    [DISCONTINUED] clopidogreL (PLAVIX) 75 mg tablet TAKE 1 TABLET AFTER EVERY MORNING       Review of patient's allergies indicates:  No Known Allergies    Past Medical History:   Diagnosis Date    Anticoagulant long-term use     Anxiety     Arthritis     C. difficile colitis 8/6/2022    Carotid artery disease     Cervical disc disorder     Chronic back pain     COPD (chronic obstructive pulmonary disease)     Coronary artery disease     Dementia     Depression     Diarrhea,  unspecified 11/1/2021    DVT (deep venous thrombosis)     CAROTID    GERD (gastroesophageal reflux disease)     Hematuria     Hiatal hernia     High cholesterol     Ill-fitting dentures     STATES DOESN'T WEAR    PVD (peripheral vascular disease)     Renal calculus     Sleep apnea     Sleep apnea     NO C PAP    Urinary frequency     Urinary urgency     Wears glasses     READING      Past Surgical History:   Procedure Laterality Date    BACK SURGERY      BREAST LUMPECTOMY Right     CAROTID ARTERY ANGIOPLASTY      CAROTID ARTERY ANGIOPLASTY      CAROTID ARTERY STENT Left     CAROTID ENDARTERECTOMY Right     CHOLECYSTECTOMY      COLONOSCOPY      CYSTOSCOPY      HYSTERECTOMY      OOPHORECTOMY      TONSILLECTOMY       Family History       Problem Relation (Age of Onset)    Cancer Mother    No Known Problems Sister, Daughter, Maternal Aunt, Maternal Uncle, Paternal Aunt, Paternal Uncle, Maternal Grandmother, Maternal Grandfather, Paternal Grandmother, Paternal Grandfather    Stroke Father          Tobacco Use    Smoking status: Every Day     Current packs/day: 0.50     Average packs/day: 0.5 packs/day for 54.3 years (27.1 ttl pk-yrs)     Types: Cigarettes     Start date: 1970    Smokeless tobacco: Never   Substance and Sexual Activity    Alcohol use: No    Drug use: No    Sexual activity: Not on file     Review of Systems   Constitutional:  Negative for activity change, appetite change, fatigue and fever.   Respiratory:  Negative for apnea, cough, chest tightness and shortness of breath.    Cardiovascular:  Negative for chest pain.   Gastrointestinal:  Positive for abdominal pain. Negative for abdominal distention, anal bleeding, blood in stool, constipation, diarrhea, nausea, rectal pain and vomiting.   All other systems reviewed and are negative.    Objective:     Vital Signs (Most Recent):  Temp: 99.3 °F (37.4 °C) (04/09/24 1107)  Pulse: 84 (04/09/24 1107)  Resp: 20 (04/09/24 1107)  BP: 130/60 (04/09/24 1107)  SpO2:  (!) 94 % (04/09/24 1107) Vital Signs (24h Range):  Temp:  [98 °F (36.7 °C)-99.3 °F (37.4 °C)] 99.3 °F (37.4 °C)  Pulse:  [84-95] 84  Resp:  [18-20] 20  SpO2:  [92 %-98 %] 94 %  BP: (111-148)/(60-76) 130/60     Weight: 50.4 kg (111 lb 3.2 oz)  Body mass index is 19.09 kg/m².     Physical Exam  Constitutional:       General: She is not in acute distress.     Appearance: She is not ill-appearing or toxic-appearing.   Cardiovascular:      Rate and Rhythm: Normal rate and regular rhythm.   Pulmonary:      Effort: Pulmonary effort is normal. No respiratory distress.   Abdominal:      General: There is no distension.      Palpations: Abdomen is soft.      Tenderness: There is abdominal tenderness (epigastric). There is no guarding.      Hernia: No hernia is present.   Skin:     Capillary Refill: Capillary refill takes less than 2 seconds.   Neurological:      Mental Status: She is alert. Mental status is at baseline.            I have reviewed all pertinent lab results within the past 24 hours.  CBC:   Recent Labs   Lab 04/08/24  1550 04/08/24 2008 04/09/24  1151   WBC 13.27*  --   --    RBC 3.29*  --   --    HGB 9.8*   < > 7.5*   HCT 30.4*   < > 23.8*     --   --    MCV 92  --   --    MCH 29.8  --   --    MCHC 32.2  --   --     < > = values in this interval not displayed.     CMP:   Recent Labs   Lab 04/08/24  1550   *   CALCIUM 8.7   ALBUMIN 2.5*   PROT 6.6      K 3.8   CO2 25      BUN 28*   CREATININE 0.6   ALKPHOS 90   ALT 12   AST 7*   BILITOT 0.2       Significant Diagnostics:  I have reviewed all pertinent imaging results/findings within the past 24 hours.    Assessment/Plan:     * Gastrointestinal hemorrhage associated with peptic ulcer  To endo 4/10 for EGD   CLD today  NPO midnight   Continue PPI and carafate   Tobacco cessation emphasized - currently tolerating nicotine patch   Pre-op EKG   Hgb 7.5 - transfuse per primary       VTE Risk Mitigation (From admission, onward)            Ordered     IP VTE HIGH RISK PATIENT  Once         04/08/24 1631     Place sequential compression device  Until discontinued         04/08/24 1631     Place VANDANA hose  Until discontinued         04/08/24 1631                    Thank you for your consult. I will follow-up with patient. Please contact us if you have any additional questions.    Meg Ayon MD  General Surgery  [facility]

## 2024-04-09 NOTE — ASSESSMENT & PLAN NOTE
Patient's anemia is currently uncontrolled. Has received 2 units of PRBCs on 4/9/24 . Etiology likely d/t acute blood loss which was from PUD  Current CBC reviewed-   Lab Results   Component Value Date    HGB 7.5 (L) 04/09/2024    HCT 23.8 (L) 04/09/2024     Monitor serial CBC and transfuse if patient becomes hemodynamically unstable, symptomatic or H/H drops below 7/21.

## 2024-04-09 NOTE — SUBJECTIVE & OBJECTIVE
Past Medical History:   Diagnosis Date    Anticoagulant long-term use     Anxiety     Arthritis     C. difficile colitis 8/6/2022    Carotid artery disease     Cervical disc disorder     Chronic back pain     COPD (chronic obstructive pulmonary disease)     Coronary artery disease     Dementia     Depression     Diarrhea, unspecified 11/1/2021    DVT (deep venous thrombosis)     CAROTID    GERD (gastroesophageal reflux disease)     Hematuria     Hiatal hernia     High cholesterol     Ill-fitting dentures     STATES DOESN'T WEAR    PVD (peripheral vascular disease)     Renal calculus     Sleep apnea     Sleep apnea     NO C PAP    Urinary frequency     Urinary urgency     Wears glasses     READING        Past Surgical History:   Procedure Laterality Date    BACK SURGERY      BREAST LUMPECTOMY Right     CAROTID ARTERY ANGIOPLASTY      CAROTID ARTERY ANGIOPLASTY      CAROTID ARTERY STENT Left     CAROTID ENDARTERECTOMY Right     CHOLECYSTECTOMY      COLONOSCOPY      CYSTOSCOPY      HYSTERECTOMY      OOPHORECTOMY      TONSILLECTOMY         Review of patient's allergies indicates:  No Known Allergies    No current facility-administered medications on file prior to encounter.     Current Outpatient Medications on File Prior to Encounter   Medication Sig    QUEtiapine (SEROQUEL) 300 MG Tab Take 2 tablets (600 mg total) by mouth every evening.    albuterol (PROVENTIL) 2.5 mg /3 mL (0.083 %) nebulizer solution Take 2.5 mg by nebulization every 4 (four) hours as needed.    albuterol-ipratropium (DUO-NEB) 2.5 mg-0.5 mg/3 mL nebulizer solution Take 3 mLs by nebulization every 6 (six) hours as needed for Wheezing. Rescue    amLODIPine (NORVASC) 10 MG tablet Take 1 tablet (10 mg total) by mouth once daily.    amoxicillin-clavulanate 875-125mg (AUGMENTIN) 875-125 mg per tablet Take 1 tablet by mouth 2 (two) times daily.    busPIRone (BUSPAR) 15 MG tablet Take 1 tablet (15 mg total) by mouth 3 (three) times daily.     butalbital-acetaminophen-caffeine -40 mg (FIORICET, ESGIC) -40 mg per tablet Take 1 tablet by mouth once daily.    ciprofloxacin HCl (CIPRO) 500 MG tablet Take 1 tablet (500 mg total) by mouth every 12 (twelve) hours.    clopidogreL (PLAVIX) 75 mg tablet TAKE 1 TABLET AFTER EVERY MORNING    estradioL (ESTRACE) 0.01 % (0.1 mg/gram) vaginal cream Prescribed by Dr. Anahy Samuels    famotidine (PEPCID) 20 MG tablet Take 1 tablet (20 mg total) by mouth 2 (two) times daily.    FLUoxetine 40 MG capsule Take 1 capsule (40 mg total) by mouth once daily.    gabapentin (NEURONTIN) 300 MG capsule Take 1 capsule by mouth 3 (three) times daily. Prescribed by Dr. Rodas    glycerin adult suppository Place 1 suppository rectally as needed for Constipation.    HYDROcodone-acetaminophen (NORCO)  mg per tablet Take 1 tablet by mouth every 6 (six) hours as needed for Pain.    ondansetron (ZOFRAN-ODT) 4 MG TbDL Take 2 tablets (8 mg total) by mouth 3 (three) times daily as needed (nausea/vomiting).    oxyCODONE-acetaminophen (PERCOCET) 5-325 mg per tablet Take 1 tablet by mouth 2 (two) times daily as needed. Prescribed by Dr. Rodas    pravastatin (PRAVACHOL) 20 MG tablet Take 20 mg by mouth once daily.    prochlorperazine (COMPAZINE) 10 MG tablet Take 1 tablet (10 mg total) by mouth every 6 (six) hours as needed (headache).    sucralfate (CARAFATE) 1 gram tablet Take 1 tablet (1 g total) by mouth 4 (four) times daily.    topiramate (TOPAMAX) 50 MG tablet Take 1 tablet (50 mg total) by mouth once daily.    traZODone (DESYREL) 150 MG tablet Take 1 tablet (150 mg total) by mouth every evening.    TYMLOS 80 mcg (3,120 mcg/1.56 mL) PnIj Inject into the skin. Prescribed by Roxana Rai     Family History       Problem Relation (Age of Onset)    Cancer Mother    No Known Problems Sister, Daughter, Maternal Aunt, Maternal Uncle, Paternal Aunt, Paternal Uncle, Maternal Grandmother, Maternal Grandfather, Paternal  Grandmother, Paternal Grandfather    Stroke Father          Tobacco Use    Smoking status: Every Day     Current packs/day: 0.50     Average packs/day: 0.5 packs/day for 54.3 years (27.1 ttl pk-yrs)     Types: Cigarettes     Start date: 1970    Smokeless tobacco: Never   Substance and Sexual Activity    Alcohol use: No    Drug use: No    Sexual activity: Not on file     Review of Systems   Constitutional:  Negative for chills and fever.   HENT:  Negative for rhinorrhea, sneezing and sore throat.    Eyes:  Negative for pain and visual disturbance.   Respiratory:  Negative for cough and shortness of breath.    Cardiovascular:  Negative for chest pain.   Gastrointestinal:  Positive for abdominal pain, nausea and vomiting. Negative for constipation and diarrhea.   Endocrine: Negative for cold intolerance and heat intolerance.   Genitourinary:  Negative for difficulty urinating.   Musculoskeletal:  Negative for arthralgias and joint swelling.   Skin:  Negative for rash.   Allergic/Immunologic: Negative for environmental allergies.   Neurological:  Negative for dizziness, syncope and weakness.   Hematological:  Does not bruise/bleed easily.   Psychiatric/Behavioral:  Negative for dysphoric mood. The patient is not nervous/anxious.      Objective:     Vital Signs (Most Recent):  Temp: 99.3 °F (37.4 °C) (04/09/24 1107)  Pulse: 84 (04/09/24 1107)  Resp: 20 (04/09/24 1107)  BP: 130/60 (04/09/24 1107)  SpO2: (!) 94 % (04/09/24 1107) Vital Signs (24h Range):  Temp:  [98 °F (36.7 °C)-99.3 °F (37.4 °C)] 99.3 °F (37.4 °C)  Pulse:  [84-95] 84  Resp:  [18-20] 20  SpO2:  [92 %-98 %] 94 %  BP: (111-148)/(60-76) 130/60     Weight: 50.4 kg (111 lb 3.2 oz)  Body mass index is 19.09 kg/m².     Physical Exam  Vitals and nursing note reviewed.   Constitutional:       Appearance: Normal appearance.   HENT:      Head: Normocephalic and atraumatic.      Nose: Nose normal.   Eyes:      Extraocular Movements: Extraocular movements intact.       Pupils: Pupils are equal, round, and reactive to light.   Cardiovascular:      Rate and Rhythm: Normal rate and regular rhythm.      Heart sounds: No murmur heard.     No friction rub. No gallop.   Pulmonary:      Effort: Pulmonary effort is normal.      Breath sounds: Normal breath sounds.   Abdominal:      General: Abdomen is flat. Bowel sounds are normal.      Palpations: Abdomen is soft.      Tenderness: There is abdominal tenderness. There is no guarding or rebound.   Musculoskeletal:         General: No swelling or deformity.      Cervical back: Normal range of motion and neck supple.   Skin:     General: Skin is warm and dry.      Capillary Refill: Capillary refill takes less than 2 seconds.   Neurological:      General: No focal deficit present.      Mental Status: She is alert and oriented to person, place, and time.   Psychiatric:         Mood and Affect: Mood normal.         Behavior: Behavior normal.              CRANIAL NERVES     CN III, IV, VI   Pupils are equal, round, and reactive to light.       Significant Labs: All pertinent labs within the past 24 hours have been reviewed.    Significant Imaging: I have reviewed all pertinent imaging results/findings within the past 24 hours.

## 2024-04-10 ENCOUNTER — ANESTHESIA (OUTPATIENT)
Dept: ENDOSCOPY | Facility: HOSPITAL | Age: 69
DRG: 378 | End: 2024-04-10
Payer: MEDICARE

## 2024-04-10 LAB
ALBUMIN SERPL BCP-MCNC: 2.3 G/DL (ref 3.5–5.2)
ALP SERPL-CCNC: 79 U/L (ref 55–135)
ALT SERPL W/O P-5'-P-CCNC: 12 U/L (ref 10–44)
ANION GAP SERPL CALC-SCNC: 4 MMOL/L (ref 3–11)
AST SERPL-CCNC: 10 U/L (ref 10–40)
BASOPHILS # BLD AUTO: 0.03 K/UL (ref 0–0.2)
BASOPHILS NFR BLD: 0.3 % (ref 0–1.9)
BILIRUB SERPL-MCNC: 0.3 MG/DL (ref 0.1–1)
BUN SERPL-MCNC: 5 MG/DL (ref 8–23)
CALCIUM SERPL-MCNC: 8.6 MG/DL (ref 8.7–10.5)
CHLORIDE SERPL-SCNC: 110 MMOL/L (ref 95–110)
CO2 SERPL-SCNC: 25 MMOL/L (ref 23–29)
CREAT SERPL-MCNC: 0.2 MG/DL (ref 0.5–1.4)
DIFFERENTIAL METHOD BLD: ABNORMAL
EOSINOPHIL # BLD AUTO: 0.1 K/UL (ref 0–0.5)
EOSINOPHIL NFR BLD: 1.1 % (ref 0–8)
ERYTHROCYTE [DISTWIDTH] IN BLOOD BY AUTOMATED COUNT: 18.6 % (ref 11.5–14.5)
EST. GFR  (NO RACE VARIABLE): >60 ML/MIN/1.73 M^2
GLUCOSE SERPL-MCNC: 83 MG/DL (ref 70–110)
HCT VFR BLD AUTO: 30.8 % (ref 37–48.5)
HGB BLD-MCNC: 10.1 G/DL (ref 12–16)
IMM GRANULOCYTES # BLD AUTO: 0.05 K/UL (ref 0–0.04)
IMM GRANULOCYTES NFR BLD AUTO: 0.6 % (ref 0–0.5)
LYMPHOCYTES # BLD AUTO: 0.8 K/UL (ref 1–4.8)
LYMPHOCYTES NFR BLD: 9 % (ref 18–48)
MAGNESIUM SERPL-MCNC: 2.1 MG/DL (ref 1.6–2.6)
MCH RBC QN AUTO: 27.7 PG (ref 27–31)
MCHC RBC AUTO-ENTMCNC: 32.8 G/DL (ref 32–36)
MCV RBC AUTO: 85 FL (ref 82–98)
MONOCYTES # BLD AUTO: 0.5 K/UL (ref 0.3–1)
MONOCYTES NFR BLD: 5.7 % (ref 4–15)
NEUTROPHILS # BLD AUTO: 7.3 K/UL (ref 1.8–7.7)
NEUTROPHILS NFR BLD: 83.3 % (ref 38–73)
NRBC BLD-RTO: 0 /100 WBC
PLATELET # BLD AUTO: 273 K/UL (ref 150–450)
PMV BLD AUTO: 9.3 FL (ref 9.2–12.9)
POTASSIUM SERPL-SCNC: 3.4 MMOL/L (ref 3.5–5.1)
PROT SERPL-MCNC: 5.8 G/DL (ref 6–8.4)
RBC # BLD AUTO: 3.64 M/UL (ref 4–5.4)
SODIUM SERPL-SCNC: 139 MMOL/L (ref 136–145)
WBC # BLD AUTO: 8.77 K/UL (ref 3.9–12.7)

## 2024-04-10 PROCEDURE — 25000003 PHARM REV CODE 250: Performed by: STUDENT IN AN ORGANIZED HEALTH CARE EDUCATION/TRAINING PROGRAM

## 2024-04-10 PROCEDURE — 80053 COMPREHEN METABOLIC PANEL: CPT | Performed by: INTERNAL MEDICINE

## 2024-04-10 PROCEDURE — 99232 SBSQ HOSP IP/OBS MODERATE 35: CPT | Mod: GT,25,,

## 2024-04-10 PROCEDURE — 37000009 HC ANESTHESIA EA ADD 15 MINS: Performed by: STUDENT IN AN ORGANIZED HEALTH CARE EDUCATION/TRAINING PROGRAM

## 2024-04-10 PROCEDURE — 99900031 HC PATIENT EDUCATION (STAT)

## 2024-04-10 PROCEDURE — 43250 EGD CAUTERY TUMOR POLYP: CPT | Mod: ,,, | Performed by: STUDENT IN AN ORGANIZED HEALTH CARE EDUCATION/TRAINING PROGRAM

## 2024-04-10 PROCEDURE — 63600175 PHARM REV CODE 636 W HCPCS: Mod: JZ,JG | Performed by: INTERNAL MEDICINE

## 2024-04-10 PROCEDURE — 37000008 HC ANESTHESIA 1ST 15 MINUTES: Performed by: STUDENT IN AN ORGANIZED HEALTH CARE EDUCATION/TRAINING PROGRAM

## 2024-04-10 PROCEDURE — 25000003 PHARM REV CODE 250: Performed by: INTERNAL MEDICINE

## 2024-04-10 PROCEDURE — 88305 TISSUE EXAM BY PATHOLOGIST: CPT | Mod: TC | Performed by: NURSE ANESTHETIST, CERTIFIED REGISTERED

## 2024-04-10 PROCEDURE — 63600175 PHARM REV CODE 636 W HCPCS: Performed by: INTERNAL MEDICINE

## 2024-04-10 PROCEDURE — S4991 NICOTINE PATCH NONLEGEND: HCPCS | Performed by: INTERNAL MEDICINE

## 2024-04-10 PROCEDURE — 99900035 HC TECH TIME PER 15 MIN (STAT)

## 2024-04-10 PROCEDURE — C9113 INJ PANTOPRAZOLE SODIUM, VIA: HCPCS | Performed by: INTERNAL MEDICINE

## 2024-04-10 PROCEDURE — 88342 IMHCHEM/IMCYTCHM 1ST ANTB: CPT | Mod: TC | Performed by: NURSE ANESTHETIST, CERTIFIED REGISTERED

## 2024-04-10 PROCEDURE — 63600175 PHARM REV CODE 636 W HCPCS: Performed by: NURSE ANESTHETIST, CERTIFIED REGISTERED

## 2024-04-10 PROCEDURE — 43250 EGD CAUTERY TUMOR POLYP: CPT | Performed by: STUDENT IN AN ORGANIZED HEALTH CARE EDUCATION/TRAINING PROGRAM

## 2024-04-10 PROCEDURE — 94761 N-INVAS EAR/PLS OXIMETRY MLT: CPT

## 2024-04-10 PROCEDURE — 0DB68ZX EXCISION OF STOMACH, VIA NATURAL OR ARTIFICIAL OPENING ENDOSCOPIC, DIAGNOSTIC: ICD-10-PCS | Performed by: STUDENT IN AN ORGANIZED HEALTH CARE EDUCATION/TRAINING PROGRAM

## 2024-04-10 PROCEDURE — 36415 COLL VENOUS BLD VENIPUNCTURE: CPT | Performed by: INTERNAL MEDICINE

## 2024-04-10 PROCEDURE — 27000221 HC OXYGEN, UP TO 24 HOURS

## 2024-04-10 PROCEDURE — 85025 COMPLETE CBC W/AUTO DIFF WBC: CPT | Performed by: INTERNAL MEDICINE

## 2024-04-10 PROCEDURE — 83735 ASSAY OF MAGNESIUM: CPT | Performed by: INTERNAL MEDICINE

## 2024-04-10 PROCEDURE — 63600175 PHARM REV CODE 636 W HCPCS: Performed by: STUDENT IN AN ORGANIZED HEALTH CARE EDUCATION/TRAINING PROGRAM

## 2024-04-10 PROCEDURE — 27201423 OPTIME MED/SURG SUP & DEVICES STERILE SUPPLY: Performed by: STUDENT IN AN ORGANIZED HEALTH CARE EDUCATION/TRAINING PROGRAM

## 2024-04-10 RX ORDER — SUCRALFATE 1 G/1
1 TABLET ORAL 4 TIMES DAILY
Qty: 120 TABLET | Refills: 0 | OUTPATIENT
Start: 2024-04-10 | End: 2024-04-30

## 2024-04-10 RX ORDER — CHOLESTYRAMINE 4 G/9G
4 POWDER, FOR SUSPENSION ORAL
Qty: 90 PACKET | Refills: 2 | Status: SHIPPED | OUTPATIENT
Start: 2024-04-10 | End: 2024-05-28

## 2024-04-10 RX ORDER — HYDROCODONE BITARTRATE AND ACETAMINOPHEN 10; 325 MG/1; MG/1
1 TABLET ORAL EVERY 4 HOURS PRN
Status: DISCONTINUED | OUTPATIENT
Start: 2024-04-10 | End: 2024-04-10 | Stop reason: HOSPADM

## 2024-04-10 RX ORDER — PROPOFOL 10 MG/ML
INJECTION, EMULSION INTRAVENOUS
Status: DISCONTINUED | OUTPATIENT
Start: 2024-04-10 | End: 2024-04-10

## 2024-04-10 RX ORDER — PANTOPRAZOLE SODIUM 40 MG/1
40 TABLET, DELAYED RELEASE ORAL 2 TIMES DAILY
Qty: 60 TABLET | Refills: 2 | Status: SHIPPED | OUTPATIENT
Start: 2024-04-10

## 2024-04-10 RX ADMIN — MORPHINE SULFATE 2 MG: 2 INJECTION, SOLUTION INTRAMUSCULAR; INTRAVENOUS at 09:04

## 2024-04-10 RX ADMIN — MORPHINE SULFATE 2 MG: 2 INJECTION, SOLUTION INTRAMUSCULAR; INTRAVENOUS at 04:04

## 2024-04-10 RX ADMIN — HYDROMORPHONE HYDROCHLORIDE 0.5 MG: 0.5 INJECTION, SOLUTION INTRAMUSCULAR; INTRAVENOUS; SUBCUTANEOUS at 02:04

## 2024-04-10 RX ADMIN — PANTOPRAZOLE SODIUM 8 MG/HR: 40 INJECTION, POWDER, FOR SOLUTION INTRAVENOUS at 05:04

## 2024-04-10 RX ADMIN — HYDROCODONE BITARTRATE AND ACETAMINOPHEN 1 TABLET: 10; 325 TABLET ORAL at 01:04

## 2024-04-10 RX ADMIN — PROPOFOL 30 MG: 10 INJECTION, EMULSION INTRAVENOUS at 11:04

## 2024-04-10 RX ADMIN — HYDROMORPHONE HYDROCHLORIDE 0.5 MG: 0.5 INJECTION, SOLUTION INTRAMUSCULAR; INTRAVENOUS; SUBCUTANEOUS at 07:04

## 2024-04-10 RX ADMIN — FLUOXETINE HYDROCHLORIDE 40 MG: 20 CAPSULE ORAL at 08:04

## 2024-04-10 RX ADMIN — NICOTINE 1 PATCH: 21 PATCH, EXTENDED RELEASE TRANSDERMAL at 08:04

## 2024-04-10 RX ADMIN — ONDANSETRON 8 MG: 2 INJECTION INTRAMUSCULAR; INTRAVENOUS at 04:04

## 2024-04-10 RX ADMIN — HYDROMORPHONE HYDROCHLORIDE 0.5 MG: 0.5 INJECTION, SOLUTION INTRAMUSCULAR; INTRAVENOUS; SUBCUTANEOUS at 10:04

## 2024-04-10 RX ADMIN — SODIUM CHLORIDE: 9 INJECTION, SOLUTION INTRAVENOUS at 12:04

## 2024-04-10 RX ADMIN — PANTOPRAZOLE SODIUM 8 MG/HR: 40 INJECTION, POWDER, FOR SOLUTION INTRAVENOUS at 10:04

## 2024-04-10 RX ADMIN — SUCRALFATE 1 G: 1 SUSPENSION ORAL at 12:04

## 2024-04-10 RX ADMIN — PROPOFOL 50 MG: 10 INJECTION, EMULSION INTRAVENOUS at 11:04

## 2024-04-10 RX ADMIN — PRAVASTATIN SODIUM 20 MG: 20 TABLET ORAL at 08:04

## 2024-04-10 NOTE — ASSESSMENT & PLAN NOTE
Visible by CT scan.  Provide Protonix at b.i.d. dosing in addition to Carafate.    S/p egd pending results.

## 2024-04-10 NOTE — ASSESSMENT & PLAN NOTE
Provide PPI and Carafate.  Continue to trend hemoglobin    Recent Labs   Lab 04/09/24  0803 04/09/24  1151 04/10/24  0527   Hemoglobin 7.5 L 7.5 L 10.1 L

## 2024-04-10 NOTE — INTERVAL H&P NOTE
Patient seen and examined.  No interval change since the below obtained H&P  Informed consent verified    NPO since midnight  All questions and concerns addressed  To Endo for EGD for upper GI bleed     Meg Ayon MD  General Surgery   [phone]    
Yes

## 2024-04-10 NOTE — ASSESSMENT & PLAN NOTE
To endo 4/10 for EGD   CLD today  NPO midnight   Continue PPI and carafate   Tobacco cessation emphasized - currently tolerating nicotine patch   Pre-op EKG   Hgb 7.5 - transfuse per primary     04/10 CP:   To endo today for EGD  NPO since midnight  Continue PPI and Carafate   Monitor blood counts; transfuse Hgb < 7 per priamry   Rest of care per primary

## 2024-04-10 NOTE — PLAN OF CARE
Plan of care reviewed with patient. Pt is free of falls and injury. NADN, VSS, afebrile, finished 2nd unit PRBC. Pt tolerating medications well, IVF and Protonix infusing. Pain controlled with PRN pain meds. Questions and concerns addressed. NPO since MN for EGD this AM.    Problem: Adult Inpatient Plan of Care  Goal: Plan of Care Review  Outcome: Ongoing, Progressing  Goal: Patient-Specific Goal (Individualized)  Outcome: Ongoing, Progressing  Goal: Absence of Hospital-Acquired Illness or Injury  Outcome: Ongoing, Progressing  Goal: Optimal Comfort and Wellbeing  Outcome: Ongoing, Progressing  Goal: Readiness for Transition of Care  Outcome: Ongoing, Progressing     Problem: Behavioral Health Comorbidity  Goal: Maintenance of Behavioral Health Symptom Control  Outcome: Ongoing, Progressing     Problem: COPD (Chronic Obstructive Pulmonary Disease) Comorbidity  Goal: Maintenance of COPD Symptom Control  Outcome: Ongoing, Progressing     Problem: Hypertension Comorbidity  Goal: Blood Pressure in Desired Range  Outcome: Ongoing, Progressing     Problem: Obstructive Sleep Apnea Risk or Actual Comorbidity Management  Goal: Unobstructed Breathing During Sleep  Outcome: Ongoing, Progressing     Problem: Nausea and Vomiting  Goal: Fluid and Electrolyte Balance  Outcome: Ongoing, Progressing     Problem: Anemia  Goal: Anemia Symptom Improvement  Outcome: Ongoing, Progressing

## 2024-04-10 NOTE — SUBJECTIVE & OBJECTIVE
No current facility-administered medications on file prior to encounter.     Current Outpatient Medications on File Prior to Encounter   Medication Sig    albuterol (PROVENTIL) 2.5 mg /3 mL (0.083 %) nebulizer solution Take 2.5 mg by nebulization every 4 (four) hours as needed.    albuterol-ipratropium (DUO-NEB) 2.5 mg-0.5 mg/3 mL nebulizer solution Take 3 mLs by nebulization every 6 (six) hours as needed for Wheezing. Rescue    amLODIPine (NORVASC) 10 MG tablet Take 1 tablet (10 mg total) by mouth once daily.    amoxicillin-clavulanate 875-125mg (AUGMENTIN) 875-125 mg per tablet Take 1 tablet by mouth 2 (two) times daily.    busPIRone (BUSPAR) 15 MG tablet Take 1 tablet (15 mg total) by mouth 3 (three) times daily.    butalbital-acetaminophen-caffeine -40 mg (FIORICET, ESGIC) -40 mg per tablet Take 1 tablet by mouth once daily.    ciprofloxacin HCl (CIPRO) 500 MG tablet Take 1 tablet (500 mg total) by mouth every 12 (twelve) hours.    estradioL (ESTRACE) 0.01 % (0.1 mg/gram) vaginal cream Prescribed by Dr. Anahy Samuels    famotidine (PEPCID) 20 MG tablet Take 1 tablet (20 mg total) by mouth 2 (two) times daily.    gabapentin (NEURONTIN) 300 MG capsule Take 1 capsule by mouth 3 (three) times daily. Prescribed by Dr. Rodas    glycerin adult suppository Place 1 suppository rectally as needed for Constipation.    HYDROcodone-acetaminophen (NORCO)  mg per tablet Take 1 tablet by mouth every 6 (six) hours as needed for Pain.    ondansetron (ZOFRAN-ODT) 4 MG TbDL Take 2 tablets (8 mg total) by mouth 3 (three) times daily as needed (nausea/vomiting).    oxyCODONE-acetaminophen (PERCOCET) 5-325 mg per tablet Take 1 tablet by mouth 2 (two) times daily as needed. Prescribed by Dr. Rodas    pravastatin (PRAVACHOL) 20 MG tablet Take 20 mg by mouth once daily.    prochlorperazine (COMPAZINE) 10 MG tablet Take 1 tablet (10 mg total) by mouth every 6 (six) hours as needed (headache).    QUEtiapine (SEROQUEL)  300 MG Tab Take 2 tablets (600 mg total) by mouth every evening.    sucralfate (CARAFATE) 1 gram tablet Take 1 tablet (1 g total) by mouth 4 (four) times daily.    traZODone (DESYREL) 150 MG tablet Take 1 tablet (150 mg total) by mouth every evening.    TYMLOS 80 mcg (3,120 mcg/1.56 mL) PnIj Inject into the skin. Prescribed by Roxana Rai    FLUoxetine 40 MG capsule Take 1 capsule (40 mg total) by mouth once daily.    topiramate (TOPAMAX) 50 MG tablet Take 1 tablet (50 mg total) by mouth once daily.       Review of patient's allergies indicates:  No Known Allergies    Past Medical History:   Diagnosis Date    Anticoagulant long-term use     Anxiety     Arthritis     C. difficile colitis 8/6/2022    Carotid artery disease     Cervical disc disorder     Chronic back pain     COPD (chronic obstructive pulmonary disease)     Coronary artery disease     Dementia     Depression     Diarrhea, unspecified 11/1/2021    DVT (deep venous thrombosis)     CAROTID    GERD (gastroesophageal reflux disease)     Hematuria     Hiatal hernia     High cholesterol     Ill-fitting dentures     STATES DOESN'T WEAR    PVD (peripheral vascular disease)     Renal calculus     Sleep apnea     Sleep apnea     NO C PAP    Urinary frequency     Urinary urgency     Wears glasses     READING      Past Surgical History:   Procedure Laterality Date    BACK SURGERY      BREAST LUMPECTOMY Right     CAROTID ARTERY ANGIOPLASTY      CAROTID ARTERY ANGIOPLASTY      CAROTID ARTERY STENT Left     CAROTID ENDARTERECTOMY Right     CHOLECYSTECTOMY      COLONOSCOPY      CYSTOSCOPY      HYSTERECTOMY      OOPHORECTOMY      TONSILLECTOMY       Family History       Problem Relation (Age of Onset)    Cancer Mother    No Known Problems Sister, Daughter, Maternal Aunt, Maternal Uncle, Paternal Aunt, Paternal Uncle, Maternal Grandmother, Maternal Grandfather, Paternal Grandmother, Paternal Grandfather    Stroke Father          Tobacco Use    Smoking status: Every  Day     Current packs/day: 0.50     Average packs/day: 0.5 packs/day for 54.3 years (27.1 ttl pk-yrs)     Types: Cigarettes     Start date: 1970    Smokeless tobacco: Never   Substance and Sexual Activity    Alcohol use: No    Drug use: No    Sexual activity: Not on file     Review of Systems   Constitutional:  Negative for activity change, appetite change, fatigue and fever.   Respiratory:  Negative for apnea, cough, chest tightness and shortness of breath.    Cardiovascular:  Negative for chest pain.   Gastrointestinal:  Positive for abdominal pain and nausea. Negative for abdominal distention, anal bleeding, blood in stool, constipation, diarrhea, rectal pain and vomiting.   All other systems reviewed and are negative.    Objective:     Vital Signs (Most Recent):  Temp: 98.8 °F (37.1 °C) (04/10/24 1100)  Pulse: 82 (04/10/24 1124)  Resp: 18 (04/10/24 1100)  BP: (!) 157/68 (04/10/24 1100)  SpO2: 100 % (04/10/24 1157) Vital Signs (24h Range):  Temp:  [98.2 °F (36.8 °C)-100.4 °F (38 °C)] 98.8 °F (37.1 °C)  Pulse:  [77-91] 82  Resp:  [16-22] 18  SpO2:  [84 %-100 %] 100 %  BP: (137-170)/(63-82) 157/68     Weight: 50.4 kg (111 lb 3.2 oz)  Body mass index is 19.09 kg/m².     Physical Exam  Constitutional:       General: She is not in acute distress.     Appearance: Normal appearance. She is normal weight. She is not ill-appearing, toxic-appearing or diaphoretic.   HENT:      Head: Normocephalic and atraumatic.      Mouth/Throat:      Mouth: Mucous membranes are moist.   Eyes:      Conjunctiva/sclera: Conjunctivae normal.   Cardiovascular:      Rate and Rhythm: Normal rate. Rhythm irregular.   Pulmonary:      Effort: Pulmonary effort is normal. No respiratory distress.   Abdominal:      General: Bowel sounds are normal. There is no distension.      Palpations: Abdomen is soft.      Tenderness: There is abdominal tenderness in the epigastric area. There is no guarding.      Hernia: No hernia is present.   Skin:      Capillary Refill: Capillary refill takes less than 2 seconds.   Neurological:      Mental Status: She is alert. Mental status is at baseline.            I have reviewed all pertinent lab results within the past 24 hours.  CBC:   Recent Labs   Lab 04/10/24  0527   WBC 8.77   RBC 3.64*   HGB 10.1*   HCT 30.8*      MCV 85   MCH 27.7   MCHC 32.8       CMP:   Recent Labs   Lab 04/10/24  0527   GLU 83   CALCIUM 8.6*   ALBUMIN 2.3*   PROT 5.8*      K 3.4*   CO2 25      BUN 5*   CREATININE 0.2*   ALKPHOS 79   ALT 12   AST 10   BILITOT 0.3         Significant Diagnostics:  I have reviewed all pertinent imaging results/findings within the past 24 hours.

## 2024-04-10 NOTE — HOSPITAL COURSE
4/10:  No acute events overnight.  Patient is scheduled for EGD this morning.  Awaiting results prior to potential transition home.    Peptic ulcer noted on EGD with bile reflux.  Stable for discharge with antacids, bile acid sequestrant, and Carafate.

## 2024-04-10 NOTE — PROGRESS NOTES
[facility]  General Surgery  Progress Note    Subjective:     History of Present Illness:  67yo female with history of GERD and tobacco abuse who presents with abdominal pain and hematemesis.  Initially Presented to the ED 2 days prior to admission with abdominal pain.  CT abdomen and pelvis demonstrated possible gastritis versus peptic ulcer disease.  Discharge home with appropriate medication but re-presented 24 hours later with recurrent symptoms and also new-found hematemesis.  Hgb decreased to 9 from 12.  GS consulted for evaluation.      Hospital Course:  04/10 CP: Pt has c/o abdominal pain with accompanying nausea. Blood counts stable. NPO since midnight. To endo today for EGD.    Post-Op Info:  Procedure(s) (LRB):  EGD, WITH CLOSED BIOPSY (N/A)   Day of Surgery     No current facility-administered medications on file prior to encounter.     Current Outpatient Medications on File Prior to Encounter   Medication Sig    albuterol (PROVENTIL) 2.5 mg /3 mL (0.083 %) nebulizer solution Take 2.5 mg by nebulization every 4 (four) hours as needed.    albuterol-ipratropium (DUO-NEB) 2.5 mg-0.5 mg/3 mL nebulizer solution Take 3 mLs by nebulization every 6 (six) hours as needed for Wheezing. Rescue    amLODIPine (NORVASC) 10 MG tablet Take 1 tablet (10 mg total) by mouth once daily.    amoxicillin-clavulanate 875-125mg (AUGMENTIN) 875-125 mg per tablet Take 1 tablet by mouth 2 (two) times daily.    busPIRone (BUSPAR) 15 MG tablet Take 1 tablet (15 mg total) by mouth 3 (three) times daily.    butalbital-acetaminophen-caffeine -40 mg (FIORICET, ESGIC) -40 mg per tablet Take 1 tablet by mouth once daily.    ciprofloxacin HCl (CIPRO) 500 MG tablet Take 1 tablet (500 mg total) by mouth every 12 (twelve) hours.    estradioL (ESTRACE) 0.01 % (0.1 mg/gram) vaginal cream Prescribed by Dr. Anahy Samuels    famotidine (PEPCID) 20 MG tablet Take 1 tablet (20 mg total) by mouth 2 (two) times daily.     gabapentin (NEURONTIN) 300 MG capsule Take 1 capsule by mouth 3 (three) times daily. Prescribed by Dr. Rodas    glycerin adult suppository Place 1 suppository rectally as needed for Constipation.    HYDROcodone-acetaminophen (NORCO)  mg per tablet Take 1 tablet by mouth every 6 (six) hours as needed for Pain.    ondansetron (ZOFRAN-ODT) 4 MG TbDL Take 2 tablets (8 mg total) by mouth 3 (three) times daily as needed (nausea/vomiting).    oxyCODONE-acetaminophen (PERCOCET) 5-325 mg per tablet Take 1 tablet by mouth 2 (two) times daily as needed. Prescribed by Dr. Rodas    pravastatin (PRAVACHOL) 20 MG tablet Take 20 mg by mouth once daily.    prochlorperazine (COMPAZINE) 10 MG tablet Take 1 tablet (10 mg total) by mouth every 6 (six) hours as needed (headache).    QUEtiapine (SEROQUEL) 300 MG Tab Take 2 tablets (600 mg total) by mouth every evening.    sucralfate (CARAFATE) 1 gram tablet Take 1 tablet (1 g total) by mouth 4 (four) times daily.    traZODone (DESYREL) 150 MG tablet Take 1 tablet (150 mg total) by mouth every evening.    TYMLOS 80 mcg (3,120 mcg/1.56 mL) PnIj Inject into the skin. Prescribed by Roxana Rai    FLUoxetine 40 MG capsule Take 1 capsule (40 mg total) by mouth once daily.    topiramate (TOPAMAX) 50 MG tablet Take 1 tablet (50 mg total) by mouth once daily.       Review of patient's allergies indicates:  No Known Allergies    Past Medical History:   Diagnosis Date    Anticoagulant long-term use     Anxiety     Arthritis     C. difficile colitis 8/6/2022    Carotid artery disease     Cervical disc disorder     Chronic back pain     COPD (chronic obstructive pulmonary disease)     Coronary artery disease     Dementia     Depression     Diarrhea, unspecified 11/1/2021    DVT (deep venous thrombosis)     CAROTID    GERD (gastroesophageal reflux disease)     Hematuria     Hiatal hernia     High cholesterol     Ill-fitting dentures     STATES DOESN'T WEAR    PVD (peripheral vascular  disease)     Renal calculus     Sleep apnea     Sleep apnea     NO C PAP    Urinary frequency     Urinary urgency     Wears glasses     READING      Past Surgical History:   Procedure Laterality Date    BACK SURGERY      BREAST LUMPECTOMY Right     CAROTID ARTERY ANGIOPLASTY      CAROTID ARTERY ANGIOPLASTY      CAROTID ARTERY STENT Left     CAROTID ENDARTERECTOMY Right     CHOLECYSTECTOMY      COLONOSCOPY      CYSTOSCOPY      HYSTERECTOMY      OOPHORECTOMY      TONSILLECTOMY       Family History       Problem Relation (Age of Onset)    Cancer Mother    No Known Problems Sister, Daughter, Maternal Aunt, Maternal Uncle, Paternal Aunt, Paternal Uncle, Maternal Grandmother, Maternal Grandfather, Paternal Grandmother, Paternal Grandfather    Stroke Father          Tobacco Use    Smoking status: Every Day     Current packs/day: 0.50     Average packs/day: 0.5 packs/day for 54.3 years (27.1 ttl pk-yrs)     Types: Cigarettes     Start date: 1970    Smokeless tobacco: Never   Substance and Sexual Activity    Alcohol use: No    Drug use: No    Sexual activity: Not on file     Review of Systems   Constitutional:  Negative for activity change, appetite change, fatigue and fever.   Respiratory:  Negative for apnea, cough, chest tightness and shortness of breath.    Cardiovascular:  Negative for chest pain.   Gastrointestinal:  Positive for abdominal pain and nausea. Negative for abdominal distention, anal bleeding, blood in stool, constipation, diarrhea, rectal pain and vomiting.   All other systems reviewed and are negative.    Objective:     Vital Signs (Most Recent):  Temp: 98.8 °F (37.1 °C) (04/10/24 1100)  Pulse: 82 (04/10/24 1124)  Resp: 18 (04/10/24 1100)  BP: (!) 157/68 (04/10/24 1100)  SpO2: 100 % (04/10/24 1157) Vital Signs (24h Range):  Temp:  [98.2 °F (36.8 °C)-100.4 °F (38 °C)] 98.8 °F (37.1 °C)  Pulse:  [77-91] 82  Resp:  [16-22] 18  SpO2:  [84 %-100 %] 100 %  BP: (137-170)/(63-82) 157/68     Weight: 50.4 kg (111  lb 3.2 oz)  Body mass index is 19.09 kg/m².    Physical Exam  Constitutional:       General: She is not in acute distress.     Appearance: Normal appearance. She is normal weight. She is not ill-appearing, toxic-appearing or diaphoretic.   HENT:      Head: Normocephalic and atraumatic.      Mouth/Throat:      Mouth: Mucous membranes are moist.   Eyes:      Conjunctiva/sclera: Conjunctivae normal.   Cardiovascular:      Rate and Rhythm: Normal rate and regular rhythm.   Pulmonary:      Effort: Pulmonary effort is normal. No respiratory distress.   Abdominal:      General: Bowel sounds are normal. There is no distension.      Palpations: Abdomen is soft.      Tenderness: There is abdominal tenderness in the epigastric area. There is no guarding.      Hernia: No hernia is present.   Skin:     Capillary Refill: Capillary refill takes less than 2 seconds.   Neurological:      Mental Status: She is alert. Mental status is at baseline.            I have reviewed all pertinent lab results within the past 24 hours.  CBC:   Recent Labs   Lab 04/10/24  0527   WBC 8.77   RBC 3.64*   HGB 10.1*   HCT 30.8*      MCV 85   MCH 27.7   MCHC 32.8       CMP:   Recent Labs   Lab 04/10/24  0527   GLU 83   CALCIUM 8.6*   ALBUMIN 2.3*   PROT 5.8*      K 3.4*   CO2 25      BUN 5*   CREATININE 0.2*   ALKPHOS 79   ALT 12   AST 10   BILITOT 0.3         Significant Diagnostics:  I have reviewed all pertinent imaging results/findings within the past 24 hours.    Assessment/Plan:     * Gastrointestinal hemorrhage associated with peptic ulcer  To endo 4/10 for EGD   CLD today  NPO midnight   Continue PPI and carafate   Tobacco cessation emphasized - currently tolerating nicotine patch   Pre-op EKG   Hgb 7.5 - transfuse per primary     04/10 CP:   To endo today for EGD  NPO since midnight  Continue PPI and Carafate   Monitor blood counts; transfuse Hgb < 7 per priamry   Rest of care per primary     PUD (peptic ulcer disease)  PPI  and Carafate    Symptomatic anemia  Transfuse Hgb < 7 per primary.    Tobacco abuse  Tobacco cessation education provided.        BENITEZ LACKEY NP  General Surgery  [facility]

## 2024-04-10 NOTE — TRANSFER OF CARE
Anesthesia Transfer of Care Note    Patient: Desiree Blake    Procedure(s) Performed: Procedure(s) (LRB):  EGD (ESOPHAGOGASTRODUODENOSCOPY) (N/A)    Patient location: OPS    Anesthesia Type: MAC    Transport from OR: Transported from OR on room air with adequate spontaneous ventilation    Post pain: adequate analgesia    Post assessment: no apparent anesthetic complications    Post vital signs: stable    Level of consciousness: awake    Nausea/Vomiting: no nausea/vomiting    Complications: none    Transfer of care protocol was followed      Last vitals:     /62  P 72  R 18  O2 Sat 100%

## 2024-04-10 NOTE — HOSPITAL COURSE
04/10 CP: Pt has c/o abdominal pain with accompanying nausea. Blood counts stable. NPO since midnight. To endo today for EGD.

## 2024-04-10 NOTE — PLAN OF CARE
[place] [facility]  Initial Discharge Assessment       Primary Care Provider: Kt Pozo Jr., MD    Admission Diagnosis: Peptic ulcer disease [K27.9]  Hematemesis with nausea [K92.0]    Admission Date: 4/8/2024  Expected Discharge Date:          Payor: [employer] [facility]GD [facility] / Plan: [employer] / Product Type: Medicare Advantage /     Extended Emergency Contact Information  Primary Emergency Contact: Kirsten Pittman  Address: [address]            [place], [place] [id number] [place] [address]  Home Phone: [phone]  Mobile Phone: [phone]  Relation: Daughter  Secondary Emergency Contact: HaydenUrbano zabala  Address: [address]           [place], [place] [address] [place] [address]  Home Phone: [phone]  Mobile Phone: [phone]  Relation: Spouse    Discharge Plan A: Home, Home with family  Discharge Plan B: Home with family, Home Health      [facility] [address]  [address]  [address]  [facility] [address]  Phone: [phone] Fax: [phone]    [id number] - [place], [place] - [address] [facility] [address]  [address]  [facility] [address]  Phone: [phone] Fax: [phone]    [id number] - [place], [place] - [address] [place] [address]  [address]  [facility] [address]  Phone: [phone] Fax: [phone]      Initial Assessment (most recent)       Adult Discharge Assessment - 04/09/24 1315          Discharge Assessment    Assessment Type Discharge Planning Assessment     Confirmed/corrected address, phone number and insurance Yes     Confirmed Demographics Correct on Facesheet     Source of Information patient     When was your last doctors appointment? 04/02/24     Reason For Admission Gastrointestinal hemorrhage associated with peptic ulcer     People in Home spouse     Do you expect to return to your current living situation? Yes     Prior to hospitilization cognitive status:  Alert/Oriented     Current cognitive status: Alert/Oriented     Walking or Climbing Stairs Difficulty --   The patient expressed that she was independent prior to being admitted to the hosptial    Dressing/Bathing Difficulty --   The patient expressed that she was independent prior to being admitted.    Do you have any problems with: --   The patient patient does not have a caregiver, but he does have a spouse who assist her as needed.    Home Accessibility stairs to enter home;stairs within home     Number of Stairs, Within Home, Primary none     Number of Stairs, Main Entrance none     Home Layout Able to live on 1st floor     Equipment Currently Used at Home other (see comments);none   The patient expressed that she does have equipment, but she currently does not use anything.    Readmission within 30 days? No     Patient currently being followed by outpatient case management? No     Do you currently have service(s) that help you manage your care at home? No     Do you take prescription medications? Yes     Do you have prescription coverage? Yes     Coverage [facility]     How do you get to doctors appointments? family or friend will provide     Discharge Plan A Home;Home with family     Discharge Plan B Home with family;Home Health     Discharge Plan discussed with: Patient        Physical Activity    On average, how many days per week do you engage in moderate to strenuous exercise (like a brisk walk)? Patient declined     On average, how many minutes do you engage in exercise at this level? Patient declined        Financial Resource Strain    How hard is it for you to pay for the very basics like food, housing, medical care, and heating? Patient declined        Housing Stability    In the last 12 months, was there a time when you were not able to pay the mortgage or rent on time? Patient declined     In the last 12 months, was there a time when you did not have a  steady place to sleep or slept in a shelter (including now)? Patient declined        Transportation Needs    In the past 12 months, has lack of transportation kept you from medical appointments or from getting medications? Patient declined     In the past 12 months, has lack of transportation kept you from meetings, work, or from getting things needed for daily living? Patient declined        Food Insecurity    Within the past 12 months, you worried that your food would run out before you got the money to buy more. Patient declined     Within the past 12 months, the food you bought just didn't last and you didn't have money to get more. Patient declined        Stress    Do you feel stress - tense, restless, nervous, or anxious, or unable to sleep at night because your mind is troubled all the time - these days? Patient declined        Social Connections    In a typical week, how many times do you talk on the phone with family, friends, or neighbors? Patient declined     How often do you get together with friends or relatives? Patient declined     How often do you attend Holiness or Cheondoism services? Patient declined     Do you belong to any clubs or organizations such as Holiness groups, unions, fraternal or athletic groups, or school groups? Patient declined     Are you , , , , never , or living with a partner?                  Late entry- I started the discharge planning assessment with the patient yesterday, but she asked if she could complete it at another time. I attempted to see the patient  today, and she declined to complete the assessment again. She stated that she did not feel like answering  any questions. Contact information was left in the patient's room. MADELINE/HERMAN will remain available.

## 2024-04-10 NOTE — PROGRESS NOTES
[facility]  General Surgery  Progress Note    Subjective:     History of Present Illness:  67yo female with history of GERD and tobacco abuse who presents with abdominal pain and hematemesis.  Initially Presented to the ED 2 days prior to admission with abdominal pain.  CT abdomen and pelvis demonstrated possible gastritis versus peptic ulcer disease.  Discharge home with appropriate medication but re-presented 24 hours later with recurrent symptoms and also new-found hematemesis.  Hgb decreased to 9 from 12.  GS consulted for evaluation.      Hospital Course:  04/10 CP: Pt has c/o abdominal pain with accompanying nausea. Blood counts stable. NPO since midnight. To endo today for EGD.    Post-Op Info:  Procedure(s) (LRB):  EGD, WITH CLOSED BIOPSY (N/A)   Day of Surgery     No current facility-administered medications on file prior to encounter.     Current Outpatient Medications on File Prior to Encounter   Medication Sig    albuterol (PROVENTIL) 2.5 mg /3 mL (0.083 %) nebulizer solution Take 2.5 mg by nebulization every 4 (four) hours as needed.    albuterol-ipratropium (DUO-NEB) 2.5 mg-0.5 mg/3 mL nebulizer solution Take 3 mLs by nebulization every 6 (six) hours as needed for Wheezing. Rescue    amLODIPine (NORVASC) 10 MG tablet Take 1 tablet (10 mg total) by mouth once daily.    amoxicillin-clavulanate 875-125mg (AUGMENTIN) 875-125 mg per tablet Take 1 tablet by mouth 2 (two) times daily.    busPIRone (BUSPAR) 15 MG tablet Take 1 tablet (15 mg total) by mouth 3 (three) times daily.    butalbital-acetaminophen-caffeine -40 mg (FIORICET, ESGIC) -40 mg per tablet Take 1 tablet by mouth once daily.    ciprofloxacin HCl (CIPRO) 500 MG tablet Take 1 tablet (500 mg total) by mouth every 12 (twelve) hours.    estradioL (ESTRACE) 0.01 % (0.1 mg/gram) vaginal cream Prescribed by Dr. Anahy Samuels    famotidine (PEPCID) 20 MG tablet Take 1 tablet (20 mg total) by mouth 2 (two) times daily.     gabapentin (NEURONTIN) 300 MG capsule Take 1 capsule by mouth 3 (three) times daily. Prescribed by Dr. Rodas    glycerin adult suppository Place 1 suppository rectally as needed for Constipation.    HYDROcodone-acetaminophen (NORCO)  mg per tablet Take 1 tablet by mouth every 6 (six) hours as needed for Pain.    ondansetron (ZOFRAN-ODT) 4 MG TbDL Take 2 tablets (8 mg total) by mouth 3 (three) times daily as needed (nausea/vomiting).    oxyCODONE-acetaminophen (PERCOCET) 5-325 mg per tablet Take 1 tablet by mouth 2 (two) times daily as needed. Prescribed by Dr. Rodas    pravastatin (PRAVACHOL) 20 MG tablet Take 20 mg by mouth once daily.    prochlorperazine (COMPAZINE) 10 MG tablet Take 1 tablet (10 mg total) by mouth every 6 (six) hours as needed (headache).    QUEtiapine (SEROQUEL) 300 MG Tab Take 2 tablets (600 mg total) by mouth every evening.    sucralfate (CARAFATE) 1 gram tablet Take 1 tablet (1 g total) by mouth 4 (four) times daily.    traZODone (DESYREL) 150 MG tablet Take 1 tablet (150 mg total) by mouth every evening.    TYMLOS 80 mcg (3,120 mcg/1.56 mL) PnIj Inject into the skin. Prescribed by Roxana Rai    FLUoxetine 40 MG capsule Take 1 capsule (40 mg total) by mouth once daily.    topiramate (TOPAMAX) 50 MG tablet Take 1 tablet (50 mg total) by mouth once daily.       Review of patient's allergies indicates:  No Known Allergies    Past Medical History:   Diagnosis Date    Anticoagulant long-term use     Anxiety     Arthritis     C. difficile colitis 8/6/2022    Carotid artery disease     Cervical disc disorder     Chronic back pain     COPD (chronic obstructive pulmonary disease)     Coronary artery disease     Dementia     Depression     Diarrhea, unspecified 11/1/2021    DVT (deep venous thrombosis)     CAROTID    GERD (gastroesophageal reflux disease)     Hematuria     Hiatal hernia     High cholesterol     Ill-fitting dentures     STATES DOESN'T WEAR    PVD (peripheral vascular  disease)     Renal calculus     Sleep apnea     Sleep apnea     NO C PAP    Urinary frequency     Urinary urgency     Wears glasses     READING      Past Surgical History:   Procedure Laterality Date    BACK SURGERY      BREAST LUMPECTOMY Right     CAROTID ARTERY ANGIOPLASTY      CAROTID ARTERY ANGIOPLASTY      CAROTID ARTERY STENT Left     CAROTID ENDARTERECTOMY Right     CHOLECYSTECTOMY      COLONOSCOPY      CYSTOSCOPY      HYSTERECTOMY      OOPHORECTOMY      TONSILLECTOMY       Family History       Problem Relation (Age of Onset)    Cancer Mother    No Known Problems Sister, Daughter, Maternal Aunt, Maternal Uncle, Paternal Aunt, Paternal Uncle, Maternal Grandmother, Maternal Grandfather, Paternal Grandmother, Paternal Grandfather    Stroke Father          Tobacco Use    Smoking status: Every Day     Current packs/day: 0.50     Average packs/day: 0.5 packs/day for 54.3 years (27.1 ttl pk-yrs)     Types: Cigarettes     Start date: 1970    Smokeless tobacco: Never   Substance and Sexual Activity    Alcohol use: No    Drug use: No    Sexual activity: Not on file     Review of Systems   Constitutional:  Negative for activity change, appetite change, fatigue and fever.   Respiratory:  Negative for apnea, cough, chest tightness and shortness of breath.    Cardiovascular:  Negative for chest pain.   Gastrointestinal:  Positive for abdominal pain and nausea. Negative for abdominal distention, anal bleeding, blood in stool, constipation, diarrhea, rectal pain and vomiting.   All other systems reviewed and are negative.    Objective:     Vital Signs (Most Recent):  Temp: 98.8 °F (37.1 °C) (04/10/24 1100)  Pulse: 82 (04/10/24 1124)  Resp: 18 (04/10/24 1100)  BP: (!) 157/68 (04/10/24 1100)  SpO2: 100 % (04/10/24 1157) Vital Signs (24h Range):  Temp:  [98.2 °F (36.8 °C)-100.4 °F (38 °C)] 98.8 °F (37.1 °C)  Pulse:  [77-91] 82  Resp:  [16-22] 18  SpO2:  [84 %-100 %] 100 %  BP: (137-170)/(63-82) 157/68     Weight: 50.4 kg (111  lb 3.2 oz)  Body mass index is 19.09 kg/m².    Physical Exam  Constitutional:       General: She is not in acute distress.     Appearance: Normal appearance. She is normal weight. She is not ill-appearing, toxic-appearing or diaphoretic.   HENT:      Head: Normocephalic and atraumatic.      Mouth/Throat:      Mouth: Mucous membranes are moist.   Eyes:      Conjunctiva/sclera: Conjunctivae normal.   Cardiovascular:      Rate and Rhythm: Normal rate. Rhythm irregular.   Pulmonary:      Effort: Pulmonary effort is normal. No respiratory distress.   Abdominal:      General: Bowel sounds are normal. There is no distension.      Palpations: Abdomen is soft.      Tenderness: There is abdominal tenderness in the epigastric area. There is no guarding.      Hernia: No hernia is present.   Skin:     Capillary Refill: Capillary refill takes less than 2 seconds.   Neurological:      Mental Status: She is alert. Mental status is at baseline.            I have reviewed all pertinent lab results within the past 24 hours.  CBC:   Recent Labs   Lab 04/10/24  0527   WBC 8.77   RBC 3.64*   HGB 10.1*   HCT 30.8*      MCV 85   MCH 27.7   MCHC 32.8       CMP:   Recent Labs   Lab 04/10/24  0527   GLU 83   CALCIUM 8.6*   ALBUMIN 2.3*   PROT 5.8*      K 3.4*   CO2 25      BUN 5*   CREATININE 0.2*   ALKPHOS 79   ALT 12   AST 10   BILITOT 0.3         Significant Diagnostics:  I have reviewed all pertinent imaging results/findings within the past 24 hours.    Assessment/Plan:     * Gastrointestinal hemorrhage associated with peptic ulcer  To endo 4/10 for EGD   CLD today  NPO midnight   Continue PPI and carafate   Tobacco cessation emphasized - currently tolerating nicotine patch   Pre-op EKG   Hgb 7.5 - transfuse per primary     04/10 CP:   To endo today for EGD  NPO since midnight  Continue PPI and Carafate   Monitor blood counts; transfuse Hgb < 7 per priamry   Rest of care per primary     PUD (peptic ulcer disease)  PPI  and Carafate    Symptomatic anemia  Transfuse Hgb < 7 per primary.    Tobacco abuse  Tobacco cessation education provided.        BENITEZ LACKEY NP  General Surgery  [facility]

## 2024-04-10 NOTE — OP NOTE
[facility] - OR Periop Services  General Surgery Department  Operative Note    SUMMARY     Date of Procedure: 4/10/2024     Procedure: Procedure(s) (LRB):  EGD, WITH CLOSED BIOPSY:      Surgeon(s) and Role:  Meg Ayon MD     Pre-Operative Diagnosis: Pre-Op Diagnosis Codes:     * Hematemesis with nausea [K92.0]     * Peptic ulcer disease [K27.9]    Post-Operative Diagnosis: Same         Anesthesia: Local MAC    Findings:  GEJ 37 from the incisors   Normal Z line   Diffuse bile reflux gastritis  Large punched out pyloric ulcer with no signs of active hemorrhage - hot forceps bx obtained   Mild duodenitis   No hiatal hernia on retroflexion     Indications for the Procedure:  67 yo female who presents with refractory upper GI bleed and suspected peptic ulcer disease    Operative Conduct in Detail:     Risks, benefits, and alternatives were thoroughly discussed with the patient. Despite the risks, the patient wished to proceed. Informed consent was signed and will be scanned to the electronic chart.     The patient was placed on left lateral decubitus, with mouth block protection. After achieving moderate sedation, a standard gastroscope was introduced through the mouth down the esophagus, stomach, up to the Second portion of the  duodenum with the following findings:  Esophagus:  Normal  Esophagogastric Junction was normal at 37 cm from teeth line.  Stomach  Diffuse bile reflux gastritis  Large punched out pyloric ulcer with no signs of active hemorrhage - hot forceps bx obtained   Retroflexion view was normal  Duodenum  Mild duodenitis     Specimens :  Specimens (From admission, onward)       Start     Ordered    04/10/24 1204  Specimen to Pathology, Surgery Gastrointestinal tract  Once        Comments: Pre-op Diagnosis: Hematemesis with nausea [K92.0]Peptic ulcer disease [K27.9]Procedure(s):EGD, WITH CLOSED BIOPSY Number of specimens: 1Name of specimens:  bxs pyloric ulcer at 1137     References:     Click here for ordering Quick Tip   Question Answer Comment   Procedure Type: Gastrointestinal tract    Specimen Class: Complex case/Special    Which provider would you like to cc? [facility]    Release to patient Immediate        04/10/24 1204                      Diagnostic Impression:  Type III gastric ulcer without active hemorrhage   Protonix 40mg BID  Carafate AC&HS   Colestipol AC&HS   Follow up pathology  Follow up in GS clinic in two weeks   Smoking cessation      Complications: No    Estimated Blood Loss (EBL): Minimal           Implants: None             Condition: Good    Disposition: PACU - hemodynamically stable.    Kaiser Ortega MD  General Surgery   [phone]

## 2024-04-10 NOTE — DISCHARGE SUMMARY
[facility]  Discharge Summary      Patient Name: Desiree Blake  MRN: [id number]  LEBRON: [id number]  Patient Class: IP- Inpatient  Admission Date: 4/8/2024  Hospital Length of Stay: 2 days  Discharge Date and Time:  04/10/2024 2:29 PM  Attending Physician: Kt Pozo Jr., MD   Discharging Provider: Kt Pozo Jr, MD  Primary Care Provider: Kt Pozo Jr., MD    Primary Care Team: Networked reference to record PCT     HPI:   Chief Complaint   Patient presents with    Vomiting       Pt c/o vomiting dark brown emesis x2 days      68-year-old female with a history of abdominal pain, colitis in the past presents the ER with nausea vomiting that began yesterday, was seen in the ER yesterday as well, complete workup performed including laboratory values and CT scan which showed gastritis and probable peptic ulcer disease.  Today she comes to the emergency department stating she is vomiting with blood in it.  Complaining of epigastric abdominal pain as well.  Alert oriented x4, GCS is 15.  Denies any fever       Procedure(s) (LRB):  EGD, WITH CLOSED BIOPSY (N/A)      Hospital Course:   4/10:  No acute events overnight.  Patient is scheduled for EGD this morning.  Awaiting results prior to potential transition home.    Peptic ulcer noted on EGD with bile reflux.  Stable for discharge with antacids, bile acid sequestrant, and Carafate.     Goals of Care Treatment Preferences:  Code Status: Full Code      Consults:     No new Assessment & Plan notes have been filed under this hospital service since the last note was generated.  Service: Hospital Medicine    Final Active Diagnoses:    Diagnosis Date Noted POA    PRINCIPAL PROBLEM:  Gastrointestinal hemorrhage associated with peptic ulcer [K27.4] 04/09/2024 Yes    Symptomatic anemia [D64.9] 04/09/2024 Yes    PUD (peptic ulcer disease) [K27.9] 04/09/2024 Yes    Tobacco abuse [Z72.0] 08/06/2022 Yes    Anxiety [F41.9] 02/05/2021 Yes       Problems Resolved During this Admission:       Discharged Condition: good    Disposition: Home or Self Care    Follow Up:   Follow-up Information       Kt Pozo Jr., MD Follow up in 5 day(s).    Specialty: Internal Medicine  Contact information:  [address] [facility] [address] [phone]                           Patient Instructions:      Reason for not Ordering Smoking Cessation Referral     Order Specific Question Answer Comments   Reason for not ordering: Patient refused      Reason for not Prescribing Nicotine Replacement     Order Specific Question Answer Comments   Reason for not Prescribing: Patient refused        Significant Diagnostic Studies: Labs: All labs within the past 24 hours have been reviewed    Pending Diagnostic Studies:       Procedure Component Value Units Date/Time    Specimen to Pathology, Surgery Gastrointestinal tract [[id number]] Collected: 04/10/24 1157    Order Status: Sent Lab Status: In process Updated: 04/10/24 1216    Specimen: Tissue            Medications:  Reconciled Home Medications:      Medication List        START taking these medications      cholestyramine 4 gram packet  Commonly known as: QUESTRAN  Take 1 packet (4 g total) by mouth 3 (three) times daily with meals.     pantoprazole 40 MG tablet  Commonly known as: PROTONIX  Take 1 tablet (40 mg total) by mouth 2 (two) times daily.            CONTINUE taking these medications      albuterol 2.5 mg /3 mL (0.083 %) nebulizer solution  Commonly known as: PROVENTIL  Take 2.5 mg by nebulization every 4 (four) hours as needed.     albuterol-ipratropium 2.5 mg-0.5 mg/3 mL nebulizer solution  Commonly known as: DUO-NEB  Take 3 mLs by nebulization every 6 (six) hours as needed for Wheezing. Rescue     amLODIPine 10 MG tablet  Commonly known as: NORVASC  Take 1 tablet (10 mg total) by mouth once daily.     amoxicillin-clavulanate 875-125mg 875-125 mg per tablet  Commonly known as: AUGMENTIN  Take 1 tablet  by mouth 2 (two) times daily.     busPIRone 15 MG tablet  Commonly known as: BUSPAR  Take 1 tablet (15 mg total) by mouth 3 (three) times daily.     butalbital-acetaminophen-caffeine -40 mg -40 mg per tablet  Commonly known as: FIORICET, ESGIC  Take 1 tablet by mouth once daily.     estradioL 0.01 % (0.1 mg/gram) vaginal cream  Commonly known as: ESTRACE  Prescribed by Dr. Anahy Samuels     famotidine 20 MG tablet  Commonly known as: PEPCID  Take 1 tablet (20 mg total) by mouth 2 (two) times daily.     FLUoxetine 40 MG capsule  Take 1 capsule (40 mg total) by mouth once daily.     gabapentin 300 MG capsule  Commonly known as: NEURONTIN  Take 1 capsule by mouth 3 (three) times daily. Prescribed by Dr. Rodas     glycerin adult suppository  Place 1 suppository rectally as needed for Constipation.     HYDROcodone-acetaminophen  mg per tablet  Commonly known as: NORCO  Take 1 tablet by mouth every 6 (six) hours as needed for Pain.     ondansetron 4 MG Tbdl  Commonly known as: ZOFRAN-ODT  Take 2 tablets (8 mg total) by mouth 3 (three) times daily as needed (nausea/vomiting).     oxyCODONE-acetaminophen 5-325 mg per tablet  Commonly known as: PERCOCET  Take 1 tablet by mouth 2 (two) times daily as needed. Prescribed by Dr. Rodas     pravastatin 20 MG tablet  Commonly known as: PRAVACHOL  Take 20 mg by mouth once daily.     prochlorperazine 10 MG tablet  Commonly known as: COMPAZINE  Take 1 tablet (10 mg total) by mouth every 6 (six) hours as needed (headache).     QUEtiapine 300 MG Tab  Commonly known as: SEROQUEL  Take 2 tablets (600 mg total) by mouth every evening.     sucralfate 1 gram tablet  Commonly known as: CARAFATE  Take 1 tablet (1 g total) by mouth 4 (four) times daily.     topiramate 50 MG tablet  Commonly known as: TOPAMAX  Take 1 tablet (50 mg total) by mouth once daily.     traZODone 150 MG tablet  Commonly known as: DESYREL  Take 1 tablet (150 mg total) by mouth every evening.      TYMLOS 80 mcg (3,120 mcg/1.56 mL) Pnij  Generic drug: abaloparatide  Inject into the skin. Prescribed by Roxana Rai            STOP taking these medications      ciprofloxacin HCl 500 MG tablet  Commonly known as: CIPRO              Indwelling Lines/Drains at time of discharge:   Lines/Drains/Airways       None                   Time spent on the discharge of patient: 60 minutes         Kt Pozo Jr, MD  Department of Hospital Medicine  [facility]   Detail Level: Zone

## 2024-04-10 NOTE — ASSESSMENT & PLAN NOTE
Patient's anemia is currently uncontrolled. Has received 2 units of PRBCs on 4/9/24 . Etiology likely d/t acute blood loss which was from PUD  Current CBC reviewed-   Lab Results   Component Value Date    HGB 10.1 (L) 04/10/2024    HCT 30.8 (L) 04/10/2024     Monitor serial CBC and transfuse if patient becomes hemodynamically unstable, symptomatic or H/H drops below 7/21.    Recent Labs   Lab 04/09/24  0803 04/09/24  1151 04/10/24  0527   Hemoglobin 7.5 L 7.5 L 10.1 L

## 2024-04-10 NOTE — PROGRESS NOTES
[facility] Medicine  Progress Note    Patient Name: Desiree Blake  MRN: [id number]  Patient Class: IP- Inpatient   Admission Date: 4/8/2024  Length of Stay: 2 days  Attending Physician: Kt Pozo Jr., MD  Primary Care Provider: Kt Pozo Jr., MD        Subjective:     Principal Problem:Gastrointestinal hemorrhage associated with peptic ulcer        HPI:  Chief Complaint   Patient presents with    Vomiting       Pt c/o vomiting dark brown emesis x2 days      68-year-old female with a history of abdominal pain, colitis in the past presents the ER with nausea vomiting that began yesterday, was seen in the ER yesterday as well, complete workup performed including laboratory values and CT scan which showed gastritis and probable peptic ulcer disease.  Today she comes to the emergency department stating she is vomiting with blood in it.  Complaining of epigastric abdominal pain as well.  Alert oriented x4, GCS is 15.  Denies any fever       Overview/Hospital Course:  4/10:  No acute events overnight.  Patient is scheduled for EGD this morning.  Awaiting results prior to potential transition home.    No current facility-administered medications on file prior to encounter.     Current Outpatient Medications on File Prior to Encounter   Medication Sig    albuterol (PROVENTIL) 2.5 mg /3 mL (0.083 %) nebulizer solution Take 2.5 mg by nebulization every 4 (four) hours as needed.    albuterol-ipratropium (DUO-NEB) 2.5 mg-0.5 mg/3 mL nebulizer solution Take 3 mLs by nebulization every 6 (six) hours as needed for Wheezing. Rescue    amLODIPine (NORVASC) 10 MG tablet Take 1 tablet (10 mg total) by mouth once daily.    amoxicillin-clavulanate 875-125mg (AUGMENTIN) 875-125 mg per tablet Take 1 tablet by mouth 2 (two) times daily.    busPIRone (BUSPAR) 15 MG tablet Take 1 tablet (15 mg total) by mouth 3 (three) times daily.    butalbital-acetaminophen-caffeine -40 mg (FIORICET, ESGIC) -40 mg  per tablet Take 1 tablet by mouth once daily.    ciprofloxacin HCl (CIPRO) 500 MG tablet Take 1 tablet (500 mg total) by mouth every 12 (twelve) hours.    estradioL (ESTRACE) 0.01 % (0.1 mg/gram) vaginal cream Prescribed by Dr. Anahy Samuels    famotidine (PEPCID) 20 MG tablet Take 1 tablet (20 mg total) by mouth 2 (two) times daily.    gabapentin (NEURONTIN) 300 MG capsule Take 1 capsule by mouth 3 (three) times daily. Prescribed by Dr. Rodas    glycerin adult suppository Place 1 suppository rectally as needed for Constipation.    HYDROcodone-acetaminophen (NORCO)  mg per tablet Take 1 tablet by mouth every 6 (six) hours as needed for Pain.    ondansetron (ZOFRAN-ODT) 4 MG TbDL Take 2 tablets (8 mg total) by mouth 3 (three) times daily as needed (nausea/vomiting).    oxyCODONE-acetaminophen (PERCOCET) 5-325 mg per tablet Take 1 tablet by mouth 2 (two) times daily as needed. Prescribed by Dr. Rodas    pravastatin (PRAVACHOL) 20 MG tablet Take 20 mg by mouth once daily.    prochlorperazine (COMPAZINE) 10 MG tablet Take 1 tablet (10 mg total) by mouth every 6 (six) hours as needed (headache).    QUEtiapine (SEROQUEL) 300 MG Tab Take 2 tablets (600 mg total) by mouth every evening.    sucralfate (CARAFATE) 1 gram tablet Take 1 tablet (1 g total) by mouth 4 (four) times daily.    traZODone (DESYREL) 150 MG tablet Take 1 tablet (150 mg total) by mouth every evening.    TYMLOS 80 mcg (3,120 mcg/1.56 mL) PnIj Inject into the skin. Prescribed by Roxana Rai    FLUoxetine 40 MG capsule Take 1 capsule (40 mg total) by mouth once daily.    topiramate (TOPAMAX) 50 MG tablet Take 1 tablet (50 mg total) by mouth once daily.       Review of patient's allergies indicates:  No Known Allergies    Past Medical History:   Diagnosis Date    Anticoagulant long-term use     Anxiety     Arthritis     C. difficile colitis 8/6/2022    Carotid artery disease     Cervical disc disorder     Chronic back pain     COPD (chronic  obstructive pulmonary disease)     Coronary artery disease     Dementia     Depression     Diarrhea, unspecified 11/1/2021    DVT (deep venous thrombosis)     CAROTID    GERD (gastroesophageal reflux disease)     Hematuria     Hiatal hernia     High cholesterol     Ill-fitting dentures     STATES DOESN'T WEAR    PVD (peripheral vascular disease)     Renal calculus     Sleep apnea     Sleep apnea     NO C PAP    Urinary frequency     Urinary urgency     Wears glasses     READING      Past Surgical History:   Procedure Laterality Date    BACK SURGERY      BREAST LUMPECTOMY Right     CAROTID ARTERY ANGIOPLASTY      CAROTID ARTERY ANGIOPLASTY      CAROTID ARTERY STENT Left     CAROTID ENDARTERECTOMY Right     CHOLECYSTECTOMY      COLONOSCOPY      CYSTOSCOPY      HYSTERECTOMY      OOPHORECTOMY      TONSILLECTOMY       Family History       Problem Relation (Age of Onset)    Cancer Mother    No Known Problems Sister, Daughter, Maternal Aunt, Maternal Uncle, Paternal Aunt, Paternal Uncle, Maternal Grandmother, Maternal Grandfather, Paternal Grandmother, Paternal Grandfather    Stroke Father          Tobacco Use    Smoking status: Every Day     Current packs/day: 0.50     Average packs/day: 0.5 packs/day for 54.3 years (27.1 ttl pk-yrs)     Types: Cigarettes     Start date: 1970    Smokeless tobacco: Never   Substance and Sexual Activity    Alcohol use: No    Drug use: No    Sexual activity: Not on file     Review of Systems   Constitutional:  Negative for activity change, appetite change, fatigue and fever.   Respiratory:  Negative for apnea, cough, chest tightness and shortness of breath.    Cardiovascular:  Negative for chest pain.   Gastrointestinal:  Positive for abdominal pain. Negative for abdominal distention, anal bleeding, blood in stool, constipation, diarrhea, nausea, rectal pain and vomiting.   All other systems reviewed and are negative.    Objective:     Vital Signs (Most Recent):  Temp: 98.8 °F (37.1 °C)  (04/10/24 1100)  Pulse: 82 (04/10/24 1124)  Resp: 20 (04/10/24 1352)  BP: (!) 157/68 (04/10/24 1100)  SpO2: 100 % (04/10/24 1157) Vital Signs (24h Range):  Temp:  [98.2 °F (36.8 °C)-99.3 °F (37.4 °C)] 98.8 °F (37.1 °C)  Pulse:  [77-91] 82  Resp:  [16-22] 20  SpO2:  [84 %-100 %] 100 %  BP: (137-170)/(63-82) 157/68     Weight: 50.4 kg (111 lb 3.2 oz)  Body mass index is 19.09 kg/m².     Physical Exam  Constitutional:       General: She is not in acute distress.     Appearance: She is not ill-appearing or toxic-appearing.   Cardiovascular:      Rate and Rhythm: Normal rate and regular rhythm.   Pulmonary:      Effort: Pulmonary effort is normal. No respiratory distress.   Abdominal:      General: There is no distension.      Palpations: Abdomen is soft.      Tenderness: There is abdominal tenderness (epigastric). There is no guarding.      Hernia: No hernia is present.   Skin:     Capillary Refill: Capillary refill takes less than 2 seconds.   Neurological:      Mental Status: She is alert. Mental status is at baseline.            I have reviewed all pertinent lab results within the past 24 hours.  CBC:   Recent Labs   Lab 04/10/24  0527   WBC 8.77   RBC 3.64*   HGB 10.1*   HCT 30.8*      MCV 85   MCH 27.7   MCHC 32.8     CMP:   Recent Labs   Lab 04/10/24  0527   GLU 83   CALCIUM 8.6*   ALBUMIN 2.3*   PROT 5.8*      K 3.4*   CO2 25      BUN 5*   CREATININE 0.2*   ALKPHOS 79   ALT 12   AST 10   BILITOT 0.3       Significant Diagnostics:  I have reviewed all pertinent imaging results/findings within the past 24 hours.      Assessment/Plan:      * Gastrointestinal hemorrhage associated with peptic ulcer  Provide PPI and Carafate.  Continue to trend hemoglobin    Recent Labs   Lab 04/09/24  0803 04/09/24  1151 04/10/24  0527   Hemoglobin 7.5 L 7.5 L 10.1 L           PUD (peptic ulcer disease)  Visible by CT scan.  Provide Protonix at b.i.d. dosing in addition to Carafate.    S/p egd pending  results.    Symptomatic anemia  Patient's anemia is currently uncontrolled. Has received 2 units of PRBCs on 4/9/24 . Etiology likely d/t acute blood loss which was from PUD  Current CBC reviewed-   Lab Results   Component Value Date    HGB 10.1 (L) 04/10/2024    HCT 30.8 (L) 04/10/2024     Monitor serial CBC and transfuse if patient becomes hemodynamically unstable, symptomatic or H/H drops below 7/21.    Recent Labs   Lab 04/09/24  0803 04/09/24  1151 04/10/24  0527   Hemoglobin 7.5 L 7.5 L 10.1 L         Tobacco abuse  Patient counseled on health consequences associated with tobacco abuse.  She endorses understanding.  Nicotine patch will be provided if necessary as to avoid cravings.        Anxiety  Continue home medical therapy.          VTE Risk Mitigation (From admission, onward)           Ordered     IP VTE HIGH RISK PATIENT  Once         04/08/24 1631     Place sequential compression device  Until discontinued         04/08/24 1631     Place VANDANA hose  Until discontinued         04/08/24 1631                    Discharge Planning   DEEPTI:      Code Status: Full Code   Is the patient medically ready for discharge?:     Reason for patient still in hospital (select all that apply): Treatment  Discharge Plan A: Home, Home with family                  Kt Pozo Jr, MD  Department of Hospital Medicine   [facility] S[facility]

## 2024-04-10 NOTE — SUBJECTIVE & OBJECTIVE
No current facility-administered medications on file prior to encounter.     Current Outpatient Medications on File Prior to Encounter   Medication Sig    albuterol (PROVENTIL) 2.5 mg /3 mL (0.083 %) nebulizer solution Take 2.5 mg by nebulization every 4 (four) hours as needed.    albuterol-ipratropium (DUO-NEB) 2.5 mg-0.5 mg/3 mL nebulizer solution Take 3 mLs by nebulization every 6 (six) hours as needed for Wheezing. Rescue    amLODIPine (NORVASC) 10 MG tablet Take 1 tablet (10 mg total) by mouth once daily.    amoxicillin-clavulanate 875-125mg (AUGMENTIN) 875-125 mg per tablet Take 1 tablet by mouth 2 (two) times daily.    busPIRone (BUSPAR) 15 MG tablet Take 1 tablet (15 mg total) by mouth 3 (three) times daily.    butalbital-acetaminophen-caffeine -40 mg (FIORICET, ESGIC) -40 mg per tablet Take 1 tablet by mouth once daily.    ciprofloxacin HCl (CIPRO) 500 MG tablet Take 1 tablet (500 mg total) by mouth every 12 (twelve) hours.    estradioL (ESTRACE) 0.01 % (0.1 mg/gram) vaginal cream Prescribed by Dr. Anahy Samuels    famotidine (PEPCID) 20 MG tablet Take 1 tablet (20 mg total) by mouth 2 (two) times daily.    gabapentin (NEURONTIN) 300 MG capsule Take 1 capsule by mouth 3 (three) times daily. Prescribed by Dr. Rodas    glycerin adult suppository Place 1 suppository rectally as needed for Constipation.    HYDROcodone-acetaminophen (NORCO)  mg per tablet Take 1 tablet by mouth every 6 (six) hours as needed for Pain.    ondansetron (ZOFRAN-ODT) 4 MG TbDL Take 2 tablets (8 mg total) by mouth 3 (three) times daily as needed (nausea/vomiting).    oxyCODONE-acetaminophen (PERCOCET) 5-325 mg per tablet Take 1 tablet by mouth 2 (two) times daily as needed. Prescribed by Dr. Rodas    pravastatin (PRAVACHOL) 20 MG tablet Take 20 mg by mouth once daily.    prochlorperazine (COMPAZINE) 10 MG tablet Take 1 tablet (10 mg total) by mouth every 6 (six) hours as needed (headache).    QUEtiapine (SEROQUEL)  300 MG Tab Take 2 tablets (600 mg total) by mouth every evening.    sucralfate (CARAFATE) 1 gram tablet Take 1 tablet (1 g total) by mouth 4 (four) times daily.    traZODone (DESYREL) 150 MG tablet Take 1 tablet (150 mg total) by mouth every evening.    TYMLOS 80 mcg (3,120 mcg/1.56 mL) PnIj Inject into the skin. Prescribed by Roxana Rai    FLUoxetine 40 MG capsule Take 1 capsule (40 mg total) by mouth once daily.    topiramate (TOPAMAX) 50 MG tablet Take 1 tablet (50 mg total) by mouth once daily.       Review of patient's allergies indicates:  No Known Allergies    Past Medical History:   Diagnosis Date    Anticoagulant long-term use     Anxiety     Arthritis     C. difficile colitis 8/6/2022    Carotid artery disease     Cervical disc disorder     Chronic back pain     COPD (chronic obstructive pulmonary disease)     Coronary artery disease     Dementia     Depression     Diarrhea, unspecified 11/1/2021    DVT (deep venous thrombosis)     CAROTID    GERD (gastroesophageal reflux disease)     Hematuria     Hiatal hernia     High cholesterol     Ill-fitting dentures     STATES DOESN'T WEAR    PVD (peripheral vascular disease)     Renal calculus     Sleep apnea     Sleep apnea     NO C PAP    Urinary frequency     Urinary urgency     Wears glasses     READING      Past Surgical History:   Procedure Laterality Date    BACK SURGERY      BREAST LUMPECTOMY Right     CAROTID ARTERY ANGIOPLASTY      CAROTID ARTERY ANGIOPLASTY      CAROTID ARTERY STENT Left     CAROTID ENDARTERECTOMY Right     CHOLECYSTECTOMY      COLONOSCOPY      CYSTOSCOPY      HYSTERECTOMY      OOPHORECTOMY      TONSILLECTOMY       Family History       Problem Relation (Age of Onset)    Cancer Mother    No Known Problems Sister, Daughter, Maternal Aunt, Maternal Uncle, Paternal Aunt, Paternal Uncle, Maternal Grandmother, Maternal Grandfather, Paternal Grandmother, Paternal Grandfather    Stroke Father          Tobacco Use    Smoking status: Every  Day     Current packs/day: 0.50     Average packs/day: 0.5 packs/day for 54.3 years (27.1 ttl pk-yrs)     Types: Cigarettes     Start date: 1970    Smokeless tobacco: Never   Substance and Sexual Activity    Alcohol use: No    Drug use: No    Sexual activity: Not on file     Review of Systems   Constitutional:  Negative for activity change, appetite change, fatigue and fever.   Respiratory:  Negative for apnea, cough, chest tightness and shortness of breath.    Cardiovascular:  Negative for chest pain.   Gastrointestinal:  Positive for abdominal pain. Negative for abdominal distention, anal bleeding, blood in stool, constipation, diarrhea, nausea, rectal pain and vomiting.   All other systems reviewed and are negative.    Objective:     Vital Signs (Most Recent):  Temp: 98.8 °F (37.1 °C) (04/10/24 1100)  Pulse: 82 (04/10/24 1124)  Resp: 20 (04/10/24 1352)  BP: (!) 157/68 (04/10/24 1100)  SpO2: 100 % (04/10/24 1157) Vital Signs (24h Range):  Temp:  [98.2 °F (36.8 °C)-99.3 °F (37.4 °C)] 98.8 °F (37.1 °C)  Pulse:  [77-91] 82  Resp:  [16-22] 20  SpO2:  [84 %-100 %] 100 %  BP: (137-170)/(63-82) 157/68     Weight: 50.4 kg (111 lb 3.2 oz)  Body mass index is 19.09 kg/m².     Physical Exam  Constitutional:       General: She is not in acute distress.     Appearance: She is not ill-appearing or toxic-appearing.   Cardiovascular:      Rate and Rhythm: Normal rate and regular rhythm.   Pulmonary:      Effort: Pulmonary effort is normal. No respiratory distress.   Abdominal:      General: There is no distension.      Palpations: Abdomen is soft.      Tenderness: There is abdominal tenderness (epigastric). There is no guarding.      Hernia: No hernia is present.   Skin:     Capillary Refill: Capillary refill takes less than 2 seconds.   Neurological:      Mental Status: She is alert. Mental status is at baseline.            I have reviewed all pertinent lab results within the past 24 hours.  CBC:   Recent Labs   Lab  04/10/24  0527   WBC 8.77   RBC 3.64*   HGB 10.1*   HCT 30.8*      MCV 85   MCH 27.7   MCHC 32.8     CMP:   Recent Labs   Lab 04/10/24  0527   GLU 83   CALCIUM 8.6*   ALBUMIN 2.3*   PROT 5.8*      K 3.4*   CO2 25      BUN 5*   CREATININE 0.2*   ALKPHOS 79   ALT 12   AST 10   BILITOT 0.3       Significant Diagnostics:  I have reviewed all pertinent imaging results/findings within the past 24 hours.

## 2024-04-11 NOTE — ANESTHESIA POSTPROCEDURE EVALUATION
Anesthesia Post Evaluation    Patient: Desiree Blake    Procedure(s) Performed: Procedure(s) (LRB):  EGD, WITH CLOSED BIOPSY (N/A)    Final Anesthesia Type: MAC      Patient location during evaluation: med/surg floor  Patient participation: Yes- Able to Participate  Level of consciousness: awake, oriented and awake and alert  Post-procedure vital signs: reviewed and stable  Pain management: adequate  Airway patency: patent    PONV status at discharge: No PONV  Anesthetic complications: no      Cardiovascular status: blood pressure returned to baseline  Respiratory status: spontaneous ventilation, room air and unassisted  Hydration status: euvolemic  Follow-up not needed.              Vitals Value Taken Time   /78 04/11/24 0818   Temp 98.2 04/11/24 0818   Pulse 84 04/10/24 1425   Resp 18 04/10/24 1415   SpO2 100 % 04/10/24 1157         No case tracking events are documented in the log.      Pain/Shane Score: Pain Rating Prior to Med Admin: 10 (4/10/2024  2:15 PM)  Pain Rating Post Med Admin: 4 (4/10/2024 11:25 AM)  Shane Score: 10 (4/10/2024 11:57 AM)

## 2024-04-12 VITALS
HEIGHT: 64 IN | RESPIRATION RATE: 18 BRPM | OXYGEN SATURATION: 100 % | WEIGHT: 111.19 LBS | TEMPERATURE: 99 F | BODY MASS INDEX: 18.98 KG/M2 | SYSTOLIC BLOOD PRESSURE: 157 MMHG | DIASTOLIC BLOOD PRESSURE: 68 MMHG | HEART RATE: 84 BPM

## 2024-04-13 ENCOUNTER — HOSPITAL ENCOUNTER (EMERGENCY)
Facility: HOSPITAL | Age: 69
Discharge: HOME | End: 2024-04-13
Attending: EMERGENCY MEDICINE
Payer: MEDICARE

## 2024-04-13 VITALS
RESPIRATION RATE: 18 BRPM | BODY MASS INDEX: 18.95 KG/M2 | OXYGEN SATURATION: 99 % | DIASTOLIC BLOOD PRESSURE: 84 MMHG | SYSTOLIC BLOOD PRESSURE: 125 MMHG | TEMPERATURE: 98 F | WEIGHT: 111 LBS | HEART RATE: 84 BPM | HEIGHT: 64 IN

## 2024-04-13 DIAGNOSIS — K29.70 GASTRITIS, PRESENCE OF BLEEDING UNSPECIFIED, UNSPECIFIED CHRONICITY, UNSPECIFIED GASTRITIS TYPE: Primary | ICD-10-CM

## 2024-04-13 LAB
ALBUMIN SERPL BCP-MCNC: 2.6 G/DL (ref 3.5–5.2)
ALP SERPL-CCNC: 81 U/L (ref 55–135)
ALT SERPL W/O P-5'-P-CCNC: 10 U/L (ref 10–44)
ANION GAP SERPL CALC-SCNC: 6 MMOL/L (ref 3–11)
AST SERPL-CCNC: 7 U/L (ref 10–40)
BASOPHILS # BLD AUTO: 0.04 K/UL (ref 0–0.2)
BASOPHILS NFR BLD: 0.3 % (ref 0–1.9)
BILIRUB SERPL-MCNC: 0.2 MG/DL (ref 0.1–1)
BUN SERPL-MCNC: 10 MG/DL (ref 8–23)
CALCIUM SERPL-MCNC: 9 MG/DL (ref 8.7–10.5)
CHLORIDE SERPL-SCNC: 106 MMOL/L (ref 95–110)
CO2 SERPL-SCNC: 26 MMOL/L (ref 23–29)
CREAT SERPL-MCNC: 0.4 MG/DL (ref 0.5–1.4)
DIFFERENTIAL METHOD BLD: ABNORMAL
EOSINOPHIL # BLD AUTO: 0 K/UL (ref 0–0.5)
EOSINOPHIL NFR BLD: 0.3 % (ref 0–8)
ERYTHROCYTE [DISTWIDTH] IN BLOOD BY AUTOMATED COUNT: 18 % (ref 11.5–14.5)
EST. GFR  (NO RACE VARIABLE): >60 ML/MIN/1.73 M^2
GLUCOSE SERPL-MCNC: 104 MG/DL (ref 70–110)
HCT VFR BLD AUTO: 28.6 % (ref 37–48.5)
HGB BLD-MCNC: 9.2 G/DL (ref 12–16)
IMM GRANULOCYTES # BLD AUTO: 0.1 K/UL (ref 0–0.04)
IMM GRANULOCYTES NFR BLD AUTO: 0.8 % (ref 0–0.5)
LIPASE SERPL-CCNC: 16 U/L (ref 13–75)
LYMPHOCYTES # BLD AUTO: 1.2 K/UL (ref 1–4.8)
LYMPHOCYTES NFR BLD: 9.7 % (ref 18–48)
MCH RBC QN AUTO: 28.4 PG (ref 27–31)
MCHC RBC AUTO-ENTMCNC: 32.2 G/DL (ref 32–36)
MCV RBC AUTO: 88 FL (ref 82–98)
MONOCYTES # BLD AUTO: 0.6 K/UL (ref 0.3–1)
MONOCYTES NFR BLD: 5.2 % (ref 4–15)
NEUTROPHILS # BLD AUTO: 10.1 K/UL (ref 1.8–7.7)
NEUTROPHILS NFR BLD: 83.7 % (ref 38–73)
NRBC BLD-RTO: 0 /100 WBC
PLATELET # BLD AUTO: 344 K/UL (ref 150–450)
PMV BLD AUTO: 9.7 FL (ref 9.2–12.9)
POTASSIUM SERPL-SCNC: 3.9 MMOL/L (ref 3.5–5.1)
PROT SERPL-MCNC: 6.4 G/DL (ref 6–8.4)
RBC # BLD AUTO: 3.24 M/UL (ref 4–5.4)
SODIUM SERPL-SCNC: 138 MMOL/L (ref 136–145)
WBC # BLD AUTO: 12.12 K/UL (ref 3.9–12.7)

## 2024-04-13 PROCEDURE — 63600175 PHARM REV CODE 636 W HCPCS: Performed by: EMERGENCY MEDICINE

## 2024-04-13 PROCEDURE — 25000003 PHARM REV CODE 250: Performed by: EMERGENCY MEDICINE

## 2024-04-13 PROCEDURE — 85025 COMPLETE CBC W/AUTO DIFF WBC: CPT | Performed by: EMERGENCY MEDICINE

## 2024-04-13 PROCEDURE — 96361 HYDRATE IV INFUSION ADD-ON: CPT

## 2024-04-13 PROCEDURE — 83690 ASSAY OF LIPASE: CPT | Performed by: EMERGENCY MEDICINE

## 2024-04-13 PROCEDURE — C9113 INJ PANTOPRAZOLE SODIUM, VIA: HCPCS | Performed by: EMERGENCY MEDICINE

## 2024-04-13 PROCEDURE — 80053 COMPREHEN METABOLIC PANEL: CPT | Performed by: EMERGENCY MEDICINE

## 2024-04-13 PROCEDURE — 96375 TX/PRO/DX INJ NEW DRUG ADDON: CPT

## 2024-04-13 PROCEDURE — 99284 EMERGENCY DEPT VISIT MOD MDM: CPT

## 2024-04-13 PROCEDURE — 36415 COLL VENOUS BLD VENIPUNCTURE: CPT | Performed by: EMERGENCY MEDICINE

## 2024-04-13 PROCEDURE — 96374 THER/PROPH/DIAG INJ IV PUSH: CPT

## 2024-04-13 RX ORDER — ALUMINUM HYDROXIDE, MAGNESIUM HYDROXIDE, AND SIMETHICONE 1200; 120; 1200 MG/30ML; MG/30ML; MG/30ML
30 SUSPENSION ORAL ONCE
Status: COMPLETED | OUTPATIENT
Start: 2024-04-13 | End: 2024-04-13

## 2024-04-13 RX ORDER — FAMOTIDINE 10 MG/ML
40 INJECTION INTRAVENOUS
Status: COMPLETED | OUTPATIENT
Start: 2024-04-13 | End: 2024-04-13

## 2024-04-13 RX ORDER — LIDOCAINE HYDROCHLORIDE 20 MG/ML
10 SOLUTION OROPHARYNGEAL ONCE
Status: COMPLETED | OUTPATIENT
Start: 2024-04-13 | End: 2024-04-13

## 2024-04-13 RX ORDER — ONDANSETRON HYDROCHLORIDE 2 MG/ML
4 INJECTION, SOLUTION INTRAVENOUS
Status: COMPLETED | OUTPATIENT
Start: 2024-04-13 | End: 2024-04-13

## 2024-04-13 RX ORDER — MORPHINE SULFATE 4 MG/ML
4 INJECTION, SOLUTION INTRAMUSCULAR; INTRAVENOUS
Status: COMPLETED | OUTPATIENT
Start: 2024-04-13 | End: 2024-04-13

## 2024-04-13 RX ORDER — PANTOPRAZOLE SODIUM 40 MG/10ML
40 INJECTION, POWDER, LYOPHILIZED, FOR SOLUTION INTRAVENOUS
Status: COMPLETED | OUTPATIENT
Start: 2024-04-13 | End: 2024-04-13

## 2024-04-13 RX ADMIN — ALUMINUM HYDROXIDE, MAGNESIUM HYDROXIDE, AND DIMETHICONE 30 ML: 200; 20; 200 SUSPENSION ORAL at 11:04

## 2024-04-13 RX ADMIN — FAMOTIDINE 40 MG: 10 INJECTION, SOLUTION INTRAVENOUS at 11:04

## 2024-04-13 RX ADMIN — SODIUM CHLORIDE, POTASSIUM CHLORIDE, SODIUM LACTATE AND CALCIUM CHLORIDE 1000 ML: 600; 310; 30; 20 INJECTION, SOLUTION INTRAVENOUS at 11:04

## 2024-04-13 RX ADMIN — MORPHINE SULFATE 4 MG: 4 INJECTION, SOLUTION INTRAMUSCULAR; INTRAVENOUS at 12:04

## 2024-04-13 RX ADMIN — LIDOCAINE HYDROCHLORIDE 10 ML: 20 SOLUTION ORAL at 11:04

## 2024-04-13 RX ADMIN — PANTOPRAZOLE SODIUM 40 MG: 40 INJECTION, POWDER, FOR SOLUTION INTRAVENOUS at 11:04

## 2024-04-13 RX ADMIN — ONDANSETRON 4 MG: 2 INJECTION INTRAMUSCULAR; INTRAVENOUS at 11:04

## 2024-04-13 NOTE — ED PROVIDER NOTES
EMERGENCY DEPARTMENT HISTORY AND PHYSICAL EXAM     This note is dictated on M*Modal word recognition program.  There are word recognition mistakes and grammatical errors that are occasionally missed on review.     Date: 4/13/2024   Patient Name: Desiree Blake       History of Presenting Illness      Chief Complaint   Patient presents with    Emesis     Patient to the ER with complaints of nausea, vomiting, diarrhea, abdominal pain, and weakness.         1125   Desiree Blake is a 68 y.o. female with PMHX of PUD, gastritis, chronic opioid use, tobacco use, memory loss, anxiety who presents to the emergency department C/O nausea.    Patient reports abdominal pain and nausea.  Was admitted to this hospital 5 days ago and had an upper endoscopy which demonstrated gastritis and a pyloric ulcer without active bleeding.  Patient reports she can not eat because when she tries to eat or drink something she gets nauseous and will throw up.  Patient complains of feeling weak.      PCP: Kt Pozo Jr., MD        Current Facility-Administered Medications   Medication Dose Route Frequency Provider Last Rate Last Admin    lactated ringers bolus 1,000 mL  1,000 mL Intravenous ED 1 Time Anthony Platt  mL/hr at 04/13/24 1145 1,000 mL at 04/13/24 1145    morphine injection 4 mg  4 mg Intravenous ED 1 Time Anthony Platt MD         Current Outpatient Medications   Medication Sig Dispense Refill    albuterol (PROVENTIL) 2.5 mg /3 mL (0.083 %) nebulizer solution Take 2.5 mg by nebulization every 4 (four) hours as needed.      albuterol-ipratropium (DUO-NEB) 2.5 mg-0.5 mg/3 mL nebulizer solution Take 3 mLs by nebulization every 6 (six) hours as needed for Wheezing. Rescue 75 mL 0    amLODIPine (NORVASC) 10 MG tablet Take 1 tablet (10 mg total) by mouth once daily. 30 tablet 5    amoxicillin-clavulanate 875-125mg (AUGMENTIN) 875-125 mg per tablet Take 1 tablet by mouth 2 (two) times daily. 14 tablet 0    busPIRone  (BUSPAR) 15 MG tablet Take 1 tablet (15 mg total) by mouth 3 (three) times daily. 90 tablet 2    butalbital-acetaminophen-caffeine -40 mg (FIORICET, ESGIC) -40 mg per tablet Take 1 tablet by mouth once daily. 10 tablet 0    cholestyramine (QUESTRAN) 4 gram packet Take 1 packet (4 g total) by mouth 3 (three) times daily with meals. 90 packet 2    estradioL (ESTRACE) 0.01 % (0.1 mg/gram) vaginal cream Prescribed by Dr. Anahy Samuels      famotidine (PEPCID) 20 MG tablet Take 1 tablet (20 mg total) by mouth 2 (two) times daily. 20 tablet 0    FLUoxetine 40 MG capsule Take 1 capsule (40 mg total) by mouth once daily. 30 capsule 5    gabapentin (NEURONTIN) 300 MG capsule Take 1 capsule by mouth 3 (three) times daily. Prescribed by Dr. Rodas      glycerin adult suppository Place 1 suppository rectally as needed for Constipation. 12 suppository 0    HYDROcodone-acetaminophen (NORCO)  mg per tablet Take 1 tablet by mouth every 6 (six) hours as needed for Pain. 12 tablet 0    ondansetron (ZOFRAN-ODT) 4 MG TbDL Take 2 tablets (8 mg total) by mouth 3 (three) times daily as needed (nausea/vomiting). 30 tablet 0    oxyCODONE-acetaminophen (PERCOCET) 5-325 mg per tablet Take 1 tablet by mouth 2 (two) times daily as needed. Prescribed by Dr. Rodas      pantoprazole (PROTONIX) 40 MG tablet Take 1 tablet (40 mg total) by mouth 2 (two) times daily. 60 tablet 2    pravastatin (PRAVACHOL) 20 MG tablet Take 20 mg by mouth once daily.      prochlorperazine (COMPAZINE) 10 MG tablet Take 1 tablet (10 mg total) by mouth every 6 (six) hours as needed (headache). 15 tablet 0    QUEtiapine (SEROQUEL) 300 MG Tab Take 2 tablets (600 mg total) by mouth every evening. 60 tablet 0    sucralfate (CARAFATE) 1 gram tablet Take 1 tablet (1 g total) by mouth 4 (four) times daily. 120 tablet 0    topiramate (TOPAMAX) 50 MG tablet Take 1 tablet (50 mg total) by mouth once daily. 30 tablet 2    traZODone (DESYREL) 150 MG tablet Take 1  tablet (150 mg total) by mouth every evening. 30 tablet 11    TYMLOS 80 mcg (3,120 mcg/1.56 mL) PnIj Inject into the skin. Prescribed by Roxana Rai             Past History     Past Medical History:   Past Medical History:   Diagnosis Date    Anticoagulant long-term use     Anxiety     Arthritis     C. difficile colitis 8/6/2022    Carotid artery disease     Cervical disc disorder     Chronic back pain     COPD (chronic obstructive pulmonary disease)     Coronary artery disease     Dementia     Depression     Diarrhea, unspecified 11/1/2021    DVT (deep venous thrombosis)     CAROTID    GERD (gastroesophageal reflux disease)     Hematuria     Hiatal hernia     High cholesterol     Ill-fitting dentures     STATES DOESN'T WEAR    PVD (peripheral vascular disease)     Renal calculus     Sleep apnea     Sleep apnea     NO C PAP    Urinary frequency     Urinary urgency     Wears glasses     READING         Past Surgical History:   Past Surgical History:   Procedure Laterality Date    BACK SURGERY      BREAST LUMPECTOMY Right     CAROTID ARTERY ANGIOPLASTY      CAROTID ARTERY ANGIOPLASTY      CAROTID ARTERY STENT Left     CAROTID ENDARTERECTOMY Right     CHOLECYSTECTOMY      COLONOSCOPY      CYSTOSCOPY      EGD, WITH CLOSED BIOPSY N/A 4/10/2024    Procedure: EGD, WITH CLOSED BIOPSY;  Surgeon: Meg Ayon MD;  Location: [facility];  Service: General;  Laterality: N/A;    HYSTERECTOMY      OOPHORECTOMY      TONSILLECTOMY          Family History:   Family History   Problem Relation Name Age of Onset    Cancer Mother      Stroke Father      No Known Problems Sister      No Known Problems Daughter      No Known Problems Maternal Aunt      No Known Problems Maternal Uncle      No Known Problems Paternal Aunt      No Known Problems Paternal Uncle      No Known Problems Maternal Grandmother      No Known Problems Maternal Grandfather      No Known Problems Paternal Grandmother      No Known Problems Paternal  "Grandfather      Breast cancer Neg Hx      Ovarian cancer Neg Hx      BRCA 1/2 Neg Hx          Social History:   Social History     Tobacco Use    Smoking status: Every Day     Current packs/day: 0.50     Average packs/day: 0.5 packs/day for 54.3 years (27.1 ttl pk-yrs)     Types: Cigarettes     Start date: 1970    Smokeless tobacco: Never   Substance Use Topics    Alcohol use: No    Drug use: No        Allergies:   Review of patient's allergies indicates:  No Known Allergies       Review of Systems   Review of Systems   See HPI for pertinent positives and negatives       Physical Exam     Vitals:    04/13/24 1108 04/13/24 1112   BP:  (!) 150/65   Pulse:  83   Resp:  18   Temp:  98 °F (36.7 °C)   SpO2:  95%   Weight: 50.3 kg (111 lb)    Height: 5' 4" (1.626 m)       Physical Exam  Vitals and nursing note reviewed.   Constitutional:       General: She is not in acute distress.     Appearance: Normal appearance.   HENT:      Head: Normocephalic and atraumatic.      Right Ear: External ear normal.      Left Ear: External ear normal.      Nose: Nose normal. No congestion or rhinorrhea.      Mouth/Throat:      Mouth: Mucous membranes are moist.   Eyes:      Conjunctiva/sclera: Conjunctivae normal.      Pupils: Pupils are equal, round, and reactive to light.   Pulmonary:      Effort: Pulmonary effort is normal. No respiratory distress.   Abdominal:      Palpations: Abdomen is soft.      Tenderness: There is abdominal tenderness in the epigastric area. There is no guarding or rebound.   Musculoskeletal:         General: No deformity. Normal range of motion.      Cervical back: Normal range of motion. No rigidity.   Skin:     General: Skin is dry.      Coloration: Skin is pale.   Neurological:      General: No focal deficit present.      Mental Status: She is alert and oriented to person, place, and time. Mental status is at baseline.   Psychiatric:         Mood and Affect: Mood normal.         Behavior: Behavior normal. "              Diagnostic Study Results      Labs -   Recent Results (from the past 12 hour(s))   CBC Auto Differential    Collection Time: 04/13/24 11:22 AM   Result Value Ref Range    WBC 12.12 3.90 - 12.70 K/uL    RBC 3.24 (L) 4.00 - 5.40 M/uL    Hemoglobin 9.2 (L) 12.0 - 16.0 g/dL    Hematocrit 28.6 (L) 37.0 - 48.5 %    MCV 88 82 - 98 fL    MCH 28.4 27.0 - 31.0 pg    MCHC 32.2 32.0 - 36.0 g/dL    RDW 18.0 (H) 11.5 - 14.5 %    Platelets 344 150 - 450 K/uL    MPV 9.7 9.2 - 12.9 fL    Immature Granulocytes 0.8 (H) 0.0 - 0.5 %    Gran # (ANC) 10.1 (H) 1.8 - 7.7 K/uL    Immature Grans (Abs) 0.10 (H) 0.00 - 0.04 K/uL    Lymph # 1.2 1.0 - 4.8 K/uL    Mono # 0.6 0.3 - 1.0 K/uL    Eos # 0.0 0.0 - 0.5 K/uL    Baso # 0.04 0.00 - 0.20 K/uL    nRBC 0 0 /100 WBC    Gran % 83.7 (H) 38.0 - 73.0 %    Lymph % 9.7 (L) 18.0 - 48.0 %    Mono % 5.2 4.0 - 15.0 %    Eosinophil % 0.3 0.0 - 8.0 %    Basophil % 0.3 0.0 - 1.9 %    Differential Method Automated    Comprehensive Metabolic Panel    Collection Time: 04/13/24 11:22 AM   Result Value Ref Range    Sodium 138 136 - 145 mmol/L    Potassium 3.9 3.5 - 5.1 mmol/L    Chloride 106 95 - 110 mmol/L    CO2 26 23 - 29 mmol/L    Glucose 104 70 - 110 mg/dL    BUN 10 8 - 23 mg/dL    Creatinine 0.4 (L) 0.5 - 1.4 mg/dL    Calcium 9.0 8.7 - 10.5 mg/dL    Total Protein 6.4 6.0 - 8.4 g/dL    Albumin 2.6 (L) 3.5 - 5.2 g/dL    Total Bilirubin 0.2 0.1 - 1.0 mg/dL    Alkaline Phosphatase 81 55 - 135 U/L    AST 7 (L) 10 - 40 U/L    ALT 10 10 - 44 U/L    eGFR >60.0 >60 mL/min/1.73 m^2    Anion Gap 6 3 - 11 mmol/L   Lipase    Collection Time: 04/13/24 11:22 AM   Result Value Ref Range    Lipase 16 13 - 75 U/L        Radiologic Studies -    No orders to display        Medications given in the ED-   Medications   lactated ringers bolus 1,000 mL (1,000 mLs Intravenous New Bag 4/13/24 1145)   morphine injection 4 mg (has no administration in time range)   pantoprazole injection 40 mg (40 mg Intravenous Given  4/13/24 1138)   famotidine (PF) injection 40 mg (40 mg Intravenous Given 4/13/24 1140)   aluminum-magnesium hydroxide-simethicone 200-200-20 mg/5 mL suspension 30 mL (30 mLs Oral Given 4/13/24 1144)     And   LIDOcaine viscous HCl 2% oral solution 10 mL (10 mLs Oral Given 4/13/24 1144)   ondansetron injection 4 mg (4 mg Intravenous Given 4/13/24 1137)           Medical Decision Making    I am the first provider for this patient.     I reviewed the vital signs, available nursing notes, past medical history, past surgical history, family history and social history.     Vital Signs:  Reviewed the patient's vital signs.     Pulse Oximetry Analysis and Interpretation:    95% on Room Air, normal        External Test Results (Pertinent to encounter):    Records Reviewed: Nursing Notes, Current Prescription Medications, Old Medical Records, External Medical Records , and EGD note    History Obtained By: Patient and Spouse    Provider Notes: Desiree Blake is a 68 y.o. female with PUD, complains of nausea    Co-morbidities Considered:  Gastritis, peptic ulcer disease, tobacco use    Differential Diagnosis:  Nausea and vomiting, gastritis, upper GI bleed      ED Course:    Patient presents with epigastric pain is setting of known gastric ulcer and gastritis.  Vital signs within normal limits.  Abdominal exam is not peritonitic.  Patient does not appear ill and I am not suspicious for perforated ulcer.  Patient denies hematemesis.  Labs demonstrated hemoglobin of 9.2.  Patient received a blood transfusion while hospitalized earlier this month.  Slightly down trending from 3 days ago however this will need to be followed up.  When I re-evaluate patient she was feeling better but requesting a pain shot.  Reviewed [facility] aware database and patient is prescribed oxycodone 10 with acetaminophen by 1 of her providers and will give a dose of morphine as patient is opioid tolerant.    ED Course as of 04/13/24 1243   Sat Apr 13, 2024   1221  Hemoglobin(!): 9.2 [MO]   1222 MCV: 88 [MO]      ED Course User Index  [MO] Anthony Platt MD       Problems Addressed:  Epigastric pain    Procedures:   Procedures       Diagnosis and Disposition     Critical Care:      DISCHARGE NOTE:       Desiree Blake's  results have been reviewed with her.  She has been counseled regarding her diagnosis, treatment, and plan.  She verbally conveys understanding and agreement of the signs, symptoms, diagnosis, treatment and prognosis and additionally agrees to follow up as discussed.  She also agrees with the care-plan and conveys that all of her questions have been answered.  I have also provided discharge instructions for her that include: educational information regarding their diagnosis and treatment, and list of reasons why they would want to return to the ED prior to their follow-up appointment, should her condition change. She has been provided with education for proper emergency department utilization.         CLINICAL IMPRESSION:         1. Gastritis, presence of bleeding unspecified, unspecified chronicity, unspecified gastritis type              PLAN:   1. Discharge Home  2.      Medication List        ASK your doctor about these medications      albuterol 2.5 mg /3 mL (0.083 %) nebulizer solution  Commonly known as: PROVENTIL     albuterol-ipratropium 2.5 mg-0.5 mg/3 mL nebulizer solution  Commonly known as: DUO-NEB  Take 3 mLs by nebulization every 6 (six) hours as needed for Wheezing. Rescue     amLODIPine 10 MG tablet  Commonly known as: NORVASC  Take 1 tablet (10 mg total) by mouth once daily.     amoxicillin-clavulanate 875-125mg 875-125 mg per tablet  Commonly known as: AUGMENTIN  Take 1 tablet by mouth 2 (two) times daily.     busPIRone 15 MG tablet  Commonly known as: BUSPAR  Take 1 tablet (15 mg total) by mouth 3 (three) times daily.     butalbital-acetaminophen-caffeine -40 mg -40 mg per tablet  Commonly known as: FIORICET, ESGIC  Take 1  tablet by mouth once daily.     cholestyramine 4 gram packet  Commonly known as: QUESTRAN  Take 1 packet (4 g total) by mouth 3 (three) times daily with meals.     estradioL 0.01 % (0.1 mg/gram) vaginal cream  Commonly known as: ESTRACE     famotidine 20 MG tablet  Commonly known as: PEPCID  Take 1 tablet (20 mg total) by mouth 2 (two) times daily.     FLUoxetine 40 MG capsule  Take 1 capsule (40 mg total) by mouth once daily.     gabapentin 300 MG capsule  Commonly known as: NEURONTIN     glycerin adult suppository  Place 1 suppository rectally as needed for Constipation.     HYDROcodone-acetaminophen  mg per tablet  Commonly known as: NORCO  Take 1 tablet by mouth every 6 (six) hours as needed for Pain.     ondansetron 4 MG Tbdl  Commonly known as: ZOFRAN-ODT  Take 2 tablets (8 mg total) by mouth 3 (three) times daily as needed (nausea/vomiting).     oxyCODONE-acetaminophen 5-325 mg per tablet  Commonly known as: PERCOCET     pantoprazole 40 MG tablet  Commonly known as: PROTONIX  Take 1 tablet (40 mg total) by mouth 2 (two) times daily.     pravastatin 20 MG tablet  Commonly known as: PRAVACHOL     prochlorperazine 10 MG tablet  Commonly known as: COMPAZINE  Take 1 tablet (10 mg total) by mouth every 6 (six) hours as needed (headache).     QUEtiapine 300 MG Tab  Commonly known as: SEROQUEL  Take 2 tablets (600 mg total) by mouth every evening.     sucralfate 1 gram tablet  Commonly known as: CARAFATE  Take 1 tablet (1 g total) by mouth 4 (four) times daily.     topiramate 50 MG tablet  Commonly known as: TOPAMAX  Take 1 tablet (50 mg total) by mouth once daily.     traZODone 150 MG tablet  Commonly known as: DESYREL  Take 1 tablet (150 mg total) by mouth every evening.     TYMLOS 80 mcg (3,120 mcg/1.56 mL) Pnij  Generic drug: abaloparatide             3. Kt Pozo Jr., MD  [address] [place] [id number]  [phone]    Schedule an appointment as soon as possible for a visit        Meg Ayon MD  [address] [facility] [address]  [facility] [address]  [phone]    Schedule an appointment as soon as possible for a visit       [facility] Emergency Department  [address] [place] [id number] [phone]  Go to   If symptoms worsen       _______________________________     Please note that this dictation was completed with iPling, the computer voice recognition software.  Quite often unanticipated grammatical, syntax, homophones, and other interpretive errors are inadvertently transcribed by the computer software.  Please disregard these errors.  Please excuse any errors that have escaped final proofreading.             Anthony Platt MD  04/14/24 0816

## 2024-04-15 ENCOUNTER — HOSPITAL ENCOUNTER (EMERGENCY)
Facility: HOSPITAL | Age: 69
Discharge: HOME | End: 2024-04-15
Attending: EMERGENCY MEDICINE
Payer: MEDICARE

## 2024-04-15 VITALS
HEART RATE: 77 BPM | BODY MASS INDEX: 18.24 KG/M2 | TEMPERATURE: 99 F | WEIGHT: 106.81 LBS | SYSTOLIC BLOOD PRESSURE: 127 MMHG | OXYGEN SATURATION: 95 % | RESPIRATION RATE: 20 BRPM | HEIGHT: 64 IN | DIASTOLIC BLOOD PRESSURE: 72 MMHG

## 2024-04-15 DIAGNOSIS — R10.13 EPIGASTRIC ABDOMINAL PAIN: ICD-10-CM

## 2024-04-15 DIAGNOSIS — R11.2 NAUSEA AND VOMITING, UNSPECIFIED VOMITING TYPE: ICD-10-CM

## 2024-04-15 DIAGNOSIS — R11.10 CHRONIC VOMITING: ICD-10-CM

## 2024-04-15 DIAGNOSIS — F06.4 ANXIETY DISORDER DUE TO MEDICAL CONDITION: Primary | ICD-10-CM

## 2024-04-15 PROBLEM — Z00.00 WELLNESS EXAMINATION: Status: RESOLVED | Noted: 2024-01-10 | Resolved: 2024-04-15

## 2024-04-15 LAB
ALBUMIN SERPL BCP-MCNC: 2.5 G/DL (ref 3.5–5.2)
ALP SERPL-CCNC: 74 U/L (ref 55–135)
ALT SERPL W/O P-5'-P-CCNC: 11 U/L (ref 10–44)
ANION GAP SERPL CALC-SCNC: 5 MMOL/L (ref 3–11)
AST SERPL-CCNC: 9 U/L (ref 10–40)
BASOPHILS # BLD AUTO: 0.02 K/UL (ref 0–0.2)
BASOPHILS NFR BLD: 0.2 % (ref 0–1.9)
BILIRUB SERPL-MCNC: 0.2 MG/DL (ref 0.1–1)
BUN SERPL-MCNC: 7 MG/DL (ref 8–23)
CALCIUM SERPL-MCNC: 8.7 MG/DL (ref 8.7–10.5)
CHLORIDE SERPL-SCNC: 107 MMOL/L (ref 95–110)
CO2 SERPL-SCNC: 28 MMOL/L (ref 23–29)
CREAT SERPL-MCNC: 0.4 MG/DL (ref 0.5–1.4)
DIFFERENTIAL METHOD BLD: ABNORMAL
EOSINOPHIL # BLD AUTO: 0.1 K/UL (ref 0–0.5)
EOSINOPHIL NFR BLD: 1 % (ref 0–8)
ERYTHROCYTE [DISTWIDTH] IN BLOOD BY AUTOMATED COUNT: 18.6 % (ref 11.5–14.5)
EST. GFR  (NO RACE VARIABLE): >60 ML/MIN/1.73 M^2
GLUCOSE SERPL-MCNC: 109 MG/DL (ref 70–110)
HCT VFR BLD AUTO: 27.9 % (ref 37–48.5)
HGB BLD-MCNC: 8.7 G/DL (ref 12–16)
IMM GRANULOCYTES # BLD AUTO: 0.06 K/UL (ref 0–0.04)
IMM GRANULOCYTES NFR BLD AUTO: 0.7 % (ref 0–0.5)
LIPASE SERPL-CCNC: 22 U/L (ref 13–75)
LYMPHOCYTES # BLD AUTO: 1 K/UL (ref 1–4.8)
LYMPHOCYTES NFR BLD: 10.8 % (ref 18–48)
MCH RBC QN AUTO: 28.1 PG (ref 27–31)
MCHC RBC AUTO-ENTMCNC: 31.2 G/DL (ref 32–36)
MCV RBC AUTO: 90 FL (ref 82–98)
MONOCYTES # BLD AUTO: 0.6 K/UL (ref 0.3–1)
MONOCYTES NFR BLD: 6.4 % (ref 4–15)
NEUTROPHILS # BLD AUTO: 7.3 K/UL (ref 1.8–7.7)
NEUTROPHILS NFR BLD: 80.9 % (ref 38–73)
NRBC BLD-RTO: 1 /100 WBC
PLATELET # BLD AUTO: 368 K/UL (ref 150–450)
PMV BLD AUTO: 9.6 FL (ref 9.2–12.9)
POTASSIUM SERPL-SCNC: 3 MMOL/L (ref 3.5–5.1)
PROT SERPL-MCNC: 6 G/DL (ref 6–8.4)
RBC # BLD AUTO: 3.1 M/UL (ref 4–5.4)
SODIUM SERPL-SCNC: 140 MMOL/L (ref 136–145)
WBC # BLD AUTO: 9.07 K/UL (ref 3.9–12.7)

## 2024-04-15 PROCEDURE — 85025 COMPLETE CBC W/AUTO DIFF WBC: CPT | Performed by: EMERGENCY MEDICINE

## 2024-04-15 PROCEDURE — 36415 COLL VENOUS BLD VENIPUNCTURE: CPT | Performed by: EMERGENCY MEDICINE

## 2024-04-15 PROCEDURE — 83690 ASSAY OF LIPASE: CPT | Performed by: EMERGENCY MEDICINE

## 2024-04-15 PROCEDURE — 96372 THER/PROPH/DIAG INJ SC/IM: CPT | Performed by: EMERGENCY MEDICINE

## 2024-04-15 PROCEDURE — 63600175 PHARM REV CODE 636 W HCPCS: Performed by: EMERGENCY MEDICINE

## 2024-04-15 PROCEDURE — 99284 EMERGENCY DEPT VISIT MOD MDM: CPT | Mod: 25

## 2024-04-15 PROCEDURE — 25000003 PHARM REV CODE 250: Performed by: EMERGENCY MEDICINE

## 2024-04-15 PROCEDURE — 80053 COMPREHEN METABOLIC PANEL: CPT | Performed by: EMERGENCY MEDICINE

## 2024-04-15 RX ORDER — DICYCLOMINE HYDROCHLORIDE 10 MG/ML
20 INJECTION INTRAMUSCULAR
Status: COMPLETED | OUTPATIENT
Start: 2024-04-15 | End: 2024-04-15

## 2024-04-15 RX ORDER — PROMETHAZINE HYDROCHLORIDE 25 MG/ML
25 INJECTION, SOLUTION INTRAMUSCULAR; INTRAVENOUS
Status: COMPLETED | OUTPATIENT
Start: 2024-04-15 | End: 2024-04-15

## 2024-04-15 RX ADMIN — POTASSIUM BICARBONATE 50 MEQ: 978 TABLET, EFFERVESCENT ORAL at 10:04

## 2024-04-15 RX ADMIN — PROMETHAZINE HYDROCHLORIDE 25 MG: 25 INJECTION INTRAMUSCULAR; INTRAVENOUS at 09:04

## 2024-04-15 RX ADMIN — DICYCLOMINE HYDROCHLORIDE 20 MG: 10 INJECTION, SOLUTION INTRAMUSCULAR at 09:04

## 2024-04-15 NOTE — ED PROVIDER NOTES
Encounter Date: 4/15/2024       History     Chief Complaint   Patient presents with    Abdominal Pain     Pt reports that she is having colitis again. Worse the past 2 days.     68-year-old female with a history of chronic abdominal pain, chronic nausea vomiting and diarrhea, states she has been having this issue since being discharged in the hospital.  Admitted a few days ago for peptic ulcer disease, EGD revealed peptic ulcers that were not actively bleeding.  Patient with a history of chronic opiate use as well.  Depart with the primary care physician today but came here instead.  Has been here multiple times for the same issue denies any fever.  No blood in vomit or stool      Review of patient's allergies indicates:  No Known Allergies  Past Medical History:   Diagnosis Date    Anticoagulant long-term use     Anxiety     Arthritis     C. difficile colitis 8/6/2022    Carotid artery disease     Cervical disc disorder     Chronic back pain     COPD (chronic obstructive pulmonary disease)     Coronary artery disease     Dementia     Depression     Diarrhea, unspecified 11/1/2021    DVT (deep venous thrombosis)     CAROTID    GERD (gastroesophageal reflux disease)     Hematuria     Hiatal hernia     High cholesterol     Ill-fitting dentures     STATES DOESN'T WEAR    PVD (peripheral vascular disease)     Renal calculus     Sleep apnea     Sleep apnea     NO C PAP    Urinary frequency     Urinary urgency     Wears glasses     READING      Past Surgical History:   Procedure Laterality Date    BACK SURGERY      BREAST LUMPECTOMY Right     CAROTID ARTERY ANGIOPLASTY      CAROTID ARTERY ANGIOPLASTY      CAROTID ARTERY STENT Left     CAROTID ENDARTERECTOMY Right     CHOLECYSTECTOMY      COLONOSCOPY      CYSTOSCOPY      EGD, WITH CLOSED BIOPSY N/A 4/10/2024    Procedure: EGD, WITH CLOSED BIOPSY;  Surgeon: Meg Ayon MD;  Location: [facility];  Service: General;  Laterality: N/A;    HYSTERECTOMY      OOPHORECTOMY       TONSILLECTOMY       Family History   Problem Relation Name Age of Onset    Cancer Mother      Stroke Father      No Known Problems Sister      No Known Problems Daughter      No Known Problems Maternal Aunt      No Known Problems Maternal Uncle      No Known Problems Paternal Aunt      No Known Problems Paternal Uncle      No Known Problems Maternal Grandmother      No Known Problems Maternal Grandfather      No Known Problems Paternal Grandmother      No Known Problems Paternal Grandfather      Breast cancer Neg Hx      Ovarian cancer Neg Hx      BRCA 1/2 Neg Hx       Social History     Tobacco Use    Smoking status: Every Day     Current packs/day: 0.50     Average packs/day: 0.5 packs/day for 54.3 years (27.1 ttl pk-yrs)     Types: Cigarettes     Start date: 1970    Smokeless tobacco: Never   Substance Use Topics    Alcohol use: No    Drug use: No     Review of Systems   Constitutional:  Negative for fever.   HENT:  Negative for sore throat.    Respiratory:  Negative for shortness of breath.    Cardiovascular:  Negative for chest pain.   Gastrointestinal:  Positive for diarrhea, nausea and vomiting.   Genitourinary:  Negative for dysuria.   Musculoskeletal:  Negative for back pain.   Skin:  Negative for rash.   Neurological:  Negative for weakness.   Hematological:  Does not bruise/bleed easily.   All other systems reviewed and are negative.      Physical Exam     Initial Vitals [04/15/24 0858]   BP Pulse Resp Temp SpO2   (!) 146/57 90 20 98.6 °F (37 °C) 97 %      MAP       --         Physical Exam    Nursing note and vitals reviewed.  Constitutional: She appears well-developed and well-nourished. She is not diaphoretic. No distress.   HENT:   Head: Normocephalic and atraumatic.   Eyes: Conjunctivae and EOM are normal. Pupils are equal, round, and reactive to light.   Neck: Neck supple.   Normal range of motion.  Cardiovascular:  Normal rate, regular rhythm, normal heart sounds and intact distal pulses.            No murmur heard.  Pulmonary/Chest: Breath sounds normal. No respiratory distress. She has no wheezes. She has no rhonchi. She has no rales. She exhibits no tenderness.   Abdominal: Abdomen is soft. Bowel sounds are normal.   Musculoskeletal:         General: No tenderness or edema. Normal range of motion.      Cervical back: Normal range of motion and neck supple.     Neurological: She is alert and oriented to person, place, and time. She has normal strength. No cranial nerve deficit. GCS score is 15. GCS eye subscore is 4. GCS verbal subscore is 5. GCS motor subscore is 6.   Skin: Skin is warm and dry. Capillary refill takes less than 2 seconds.         ED Course   Procedures  Labs Reviewed   CBC W/ AUTO DIFFERENTIAL - Abnormal; Notable for the following components:       Result Value    RBC 3.10 (*)     Hemoglobin 8.7 (*)     Hematocrit 27.9 (*)     MCHC 31.2 (*)     RDW 18.6 (*)     Immature Granulocytes 0.7 (*)     Immature Grans (Abs) 0.06 (*)     nRBC 1 (*)     Gran % 80.9 (*)     Lymph % 10.8 (*)     All other components within normal limits   COMPREHENSIVE METABOLIC PANEL - Abnormal; Notable for the following components:    Potassium 3.0 (*)     BUN 7 (*)     Creatinine 0.4 (*)     Albumin 2.5 (*)     AST 9 (*)     All other components within normal limits   LIPASE          Imaging Results    None          Medications   promethazine injection 25 mg (25 mg Intramuscular Given 4/15/24 0922)   dicyclomine injection 20 mg (20 mg Intramuscular Given 4/15/24 0921)   potassium bicarbonate disintegrating tablet 50 mEq (50 mEq Oral Given 4/15/24 1010)     Medical Decision Making  Amount and/or Complexity of Data Reviewed  Labs: ordered.    Risk  Prescription drug management.                          Medical Decision Making:   Differential Diagnosis:   Chronic nausea vomiting, chronic pain syndrome, chronic abdominal pain, GERD             Clinical Impression:  Final diagnoses:  [R11.10] Chronic vomiting  [R11.2]  Nausea and vomiting, unspecified vomiting type  [R10.13] Epigastric abdominal pain  [F06.4] Anxiety disorder due to medical condition (Primary)          ED Disposition Condition    Discharge Stable          ED Prescriptions    None       Follow-up Information       Follow up With Specialties Details Why Contact Info    Kt Pozo Jr., MD [facility]  [address] [place] [id number]  [phone]               Marek Rai MD  04/15/24 1026       Marek Rai MD  04/15/24 1043

## 2024-04-16 LAB — SPECIMEN TO PATHOLOGY - SURGICAL: NORMAL

## 2024-04-22 ENCOUNTER — LAB VISIT (OUTPATIENT)
Dept: LAB | Facility: HOSPITAL | Age: 69
End: 2024-04-22
Attending: INTERNAL MEDICINE
Payer: MEDICARE

## 2024-04-22 DIAGNOSIS — K27.9 PEPTIC ULCER: ICD-10-CM

## 2024-04-22 DIAGNOSIS — F03.90 DEMENTIA WITHOUT BEHAVIORAL DISTURBANCE: ICD-10-CM

## 2024-04-22 DIAGNOSIS — E78.2 MIXED HYPERLIPIDEMIA: ICD-10-CM

## 2024-04-22 DIAGNOSIS — I10 PRIMARY HYPERTENSION: ICD-10-CM

## 2024-04-22 DIAGNOSIS — Z72.0 TOBACCO ABUSE: ICD-10-CM

## 2024-04-22 DIAGNOSIS — F41.9 ANXIETY: ICD-10-CM

## 2024-04-22 DIAGNOSIS — F51.01 PRIMARY INSOMNIA: ICD-10-CM

## 2024-04-22 DIAGNOSIS — K27.4 GASTROINTESTINAL HEMORRHAGE ASSOCIATED WITH PEPTIC ULCER: ICD-10-CM

## 2024-04-22 PROBLEM — U07.1 COVID-19: Status: RESOLVED | Noted: 2023-11-06 | Resolved: 2024-04-22

## 2024-04-22 PROBLEM — U09.9 COVID-19 LONG HAULER MANIFESTING CHRONIC FATIGUE: Status: RESOLVED | Noted: 2023-11-28 | Resolved: 2024-04-22

## 2024-04-22 PROBLEM — G93.32 COVID-19 LONG HAULER MANIFESTING CHRONIC FATIGUE: Status: RESOLVED | Noted: 2023-11-28 | Resolved: 2024-04-22

## 2024-04-22 LAB
ALBUMIN SERPL BCP-MCNC: 2.9 G/DL (ref 3.5–5.2)
ALP SERPL-CCNC: 92 U/L (ref 55–135)
ALT SERPL W/O P-5'-P-CCNC: 10 U/L (ref 10–44)
ANION GAP SERPL CALC-SCNC: 6 MMOL/L (ref 3–11)
AST SERPL-CCNC: 9 U/L (ref 10–40)
BASOPHILS # BLD AUTO: 0.04 K/UL (ref 0–0.2)
BASOPHILS NFR BLD: 0.3 % (ref 0–1.9)
BILIRUB SERPL-MCNC: 0.3 MG/DL (ref 0.1–1)
BUN SERPL-MCNC: 10 MG/DL (ref 8–23)
CALCIUM SERPL-MCNC: 8.9 MG/DL (ref 8.7–10.5)
CHLORIDE SERPL-SCNC: 106 MMOL/L (ref 95–110)
CO2 SERPL-SCNC: 25 MMOL/L (ref 23–29)
CREAT SERPL-MCNC: 0.3 MG/DL (ref 0.5–1.4)
DIFFERENTIAL METHOD BLD: ABNORMAL
EOSINOPHIL # BLD AUTO: 0.1 K/UL (ref 0–0.5)
EOSINOPHIL NFR BLD: 0.5 % (ref 0–8)
ERYTHROCYTE [DISTWIDTH] IN BLOOD BY AUTOMATED COUNT: 17.3 % (ref 11.5–14.5)
EST. GFR  (NO RACE VARIABLE): >60 ML/MIN/1.73 M^2
FERRITIN SERPL-MCNC: 13 NG/ML (ref 20–300)
FOLATE SERPL-MCNC: 7.8 NG/ML (ref 4–24)
GLUCOSE SERPL-MCNC: 93 MG/DL (ref 70–110)
HCT VFR BLD AUTO: 33.3 % (ref 37–48.5)
HGB BLD-MCNC: 10.3 G/DL (ref 12–16)
IMM GRANULOCYTES # BLD AUTO: 0.07 K/UL (ref 0–0.04)
IMM GRANULOCYTES NFR BLD AUTO: 0.5 % (ref 0–0.5)
IRON SATN MFR SERPL: 5 % (ref 20–50)
IRON SERPL-MCNC: 15 UG/DL (ref 30–160)
LYMPHOCYTES # BLD AUTO: 0.8 K/UL (ref 1–4.8)
LYMPHOCYTES NFR BLD: 6.6 % (ref 18–48)
MCH RBC QN AUTO: 27.4 PG (ref 27–31)
MCHC RBC AUTO-ENTMCNC: 30.9 G/DL (ref 32–36)
MCV RBC AUTO: 89 FL (ref 82–98)
MONOCYTES # BLD AUTO: 0.7 K/UL (ref 0.3–1)
MONOCYTES NFR BLD: 5.1 % (ref 4–15)
NEUTROPHILS # BLD AUTO: 11.2 K/UL (ref 1.8–7.7)
NEUTROPHILS NFR BLD: 87 % (ref 38–73)
NRBC BLD-RTO: 0 /100 WBC
PLATELET # BLD AUTO: 483 K/UL (ref 150–450)
PLATELET BLD QL SMEAR: ABNORMAL
PMV BLD AUTO: 10.6 FL (ref 9.2–12.9)
POTASSIUM SERPL-SCNC: 3.8 MMOL/L (ref 3.5–5.1)
PROT SERPL-MCNC: 6.8 G/DL (ref 6–8.4)
RBC # BLD AUTO: 3.76 M/UL (ref 4–5.4)
RETICS/RBC NFR AUTO: 4.3 % (ref 0.5–2.5)
SODIUM SERPL-SCNC: 137 MMOL/L (ref 136–145)
TOTAL IRON BINDING CAPACITY: 311 UG/DL (ref 250–450)
VIT B12 SERPL-MCNC: 828 PG/ML (ref 210–950)
WBC # BLD AUTO: 12.82 K/UL (ref 3.9–12.7)

## 2024-04-22 PROCEDURE — 36415 COLL VENOUS BLD VENIPUNCTURE: CPT | Performed by: INTERNAL MEDICINE

## 2024-04-22 PROCEDURE — 82728 ASSAY OF FERRITIN: CPT | Performed by: INTERNAL MEDICINE

## 2024-04-22 PROCEDURE — 83540 ASSAY OF IRON: CPT | Performed by: INTERNAL MEDICINE

## 2024-04-22 PROCEDURE — 85045 AUTOMATED RETICULOCYTE COUNT: CPT | Performed by: INTERNAL MEDICINE

## 2024-04-22 PROCEDURE — 80053 COMPREHEN METABOLIC PANEL: CPT | Performed by: INTERNAL MEDICINE

## 2024-04-22 PROCEDURE — 82607 VITAMIN B-12: CPT | Performed by: INTERNAL MEDICINE

## 2024-04-22 PROCEDURE — 85025 COMPLETE CBC W/AUTO DIFF WBC: CPT | Performed by: INTERNAL MEDICINE

## 2024-04-22 PROCEDURE — 82746 ASSAY OF FOLIC ACID SERUM: CPT | Performed by: INTERNAL MEDICINE

## 2024-04-27 ENCOUNTER — HOSPITAL ENCOUNTER (EMERGENCY)
Facility: HOSPITAL | Age: 69
Discharge: HOME | End: 2024-04-27
Attending: STUDENT IN AN ORGANIZED HEALTH CARE EDUCATION/TRAINING PROGRAM
Payer: MEDICARE

## 2024-04-27 VITALS
BODY MASS INDEX: 17.93 KG/M2 | DIASTOLIC BLOOD PRESSURE: 72 MMHG | TEMPERATURE: 98 F | SYSTOLIC BLOOD PRESSURE: 174 MMHG | WEIGHT: 105 LBS | OXYGEN SATURATION: 100 % | HEIGHT: 64 IN | HEART RATE: 80 BPM | RESPIRATION RATE: 20 BRPM

## 2024-04-27 DIAGNOSIS — K29.70 GASTRITIS, PRESENCE OF BLEEDING UNSPECIFIED, UNSPECIFIED CHRONICITY, UNSPECIFIED GASTRITIS TYPE: Primary | ICD-10-CM

## 2024-04-27 LAB
ALBUMIN SERPL BCP-MCNC: 2.4 G/DL (ref 3.5–5.2)
ALP SERPL-CCNC: 91 U/L (ref 55–135)
ALT SERPL W/O P-5'-P-CCNC: 10 U/L (ref 10–44)
ANION GAP SERPL CALC-SCNC: 5 MMOL/L (ref 3–11)
AST SERPL-CCNC: 7 U/L (ref 10–40)
BASOPHILS # BLD AUTO: 0.03 K/UL (ref 0–0.2)
BASOPHILS NFR BLD: 0.3 % (ref 0–1.9)
BILIRUB SERPL-MCNC: 0.1 MG/DL (ref 0.1–1)
BUN SERPL-MCNC: 10 MG/DL (ref 8–23)
CALCIUM SERPL-MCNC: 8.5 MG/DL (ref 8.7–10.5)
CHLORIDE SERPL-SCNC: 110 MMOL/L (ref 95–110)
CO2 SERPL-SCNC: 25 MMOL/L (ref 23–29)
CREAT SERPL-MCNC: 0.4 MG/DL (ref 0.5–1.4)
DIFFERENTIAL METHOD BLD: ABNORMAL
EOSINOPHIL # BLD AUTO: 0.1 K/UL (ref 0–0.5)
EOSINOPHIL NFR BLD: 0.9 % (ref 0–8)
ERYTHROCYTE [DISTWIDTH] IN BLOOD BY AUTOMATED COUNT: 17.4 % (ref 11.5–14.5)
EST. GFR  (NO RACE VARIABLE): >60 ML/MIN/1.73 M^2
GLUCOSE SERPL-MCNC: 108 MG/DL (ref 70–110)
HCT VFR BLD AUTO: 29.7 % (ref 37–48.5)
HGB BLD-MCNC: 8.9 G/DL (ref 12–16)
IMM GRANULOCYTES # BLD AUTO: 0.05 K/UL (ref 0–0.04)
IMM GRANULOCYTES NFR BLD AUTO: 0.4 % (ref 0–0.5)
LIPASE SERPL-CCNC: 32 U/L (ref 13–75)
LYMPHOCYTES # BLD AUTO: 1 K/UL (ref 1–4.8)
LYMPHOCYTES NFR BLD: 8.5 % (ref 18–48)
MCH RBC QN AUTO: 26.3 PG (ref 27–31)
MCHC RBC AUTO-ENTMCNC: 30 G/DL (ref 32–36)
MCV RBC AUTO: 88 FL (ref 82–98)
MONOCYTES # BLD AUTO: 0.8 K/UL (ref 0.3–1)
MONOCYTES NFR BLD: 6.6 % (ref 4–15)
NEUTROPHILS # BLD AUTO: 9.9 K/UL (ref 1.8–7.7)
NEUTROPHILS NFR BLD: 83.3 % (ref 38–73)
NRBC BLD-RTO: 0 /100 WBC
PLATELET # BLD AUTO: 265 K/UL (ref 150–450)
PMV BLD AUTO: 10 FL (ref 9.2–12.9)
POTASSIUM SERPL-SCNC: 3.9 MMOL/L (ref 3.5–5.1)
PROT SERPL-MCNC: 6.2 G/DL (ref 6–8.4)
RBC # BLD AUTO: 3.38 M/UL (ref 4–5.4)
SODIUM SERPL-SCNC: 140 MMOL/L (ref 136–145)
WBC # BLD AUTO: 11.94 K/UL (ref 3.9–12.7)

## 2024-04-27 PROCEDURE — 85025 COMPLETE CBC W/AUTO DIFF WBC: CPT | Performed by: STUDENT IN AN ORGANIZED HEALTH CARE EDUCATION/TRAINING PROGRAM

## 2024-04-27 PROCEDURE — 96374 THER/PROPH/DIAG INJ IV PUSH: CPT

## 2024-04-27 PROCEDURE — 80053 COMPREHEN METABOLIC PANEL: CPT | Performed by: STUDENT IN AN ORGANIZED HEALTH CARE EDUCATION/TRAINING PROGRAM

## 2024-04-27 PROCEDURE — 25000003 PHARM REV CODE 250: Performed by: STUDENT IN AN ORGANIZED HEALTH CARE EDUCATION/TRAINING PROGRAM

## 2024-04-27 PROCEDURE — 63600175 PHARM REV CODE 636 W HCPCS: Performed by: STUDENT IN AN ORGANIZED HEALTH CARE EDUCATION/TRAINING PROGRAM

## 2024-04-27 PROCEDURE — 99284 EMERGENCY DEPT VISIT MOD MDM: CPT | Mod: 25

## 2024-04-27 PROCEDURE — 36415 COLL VENOUS BLD VENIPUNCTURE: CPT | Performed by: STUDENT IN AN ORGANIZED HEALTH CARE EDUCATION/TRAINING PROGRAM

## 2024-04-27 PROCEDURE — 96361 HYDRATE IV INFUSION ADD-ON: CPT

## 2024-04-27 PROCEDURE — 83690 ASSAY OF LIPASE: CPT | Performed by: STUDENT IN AN ORGANIZED HEALTH CARE EDUCATION/TRAINING PROGRAM

## 2024-04-27 PROCEDURE — 96375 TX/PRO/DX INJ NEW DRUG ADDON: CPT

## 2024-04-27 RX ORDER — MORPHINE SULFATE 4 MG/ML
4 INJECTION, SOLUTION INTRAMUSCULAR; INTRAVENOUS
Status: COMPLETED | OUTPATIENT
Start: 2024-04-27 | End: 2024-04-27

## 2024-04-27 RX ORDER — ONDANSETRON HYDROCHLORIDE 2 MG/ML
4 INJECTION, SOLUTION INTRAVENOUS
Status: COMPLETED | OUTPATIENT
Start: 2024-04-27 | End: 2024-04-27

## 2024-04-27 RX ORDER — LIDOCAINE HYDROCHLORIDE 20 MG/ML
15 SOLUTION OROPHARYNGEAL ONCE
Status: COMPLETED | OUTPATIENT
Start: 2024-04-27 | End: 2024-04-27

## 2024-04-27 RX ORDER — ALUMINUM HYDROXIDE, MAGNESIUM HYDROXIDE, AND SIMETHICONE 2400; 240; 2400 MG/30ML; MG/30ML; MG/30ML
10 SUSPENSION ORAL EVERY 6 HOURS PRN
Qty: 335 ML | Refills: 0 | Status: SHIPPED | OUTPATIENT
Start: 2024-04-27 | End: 2024-05-28

## 2024-04-27 RX ORDER — ALUMINUM HYDROXIDE, MAGNESIUM HYDROXIDE, AND SIMETHICONE 1200; 120; 1200 MG/30ML; MG/30ML; MG/30ML
30 SUSPENSION ORAL ONCE
Status: COMPLETED | OUTPATIENT
Start: 2024-04-27 | End: 2024-04-27

## 2024-04-27 RX ADMIN — ONDANSETRON 4 MG: 2 INJECTION INTRAMUSCULAR; INTRAVENOUS at 08:04

## 2024-04-27 RX ADMIN — SODIUM CHLORIDE 500 ML: 9 INJECTION, SOLUTION INTRAVENOUS at 08:04

## 2024-04-27 RX ADMIN — MORPHINE SULFATE 4 MG: 4 INJECTION INTRAVENOUS at 08:04

## 2024-04-27 RX ADMIN — LIDOCAINE HYDROCHLORIDE 15 ML: 20 SOLUTION ORAL at 08:04

## 2024-04-27 RX ADMIN — ALUMINUM HYDROXIDE, MAGNESIUM HYDROXIDE, AND DIMETHICONE 30 ML: 200; 20; 200 SUSPENSION ORAL at 08:04

## 2024-04-28 NOTE — ED PROVIDER NOTES
History  Chief Complaint   Patient presents with    Abdominal Pain     Epigastric pain with nausea and vomiting. Chronic .     68-year-old female With history of gastritis, ulcers and a  hiatal hernia who presents for evaluation of abdominal pain in the epigastric region.  This is noted to be in acute on chronic issue.  Patient states related to her ulcers.  Patient denies taking any medication for symptom relief in the outpatient setting but notes that she does have a prescription for Carafate.        Past Medical History:   Diagnosis Date    Anticoagulant long-term use     Anxiety     Arthritis     C. difficile colitis 8/6/2022    Carotid artery disease     Cervical disc disorder     Chronic back pain     COPD (chronic obstructive pulmonary disease)     Coronary artery disease     Dementia     Depression     Diarrhea, unspecified 11/1/2021    DVT (deep venous thrombosis)     CAROTID    GERD (gastroesophageal reflux disease)     Hematuria     Hiatal hernia     High cholesterol     Ill-fitting dentures     STATES DOESN'T WEAR    PVD (peripheral vascular disease)     Renal calculus     Sleep apnea     Sleep apnea     NO C PAP    Urinary frequency     Urinary urgency     Wears glasses     READING        Past Surgical History:   Procedure Laterality Date    BACK SURGERY      BREAST LUMPECTOMY Right     CAROTID ARTERY ANGIOPLASTY      CAROTID ARTERY ANGIOPLASTY      CAROTID ARTERY STENT Left     CAROTID ENDARTERECTOMY Right     CHOLECYSTECTOMY      COLONOSCOPY      CYSTOSCOPY      EGD, WITH CLOSED BIOPSY N/A 4/10/2024    Procedure: EGD, WITH CLOSED BIOPSY;  Surgeon: Meg Ayon MD;  Location: [facility];  Service: General;  Laterality: N/A;    HYSTERECTOMY      OOPHORECTOMY      TONSILLECTOMY         Family History   Problem Relation Name Age of Onset    Cancer Mother      Stroke Father      No Known Problems Sister      No Known Problems Daughter      No Known Problems Maternal Aunt      No Known Problems  "Maternal Uncle      No Known Problems Paternal Aunt      No Known Problems Paternal Uncle      No Known Problems Maternal Grandmother      No Known Problems Maternal Grandfather      No Known Problems Paternal Grandmother      No Known Problems Paternal Grandfather      Breast cancer Neg Hx      Ovarian cancer Neg Hx      BRCA 1/2 Neg Hx         Social History     Tobacco Use    Smoking status: Every Day     Current packs/day: 0.50     Average packs/day: 0.5 packs/day for 54.3 years (27.2 ttl pk-yrs)     Types: Cigarettes     Start date: 1970    Smokeless tobacco: Never   Substance Use Topics    Alcohol use: No    Drug use: No       ROS  Review of Systems   Gastrointestinal:  Positive for abdominal pain and nausea.       Physical Exam  BP (!) 163/85   Pulse 83   Temp 98.1 °F (36.7 °C) (Oral)   Resp (!) 22   Ht 5' 4" (1.626 m)   Wt 47.6 kg (105 lb)   SpO2 100%   Breastfeeding No   BMI 18.02 kg/m²   Physical Exam    Constitutional: She appears well-developed and well-nourished. She is cooperative.   HENT:   Head: Normocephalic and atraumatic.   Eyes: Conjunctivae, EOM and lids are normal. Pupils are equal, round, and reactive to light.   Neck: Phonation normal.   Normal range of motion.  Cardiovascular:  Normal rate, regular rhythm and intact distal pulses.           Pulmonary/Chest: Breath sounds normal. No stridor. No respiratory distress.   Abdominal: Abdomen is soft. There is abdominal tenderness.   Musculoskeletal:      Cervical back: Normal range of motion.     Neurological: She is alert and oriented to person, place, and time.   Skin: Skin is warm and dry.   Psychiatric: She has a normal mood and affect. Her speech is normal and behavior is normal.               Labs Reviewed   CBC W/ AUTO DIFFERENTIAL   COMPREHENSIVE METABOLIC PANEL   LIPASE           Imaging Results    None                     Procedures             Medical Decision Making  Suspect symptoms related to acute gastritis flare.  Did also " consider pancreatitis.  Patient is status post cholecystectomy. Will obtain labs and re-evaluate.     Amount and/or Complexity of Data Reviewed  Labs: ordered.    Risk  OTC drugs.  Prescription drug management.               ED Course as of 04/30/24 1834   Sat Apr 27, 2024 2133 All labs reviewed unremarkable.  Patient with symptomatic improvement.  Will discharge with advice to follow up with her gastroenterologist in the outpatient setting.  Return precautions were given [NA]      ED Course User Index  [NA] Shira Cintron MD       DISCHARGE NOTE:       Desiree Blake's  results have been reviewed with her.  She has been counseled regarding her diagnosis, treatment, and plan.  She verbally conveys understanding and agreement of the signs, symptoms, diagnosis, treatment and prognosis and additionally agrees to follow up as discussed.  She also agrees with the care-plan and conveys that all of her questions have been answered.  I have also provided discharge instructions for her that include: educational information regarding their diagnosis and treatment, and list of reasons why they would want to return to the ED prior to their follow-up appointment, should her condition change. She has been provided with education for proper emergency department utilization.      CLINICAL IMPRESSION:       No diagnosis found.          PLAN:   1. Discharge  2.   New Prescriptions    No medications on file     3. No follow-up provider specified.        Sihra Cintron MD  04/30/24 1834

## 2024-04-30 ENCOUNTER — HOSPITAL ENCOUNTER (EMERGENCY)
Facility: HOSPITAL | Age: 69
Discharge: HOME | End: 2024-04-30
Attending: STUDENT IN AN ORGANIZED HEALTH CARE EDUCATION/TRAINING PROGRAM
Payer: MEDICARE

## 2024-04-30 VITALS
SYSTOLIC BLOOD PRESSURE: 128 MMHG | DIASTOLIC BLOOD PRESSURE: 76 MMHG | TEMPERATURE: 98 F | WEIGHT: 104 LBS | HEIGHT: 64 IN | BODY MASS INDEX: 17.75 KG/M2 | RESPIRATION RATE: 18 BRPM | OXYGEN SATURATION: 98 % | HEART RATE: 91 BPM

## 2024-04-30 DIAGNOSIS — K52.9 ENTERITIS: Primary | ICD-10-CM

## 2024-04-30 LAB
ALBUMIN SERPL BCP-MCNC: 2.9 G/DL (ref 3.5–5.2)
ALP SERPL-CCNC: 101 U/L (ref 55–135)
ALT SERPL W/O P-5'-P-CCNC: 13 U/L (ref 10–44)
ANION GAP SERPL CALC-SCNC: 8 MMOL/L (ref 3–11)
AST SERPL-CCNC: 10 U/L (ref 10–40)
BASOPHILS # BLD AUTO: 0.06 K/UL (ref 0–0.2)
BASOPHILS NFR BLD: 0.4 % (ref 0–1.9)
BILIRUB SERPL-MCNC: 0.3 MG/DL (ref 0.1–1)
BUN SERPL-MCNC: 9 MG/DL (ref 8–23)
CALCIUM SERPL-MCNC: 9.4 MG/DL (ref 8.7–10.5)
CHLORIDE SERPL-SCNC: 104 MMOL/L (ref 95–110)
CO2 SERPL-SCNC: 26 MMOL/L (ref 23–29)
CREAT SERPL-MCNC: 0.4 MG/DL (ref 0.5–1.4)
DIFFERENTIAL METHOD BLD: ABNORMAL
EOSINOPHIL # BLD AUTO: 0 K/UL (ref 0–0.5)
EOSINOPHIL NFR BLD: 0.2 % (ref 0–8)
ERYTHROCYTE [DISTWIDTH] IN BLOOD BY AUTOMATED COUNT: 17.2 % (ref 11.5–14.5)
EST. GFR  (NO RACE VARIABLE): >60 ML/MIN/1.73 M^2
GLUCOSE SERPL-MCNC: 113 MG/DL (ref 70–110)
HCT VFR BLD AUTO: 34.5 % (ref 37–48.5)
HGB BLD-MCNC: 10.7 G/DL (ref 12–16)
IMM GRANULOCYTES # BLD AUTO: 0.06 K/UL (ref 0–0.04)
IMM GRANULOCYTES NFR BLD AUTO: 0.4 % (ref 0–0.5)
LIPASE SERPL-CCNC: 28 U/L (ref 13–75)
LYMPHOCYTES # BLD AUTO: 1 K/UL (ref 1–4.8)
LYMPHOCYTES NFR BLD: 6.2 % (ref 18–48)
MCH RBC QN AUTO: 26 PG (ref 27–31)
MCHC RBC AUTO-ENTMCNC: 31 G/DL (ref 32–36)
MCV RBC AUTO: 84 FL (ref 82–98)
MONOCYTES # BLD AUTO: 1 K/UL (ref 0.3–1)
MONOCYTES NFR BLD: 6.2 % (ref 4–15)
NEUTROPHILS # BLD AUTO: 13.4 K/UL (ref 1.8–7.7)
NEUTROPHILS NFR BLD: 86.6 % (ref 38–73)
NRBC BLD-RTO: 0 /100 WBC
PLATELET # BLD AUTO: 338 K/UL (ref 150–450)
PMV BLD AUTO: 9.8 FL (ref 9.2–12.9)
POTASSIUM SERPL-SCNC: 3.5 MMOL/L (ref 3.5–5.1)
PROT SERPL-MCNC: 7.2 G/DL (ref 6–8.4)
RBC # BLD AUTO: 4.12 M/UL (ref 4–5.4)
SODIUM SERPL-SCNC: 138 MMOL/L (ref 136–145)
WBC # BLD AUTO: 15.45 K/UL (ref 3.9–12.7)

## 2024-04-30 PROCEDURE — 96375 TX/PRO/DX INJ NEW DRUG ADDON: CPT

## 2024-04-30 PROCEDURE — 83690 ASSAY OF LIPASE: CPT | Performed by: STUDENT IN AN ORGANIZED HEALTH CARE EDUCATION/TRAINING PROGRAM

## 2024-04-30 PROCEDURE — 96372 THER/PROPH/DIAG INJ SC/IM: CPT | Mod: 59 | Performed by: STUDENT IN AN ORGANIZED HEALTH CARE EDUCATION/TRAINING PROGRAM

## 2024-04-30 PROCEDURE — 96374 THER/PROPH/DIAG INJ IV PUSH: CPT | Mod: 59

## 2024-04-30 PROCEDURE — 25000003 PHARM REV CODE 250: Performed by: STUDENT IN AN ORGANIZED HEALTH CARE EDUCATION/TRAINING PROGRAM

## 2024-04-30 PROCEDURE — 99285 EMERGENCY DEPT VISIT HI MDM: CPT | Mod: 25

## 2024-04-30 PROCEDURE — 80053 COMPREHEN METABOLIC PANEL: CPT | Performed by: STUDENT IN AN ORGANIZED HEALTH CARE EDUCATION/TRAINING PROGRAM

## 2024-04-30 PROCEDURE — 36415 COLL VENOUS BLD VENIPUNCTURE: CPT | Performed by: STUDENT IN AN ORGANIZED HEALTH CARE EDUCATION/TRAINING PROGRAM

## 2024-04-30 PROCEDURE — 63600175 PHARM REV CODE 636 W HCPCS: Performed by: STUDENT IN AN ORGANIZED HEALTH CARE EDUCATION/TRAINING PROGRAM

## 2024-04-30 PROCEDURE — 25500020 PHARM REV CODE 255: Performed by: STUDENT IN AN ORGANIZED HEALTH CARE EDUCATION/TRAINING PROGRAM

## 2024-04-30 PROCEDURE — 85025 COMPLETE CBC W/AUTO DIFF WBC: CPT | Performed by: STUDENT IN AN ORGANIZED HEALTH CARE EDUCATION/TRAINING PROGRAM

## 2024-04-30 RX ORDER — KETOROLAC TROMETHAMINE 30 MG/ML
15 INJECTION, SOLUTION INTRAMUSCULAR; INTRAVENOUS
Status: COMPLETED | OUTPATIENT
Start: 2024-04-30 | End: 2024-04-30

## 2024-04-30 RX ORDER — ONDANSETRON 4 MG/1
8 TABLET, ORALLY DISINTEGRATING ORAL
Status: COMPLETED | OUTPATIENT
Start: 2024-04-30 | End: 2024-04-30

## 2024-04-30 RX ORDER — ONDANSETRON 4 MG/1
4 TABLET, ORALLY DISINTEGRATING ORAL EVERY 6 HOURS PRN
Qty: 20 TABLET | Refills: 0 | Status: SHIPPED | OUTPATIENT
Start: 2024-04-30 | End: 2024-05-05

## 2024-04-30 RX ORDER — SUCRALFATE 1 G/1
1 TABLET ORAL 4 TIMES DAILY
Qty: 28 TABLET | Refills: 0 | Status: SHIPPED | OUTPATIENT
Start: 2024-04-30 | End: 2024-05-09

## 2024-04-30 RX ORDER — DICYCLOMINE HYDROCHLORIDE 10 MG/ML
20 INJECTION INTRAMUSCULAR
Status: COMPLETED | OUTPATIENT
Start: 2024-04-30 | End: 2024-04-30

## 2024-04-30 RX ORDER — PROCHLORPERAZINE EDISYLATE 5 MG/ML
10 INJECTION INTRAMUSCULAR; INTRAVENOUS
Status: COMPLETED | OUTPATIENT
Start: 2024-04-30 | End: 2024-04-30

## 2024-04-30 RX ADMIN — KETOROLAC TROMETHAMINE 15 MG: 30 INJECTION, SOLUTION INTRAMUSCULAR; INTRAVENOUS at 07:04

## 2024-04-30 RX ADMIN — PROCHLORPERAZINE EDISYLATE 10 MG: 5 INJECTION INTRAMUSCULAR; INTRAVENOUS at 07:04

## 2024-04-30 RX ADMIN — DICYCLOMINE HYDROCHLORIDE 20 MG: 10 INJECTION, SOLUTION INTRAMUSCULAR at 06:04

## 2024-04-30 RX ADMIN — IOHEXOL 100 ML: 350 INJECTION, SOLUTION INTRAVENOUS at 07:04

## 2024-04-30 RX ADMIN — ONDANSETRON 8 MG: 4 TABLET, ORALLY DISINTEGRATING ORAL at 06:04

## 2024-04-30 NOTE — ED PROVIDER NOTES
History  Chief Complaint   Patient presents with    Vomiting     Pt stated that she began experiencing vomiting / diarrhea with abdominal cramping since 2am this morning.      69 y/o female with hx of gastritis, hiatal hernia and anxiety presents for evaluation of nausea, vomiting and diarrhea since 2am this morning.  No medication taken for sx relief in the outpatient setting.         Past Medical History:   Diagnosis Date    Anticoagulant long-term use     Anxiety     Arthritis     C. difficile colitis 8/6/2022    Carotid artery disease     Cervical disc disorder     Chronic back pain     COPD (chronic obstructive pulmonary disease)     Coronary artery disease     Dementia     Depression     Diarrhea, unspecified 11/1/2021    DVT (deep venous thrombosis)     CAROTID    GERD (gastroesophageal reflux disease)     Hematuria     Hiatal hernia     High cholesterol     Ill-fitting dentures     STATES DOESN'T WEAR    PVD (peripheral vascular disease)     Renal calculus     Sleep apnea     Sleep apnea     NO C PAP    Urinary frequency     Urinary urgency     Wears glasses     READING        Past Surgical History:   Procedure Laterality Date    BACK SURGERY      BREAST LUMPECTOMY Right     CAROTID ARTERY ANGIOPLASTY      CAROTID ARTERY ANGIOPLASTY      CAROTID ARTERY STENT Left     CAROTID ENDARTERECTOMY Right     CHOLECYSTECTOMY      COLONOSCOPY      CYSTOSCOPY      EGD, WITH CLOSED BIOPSY N/A 4/10/2024    Procedure: EGD, WITH CLOSED BIOPSY;  Surgeon: Meg Ayon MD;  Location: [facility];  Service: General;  Laterality: N/A;    HYSTERECTOMY      OOPHORECTOMY      TONSILLECTOMY         Family History   Problem Relation Name Age of Onset    Cancer Mother      Stroke Father      No Known Problems Sister      No Known Problems Daughter      No Known Problems Maternal Aunt      No Known Problems Maternal Uncle      No Known Problems Paternal Aunt      No Known Problems Paternal Uncle      No Known Problems Maternal  "Grandmother      No Known Problems Maternal Grandfather      No Known Problems Paternal Grandmother      No Known Problems Paternal Grandfather      Breast cancer Neg Hx      Ovarian cancer Neg Hx      BRCA 1/2 Neg Hx         Social History     Tobacco Use    Smoking status: Every Day     Current packs/day: 0.50     Average packs/day: 0.5 packs/day for 54.3 years (27.2 ttl pk-yrs)     Types: Cigarettes     Start date: 1970    Smokeless tobacco: Never   Substance Use Topics    Alcohol use: No    Drug use: No       ROS  Review of Systems   Gastrointestinal:  Positive for abdominal pain, diarrhea and vomiting.       Physical Exam  /79   Pulse 105   Temp 97.8 °F (36.6 °C)   Resp (!) 22   Ht 5' 4" (1.626 m)   Wt 47.2 kg (104 lb)   SpO2 97%   Breastfeeding No   BMI 17.85 kg/m²   Physical Exam    Constitutional: She appears well-developed and well-nourished. She is cooperative.   HENT:   Head: Normocephalic and atraumatic.   Eyes: Conjunctivae, EOM and lids are normal. Pupils are equal, round, and reactive to light.   Neck: Phonation normal.   Normal range of motion.  Cardiovascular:  Normal rate, regular rhythm and intact distal pulses.           Pulmonary/Chest: No stridor.   Abdominal: Abdomen is soft. There is abdominal tenderness.   Musculoskeletal:      Cervical back: Normal range of motion.     Neurological: She is alert and oriented to person, place, and time.   Skin: Skin is warm and dry.   Psychiatric: She has a normal mood and affect. Her speech is normal and behavior is normal.               Labs Reviewed   COMPREHENSIVE METABOLIC PANEL - Abnormal; Notable for the following components:       Result Value    Glucose 113 (*)     Creatinine 0.4 (*)     Albumin 2.9 (*)     All other components within normal limits   CBC W/ AUTO DIFFERENTIAL - Abnormal; Notable for the following components:    WBC 15.45 (*)     Hemoglobin 10.7 (*)     Hematocrit 34.5 (*)     MCH 26.0 (*)     MCHC 31.0 (*)     RDW 17.2 " (*)     Gran # (ANC) 13.4 (*)     Immature Grans (Abs) 0.06 (*)     Gran % 86.6 (*)     Lymph % 6.2 (*)     All other components within normal limits   LIPASE           Imaging Results              CT Abdomen Pelvis With IV Contrast NO Oral Contrast (In process)                                 Procedures             Medical Decision Making  Differential include viral enteritis, gastritis, colitis, or alternate pathology.  Will give medication for sx relief and prescriptions for the outpatient setting.     Amount and/or Complexity of Data Reviewed  Labs: ordered. Decision-making details documented in ED Course.  Radiology: ordered.    Risk  Prescription drug management.               ED Course as of 04/30/24 2024 Tue Apr 30, 2024 1925 WBC(!): 15.45 [NA]   1925 Lipase: 28 [NA]   1925 BUN: 9 [NA]   1925 Creatinine(!): 0.4 [NA]   1925 BILIRUBIN TOTAL: 0.3 [NA]   1925 ALT: 13 [NA]   1925 AST: 10 [NA]   1925 ALP: 101 [NA]   1925 BILIRUBIN TOTAL: 0.3 [NA]   2019 CT notable for enteritis of the small bowel. Will give prescriptions for sx relief in the outpatient setting, pt advised to f/u with GI in the outpatient setting. Return precautions were given.  [NA]      ED Course User Index  [NA] Shira Cintron MD       DISCHARGE NOTE:       Desiree Blake's  results have been reviewed with her.  She has been counseled regarding her diagnosis, treatment, and plan.  She verbally conveys understanding and agreement of the signs, symptoms, diagnosis, treatment and prognosis and additionally agrees to follow up as discussed.  She also agrees with the care-plan and conveys that all of her questions have been answered.  I have also provided discharge instructions for her that include: educational information regarding their diagnosis and treatment, and list of reasons why they would want to return to the ED prior to their follow-up appointment, should her condition change. She has been provided with education for proper emergency  department utilization.      CLINICAL IMPRESSION:         1. Enteritis              PLAN:   1. Discharge  2.   New Prescriptions    ONDANSETRON (ZOFRAN-ODT) 4 MG TBDL    Take 1 tablet (4 mg total) by mouth every 6 (six) hours as needed.    SUCRALFATE (CARAFATE) 1 GRAM TABLET    Take 1 tablet (1 g total) by mouth 4 (four) times daily. for 7 days     3. Kt Pozo Jr., MD  [address] [place] [address]  [phone]    Schedule an appointment as soon as possible for a visit   As needed          Shira Cintron MD  04/30/24 2024

## 2024-05-02 ENCOUNTER — HOSPITAL ENCOUNTER (EMERGENCY)
Facility: HOSPITAL | Age: 69
Discharge: HOME | End: 2024-05-02
Attending: EMERGENCY MEDICINE
Payer: MEDICARE

## 2024-05-02 VITALS
OXYGEN SATURATION: 98 % | WEIGHT: 104 LBS | DIASTOLIC BLOOD PRESSURE: 85 MMHG | HEIGHT: 64 IN | TEMPERATURE: 99 F | RESPIRATION RATE: 20 BRPM | SYSTOLIC BLOOD PRESSURE: 128 MMHG | HEART RATE: 101 BPM | BODY MASS INDEX: 17.75 KG/M2

## 2024-05-02 DIAGNOSIS — R10.9 CHRONIC ABDOMINAL PAIN: ICD-10-CM

## 2024-05-02 DIAGNOSIS — F06.4 ANXIETY DISORDER DUE TO GENERAL MEDICAL CONDITION: Primary | ICD-10-CM

## 2024-05-02 DIAGNOSIS — G89.29 CHRONIC ABDOMINAL PAIN: ICD-10-CM

## 2024-05-02 LAB
BASOPHILS # BLD AUTO: 0.03 K/UL (ref 0–0.2)
BASOPHILS NFR BLD: 0.2 % (ref 0–1.9)
DIFFERENTIAL METHOD BLD: ABNORMAL
EOSINOPHIL # BLD AUTO: 0.1 K/UL (ref 0–0.5)
EOSINOPHIL NFR BLD: 0.3 % (ref 0–8)
ERYTHROCYTE [DISTWIDTH] IN BLOOD BY AUTOMATED COUNT: 17 % (ref 11.5–14.5)
HCT VFR BLD AUTO: 32.2 % (ref 37–48.5)
HGB BLD-MCNC: 10 G/DL (ref 12–16)
IMM GRANULOCYTES # BLD AUTO: 0.07 K/UL (ref 0–0.04)
IMM GRANULOCYTES NFR BLD AUTO: 0.4 % (ref 0–0.5)
LYMPHOCYTES # BLD AUTO: 1 K/UL (ref 1–4.8)
LYMPHOCYTES NFR BLD: 6 % (ref 18–48)
MCH RBC QN AUTO: 25.9 PG (ref 27–31)
MCHC RBC AUTO-ENTMCNC: 31.1 G/DL (ref 32–36)
MCV RBC AUTO: 83 FL (ref 82–98)
MONOCYTES # BLD AUTO: 0.9 K/UL (ref 0.3–1)
MONOCYTES NFR BLD: 5.9 % (ref 4–15)
NEUTROPHILS # BLD AUTO: 13.9 K/UL (ref 1.8–7.7)
NEUTROPHILS NFR BLD: 87.2 % (ref 38–73)
NRBC BLD-RTO: 0 /100 WBC
PLATELET # BLD AUTO: 350 K/UL (ref 150–450)
PMV BLD AUTO: 10.2 FL (ref 9.2–12.9)
RBC # BLD AUTO: 3.86 M/UL (ref 4–5.4)
WBC # BLD AUTO: 15.92 K/UL (ref 3.9–12.7)

## 2024-05-02 PROCEDURE — 63600175 PHARM REV CODE 636 W HCPCS: Performed by: EMERGENCY MEDICINE

## 2024-05-02 PROCEDURE — 36415 COLL VENOUS BLD VENIPUNCTURE: CPT | Performed by: EMERGENCY MEDICINE

## 2024-05-02 PROCEDURE — 99284 EMERGENCY DEPT VISIT MOD MDM: CPT | Mod: 25

## 2024-05-02 PROCEDURE — 96372 THER/PROPH/DIAG INJ SC/IM: CPT | Performed by: EMERGENCY MEDICINE

## 2024-05-02 PROCEDURE — 25000003 PHARM REV CODE 250: Performed by: EMERGENCY MEDICINE

## 2024-05-02 PROCEDURE — 85025 COMPLETE CBC W/AUTO DIFF WBC: CPT | Performed by: EMERGENCY MEDICINE

## 2024-05-02 RX ORDER — ONDANSETRON 4 MG/1
8 TABLET, ORALLY DISINTEGRATING ORAL
Status: COMPLETED | OUTPATIENT
Start: 2024-05-02 | End: 2024-05-02

## 2024-05-02 RX ORDER — PROCHLORPERAZINE EDISYLATE 5 MG/ML
10 INJECTION INTRAMUSCULAR; INTRAVENOUS
Status: COMPLETED | OUTPATIENT
Start: 2024-05-02 | End: 2024-05-02

## 2024-05-02 RX ORDER — LIDOCAINE HYDROCHLORIDE 20 MG/ML
15 SOLUTION OROPHARYNGEAL ONCE
Status: COMPLETED | OUTPATIENT
Start: 2024-05-02 | End: 2024-05-02

## 2024-05-02 RX ORDER — ALUMINUM HYDROXIDE, MAGNESIUM HYDROXIDE, AND SIMETHICONE 1200; 120; 1200 MG/30ML; MG/30ML; MG/30ML
30 SUSPENSION ORAL ONCE
Status: COMPLETED | OUTPATIENT
Start: 2024-05-02 | End: 2024-05-02

## 2024-05-02 RX ADMIN — ONDANSETRON 8 MG: 4 TABLET, ORALLY DISINTEGRATING ORAL at 06:05

## 2024-05-02 RX ADMIN — PROCHLORPERAZINE EDISYLATE 10 MG: 5 INJECTION INTRAMUSCULAR; INTRAVENOUS at 06:05

## 2024-05-02 RX ADMIN — ALUMINUM HYDROXIDE, MAGNESIUM HYDROXIDE, AND SIMETHICONE 30 ML: 200; 200; 20 SUSPENSION ORAL at 06:05

## 2024-05-02 RX ADMIN — LIDOCAINE HYDROCHLORIDE 15 ML: 20 SOLUTION ORAL at 06:05

## 2024-05-02 NOTE — ED PROVIDER NOTES
"Encounter Date: 5/2/2024       History     Chief Complaint   Patient presents with    Emesis     Pt to the ER w/ complaints of vomiting and diarrhea "for a couple weeks." Pt recently seen in the ER and diagnosed w/ enteritis, states symptoms are worsening and now has abdominal cramping. Pt reports hx of gastric ulcers, states she's only vomiting bile.      68-year-old female with a history of chronic pain, gastric ulcers, multiple visits to the ER for "nausea and vomiting" presents to the ER stating that she is "so sick", immediately asking for pain medication.  History of abuse of opiate pain medication in the past.  Was seen here a few days ago, complete workup done including CT scan and blood work.  Patient is not vomiting here in the emergency department now.  Complaining of epigastric pain.  She has had this multiple times in the past.      Review of patient's allergies indicates:  No Known Allergies  Past Medical History:   Diagnosis Date    Anticoagulant long-term use     Anxiety     Arthritis     C. difficile colitis 8/6/2022    Carotid artery disease     Cervical disc disorder     Chronic back pain     COPD (chronic obstructive pulmonary disease)     Coronary artery disease     Dementia     Depression     Diarrhea, unspecified 11/1/2021    DVT (deep venous thrombosis)     CAROTID    GERD (gastroesophageal reflux disease)     Hematuria     Hiatal hernia     High cholesterol     Ill-fitting dentures     STATES DOESN'T WEAR    PVD (peripheral vascular disease)     Renal calculus     Sleep apnea     Sleep apnea     NO C PAP    Urinary frequency     Urinary urgency     Wears glasses     READING      Past Surgical History:   Procedure Laterality Date    BACK SURGERY      BREAST LUMPECTOMY Right     CAROTID ARTERY ANGIOPLASTY      CAROTID ARTERY ANGIOPLASTY      CAROTID ARTERY STENT Left     CAROTID ENDARTERECTOMY Right     CHOLECYSTECTOMY      COLONOSCOPY      CYSTOSCOPY      EGD, WITH CLOSED BIOPSY N/A 4/10/2024 "    Procedure: EGD, WITH CLOSED BIOPSY;  Surgeon: Meg Ayon MD;  Location: [facility];  Service: General;  Laterality: N/A;    HYSTERECTOMY      OOPHORECTOMY      TONSILLECTOMY       Family History   Problem Relation Name Age of Onset    Cancer Mother      Stroke Father      No Known Problems Sister      No Known Problems Daughter      No Known Problems Maternal Aunt      No Known Problems Maternal Uncle      No Known Problems Paternal Aunt      No Known Problems Paternal Uncle      No Known Problems Maternal Grandmother      No Known Problems Maternal Grandfather      No Known Problems Paternal Grandmother      No Known Problems Paternal Grandfather      Breast cancer Neg Hx      Ovarian cancer Neg Hx      BRCA 1/2 Neg Hx       Social History     Tobacco Use    Smoking status: Every Day     Current packs/day: 0.50     Average packs/day: 0.5 packs/day for 54.3 years (27.2 ttl pk-yrs)     Types: Cigarettes     Start date: 1970    Smokeless tobacco: Never   Substance Use Topics    Alcohol use: No    Drug use: No     Review of Systems   Constitutional:  Negative for fever.   HENT:  Negative for sore throat.    Respiratory:  Negative for shortness of breath.    Cardiovascular:  Negative for chest pain.   Gastrointestinal:  Positive for abdominal pain and nausea.   Genitourinary:  Negative for dysuria.   Musculoskeletal:  Negative for back pain.   Skin:  Negative for rash.   Neurological:  Negative for weakness.   Hematological:  Does not bruise/bleed easily.   All other systems reviewed and are negative.      Physical Exam     Initial Vitals [05/02/24 1630]   BP Pulse Resp Temp SpO2   128/85 101 20 98.6 °F (37 °C) 98 %      MAP       --         Physical Exam    Nursing note and vitals reviewed.  Constitutional: She appears well-developed and well-nourished. She is not diaphoretic. No distress.   HENT:   Head: Normocephalic and atraumatic.   Mouth/Throat: Oropharynx is clear and moist.   Eyes: Conjunctivae and EOM  are normal. Pupils are equal, round, and reactive to light.   Neck: Neck supple.   Normal range of motion.  Cardiovascular:  Normal rate, regular rhythm, normal heart sounds and intact distal pulses.           No murmur heard.  Pulmonary/Chest: Breath sounds normal. No respiratory distress. She has no wheezes. She has no rhonchi. She has no rales. She exhibits no tenderness.   Abdominal: Abdomen is soft. Bowel sounds are normal.   Abdominal exam is benign, no guarding, no rebound There is no rebound and no guarding.   Musculoskeletal:         General: No tenderness or edema. Normal range of motion.      Cervical back: Normal range of motion and neck supple.     Neurological: She is alert and oriented to person, place, and time. She has normal strength. No cranial nerve deficit. GCS score is 15. GCS eye subscore is 4. GCS verbal subscore is 5. GCS motor subscore is 6.   Skin: Skin is warm and dry. Capillary refill takes less than 2 seconds.         ED Course   Procedures  Labs Reviewed   CBC W/ AUTO DIFFERENTIAL - Abnormal; Notable for the following components:       Result Value    WBC 15.92 (*)     RBC 3.86 (*)     Hemoglobin 10.0 (*)     Hematocrit 32.2 (*)     MCH 25.9 (*)     MCHC 31.1 (*)     RDW 17.0 (*)     Gran # (ANC) 13.9 (*)     Immature Grans (Abs) 0.07 (*)     Gran % 87.2 (*)     Lymph % 6.0 (*)     All other components within normal limits          Imaging Results    None          Medications   prochlorperazine injection Soln 10 mg (10 mg Intramuscular Given 5/2/24 1832)   ondansetron disintegrating tablet 8 mg (8 mg Oral Given 5/2/24 1832)   aluminum-magnesium hydroxide-simethicone 200-200-20 mg/5 mL suspension 30 mL (30 mLs Oral Given 5/2/24 1832)     And   LIDOcaine viscous HCl 2% oral solution 15 mL (15 mLs Oral Given 5/2/24 1832)     Medical Decision Making  Amount and/or Complexity of Data Reviewed  Labs: ordered.    Risk  OTC drugs.  Prescription drug management.                          Medical  Decision Making:   Differential Diagnosis:   Chronic pain, chronic nausea vomiting, chronic abdominal pain.  ED Management:  I will repeat the CBC today and compare to the CBC done a few days ago.  She immediately asked for a pain shot when I walked into the room, we discussed that she will not receive any opiate pain medication here emergency department and she understands    Repeat CBC essentially baseline.  Abdominal exam is benign.  Stable for discharge and follow up with GI or primary care physician.             Clinical Impression:  Final diagnoses:  [R10.9, G89.29] Chronic abdominal pain  [F06.4] Anxiety disorder due to general medical condition (Primary)          ED Disposition Condition    Discharge Stable          ED Prescriptions    None       Follow-up Information       Follow up With Specialties Details Why Contact Info Additional Information    Kt Pozo Jr., MD Internal Medicine In 1 day  [address] [facility] [address]  [phone]       [facility] Emergency Department Emergency Medicine  If symptoms worsen [address] [place] [address] [id number] [phone] Floor [address]             Marek Rai MD  05/04/24 0956

## 2024-05-24 ENCOUNTER — LAB VISIT (OUTPATIENT)
Dept: LAB | Facility: HOSPITAL | Age: 69
End: 2024-05-24
Attending: INTERNAL MEDICINE
Payer: MEDICARE

## 2024-05-24 DIAGNOSIS — K27.9 PUD (PEPTIC ULCER DISEASE): ICD-10-CM

## 2024-05-24 DIAGNOSIS — R19.7 DIARRHEA, UNSPECIFIED TYPE: ICD-10-CM

## 2024-05-24 DIAGNOSIS — K27.4 GASTROINTESTINAL HEMORRHAGE ASSOCIATED WITH PEPTIC ULCER: ICD-10-CM

## 2024-05-24 LAB
ALBUMIN SERPL BCP-MCNC: 2.6 G/DL (ref 3.5–5.2)
ALP SERPL-CCNC: 105 U/L (ref 55–135)
ALT SERPL W/O P-5'-P-CCNC: 11 U/L (ref 10–44)
ANION GAP SERPL CALC-SCNC: 2 MMOL/L (ref 3–11)
AST SERPL-CCNC: 10 U/L (ref 10–40)
BASOPHILS # BLD AUTO: 0.03 K/UL (ref 0–0.2)
BASOPHILS NFR BLD: 0.3 % (ref 0–1.9)
BILIRUB SERPL-MCNC: 0.1 MG/DL (ref 0.1–1)
BUN SERPL-MCNC: 11 MG/DL (ref 8–23)
CALCIUM SERPL-MCNC: 8.9 MG/DL (ref 8.7–10.5)
CHLORIDE SERPL-SCNC: 111 MMOL/L (ref 95–110)
CO2 SERPL-SCNC: 28 MMOL/L (ref 23–29)
CREAT SERPL-MCNC: 0.8 MG/DL (ref 0.5–1.4)
DIFFERENTIAL METHOD BLD: ABNORMAL
EOSINOPHIL # BLD AUTO: 0 K/UL (ref 0–0.5)
EOSINOPHIL NFR BLD: 0.4 % (ref 0–8)
ERYTHROCYTE [DISTWIDTH] IN BLOOD BY AUTOMATED COUNT: 17.4 % (ref 11.5–14.5)
EST. GFR  (NO RACE VARIABLE): >60 ML/MIN/1.73 M^2
GLUCOSE SERPL-MCNC: 114 MG/DL (ref 70–110)
HCT VFR BLD AUTO: 32.2 % (ref 37–48.5)
HGB BLD-MCNC: 9.4 G/DL (ref 12–16)
IMM GRANULOCYTES # BLD AUTO: 0.04 K/UL (ref 0–0.04)
IMM GRANULOCYTES NFR BLD AUTO: 0.4 % (ref 0–0.5)
LYMPHOCYTES # BLD AUTO: 0.9 K/UL (ref 1–4.8)
LYMPHOCYTES NFR BLD: 9.7 % (ref 18–48)
MAGNESIUM SERPL-MCNC: 2.2 MG/DL (ref 1.6–2.6)
MCH RBC QN AUTO: 24.9 PG (ref 27–31)
MCHC RBC AUTO-ENTMCNC: 29.2 G/DL (ref 32–36)
MCV RBC AUTO: 85 FL (ref 82–98)
MONOCYTES # BLD AUTO: 0.5 K/UL (ref 0.3–1)
MONOCYTES NFR BLD: 5.2 % (ref 4–15)
NEUTROPHILS # BLD AUTO: 8 K/UL (ref 1.8–7.7)
NEUTROPHILS NFR BLD: 84 % (ref 38–73)
NRBC BLD-RTO: 0 /100 WBC
PLATELET # BLD AUTO: 380 K/UL (ref 150–450)
PMV BLD AUTO: 10.6 FL (ref 9.2–12.9)
POTASSIUM SERPL-SCNC: 4.7 MMOL/L (ref 3.5–5.1)
PROT SERPL-MCNC: 7 G/DL (ref 6–8.4)
RBC # BLD AUTO: 3.77 M/UL (ref 4–5.4)
SODIUM SERPL-SCNC: 141 MMOL/L (ref 136–145)
WBC # BLD AUTO: 9.48 K/UL (ref 3.9–12.7)

## 2024-05-24 PROCEDURE — 83735 ASSAY OF MAGNESIUM: CPT | Performed by: INTERNAL MEDICINE

## 2024-05-24 PROCEDURE — 87493 C DIFF AMPLIFIED PROBE: CPT | Mod: 59 | Performed by: INTERNAL MEDICINE

## 2024-05-24 PROCEDURE — 36415 COLL VENOUS BLD VENIPUNCTURE: CPT | Performed by: INTERNAL MEDICINE

## 2024-05-24 PROCEDURE — 85025 COMPLETE CBC W/AUTO DIFF WBC: CPT | Performed by: INTERNAL MEDICINE

## 2024-05-24 PROCEDURE — 87449 NOS EACH ORGANISM AG IA: CPT | Performed by: INTERNAL MEDICINE

## 2024-05-24 PROCEDURE — 87338 HPYLORI STOOL AG IA: CPT | Performed by: INTERNAL MEDICINE

## 2024-05-24 PROCEDURE — 87507 IADNA-DNA/RNA PROBE TQ 12-25: CPT | Performed by: INTERNAL MEDICINE

## 2024-05-24 PROCEDURE — 80053 COMPREHEN METABOLIC PANEL: CPT | Performed by: INTERNAL MEDICINE

## 2024-05-27 LAB
C DIFF GDH STL QL: POSITIVE
C DIFF TOX A+B STL QL IA: NEGATIVE
C DIFF TOX GENS STL QL NAA+PROBE: POSITIVE

## 2024-06-03 LAB — H PYLORI AG STL QL IA: NOT DETECTED

## 2024-06-07 ENCOUNTER — HOSPITAL ENCOUNTER (EMERGENCY)
Facility: HOSPITAL | Age: 69
Discharge: HOME | End: 2024-06-07
Attending: EMERGENCY MEDICINE
Payer: MEDICARE

## 2024-06-07 VITALS
HEART RATE: 99 BPM | RESPIRATION RATE: 20 BRPM | BODY MASS INDEX: 15.91 KG/M2 | DIASTOLIC BLOOD PRESSURE: 65 MMHG | OXYGEN SATURATION: 99 % | WEIGHT: 99 LBS | SYSTOLIC BLOOD PRESSURE: 103 MMHG | TEMPERATURE: 98 F | HEIGHT: 66 IN

## 2024-06-07 DIAGNOSIS — R10.84 CHRONIC GENERALIZED ABDOMINAL PAIN: Primary | ICD-10-CM

## 2024-06-07 DIAGNOSIS — G89.29 CHRONIC GENERALIZED ABDOMINAL PAIN: Primary | ICD-10-CM

## 2024-06-07 DIAGNOSIS — G89.4 CHRONIC PAIN SYNDROME: ICD-10-CM

## 2024-06-07 DIAGNOSIS — G89.29 CHRONIC ABDOMINAL PAIN: ICD-10-CM

## 2024-06-07 DIAGNOSIS — R10.9 CHRONIC ABDOMINAL PAIN: ICD-10-CM

## 2024-06-07 PROCEDURE — 99284 EMERGENCY DEPT VISIT MOD MDM: CPT | Mod: 25

## 2024-06-07 PROCEDURE — 63600175 PHARM REV CODE 636 W HCPCS: Performed by: EMERGENCY MEDICINE

## 2024-06-07 PROCEDURE — 96372 THER/PROPH/DIAG INJ SC/IM: CPT | Performed by: EMERGENCY MEDICINE

## 2024-06-07 RX ORDER — DICYCLOMINE HYDROCHLORIDE 10 MG/ML
20 INJECTION INTRAMUSCULAR
Status: COMPLETED | OUTPATIENT
Start: 2024-06-07 | End: 2024-06-07

## 2024-06-07 RX ORDER — PROMETHAZINE HYDROCHLORIDE 25 MG/ML
25 INJECTION, SOLUTION INTRAMUSCULAR; INTRAVENOUS
Status: COMPLETED | OUTPATIENT
Start: 2024-06-07 | End: 2024-06-07

## 2024-06-07 RX ADMIN — DICYCLOMINE HYDROCHLORIDE 20 MG: 10 INJECTION, SOLUTION INTRAMUSCULAR at 01:06

## 2024-06-07 RX ADMIN — PROMETHAZINE HYDROCHLORIDE 25 MG: 25 INJECTION INTRAMUSCULAR; INTRAVENOUS at 01:06

## 2024-06-07 NOTE — ED PROVIDER NOTES
Encounter Date: 6/7/2024       History     Chief Complaint   Patient presents with    Pain And Symptom Management     Chronic abd pain with n/v/d. Patient states she was sent by Dr. Pozo. Per call notes, patient called office to request refill of percocet and was denied.      68-year-old female with chronic pain syndrome, history of chronic abdominal pain with nausea vomiting and diarrhea, history of this multiple times in the past, multiple prescriptions for opiate pain medications in the past, called her physician this morning and was denied a refill of Percocet.  Now she has here in the emergency department complaining of chronic abdominal pain with nausea vomiting and diarrhea.  She has been worked up multiple times for same, no blood in stool or vomit.  Patient was on her iPhone in no acute distress when I walked into the room.  Denies any fever.  Her abdominal pain is the same as it has always been, no increased intensity.  No alleviating factors.  Patient has been followed by his primary care physician for the same issue for quite some time.      Review of patient's allergies indicates:  No Known Allergies  Past Medical History:   Diagnosis Date    Anticoagulant long-term use     Anxiety     Arthritis     C. difficile colitis 8/6/2022    Carotid artery disease     Cervical disc disorder     Chronic back pain     COPD (chronic obstructive pulmonary disease)     Coronary artery disease     Dementia     Depression     Diarrhea, unspecified 11/1/2021    DVT (deep venous thrombosis)     CAROTID    GERD (gastroesophageal reflux disease)     Hematuria     Hiatal hernia     High cholesterol     Ill-fitting dentures     STATES DOESN'T WEAR    PVD (peripheral vascular disease)     Renal calculus     Sleep apnea     Sleep apnea     NO C PAP    Urinary frequency     Urinary urgency     Wears glasses     READING      Past Surgical History:   Procedure Laterality Date    BACK SURGERY      BREAST LUMPECTOMY Right      CAROTID ARTERY ANGIOPLASTY      CAROTID ARTERY ANGIOPLASTY      CAROTID ARTERY STENT Left     CAROTID ENDARTERECTOMY Right     CHOLECYSTECTOMY      COLONOSCOPY      CYSTOSCOPY      EGD, WITH CLOSED BIOPSY N/A 4/10/2024    Procedure: EGD, WITH CLOSED BIOPSY;  Surgeon: Meg Ayon MD;  Location: [facility];  Service: General;  Laterality: N/A;    HYSTERECTOMY      OOPHORECTOMY      TONSILLECTOMY       Family History   Problem Relation Name Age of Onset    Cancer Mother      Stroke Father      No Known Problems Sister      No Known Problems Daughter      No Known Problems Maternal Aunt      No Known Problems Maternal Uncle      No Known Problems Paternal Aunt      No Known Problems Paternal Uncle      No Known Problems Maternal Grandmother      No Known Problems Maternal Grandfather      No Known Problems Paternal Grandmother      No Known Problems Paternal Grandfather      Breast cancer Neg Hx      Ovarian cancer Neg Hx      BRCA 1/2 Neg Hx       Social History     Tobacco Use    Smoking status: Every Day     Current packs/day: 0.50     Average packs/day: 0.5 packs/day for 54.4 years (27.2 ttl pk-yrs)     Types: Cigarettes     Start date: 1970    Smokeless tobacco: Never   Substance Use Topics    Alcohol use: No    Drug use: No     Review of Systems   Constitutional:  Negative for fever.   HENT:  Negative for sore throat.    Respiratory:  Negative for shortness of breath.    Cardiovascular:  Negative for chest pain.   Gastrointestinal:  Positive for abdominal pain. Negative for nausea.   Genitourinary:  Negative for dysuria.   Musculoskeletal:  Negative for back pain.   Skin:  Negative for rash.   Neurological:  Negative for weakness.   Hematological:  Does not bruise/bleed easily.   All other systems reviewed and are negative.      Physical Exam     Initial Vitals [06/07/24 1247]   BP Pulse Resp Temp SpO2   103/65 99 20 97.7 °F (36.5 °C) 99 %      MAP       --         Physical Exam    Nursing note and vitals  reviewed.  Constitutional: She appears well-developed and well-nourished. She is not diaphoretic. No distress.   HENT:   Head: Normocephalic and atraumatic.   Eyes: Conjunctivae and EOM are normal. Pupils are equal, round, and reactive to light.   Neck: Neck supple.   Normal range of motion.  Cardiovascular:  Normal rate, regular rhythm, normal heart sounds and intact distal pulses.           No murmur heard.  Pulmonary/Chest: Breath sounds normal. No respiratory distress. She has no wheezes. She has no rhonchi. She has no rales. She exhibits no tenderness.   Abdominal: Abdomen is soft. Bowel sounds are normal. She exhibits no distension and no mass. There is abdominal tenderness.   Diffuse tenderness throughout, no rebound or guarding.  Exam is the same as prior exams.  Non peritonitic There is no rebound and no guarding.   Musculoskeletal:         General: No tenderness or edema. Normal range of motion.      Cervical back: Normal range of motion and neck supple.     Neurological: She is alert and oriented to person, place, and time. She has normal strength. No cranial nerve deficit. GCS score is 15. GCS eye subscore is 4. GCS verbal subscore is 5. GCS motor subscore is 6.   Skin: Skin is warm and dry. Capillary refill takes less than 2 seconds.         ED Course   Procedures  Labs Reviewed - No data to display       Imaging Results    None          Medications   dicyclomine injection 20 mg (has no administration in time range)   promethazine injection 25 mg (has no administration in time range)     Medical Decision Making  Risk  Prescription drug management.                          Medical Decision Making:   Differential Diagnosis:   Chronic pain syndrome, chronic abdominal pain, chronic nausea vomiting and diarrhea, opiate abuse  ED Management:  [facility], multiple prescriptions for opiate pain medications given, including 4 in the past month.  Patient will be referred to chronic pain  management             Clinical Impression:  Final diagnoses:  [G89.4] Chronic pain syndrome  [R10.9, G89.29] Chronic abdominal pain  [R10.84, G89.29] Chronic generalized abdominal pain (Primary)          ED Disposition Condition    Discharge Stable          ED Prescriptions    None       Follow-up Information       Follow up With Specialties Details Why Contact Info Additional Information    Kt Pozo Jr., MD Internal Medicine In 2 days  [address] [facility] [address]  [phone]       [facility] Emergency Department Emergency Medicine  If symptoms worsen [address] [place] [id number] [phone] Floor 1             Marek Rai MD  06/07/24 1308       Marek Rai MD  06/07/24 131

## 2024-06-27 ENCOUNTER — OFFICE VISIT (OUTPATIENT)
Dept: SURGERY | Facility: CLINIC | Age: 69
End: 2024-06-27
Payer: MEDICARE

## 2024-06-27 VITALS
SYSTOLIC BLOOD PRESSURE: 190 MMHG | OXYGEN SATURATION: 100 % | HEIGHT: 66 IN | WEIGHT: 101.44 LBS | HEART RATE: 65 BPM | BODY MASS INDEX: 16.3 KG/M2 | DIASTOLIC BLOOD PRESSURE: 77 MMHG

## 2024-06-27 DIAGNOSIS — Z01.818 PRE-OP TESTING: ICD-10-CM

## 2024-06-27 DIAGNOSIS — Z87.11 HISTORY OF GASTRIC ULCER: ICD-10-CM

## 2024-06-27 DIAGNOSIS — R10.13 EPIGASTRIC PAIN: ICD-10-CM

## 2024-06-27 DIAGNOSIS — K21.9 GASTROESOPHAGEAL REFLUX DISEASE WITHOUT ESOPHAGITIS: ICD-10-CM

## 2024-06-27 DIAGNOSIS — K27.9 PEPTIC ULCER DISEASE: Primary | ICD-10-CM

## 2024-06-27 PROCEDURE — 3008F BODY MASS INDEX DOCD: CPT | Mod: CPTII,S$GLB,, | Performed by: STUDENT IN AN ORGANIZED HEALTH CARE EDUCATION/TRAINING PROGRAM

## 2024-06-27 PROCEDURE — 3078F DIAST BP <80 MM HG: CPT | Mod: CPTII,S$GLB,, | Performed by: STUDENT IN AN ORGANIZED HEALTH CARE EDUCATION/TRAINING PROGRAM

## 2024-06-27 PROCEDURE — 99999 PR PBB SHADOW E&M-EST. PATIENT-LVL IV: CPT | Mod: PBBFAC,,, | Performed by: STUDENT IN AN ORGANIZED HEALTH CARE EDUCATION/TRAINING PROGRAM

## 2024-06-27 PROCEDURE — 99214 OFFICE O/P EST MOD 30 MIN: CPT | Mod: S$GLB,,, | Performed by: STUDENT IN AN ORGANIZED HEALTH CARE EDUCATION/TRAINING PROGRAM

## 2024-06-27 PROCEDURE — 3077F SYST BP >= 140 MM HG: CPT | Mod: CPTII,S$GLB,, | Performed by: STUDENT IN AN ORGANIZED HEALTH CARE EDUCATION/TRAINING PROGRAM

## 2024-06-27 PROCEDURE — 1159F MED LIST DOCD IN RCRD: CPT | Mod: CPTII,S$GLB,, | Performed by: STUDENT IN AN ORGANIZED HEALTH CARE EDUCATION/TRAINING PROGRAM

## 2024-06-27 RX ORDER — PANTOPRAZOLE SODIUM 40 MG/1
40 TABLET, DELAYED RELEASE ORAL 2 TIMES DAILY
Qty: 60 TABLET | Refills: 2 | Status: SHIPPED | OUTPATIENT
Start: 2024-06-27 | End: 2024-09-25

## 2024-07-02 RX ORDER — SODIUM CHLORIDE 9 MG/ML
INJECTION, SOLUTION INTRAVENOUS CONTINUOUS
OUTPATIENT
Start: 2024-07-02

## 2024-07-02 RX ORDER — SODIUM CHLORIDE 0.9 % (FLUSH) 0.9 %
10 SYRINGE (ML) INJECTION
OUTPATIENT
Start: 2024-07-02

## 2024-07-07 NOTE — PROGRESS NOTES
"[facility]  General Surgery Clinic Progress Note    HPI:  Desiree Blake is a 69 y.o. female with history of pre-pyloric ulcer with GI bleed s/p EGD who presents for follow up.  Lost to follow-up after discharge in April.  Reports back with continued abdominal pain with eating.  Denies melena, nausea competent or vomiting.  Unsure of which she takes, but says she takes all medications she was given after discharge.    ROS:  Negative except above    PE:  Vitals:    06/27/24 1437   BP: (!) 190/77   Pulse: 65   SpO2: 100%   Weight: 46 kg (101 lb 6.6 oz)   Height: 5' 6" (1.676 m)        Wt Readings from Last 2 Encounters:   06/27/24 46 kg (101 lb 6.6 oz)   06/12/24 42.6 kg (94 lb)           Gen: Alert and oriented x3, judgement intact, NAD  CV: RRR  Resp: CTAB; breaths non-labored and symmetrical  Abd: Soft, NT, ND, BS+  Extremities: No c/c/e    A/P:  69 y.o. female with history of pyloric ulcer status post EGD who presents for follow-up  -To Endo 7/17 for repeat EGD  -Protonix 40mg BID   -Carafate AC&HS   -Informed consent obtained   -pre-op    Meg Ayon MD  General Surgery   [phone]        7/7/2024  6:51 PM  "

## 2024-07-07 NOTE — H&P (VIEW-ONLY)
"[facility]  General Surgery Clinic Progress Note    HPI:  Desiree Blake is a 69 y.o. female with history of pre-pyloric ulcer with GI bleed s/p EGD who presents for follow up.  Lost to follow-up after discharge in April.  Reports back with continued abdominal pain with eating.  Denies melena, nausea competent or vomiting.  Unsure of which she takes, but says she takes all medications she was given after discharge.    ROS:  Negative except above    PE:  Vitals:    06/27/24 1437   BP: (!) 190/77   Pulse: 65   SpO2: 100%   Weight: 46 kg (101 lb 6.6 oz)   Height: 5' 6" (1.676 m)        Wt Readings from Last 2 Encounters:   06/27/24 46 kg (101 lb 6.6 oz)   06/12/24 42.6 kg (94 lb)           Gen: Alert and oriented x3, judgement intact, NAD  CV: RRR  Resp: CTAB; breaths non-labored and symmetrical  Abd: Soft, NT, ND, BS+  Extremities: No c/c/e    A/P:  69 y.o. female with history of pyloric ulcer status post EGD who presents for follow-up  -To Endo 7/17 for repeat EGD  -Protonix 40mg BID   -Carafate AC&HS   -Informed consent obtained   -pre-op    Meg Ayon MD  General Surgery   [phone]        7/7/2024  6:51 PM  "

## 2024-07-10 ENCOUNTER — HOSPITAL ENCOUNTER (EMERGENCY)
Facility: HOSPITAL | Age: 69
Discharge: HOME | End: 2024-07-10
Payer: MEDICARE

## 2024-07-10 VITALS
SYSTOLIC BLOOD PRESSURE: 107 MMHG | OXYGEN SATURATION: 100 % | TEMPERATURE: 98 F | DIASTOLIC BLOOD PRESSURE: 68 MMHG | BODY MASS INDEX: 15.11 KG/M2 | HEIGHT: 66 IN | WEIGHT: 94 LBS | HEART RATE: 77 BPM | RESPIRATION RATE: 20 BRPM

## 2024-07-10 DIAGNOSIS — B34.9 NONSPECIFIC SYNDROME SUGGESTIVE OF VIRAL ILLNESS: ICD-10-CM

## 2024-07-10 DIAGNOSIS — R51.9 NONINTRACTABLE HEADACHE, UNSPECIFIED CHRONICITY PATTERN, UNSPECIFIED HEADACHE TYPE: ICD-10-CM

## 2024-07-10 DIAGNOSIS — R10.13 EPIGASTRIC PAIN: Primary | ICD-10-CM

## 2024-07-10 LAB
ALBUMIN SERPL BCP-MCNC: 3 G/DL (ref 3.5–5.2)
ALP SERPL-CCNC: 98 U/L (ref 55–135)
ALT SERPL W/O P-5'-P-CCNC: 11 U/L (ref 10–44)
ANION GAP SERPL CALC-SCNC: 6 MMOL/L (ref 3–11)
AST SERPL-CCNC: 11 U/L (ref 10–40)
BASOPHILS # BLD AUTO: 0.06 K/UL (ref 0–0.2)
BASOPHILS NFR BLD: 0.5 % (ref 0–1.9)
BILIRUB SERPL-MCNC: <0.1 MG/DL (ref 0.1–1)
BUN SERPL-MCNC: 10 MG/DL (ref 8–23)
CALCIUM SERPL-MCNC: 8.9 MG/DL (ref 8.7–10.5)
CHLORIDE SERPL-SCNC: 109 MMOL/L (ref 95–110)
CO2 SERPL-SCNC: 28 MMOL/L (ref 23–29)
CREAT SERPL-MCNC: 0.6 MG/DL (ref 0.5–1.4)
CTP QC/QA: YES
DIFFERENTIAL METHOD BLD: ABNORMAL
EOSINOPHIL # BLD AUTO: 0.1 K/UL (ref 0–0.5)
EOSINOPHIL NFR BLD: 1 % (ref 0–8)
ERYTHROCYTE [DISTWIDTH] IN BLOOD BY AUTOMATED COUNT: 16.6 % (ref 11.5–14.5)
EST. GFR  (NO RACE VARIABLE): >60 ML/MIN/1.73 M^2
GLUCOSE SERPL-MCNC: 90 MG/DL (ref 70–110)
HCT VFR BLD AUTO: 31.1 % (ref 37–48.5)
HGB BLD-MCNC: 9.2 G/DL (ref 12–16)
IMM GRANULOCYTES # BLD AUTO: 0.03 K/UL (ref 0–0.04)
IMM GRANULOCYTES NFR BLD AUTO: 0.3 % (ref 0–0.5)
LIPASE SERPL-CCNC: 115 U/L (ref 13–75)
LYMPHOCYTES # BLD AUTO: 1.6 K/UL (ref 1–4.8)
LYMPHOCYTES NFR BLD: 13.5 % (ref 18–48)
MCH RBC QN AUTO: 23.4 PG (ref 27–31)
MCHC RBC AUTO-ENTMCNC: 29.6 G/DL (ref 32–36)
MCV RBC AUTO: 79 FL (ref 82–98)
MONOCYTES # BLD AUTO: 0.7 K/UL (ref 0.3–1)
MONOCYTES NFR BLD: 5.7 % (ref 4–15)
NEUTROPHILS # BLD AUTO: 9.2 K/UL (ref 1.8–7.7)
NEUTROPHILS NFR BLD: 79 % (ref 38–73)
NRBC BLD-RTO: 0 /100 WBC
PLATELET # BLD AUTO: 279 K/UL (ref 150–450)
PMV BLD AUTO: 10 FL (ref 9.2–12.9)
POTASSIUM SERPL-SCNC: 3.8 MMOL/L (ref 3.5–5.1)
PROT SERPL-MCNC: 6.7 G/DL (ref 6–8.4)
RBC # BLD AUTO: 3.94 M/UL (ref 4–5.4)
SARS-COV-2 RDRP RESP QL NAA+PROBE: NEGATIVE
SODIUM SERPL-SCNC: 143 MMOL/L (ref 136–145)
WBC # BLD AUTO: 11.7 K/UL (ref 3.9–12.7)

## 2024-07-10 PROCEDURE — 87635 SARS-COV-2 COVID-19 AMP PRB: CPT | Performed by: NURSE PRACTITIONER

## 2024-07-10 PROCEDURE — 80053 COMPREHEN METABOLIC PANEL: CPT | Performed by: NURSE PRACTITIONER

## 2024-07-10 PROCEDURE — 36415 COLL VENOUS BLD VENIPUNCTURE: CPT | Performed by: NURSE PRACTITIONER

## 2024-07-10 PROCEDURE — 85025 COMPLETE CBC W/AUTO DIFF WBC: CPT | Performed by: NURSE PRACTITIONER

## 2024-07-10 PROCEDURE — 99284 EMERGENCY DEPT VISIT MOD MDM: CPT

## 2024-07-10 PROCEDURE — 83690 ASSAY OF LIPASE: CPT | Performed by: NURSE PRACTITIONER

## 2024-07-10 PROCEDURE — 25000003 PHARM REV CODE 250: Performed by: NURSE PRACTITIONER

## 2024-07-10 RX ORDER — LIDOCAINE HYDROCHLORIDE 20 MG/ML
15 SOLUTION OROPHARYNGEAL ONCE
Status: COMPLETED | OUTPATIENT
Start: 2024-07-10 | End: 2024-07-10

## 2024-07-10 RX ORDER — ALUMINUM HYDROXIDE, MAGNESIUM HYDROXIDE, AND SIMETHICONE 1200; 120; 1200 MG/30ML; MG/30ML; MG/30ML
30 SUSPENSION ORAL ONCE
Status: COMPLETED | OUTPATIENT
Start: 2024-07-10 | End: 2024-07-10

## 2024-07-10 RX ORDER — OXYCODONE AND ACETAMINOPHEN 5; 325 MG/1; MG/1
1 TABLET ORAL EVERY 8 HOURS PRN
Qty: 9 TABLET | Refills: 0 | Status: SHIPPED | OUTPATIENT
Start: 2024-07-10 | End: 2024-07-13

## 2024-07-10 RX ORDER — OXYCODONE AND ACETAMINOPHEN 5; 325 MG/1; MG/1
1 TABLET ORAL
Status: COMPLETED | OUTPATIENT
Start: 2024-07-10 | End: 2024-07-10

## 2024-07-10 RX ORDER — PROMETHAZINE HYDROCHLORIDE AND DEXTROMETHORPHAN HYDROBROMIDE 6.25; 15 MG/5ML; MG/5ML
5 SYRUP ORAL EVERY 4 HOURS PRN
Qty: 118 ML | Refills: 0 | Status: SHIPPED | OUTPATIENT
Start: 2024-07-10 | End: 2024-07-20

## 2024-07-10 RX ADMIN — LIDOCAINE HYDROCHLORIDE 15 ML: 20 SOLUTION ORAL at 04:07

## 2024-07-10 RX ADMIN — ALUMINUM HYDROXIDE, MAGNESIUM HYDROXIDE, AND SIMETHICONE 30 ML: 1200; 120; 1200 SUSPENSION ORAL at 04:07

## 2024-07-10 RX ADMIN — OXYCODONE HYDROCHLORIDE AND ACETAMINOPHEN 1 TABLET: 5; 325 TABLET ORAL at 04:07

## 2024-07-10 NOTE — ED PROVIDER NOTES
Encounter Date: 7/10/2024       History     Chief Complaint   Patient presents with    Abdominal Pain     Epigastric pain, chronic. Reports diarrhea. Also reports headache.      This is a 69-year-old white female with multiple medical problems including chronic pain, chronic epigastric pain with history of GERD and peptic ulcers presents to the emergency department with multiple complaints.  Patient reports crampy/burning pain to epigastric abdominal region intermittently over the last few days that has worsened today associated with nausea and worsening chronic diarrhea.  Patient was recently evaluated by GI and has plans to undergo EGD again next week.  She denies known fever, vomiting, or black/bloody stools.    Patient is also complaining of generalized throbbing headache associated with stuffy/runny nose, mild sore throat, and nonproductive cough.  She denies known exposure to others with similar symptoms.      Review of patient's allergies indicates:  No Known Allergies  Past Medical History:   Diagnosis Date    Anticoagulant long-term use     Anxiety     Arthritis     C. difficile colitis 8/6/2022    Carotid artery disease     Cervical disc disorder     Chronic back pain     COPD (chronic obstructive pulmonary disease)     Coronary artery disease     Dementia     Depression     Diarrhea, unspecified 11/1/2021    DVT (deep venous thrombosis)     CAROTID    GERD (gastroesophageal reflux disease)     Hematuria     Hiatal hernia     High cholesterol     Ill-fitting dentures     STATES DOESN'T WEAR    PVD (peripheral vascular disease)     Renal calculus     Sleep apnea     Sleep apnea     NO C PAP    Urinary frequency     Urinary urgency     Wears glasses     READING      Past Surgical History:   Procedure Laterality Date    BACK SURGERY      BREAST LUMPECTOMY Right     CAROTID ARTERY ANGIOPLASTY      CAROTID ARTERY ANGIOPLASTY      CAROTID ARTERY STENT Left     CAROTID ENDARTERECTOMY Right     CHOLECYSTECTOMY       COLONOSCOPY      CYSTOSCOPY      EGD, WITH CLOSED BIOPSY N/A 4/10/2024    Procedure: EGD, WITH CLOSED BIOPSY;  Surgeon: Meg Ayon MD;  Location: [facility];  Service: General;  Laterality: N/A;    HYSTERECTOMY      OOPHORECTOMY      TONSILLECTOMY       Family History   Problem Relation Name Age of Onset    Cancer Mother      Stroke Father      No Known Problems Sister      No Known Problems Daughter      No Known Problems Maternal Aunt      No Known Problems Maternal Uncle      No Known Problems Paternal Aunt      No Known Problems Paternal Uncle      No Known Problems Maternal Grandmother      No Known Problems Maternal Grandfather      No Known Problems Paternal Grandmother      No Known Problems Paternal Grandfather      Breast cancer Neg Hx      Ovarian cancer Neg Hx      BRCA 1/2 Neg Hx       Social History     Tobacco Use    Smoking status: Every Day     Current packs/day: 0.50     Average packs/day: 0.5 packs/day for 54.5 years (27.3 ttl pk-yrs)     Types: Cigarettes     Start date: 1970    Smokeless tobacco: Never   Substance Use Topics    Alcohol use: No    Drug use: No     Review of Systems   Constitutional:  Positive for appetite change and fatigue. Negative for fever.   HENT:  Positive for congestion, rhinorrhea and sore throat.    Respiratory:  Positive for cough. Negative for shortness of breath.    Gastrointestinal:  Positive for abdominal pain, diarrhea and nausea. Negative for constipation and vomiting.   Genitourinary:  Negative for dysuria.   Musculoskeletal:  Negative for myalgias.   Neurological:  Positive for weakness and headaches.       Physical Exam     Initial Vitals [07/10/24 1519]   BP Pulse Resp Temp SpO2   107/68 77 18 97.8 °F (36.6 °C) 100 %      MAP       --         Physical Exam    Nursing note and vitals reviewed.  Constitutional: She appears well-developed and well-nourished. She is active. No distress.   HENT:   Head: Normocephalic and atraumatic.   Mouth/Throat:  Oropharynx is clear and moist.   Eyes: EOM are normal. Pupils are equal, round, and reactive to light.   Neck: Neck supple.   Normal range of motion.  Cardiovascular:  Normal rate, regular rhythm and normal heart sounds.           Pulmonary/Chest: Breath sounds normal. No respiratory distress.   Abdominal: Abdomen is soft. Bowel sounds are normal. She exhibits no distension. There is abdominal tenderness (upper quads).   Musculoskeletal:         General: Normal range of motion.      Cervical back: Normal range of motion and neck supple.     Neurological: She is alert and oriented to person, place, and time. She has normal strength. GCS score is 15. GCS eye subscore is 4. GCS verbal subscore is 5. GCS motor subscore is 6.   Skin: Skin is warm and dry. Capillary refill takes less than 2 seconds.   Psychiatric: She has a normal mood and affect. Her behavior is normal. Thought content normal.         ED Course   Procedures  Labs Reviewed   CBC W/ AUTO DIFFERENTIAL - Abnormal; Notable for the following components:       Result Value    RBC 3.94 (*)     Hemoglobin 9.2 (*)     Hematocrit 31.1 (*)     MCV 79 (*)     MCH 23.4 (*)     MCHC 29.6 (*)     RDW 16.6 (*)     Gran # (ANC) 9.2 (*)     Gran % 79.0 (*)     Lymph % 13.5 (*)     All other components within normal limits   COMPREHENSIVE METABOLIC PANEL - Abnormal; Notable for the following components:    Albumin 3.0 (*)     Total Bilirubin <0.1 (*)     All other components within normal limits   LIPASE - Abnormal; Notable for the following components:    Lipase 115 (*)     All other components within normal limits   SARS-COV-2 RDRP GENE          Imaging Results    None          Medications   aluminum-magnesium hydroxide-simethicone 200-200-20 mg/5 mL suspension 30 mL (30 mLs Oral Given 7/10/24 1616)     And   LIDOcaine viscous HCl 2% oral solution 15 mL (15 mLs Oral Given 7/10/24 1616)   oxyCODONE-acetaminophen 5-325 mg per tablet 1 tablet (1 tablet Oral Given 7/10/24  1617)     Medical Decision Making  Amount and/or Complexity of Data Reviewed  Labs: ordered.    Risk  OTC drugs.  Prescription drug management.               ED Course as of 07/10/24 1729   Wed Jul 10, 2024   1729 Labs relatively unremarkable, mild elevation in lipase, stable anemia with hemoglobin 9.2.  Patient reports complete resolution of symptoms after taking Percocet and GI cocktail.  Patient is eager to follow-up for EGD next week and understands to return for worsening. [CB]      ED Course User Index  [CB] Jyotsna Chilel NP                           Clinical Impression:  Final diagnoses:  [R10.13] Epigastric pain (Primary)  [R51.9] Nonintractable headache, unspecified chronicity pattern, unspecified headache type  [B34.9] Nonspecific syndrome suggestive of viral illness          ED Disposition Condition    Discharge Stable          ED Prescriptions       Medication Sig Dispense Start Date End Date Auth. Provider    oxyCODONE-acetaminophen (PERCOCET) 5-325 mg per tablet Take 1 tablet by mouth every 8 (eight) hours as needed for Pain. 9 tablet 7/10/2024 7/13/2024 Jyotsna Chilel NP    promethazine-dextromethorphan (PROMETHAZINE-DM) 6.25-15 mg/5 mL Syrp Take 5 mLs by mouth every 4 (four) hours as needed. 118 mL 7/10/2024 7/20/2024 Jyotsna Chilel NP          Follow-up Information       Follow up With Specialties Details Why Contact Info    PCP Follow UP  Schedule an appointment as soon as possible for a visit in 2 days for follow-up, for re-evaluation of today's complaint              Jyotsna Chilel NP  07/10/24 6930

## 2024-07-10 NOTE — DISCHARGE INSTRUCTIONS
Take medications as directed.  Plan to follow-up with  as scheduled for EGD.  Return to the emergency department for worsening condition.

## 2024-07-15 PROBLEM — K27.4 GASTROINTESTINAL HEMORRHAGE ASSOCIATED WITH PEPTIC ULCER: Status: RESOLVED | Noted: 2024-04-09 | Resolved: 2024-07-15

## 2024-07-16 ENCOUNTER — ANESTHESIA EVENT (OUTPATIENT)
Dept: ENDOSCOPY | Facility: HOSPITAL | Age: 69
End: 2024-07-16
Payer: MEDICARE

## 2024-07-16 ENCOUNTER — HOSPITAL ENCOUNTER (OUTPATIENT)
Dept: PULMONOLOGY | Facility: HOSPITAL | Age: 69
Discharge: HOME | End: 2024-07-16
Attending: STUDENT IN AN ORGANIZED HEALTH CARE EDUCATION/TRAINING PROGRAM
Payer: MEDICARE

## 2024-07-16 ENCOUNTER — HOSPITAL ENCOUNTER (OUTPATIENT)
Dept: PREADMISSION TESTING | Facility: HOSPITAL | Age: 69
Discharge: HOME | End: 2024-07-16
Attending: STUDENT IN AN ORGANIZED HEALTH CARE EDUCATION/TRAINING PROGRAM
Payer: MEDICARE

## 2024-07-16 VITALS — BODY MASS INDEX: 15.11 KG/M2 | WEIGHT: 94 LBS | HEIGHT: 66 IN

## 2024-07-16 DIAGNOSIS — Z01.818 PRE-OP TESTING: ICD-10-CM

## 2024-07-16 DIAGNOSIS — Z87.11 HISTORY OF GASTRIC ULCER: ICD-10-CM

## 2024-07-16 DIAGNOSIS — K27.9 PEPTIC ULCER DISEASE: ICD-10-CM

## 2024-07-16 LAB
OHS QRS DURATION: 72 MS
OHS QTC CALCULATION: 418 MS

## 2024-07-16 PROCEDURE — 93010 ELECTROCARDIOGRAM REPORT: CPT | Mod: ,,, | Performed by: INTERNAL MEDICINE

## 2024-07-16 PROCEDURE — 93005 ELECTROCARDIOGRAM TRACING: CPT

## 2024-07-16 RX ORDER — HYDROCODONE BITARTRATE AND ACETAMINOPHEN 5; 325 MG/1; MG/1
1 TABLET ORAL EVERY 6 HOURS PRN
COMMUNITY
End: 2024-07-23

## 2024-07-16 NOTE — ANESTHESIA PREPROCEDURE EVALUATION
07/16/2024  Desiree Blake is a 69 y.o., female.      Pre-op Assessment    I have reviewed the Patient Summary Reports.    I have reviewed the NPO Status.   I have reviewed the Medications.     Review of Systems  Anesthesia Hx:  No problems with previous Anesthesia             Denies Family Hx of Anesthesia complications.    Denies Personal Hx of Anesthesia complications.                    Social:  Smoker       Cardiovascular:     Hypertension, well controlled                                        Pulmonary:   COPD, moderate   Shortness of breath  Sleep Apnea           Education provided regarding risk of obstructive sleep apnea            Renal/:  Renal/ Normal                 Hepatic/GI:   PUD, Hiatal Hernia, GERD, poorly controlled             Musculoskeletal:         Spine Disorders: cervical Chronic Pain           Neurological:      Headaches                                 Endocrine:  Endocrine Normal            Psych:    depression              Lab Results   Component Value Date    WBC 11.70 07/10/2024    HGB 9.2 (L) 07/10/2024    HCT 31.1 (L) 07/10/2024    MCV 79 (L) 07/10/2024     07/10/2024      CMP  Sodium   Date Value Ref Range Status   07/10/2024 143 136 - 145 mmol/L Final     Potassium   Date Value Ref Range Status   07/10/2024 3.8 3.5 - 5.1 mmol/L Final     Chloride   Date Value Ref Range Status   07/10/2024 109 95 - 110 mmol/L Final     CO2   Date Value Ref Range Status   07/10/2024 28 23 - 29 mmol/L Final     Glucose   Date Value Ref Range Status   07/10/2024 90 70 - 110 mg/dL Final   01/26/2017 82 74 - 106 MG/DL Final     BUN   Date Value Ref Range Status   07/10/2024 10 8 - 23 mg/dL Final     Creatinine   Date Value Ref Range Status   07/10/2024 0.6 0.5 - 1.4 mg/dL Final     Calcium   Date Value Ref Range Status   07/10/2024 8.9 8.7 - 10.5 mg/dL Final     Total Protein   Date  Value Ref Range Status   07/10/2024 6.7 6.0 - 8.4 g/dL Final     Albumin   Date Value Ref Range Status   07/10/2024 3.0 (L) 3.5 - 5.2 g/dL Final     Total Bilirubin   Date Value Ref Range Status   07/10/2024 <0.1 (A) 0.1 - 1.0 mg/dL Final     Comment:     For infants and newborns, interpretation of results should be based  on gestational age, weight and in agreement with clinical  observations.    Premature Infant recommended reference ranges:  Up to 24 hours.............<8.0 mg/dL  Up to 48 hours............<12.0 mg/dL  3-5 days..................<15.0 mg/dL  6-29 days.................<15.0 mg/dL    For patients on Eltrombopag therapy, use of Dimension [place] TBIL is   not   recommended.       Alkaline Phosphatase   Date Value Ref Range Status   07/10/2024 98 55 - 135 U/L Final     AST   Date Value Ref Range Status   07/10/2024 11 10 - 40 U/L Final     ALT   Date Value Ref Range Status   07/10/2024 11 10 - 44 U/L Final     Anion Gap   Date Value Ref Range Status   07/10/2024 6 3 - 11 mmol/L Final     eGFR   Date Value Ref Range Status   07/10/2024 >60.0 >60 mL/min/1.73 m^2 Final   01/04/2023 96 > OR = 60 mL/min/1.73m2 Final     Comment:     The eGFR is based on the CKD-EPI 2021 equation. To calculate   the new eGFR from a previous Creatinine or Cystatin C  result, go to https://www.kidney.org/professionals/  kdoqi/gfr%5Fcalculator          Physical Exam  General: Well nourished    Airway:  Mallampati: II / II  Mouth Opening: Normal  TM Distance: Normal  Tongue: Normal  Neck ROM: Normal ROM    Dental:    Chest/Lungs:  Clear to auscultation    Heart:  Rate: Normal  Rhythm: Regular Rhythm  Sounds: Normal        Anesthesia Plan  Type of Anesthesia, risks & benefits discussed:    Anesthesia Type: MAC  Intra-op Monitoring Plan: Standard ASA Monitors  Post Op Pain Control Plan: multimodal analgesia  Induction:  IV  Airway Plan: Direct  Informed Consent: Informed consent signed with the Patient and all parties understand the  risks and agree with anesthesia plan.  All questions answered.   ASA Score: 4  Day of Surgery Review of History & Physical: I have interviewed and examined the patient. I have reviewed the patient's H&P dated: There are no significant changes.     Ready For Surgery From Anesthesia Perspective.     .

## 2024-07-17 ENCOUNTER — HOSPITAL ENCOUNTER (OUTPATIENT)
Facility: HOSPITAL | Age: 69
Discharge: HOME | End: 2024-07-17
Attending: STUDENT IN AN ORGANIZED HEALTH CARE EDUCATION/TRAINING PROGRAM | Admitting: STUDENT IN AN ORGANIZED HEALTH CARE EDUCATION/TRAINING PROGRAM
Payer: MEDICARE

## 2024-07-17 ENCOUNTER — ANESTHESIA (OUTPATIENT)
Dept: ENDOSCOPY | Facility: HOSPITAL | Age: 69
End: 2024-07-17
Payer: MEDICARE

## 2024-07-17 VITALS
OXYGEN SATURATION: 98 % | TEMPERATURE: 98 F | DIASTOLIC BLOOD PRESSURE: 86 MMHG | SYSTOLIC BLOOD PRESSURE: 185 MMHG | HEART RATE: 78 BPM | RESPIRATION RATE: 20 BRPM

## 2024-07-17 DIAGNOSIS — K21.9 GERD (GASTROESOPHAGEAL REFLUX DISEASE): ICD-10-CM

## 2024-07-17 DIAGNOSIS — K27.9 PUD (PEPTIC ULCER DISEASE): Primary | ICD-10-CM

## 2024-07-17 PROCEDURE — 27201423 OPTIME MED/SURG SUP & DEVICES STERILE SUPPLY: Performed by: STUDENT IN AN ORGANIZED HEALTH CARE EDUCATION/TRAINING PROGRAM

## 2024-07-17 PROCEDURE — 25000003 PHARM REV CODE 250: Performed by: STUDENT IN AN ORGANIZED HEALTH CARE EDUCATION/TRAINING PROGRAM

## 2024-07-17 PROCEDURE — 37000008 HC ANESTHESIA 1ST 15 MINUTES: Performed by: STUDENT IN AN ORGANIZED HEALTH CARE EDUCATION/TRAINING PROGRAM

## 2024-07-17 PROCEDURE — 43239 EGD BIOPSY SINGLE/MULTIPLE: CPT | Mod: ,,, | Performed by: STUDENT IN AN ORGANIZED HEALTH CARE EDUCATION/TRAINING PROGRAM

## 2024-07-17 PROCEDURE — 25000003 PHARM REV CODE 250: Performed by: NURSE ANESTHETIST, CERTIFIED REGISTERED

## 2024-07-17 PROCEDURE — 43239 EGD BIOPSY SINGLE/MULTIPLE: CPT | Performed by: STUDENT IN AN ORGANIZED HEALTH CARE EDUCATION/TRAINING PROGRAM

## 2024-07-17 RX ORDER — PROPOFOL 10 MG/ML
VIAL (ML) INTRAVENOUS
Status: DISCONTINUED | OUTPATIENT
Start: 2024-07-17 | End: 2024-07-17

## 2024-07-17 RX ORDER — SODIUM CHLORIDE 9 MG/ML
INJECTION, SOLUTION INTRAVENOUS CONTINUOUS
Status: DISCONTINUED | OUTPATIENT
Start: 2024-07-17 | End: 2024-07-17 | Stop reason: HOSPADM

## 2024-07-17 RX ORDER — LIDOCAINE HYDROCHLORIDE 20 MG/ML
INJECTION INTRAVENOUS
Status: DISCONTINUED | OUTPATIENT
Start: 2024-07-17 | End: 2024-07-17

## 2024-07-17 RX ORDER — SODIUM CHLORIDE 0.9 % (FLUSH) 0.9 %
10 SYRINGE (ML) INJECTION
Status: DISCONTINUED | OUTPATIENT
Start: 2024-07-17 | End: 2024-07-17 | Stop reason: HOSPADM

## 2024-07-17 RX ORDER — DICYCLOMINE HYDROCHLORIDE 10 MG/1
10 CAPSULE ORAL
Qty: 120 CAPSULE | Refills: 0 | Status: SHIPPED | OUTPATIENT
Start: 2024-07-17 | End: 2024-08-16

## 2024-07-17 RX ADMIN — SODIUM CHLORIDE: 9 INJECTION, SOLUTION INTRAVENOUS at 09:07

## 2024-07-17 RX ADMIN — LIDOCAINE HYDROCHLORIDE 50 MG: 20 INJECTION, SOLUTION INTRAVENOUS at 11:07

## 2024-07-17 RX ADMIN — Medication 50 MG: at 11:07

## 2024-07-17 NOTE — TRANSFER OF CARE
Anesthesia Transfer of Care Note    Patient: Desiree Blake    Procedure(s) Performed: Procedure(s) (LRB):  EGD, WITH CLOSED BIOPSY of pyloric lesion (N/A)    Patient location: GI    Anesthesia Type: MAC    Transport from OR: Transported from OR on room air with adequate spontaneous ventilation    Post pain: adequate analgesia    Post assessment: no apparent anesthetic complications    Post vital signs: stable    Level of consciousness: awake    Nausea/Vomiting: no nausea/vomiting    Complications: none    Transfer of care protocol was followed      Last vitals:   124/60  16 RR  86 HR  96% O2 SAT

## 2024-07-17 NOTE — INTERVAL H&P NOTE
Patient seen and examined.  No interval change since the below obtained H&P  Informed consent verified    NPO since midnight  All questions and concerns addressed  To Endo for EGD for abdominal pain and history of pyloric ulcer    Meg Ayon MD  General Surgery   [phone]

## 2024-07-17 NOTE — DISCHARGE SUMMARY
[facility] - Endoscopy  Discharge Note  Short Stay    Procedure(s) (LRB):  EGD, WITH CLOSED BIOPSY of pyloric lesion (N/A)      OUTCOME: Patient tolerated treatment/procedure well without complication and is now ready for discharge.    DISPOSITION: Home or Self Care    FINAL DIAGNOSIS:  Gastrointestinal hemorrhage associated with peptic ulcer    FOLLOWUP: In clinic    DISCHARGE INSTRUCTIONS:    Discharge Procedure Orders   Diet Adult Regular     No driving until:     Notify your health care provider if you experience any of the following:  temperature >100.4     Notify your health care provider if you experience any of the following:  persistent nausea and vomiting or diarrhea     Notify your health care provider if you experience any of the following:  severe uncontrolled pain     Activity as tolerated        TIME SPENT ON DISCHARGE: 5 minutes

## 2024-07-17 NOTE — ANESTHESIA POSTPROCEDURE EVALUATION
Anesthesia Post Evaluation    Patient: Desiree Blake    Procedure(s) Performed: Procedure(s) (LRB):  EGD, WITH CLOSED BIOPSY of pyloric lesion (N/A)    Final Anesthesia Type: MAC      Patient location during evaluation: OPS  Patient participation: Yes- Able to Participate  Level of consciousness: awake  Post-procedure vital signs: reviewed and stable  Pain management: adequate  Airway patency: patent    PONV status at discharge: No PONV  Anesthetic complications: no      Cardiovascular status: blood pressure returned to baseline  Respiratory status: spontaneous ventilation  Hydration status: euvolemic  Follow-up not needed.              Vitals Value Taken Time   /86 07/17/24 1140   Temp 37 C 07/17/24 1140   Pulse 78 07/17/24 1140   Resp 20 07/17/24 1140   SpO2 98 % 07/17/24 1140         No case tracking events are documented in the log.      Pain/Shane Score: Shane Score: 10 (7/17/2024 11:41 AM)

## 2024-07-17 NOTE — DISCHARGE INSTRUCTIONS
NO DRIVING OR ALCOHOL 24 HOURS AFTER PROCEDURE   FOLLOW UP WITH DR ORTEGA AS SCHEDULED   NOTIFY HER OF ANY PROBLEMS OR CONCERNS

## 2024-07-18 NOTE — OP NOTE
[facility] - [facility] Periop Services  General Surgery Department  Operative Note    SUMMARY     Date of Procedure: 7/17/2024     Procedure: Procedure(s) (LRB):  EGD, WITH CLOSED BIOPSY of pyloric lesion:      Surgeon(s) and Role:  Meg Ayon MD     Pre-Operative Diagnosis: Pre-Op Diagnosis Codes:     * GERD (gastroesophageal reflux disease) [K21.9]     * Dysphagia [R13.10]    Post-Operative Diagnosis: Same         Anesthesia: Local MAC    Findings:  GEJ 32 from the incisors   Normal Z line   Large, friable pyloric lesion extending 1cm into the duodenum with persistent previously documented punched out ulceration - multiple cold biopsies obtained     Indications for the Procedure:  68 yo female with history of pyloric ulcer who presents for repeat EGD.     Operative Conduct in Detail:     Risks, benefits, and alternatives were thoroughly discussed with the patient. Despite the risks, the patient wished to proceed. Informed consent was signed and will be scanned to the electronic chart.     The patient was placed on left lateral decubitus, with mouth block protection. After achieving moderate sedation, a standard gastroscope was introduced through the mouth down the esophagus, stomach, up to the Second portion of the  duodenum with the following findings:  Esophagus:  Normal  Esophagogastric Junction was normal at 32 cm from teeth line.  Stomach  Large, friable pyloric lesion extending 1cm into the duodenum with persistent previously documented punched out ulceration - multiple cold biopsies obtained   Moderate bile reflux  Retroflexion view was normal  Duodenum  Normal duodenum     Specimens :  Specimens (From admission, onward)       Start     Ordered    07/17/24 1118  Specimen to Pathology, Surgery Gastrointestinal tract  Once        Comments: Pre-op Diagnosis: GERD (gastroesophageal reflux disease) [K21.9]Dysphagia [R13.10]Procedure(s):EGD, WITH CLOSED BIOPSY of pyloric lesion Number of specimens: 1Name of  specimens: bxs pyloric lesion at 1009     References:    Click here for ordering Quick Tip   Question Answer Comment   Procedure Type: Gastrointestinal tract    Specimen Class: Complex case/Special    Release to patient Immediate        07/17/24 1118                      Diagnostic Impression:  Pyloric lesion with persistent ulceration - suspicious for malignancy  Follow up pathology   Smoking cessation   Continue PPI BID       Complications: No    Estimated Blood Loss (EBL): Minimal           Implants: None             Condition: Good    Disposition: PACU - hemodynamically stable.    Meg Ayon MD  General Surgery   [phone]

## 2024-07-23 PROBLEM — F17.210 CIGARETTE SMOKER: Status: ACTIVE | Noted: 2022-08-06

## 2024-07-24 LAB — SPECIMEN TO PATHOLOGY - SURGICAL: NORMAL

## 2024-08-01 ENCOUNTER — OFFICE VISIT (OUTPATIENT)
Dept: SURGERY | Facility: CLINIC | Age: 69
End: 2024-08-01
Payer: MEDICARE

## 2024-08-01 VITALS
HEART RATE: 70 BPM | HEIGHT: 66 IN | BODY MASS INDEX: 16.37 KG/M2 | SYSTOLIC BLOOD PRESSURE: 135 MMHG | OXYGEN SATURATION: 99 % | DIASTOLIC BLOOD PRESSURE: 63 MMHG | WEIGHT: 101.88 LBS

## 2024-08-01 DIAGNOSIS — Z87.11 HISTORY OF GASTRIC ULCER: ICD-10-CM

## 2024-08-01 DIAGNOSIS — R10.13 EPIGASTRIC PAIN: ICD-10-CM

## 2024-08-01 DIAGNOSIS — K27.9 PEPTIC ULCER DISEASE: Primary | ICD-10-CM

## 2024-08-01 PROCEDURE — 1159F MED LIST DOCD IN RCRD: CPT | Mod: CPTII,S$GLB,, | Performed by: STUDENT IN AN ORGANIZED HEALTH CARE EDUCATION/TRAINING PROGRAM

## 2024-08-01 PROCEDURE — 99024 POSTOP FOLLOW-UP VISIT: CPT | Mod: S$GLB,,, | Performed by: STUDENT IN AN ORGANIZED HEALTH CARE EDUCATION/TRAINING PROGRAM

## 2024-08-01 PROCEDURE — 3075F SYST BP GE 130 - 139MM HG: CPT | Mod: CPTII,S$GLB,, | Performed by: STUDENT IN AN ORGANIZED HEALTH CARE EDUCATION/TRAINING PROGRAM

## 2024-08-01 PROCEDURE — 3078F DIAST BP <80 MM HG: CPT | Mod: CPTII,S$GLB,, | Performed by: STUDENT IN AN ORGANIZED HEALTH CARE EDUCATION/TRAINING PROGRAM

## 2024-08-01 PROCEDURE — 99999 PR PBB SHADOW E&M-EST. PATIENT-LVL III: CPT | Mod: PBBFAC,,, | Performed by: STUDENT IN AN ORGANIZED HEALTH CARE EDUCATION/TRAINING PROGRAM

## 2024-08-01 RX ORDER — CARIPRAZINE 4.5 MG/1
1 CAPSULE, GELATIN COATED ORAL
COMMUNITY

## 2024-08-01 RX ORDER — FAMOTIDINE 40 MG/1
40 TABLET, FILM COATED ORAL 2 TIMES DAILY
Qty: 60 TABLET | Refills: 2 | Status: SHIPPED | OUTPATIENT
Start: 2024-08-01 | End: 2024-10-30

## 2024-08-01 NOTE — PROGRESS NOTES
"[facility] Progress Note    HPI:  Desiree Blake is a 69 y.o. female with history of pre-pyloric ulcer with GI bleed s/p EGD who presents for follow up.  C/o epigastric pain not alleviated with BID Protonix 40mg.     ROS:  Negative except above    PE:  Vitals:    08/01/24 0820   BP: 135/63   Pulse: 70   SpO2: 99%   Weight: 46.2 kg (101 lb 13.6 oz)   Height: 5' 6" (1.676 m)        Wt Readings from Last 2 Encounters:   08/01/24 46.2 kg (101 lb 13.6 oz)   07/23/24 44 kg (97 lb)           Gen: Alert and oriented x3, judgement intact, NAD  CV: RRR  Resp: CTAB; breaths non-labored and symmetrical  Abd: Soft, NT, ND, BS+  Extremities: No c/c/e    Pathology:  Please see pathology for report   Benign    A/P:  69 y.o. female with history of pyloric ulcer status post EGD who presents for follow-up  -Biopsies negative for malignancy  -Location and size of ulcer places patient at high risk for GOO  -Potential candidate for truncal vagotomy with distal antrectomy if ulcer persists   -Add famotidine 40mg BID  -Pt advised to take cholesterol medications first in the AM   -Smoking cessation emphasized   -RTC 3 months     Meg Ayon MD  General Surgery   [phone]        8/1/2024  6:51 PM    "

## 2024-08-06 ENCOUNTER — HOSPITAL ENCOUNTER (EMERGENCY)
Facility: HOSPITAL | Age: 69
Discharge: HOME | End: 2024-08-06
Attending: EMERGENCY MEDICINE
Payer: MEDICARE

## 2024-08-06 VITALS
TEMPERATURE: 98 F | SYSTOLIC BLOOD PRESSURE: 186 MMHG | OXYGEN SATURATION: 100 % | HEART RATE: 76 BPM | WEIGHT: 100.06 LBS | RESPIRATION RATE: 18 BRPM | DIASTOLIC BLOOD PRESSURE: 69 MMHG | BODY MASS INDEX: 16.08 KG/M2 | HEIGHT: 66 IN

## 2024-08-06 DIAGNOSIS — K27.9 PEPTIC ULCER DISEASE: Primary | ICD-10-CM

## 2024-08-06 PROCEDURE — 63600175 PHARM REV CODE 636 W HCPCS: Performed by: EMERGENCY MEDICINE

## 2024-08-06 PROCEDURE — 99284 EMERGENCY DEPT VISIT MOD MDM: CPT | Mod: 25

## 2024-08-06 PROCEDURE — 25000003 PHARM REV CODE 250: Performed by: EMERGENCY MEDICINE

## 2024-08-06 PROCEDURE — 96372 THER/PROPH/DIAG INJ SC/IM: CPT | Performed by: EMERGENCY MEDICINE

## 2024-08-06 RX ORDER — ONDANSETRON 4 MG/1
8 TABLET, ORALLY DISINTEGRATING ORAL
Status: COMPLETED | OUTPATIENT
Start: 2024-08-06 | End: 2024-08-06

## 2024-08-06 RX ORDER — ALUMINUM HYDROXIDE, MAGNESIUM HYDROXIDE, AND SIMETHICONE 1200; 120; 1200 MG/30ML; MG/30ML; MG/30ML
30 SUSPENSION ORAL ONCE
Status: COMPLETED | OUTPATIENT
Start: 2024-08-06 | End: 2024-08-06

## 2024-08-06 RX ORDER — HYDROMORPHONE HYDROCHLORIDE 1 MG/ML
1 INJECTION, SOLUTION INTRAMUSCULAR; INTRAVENOUS; SUBCUTANEOUS
Status: COMPLETED | OUTPATIENT
Start: 2024-08-06 | End: 2024-08-06

## 2024-08-06 RX ORDER — LIDOCAINE HYDROCHLORIDE 20 MG/ML
15 SOLUTION OROPHARYNGEAL ONCE
Status: COMPLETED | OUTPATIENT
Start: 2024-08-06 | End: 2024-08-06

## 2024-08-06 RX ADMIN — ALUMINUM HYDROXIDE, MAGNESIUM HYDROXIDE, AND SIMETHICONE 30 ML: 200; 200; 20 SUSPENSION ORAL at 01:08

## 2024-08-06 RX ADMIN — ONDANSETRON 8 MG: 4 TABLET, ORALLY DISINTEGRATING ORAL at 01:08

## 2024-08-06 RX ADMIN — HYDROMORPHONE HYDROCHLORIDE 1 MG: 1 INJECTION, SOLUTION INTRAMUSCULAR; INTRAVENOUS; SUBCUTANEOUS at 01:08

## 2024-08-06 RX ADMIN — LIDOCAINE HYDROCHLORIDE 15 ML: 20 SOLUTION ORAL at 01:08

## 2024-08-06 NOTE — ED PROVIDER NOTES
Encounter Date: 8/6/2024       History     Chief Complaint   Patient presents with    Abdominal Pain     Pt stated that since last night she began experiencing upper abdominal pain. Denied nausea / vomiting. Hx ulcer.      69-year-old female with a history of peptic ulcer disease, chronic pain, anxiety, recent EGD which revealed large gastric ulcer, biopsies negative for malignancy.  Followed by  - here with her recurring pain, no bleeding.  No fever.  No nausea and vomiting.  She has had this multiple times in the past.  Same intensity.  Began yesterday.  Vital signs are stable.  Afebrile.  Alert oriented x4, GCS is 15      Review of patient's allergies indicates:  No Known Allergies  Past Medical History:   Diagnosis Date    Anticoagulant long-term use     Anxiety     Arthritis     C. difficile colitis 8/6/2022    Carotid artery disease     Cervical disc disorder     Chronic back pain     COPD (chronic obstructive pulmonary disease)     Coronary artery disease     Dementia     Depression     Diarrhea, unspecified 11/1/2021    DVT (deep venous thrombosis)     CAROTID    GERD (gastroesophageal reflux disease)     Hematuria     Hiatal hernia     High cholesterol     Ill-fitting dentures     STATES DOESN'T WEAR    PVD (peripheral vascular disease)     Renal calculus     Sleep apnea     Sleep apnea     NO C PAP    Urinary frequency     Urinary urgency     Wears glasses     READING      Past Surgical History:   Procedure Laterality Date    BACK SURGERY      BREAST LUMPECTOMY Right     CAROTID ARTERY ANGIOPLASTY      CAROTID ARTERY ANGIOPLASTY      CAROTID ARTERY STENT Left     CAROTID ENDARTERECTOMY Right     CHOLECYSTECTOMY      COLONOSCOPY      CYSTOSCOPY      EGD, WITH CLOSED BIOPSY N/A 4/10/2024    Procedure: EGD, WITH CLOSED BIOPSY;  Surgeon: Meg Ayon MD;  Location: [facility];  Service: General;  Laterality: N/A;    EGD, WITH CLOSED BIOPSY N/A 7/17/2024    Procedure: EGD, WITH CLOSED BIOPSY of  pyloric lesion;  Surgeon: Meg Ayon MD;  Location: [facility];  Service: General;  Laterality: N/A;  6th 0830    HYSTERECTOMY      OOPHORECTOMY      TONSILLECTOMY       Family History   Problem Relation Name Age of Onset    Cancer Mother      Stroke Father      No Known Problems Sister      No Known Problems Daughter      No Known Problems Maternal Aunt      No Known Problems Maternal Uncle      No Known Problems Paternal Aunt      No Known Problems Paternal Uncle      No Known Problems Maternal Grandmother      No Known Problems Maternal Grandfather      No Known Problems Paternal Grandmother      No Known Problems Paternal Grandfather      Breast cancer Neg Hx      Ovarian cancer Neg Hx      BRCA 1/2 Neg Hx       Social History     Tobacco Use    Smoking status: Every Day     Current packs/day: 0.50     Average packs/day: 0.5 packs/day for 54.6 years (27.3 ttl pk-yrs)     Types: Cigarettes     Start date: 1970    Smokeless tobacco: Never   Substance Use Topics    Alcohol use: No    Drug use: No     Review of Systems   Constitutional:  Negative for fever.   HENT:  Negative for sore throat.    Respiratory:  Negative for shortness of breath.    Cardiovascular:  Negative for chest pain.   Gastrointestinal:  Positive for abdominal pain. Negative for nausea.   Genitourinary:  Negative for dysuria.   Musculoskeletal:  Negative for back pain.   Skin:  Negative for rash.   Neurological:  Negative for weakness.   Hematological:  Does not bruise/bleed easily.   All other systems reviewed and are negative.      Physical Exam     Initial Vitals   BP Pulse Resp Temp SpO2   08/06/24 1257 08/06/24 1256 08/06/24 1256 08/06/24 1256 08/06/24 1256   (!) 186/69 76 18 98.4 °F (36.9 °C) 100 %      MAP       --                Physical Exam    Nursing note and vitals reviewed.  Constitutional: She appears well-developed and well-nourished. She is not diaphoretic. No distress.   HENT:   Head: Normocephalic and atraumatic.   Eyes:  Conjunctivae and EOM are normal. Pupils are equal, round, and reactive to light.   Neck: Neck supple.   Normal range of motion.  Cardiovascular:  Normal rate, regular rhythm, normal heart sounds and intact distal pulses.           No murmur heard.  Pulmonary/Chest: Breath sounds normal. No respiratory distress. She has no wheezes. She has no rhonchi. She has no rales. She exhibits no tenderness.   Abdominal: Abdomen is soft. Bowel sounds are normal. She exhibits no distension and no mass. There is abdominal tenderness.   Diffusely tender, no surgical abdomen, no rebound  Patient has had this abdominal pain for quite a while There is no rebound and no guarding.   Musculoskeletal:         General: No tenderness or edema. Normal range of motion.      Cervical back: Normal range of motion and neck supple.     Neurological: She is alert and oriented to person, place, and time. She has normal strength. No cranial nerve deficit. GCS score is 15. GCS eye subscore is 4. GCS verbal subscore is 5. GCS motor subscore is 6.   Skin: Skin is warm and dry. Capillary refill takes less than 2 seconds.         ED Course   Procedures  Labs Reviewed - No data to display       Imaging Results    None          Medications   aluminum-magnesium hydroxide-simethicone 200-200-20 mg/5 mL suspension 30 mL (has no administration in time range)     And   LIDOcaine viscous HCl 2% oral solution 15 mL (has no administration in time range)   HYDROmorphone injection 1 mg (has no administration in time range)   ondansetron disintegrating tablet 8 mg (has no administration in time range)     Medical Decision Making  Risk  OTC drugs.  Prescription drug management.                          Medical Decision Making:   Differential Diagnosis:   Peptic ulcer disease, chronic pain syndrome, chronic abdominal pain  ED Management:  Discussed case with  - recommends patient continue her home medications, give her something for pain here and  discharge             Clinical Impression:  Final diagnoses:  [K27.9] Peptic ulcer disease (Primary)          ED Disposition Condition    Discharge Stable          ED Prescriptions    None       Follow-up Information       Follow up With Specialties Details Why Contact Info    Meg Ayon MD General Surgery In 1 day  [address] [address]  [phone]               Marek Rai MD  08/06/24 2185

## 2024-08-10 ENCOUNTER — HOSPITAL ENCOUNTER (EMERGENCY)
Facility: HOSPITAL | Age: 69
Discharge: HOME | End: 2024-08-10
Attending: EMERGENCY MEDICINE
Payer: MEDICARE

## 2024-08-10 VITALS
DIASTOLIC BLOOD PRESSURE: 60 MMHG | TEMPERATURE: 98 F | RESPIRATION RATE: 18 BRPM | HEART RATE: 77 BPM | OXYGEN SATURATION: 95 % | WEIGHT: 104 LBS | BODY MASS INDEX: 19.63 KG/M2 | HEIGHT: 61 IN | SYSTOLIC BLOOD PRESSURE: 132 MMHG

## 2024-08-10 DIAGNOSIS — K27.9 PEPTIC ULCER DISEASE: Primary | ICD-10-CM

## 2024-08-10 LAB
ALBUMIN SERPL BCP-MCNC: 3 G/DL (ref 3.5–5.2)
ALP SERPL-CCNC: 128 U/L (ref 55–135)
ALT SERPL W/O P-5'-P-CCNC: 15 U/L (ref 10–44)
ANION GAP SERPL CALC-SCNC: 8 MMOL/L (ref 3–11)
AST SERPL-CCNC: 12 U/L (ref 10–40)
BASOPHILS # BLD AUTO: 0.03 K/UL (ref 0–0.2)
BASOPHILS NFR BLD: 0.3 % (ref 0–1.9)
BILIRUB SERPL-MCNC: 0.1 MG/DL (ref 0.1–1)
BUN SERPL-MCNC: 12 MG/DL (ref 8–23)
CALCIUM SERPL-MCNC: 9.2 MG/DL (ref 8.7–10.5)
CHLORIDE SERPL-SCNC: 103 MMOL/L (ref 95–110)
CO2 SERPL-SCNC: 27 MMOL/L (ref 23–29)
CREAT SERPL-MCNC: 0.6 MG/DL (ref 0.5–1.4)
DIFFERENTIAL METHOD BLD: ABNORMAL
EOSINOPHIL # BLD AUTO: 0.2 K/UL (ref 0–0.5)
EOSINOPHIL NFR BLD: 1.8 % (ref 0–8)
ERYTHROCYTE [DISTWIDTH] IN BLOOD BY AUTOMATED COUNT: 19.3 % (ref 11.5–14.5)
EST. GFR  (NO RACE VARIABLE): >60 ML/MIN/1.73 M^2
GLUCOSE SERPL-MCNC: 99 MG/DL (ref 70–110)
HCT VFR BLD AUTO: 32.9 % (ref 37–48.5)
HGB BLD-MCNC: 9.9 G/DL (ref 12–16)
IMM GRANULOCYTES # BLD AUTO: 0.03 K/UL (ref 0–0.04)
IMM GRANULOCYTES NFR BLD AUTO: 0.3 % (ref 0–0.5)
LYMPHOCYTES # BLD AUTO: 1.5 K/UL (ref 1–4.8)
LYMPHOCYTES NFR BLD: 15.4 % (ref 18–48)
MCH RBC QN AUTO: 23.9 PG (ref 27–31)
MCHC RBC AUTO-ENTMCNC: 30.1 G/DL (ref 32–36)
MCV RBC AUTO: 80 FL (ref 82–98)
MONOCYTES # BLD AUTO: 0.8 K/UL (ref 0.3–1)
MONOCYTES NFR BLD: 8.5 % (ref 4–15)
NEUTROPHILS # BLD AUTO: 7.3 K/UL (ref 1.8–7.7)
NEUTROPHILS NFR BLD: 73.7 % (ref 38–73)
NRBC BLD-RTO: 0 /100 WBC
PLATELET # BLD AUTO: 269 K/UL (ref 150–450)
PMV BLD AUTO: 9.5 FL (ref 9.2–12.9)
POTASSIUM SERPL-SCNC: 5.3 MMOL/L (ref 3.5–5.1)
PROT SERPL-MCNC: 7.1 G/DL (ref 6–8.4)
RBC # BLD AUTO: 4.14 M/UL (ref 4–5.4)
SODIUM SERPL-SCNC: 138 MMOL/L (ref 136–145)
WBC # BLD AUTO: 9.88 K/UL (ref 3.9–12.7)

## 2024-08-10 PROCEDURE — 96372 THER/PROPH/DIAG INJ SC/IM: CPT | Performed by: NURSE PRACTITIONER

## 2024-08-10 PROCEDURE — 36415 COLL VENOUS BLD VENIPUNCTURE: CPT | Performed by: NURSE PRACTITIONER

## 2024-08-10 PROCEDURE — 25000003 PHARM REV CODE 250: Performed by: NURSE PRACTITIONER

## 2024-08-10 PROCEDURE — 85025 COMPLETE CBC W/AUTO DIFF WBC: CPT | Performed by: NURSE PRACTITIONER

## 2024-08-10 PROCEDURE — 99284 EMERGENCY DEPT VISIT MOD MDM: CPT | Mod: 25

## 2024-08-10 PROCEDURE — 80053 COMPREHEN METABOLIC PANEL: CPT | Performed by: NURSE PRACTITIONER

## 2024-08-10 PROCEDURE — 63600175 PHARM REV CODE 636 W HCPCS: Performed by: NURSE PRACTITIONER

## 2024-08-10 RX ORDER — ONDANSETRON 4 MG/1
8 TABLET, ORALLY DISINTEGRATING ORAL
Status: COMPLETED | OUTPATIENT
Start: 2024-08-10 | End: 2024-08-10

## 2024-08-10 RX ORDER — ALUMINUM HYDROXIDE, MAGNESIUM HYDROXIDE, AND SIMETHICONE 1200; 120; 1200 MG/30ML; MG/30ML; MG/30ML
30 SUSPENSION ORAL ONCE
Status: COMPLETED | OUTPATIENT
Start: 2024-08-10 | End: 2024-08-10

## 2024-08-10 RX ORDER — HYDROMORPHONE HYDROCHLORIDE 1 MG/ML
0.5 INJECTION, SOLUTION INTRAMUSCULAR; INTRAVENOUS; SUBCUTANEOUS
Status: COMPLETED | OUTPATIENT
Start: 2024-08-10 | End: 2024-08-10

## 2024-08-10 RX ORDER — LIDOCAINE HYDROCHLORIDE 20 MG/ML
15 SOLUTION OROPHARYNGEAL ONCE
Status: COMPLETED | OUTPATIENT
Start: 2024-08-10 | End: 2024-08-10

## 2024-08-10 RX ADMIN — ALUMINUM HYDROXIDE, MAGNESIUM HYDROXIDE, AND SIMETHICONE 30 ML: 200; 200; 20 SUSPENSION ORAL at 01:08

## 2024-08-10 RX ADMIN — ONDANSETRON 8 MG: 4 TABLET, ORALLY DISINTEGRATING ORAL at 01:08

## 2024-08-10 RX ADMIN — LIDOCAINE HYDROCHLORIDE 15 ML: 20 SOLUTION ORAL at 01:08

## 2024-08-10 RX ADMIN — HYDROMORPHONE HYDROCHLORIDE 0.5 MG: 1 INJECTION, SOLUTION INTRAMUSCULAR; INTRAVENOUS; SUBCUTANEOUS at 01:08

## 2024-08-10 NOTE — DISCHARGE INSTRUCTIONS
Continue home medications as directed. See your stomach doctor as scheduled. Return to the ED for worsening symptoms.

## 2024-08-10 NOTE — ED PROVIDER NOTES
Emergency Department Attending Note    Patient: Mia Navarro Age: 58 year old Sex: female   MRN: [id number] : 1964 Encounter Date: 2023     Pt seen at 9:07 AM     HISTORY OF PRESENT ILLNESS  This is a 58 year old female pesents with r leg pain.  Pt has hx of ACL repair and has burning pain to r knee and feels \"something tore\".  Pt denies trauma to knee, denies feeling pop or tear.  Pt denies feeling instability to knee or knee giving out.  No fever, no erythema.  Pt came to ER as she is having difficulty walking and is unable to work and she needs ortho referral    PAST HISTORY         Past Medical History:   Diagnosis Date   • Acquired hypothyroidism 2020   • Diabetes mellitus (CMS/HCC)    • Essential (primary) hypertension    • Neuromuscular disorder (CMS/HCC)    • No known problems     Past Surgical History:   Procedure Laterality Date   • ANES ARTHROSCOPY KNEE,SURG     • LAP GASTRIC RESTRICTIVE W DEVICE     • LAPAROSCOPIC GASTRIC BANDING     • NO PAST SURGERIES          Additional PMH (also as noted in hpi & pmh section):  [] Denies PMH  [] As Above Additional PSH (also as noted in hpi & PSH section) :  [] Denies PSH  [] As above          Social History     Tobacco Use   • Smoking status: Never   • Smokeless tobacco: Never   Vaping Use   • Vaping Use: never used   Substance Use Topics   • Alcohol use: Yes   • Drug use: No    No family history on file.    Additional SH:  [] Denies etoh  [] Denies tob  [] Denies illicit substances  [x] Lives at home  [] Lives in nursing home / care facility  [] Lives in assisted living / group home Additional FH:          Prior to Admission medications    Medication Sig Start Date End Date Taking? Authorizing Provider   levothyroxine 150 MCG tablet TAKE 1 TABLET BY MOUTH EVERY DAY 21   Sergio Martin MD   gabapentin (NEURONTIN) 300 MG capsule Take 300 mg by mouth every 12 hours. 10/22/20   Outside Provider   Insulin Pen Needle (BD Pen Needle [place] U/F) 32G  Encounter Date: 8/10/2024       History     Chief Complaint   Patient presents with    Abdominal Pain     Pt c/o constant aching/ throbbing epigastric pain and R and L upper abdomen x 1 week worsening yesterday. No N/V/D. Pt reports has a stomach ulcer. Pt took oxycodone at 0830 this am but was ineffective for pain.      This is a 69-year-old white female with a history of peptic ulcer disease, chronic abdominal pain, anxiety who presents to the emergency department with complaints of upper abdominal pain over the last several days, characterized as aching pain that is relatively constant and associated with reflux.  She recently underwent EGD which revealed a large pre-pyloric gastric ulcer, negative biopsies.  Patient is being followed by Dr. Ayon.  Patient denies change from previous symptoms, denies black/bloody stools, denies fever, nausea, vomiting.          Review of patient's allergies indicates:  No Known Allergies  Past Medical History:   Diagnosis Date    Anticoagulant long-term use     Anxiety     Arthritis     C. difficile colitis 8/6/2022    Carotid artery disease     Cervical disc disorder     Chronic back pain     COPD (chronic obstructive pulmonary disease)     Coronary artery disease     Dementia     Depression     Diarrhea, unspecified 11/1/2021    DVT (deep venous thrombosis)     CAROTID    GERD (gastroesophageal reflux disease)     Hematuria     Hiatal hernia     High cholesterol     Ill-fitting dentures     STATES DOESN'T WEAR    PVD (peripheral vascular disease)     Renal calculus     Sleep apnea     Sleep apnea     NO C PAP    Urinary frequency     Urinary urgency     Wears glasses     READING      Past Surgical History:   Procedure Laterality Date    BACK SURGERY      BREAST LUMPECTOMY Right     CAROTID ARTERY ANGIOPLASTY      CAROTID ARTERY ANGIOPLASTY      CAROTID ARTERY STENT Left     CAROTID ENDARTERECTOMY Right     CHOLECYSTECTOMY      COLONOSCOPY      CYSTOSCOPY      EGD, WITH  CLOSED BIOPSY N/A 4/10/2024    Procedure: EGD, WITH CLOSED BIOPSY;  Surgeon: Meg Ayon MD;  Location: [facility];  Service: General;  Laterality: N/A;    EGD, WITH CLOSED BIOPSY N/A 7/17/2024    Procedure: EGD, WITH CLOSED BIOPSY of pyloric lesion;  Surgeon: Meg Ayon MD;  Location: [facility];  Service: General;  Laterality: N/A;  6th 0830    HYSTERECTOMY      OOPHORECTOMY      TONSILLECTOMY       Family History   Problem Relation Name Age of Onset    Cancer Mother      Stroke Father      No Known Problems Sister      No Known Problems Daughter      No Known Problems Maternal Aunt      No Known Problems Maternal Uncle      No Known Problems Paternal Aunt      No Known Problems Paternal Uncle      No Known Problems Maternal Grandmother      No Known Problems Maternal Grandfather      No Known Problems Paternal Grandmother      No Known Problems Paternal Grandfather      Breast cancer Neg Hx      Ovarian cancer Neg Hx      BRCA 1/2 Neg Hx       Social History     Tobacco Use    Smoking status: Every Day     Current packs/day: 0.50     Average packs/day: 0.5 packs/day for 54.6 years (27.3 ttl pk-yrs)     Types: Cigarettes     Start date: 1970    Smokeless tobacco: Never   Substance Use Topics    Alcohol use: No    Drug use: No     Review of Systems   Constitutional:  Negative for appetite change and fever.   Gastrointestinal:  Positive for abdominal pain. Negative for blood in stool, constipation, diarrhea, nausea and vomiting.       Physical Exam     Initial Vitals [08/10/24 1230]   BP Pulse Resp Temp SpO2   132/60 77 (!) 22 98.1 °F (36.7 °C) 95 %      MAP       --         Physical Exam    Nursing note and vitals reviewed.  Constitutional: She appears well-developed and well-nourished. She is active. No distress.   HENT:   Head: Normocephalic and atraumatic.   Eyes: EOM are normal. Pupils are equal, round, and reactive to light.   Neck: Neck supple.   Normal range of motion.  Cardiovascular:  Normal  X 4 MM Misc by Misc.(Non-Drug; Combo Route) route four times daily. 7/2/20   Outside Provider   metFORMIN (GLUCOPHAGE) 500 MG tablet TAKE 1 TABLETS BY MOUTH TWICE DAILY BEFORE BREAKFAST & DINNER. 11/19/20   Outside Provider   metoPROLOL succinate (TOPROL-XL) 25 MG 24 hr tablet Take 25 mg by mouth daily. 11/19/20   Outside Provider   Tresiba FlexTouch 100 UNIT/ML pen-injector INJECT 76 UNITS UNDER THE SKIN AT BEDTIME. 11/20/20   Outside Provider   HYDROcodone-acetaminophen (NORCO) 5-325 MG per tablet Take 1 tablet by mouth every 8 hours as needed for Pain. TAKE 1 TABLET EVERY 8 HOURS PRN 9/21/20   Sergio Martin MD   furosemide (LASIX) 20 MG tablet Take 1 tablet by mouth daily. 8/11/20   Sergio Martin MD   amLODIPine (NORVASC) 5 MG tablet Take 1 tablet by mouth daily. 8/11/20   Sergio Martin MD   atorvastatin (LIPITOR) 40 MG tablet Take 1 tablet by mouth daily. 2/19/20   Sergio Martin MD   clopidogrel (PLAVIX) 75 MG tablet Take 1 tablet by mouth daily. 2/19/20   Sergio Martin MD   NOVOLOG FLEXPEN 100 UNIT/ML pen-injector Inject 20 Units into the skin 3 times daily (with meals). 11/2/19   Outside Provider   solifenacin (VESICARE) 5 MG tablet Take 1 tablet by mouth daily. 4/12/19   Sergio Martin MD     Allergies   Allergen Reactions   • Enalapril Maleate Other (See Comments)     Angioedema  allergy          Review of Systems   Musculoskeletal: Positive for arthralgias and myalgias.        R knee pain            PHYSICAL EXAMINATION  No data found.    Physical Exam  Constitutional:       Appearance: Normal appearance.   Musculoskeletal:      Comments: R knee with shooting burning pain, mild r knee effusion, no erythema, no crepitance, no instability noted, no bony discomfort   Neurological:      Mental Status: She is alert.         Labs: No results found for this visit on 04/12/23.    Imaging:   No orders to display       Electrocardiogram:  ECG Read Date: 04/13/23    ECG Results from this ED encounter (latest on  rate, regular rhythm and normal heart sounds.           Pulmonary/Chest: Breath sounds normal. No respiratory distress.   Abdominal: Abdomen is soft. Bowel sounds are normal. She exhibits no distension. There is abdominal tenderness (generalized upper abdominal).   Musculoskeletal:         General: Normal range of motion.      Cervical back: Normal range of motion and neck supple.     Neurological: She is alert and oriented to person, place, and time. GCS score is 15. GCS eye subscore is 4. GCS verbal subscore is 5. GCS motor subscore is 6.   Skin: Skin is warm and dry. Capillary refill takes less than 2 seconds.   Psychiatric: She has a normal mood and affect. Her behavior is normal. Thought content normal.         ED Course   Procedures  Labs Reviewed   CBC W/ AUTO DIFFERENTIAL - Abnormal       Result Value    WBC 9.88      RBC 4.14      Hemoglobin 9.9 (*)     Hematocrit 32.9 (*)     MCV 80 (*)     MCH 23.9 (*)     MCHC 30.1 (*)     RDW 19.3 (*)     Platelets 269      MPV 9.5      Immature Granulocytes 0.3      Gran # (ANC) 7.3      Immature Grans (Abs) 0.03      Lymph # 1.5      Mono # 0.8      Eos # 0.2      Baso # 0.03      nRBC 0      Gran % 73.7 (*)     Lymph % 15.4 (*)     Mono % 8.5      Eosinophil % 1.8      Basophil % 0.3      Differential Method Automated     COMPREHENSIVE METABOLIC PANEL - Abnormal    Sodium 138      Potassium 5.3 (*)     Chloride 103      CO2 27      Glucose 99      BUN 12      Creatinine 0.6      Calcium 9.2      Total Protein 7.1      Albumin 3.0 (*)     Total Bilirubin 0.1      Alkaline Phosphatase 128      AST 12      ALT 15      eGFR >60.0      Anion Gap 8            Imaging Results    None          Medications   aluminum-magnesium hydroxide-simethicone 200-200-20 mg/5 mL suspension 30 mL (30 mLs Oral Given 8/10/24 1330)     And   LIDOcaine viscous HCl 2% oral solution 15 mL (15 mLs Oral Given 8/10/24 1330)   ondansetron disintegrating tablet 8 mg (8 mg Oral Given 8/10/24 1324)  top):  Results for orders placed or performed during the hospital encounter of 07/13/21   Electrocardiogram 12-Lead   Result Value Ref Range    Ventricular Rate EKG/Min (BPM) 61     Atrial Rate (BPM) 61     CO-Interval (MSEC) 168     QRS-Interval (MSEC) 84     QT-Interval (MSEC) 472     QTc 475     P Axis (Degrees) 61     R Axis (Degrees) 12     T Axis (Degrees) 72     REPORT TEXT       Normal sinus rhythm  Normal ECG  When compared with ECG of  09-DEC-2020 09:23,  No significant change was found  Confirmed by CARLO MILLER MD [id number]) on 7/13/2021 11:34:20 AM     Results for orders placed or performed during the hospital encounter of 12/09/20   ECG   Result Value Ref Range    Ventricular Rate EKG/Min (BPM) 82     Atrial Rate (BPM) 82     CO-Interval (MSEC) 164     QRS-Interval (MSEC) 76     QT-Interval (MSEC) 386     QTc 451     P Axis (Degrees) 53     R Axis (Degrees) -15     T Axis (Degrees) 71     REPORT TEXT       Normal sinus rhythm  Normal ECG  No previous ECGs available  Confirmed by CM SALGUERO MD ([id number]) on 12/9/2020 10:32:53 AM     (      Procedures      MEDICAL DECISION MAKING  This is a 58 year old female with R knee pain and effusion and difficulty ambulating.  Plan for comm health worker to refer to ortho and note off work until ortho evaluation.           ED Medication Orders (From admission, onward)    None          MDM      IMPRESSION AND PLAN  ED Diagnosis   1. Acute pain of right knee        2. Effusion of knee joint right            Disposition        Discharge 4/12/2023  9:59 AM  Mia Navarro discharge to home/self care.         MD Tricia Montesinos MD  04/13/23 7224       HYDROmorphone injection 0.5 mg (0.5 mg Intramuscular Given 8/10/24 1330)     Medical Decision Making  Amount and/or Complexity of Data Reviewed  Labs: ordered.    Risk  OTC drugs.  Prescription drug management.               ED Course as of 08/10/24 1421   Sat Aug 10, 2024   1418 Labs relatively unremarkable; normal wbc's, no change in chronic anemia. Pt reports resolution of symptoms after meds given and asking for dc home.  [CB]      ED Course User Index  [CB] Jyotsna Chilel NP                           Clinical Impression:  Final diagnoses:  [K27.9] Peptic ulcer disease (Primary)          ED Disposition Condition    Discharge Stable          ED Prescriptions    None       Follow-up Information       Follow up With Specialties Details Why Contact Info    PCP Follow UP  Schedule an appointment as soon as possible for a visit in 2 days for follow-up, for re-evaluation of today's complaint              Jyotsna Chilel, MARY  08/10/24 1421

## 2024-08-21 DIAGNOSIS — M50.93 CERVICAL DISC DISORDER, UNSPECIFIED, CERVICOTHORACIC REGION: Primary | ICD-10-CM

## 2024-08-21 DIAGNOSIS — M51.9 INTERVERTEBRAL DISC DISORDER: Primary | ICD-10-CM

## 2024-08-22 ENCOUNTER — HOSPITAL ENCOUNTER (OUTPATIENT)
Dept: RADIOLOGY | Facility: HOSPITAL | Age: 69
Discharge: HOME | End: 2024-08-22
Attending: STUDENT IN AN ORGANIZED HEALTH CARE EDUCATION/TRAINING PROGRAM
Payer: MEDICARE

## 2024-08-22 DIAGNOSIS — M50.93 CERVICAL DISC DISORDER, UNSPECIFIED, CERVICOTHORACIC REGION: ICD-10-CM

## 2024-08-22 DIAGNOSIS — M51.9 INTERVERTEBRAL DISC DISORDER: ICD-10-CM

## 2024-08-22 PROCEDURE — 72141 MRI NECK SPINE W/O DYE: CPT | Mod: TC

## 2024-08-26 ENCOUNTER — HOSPITAL ENCOUNTER (OUTPATIENT)
Dept: RADIOLOGY | Facility: HOSPITAL | Age: 69
Discharge: HOME | End: 2024-08-26
Attending: STUDENT IN AN ORGANIZED HEALTH CARE EDUCATION/TRAINING PROGRAM
Payer: MEDICARE

## 2024-08-26 PROCEDURE — 72148 MRI LUMBAR SPINE W/O DYE: CPT | Mod: TC

## 2024-09-04 ENCOUNTER — HOSPITAL ENCOUNTER (EMERGENCY)
Facility: HOSPITAL | Age: 69
Discharge: HOME | End: 2024-09-04
Attending: EMERGENCY MEDICINE
Payer: MEDICARE

## 2024-09-04 VITALS
HEIGHT: 61 IN | TEMPERATURE: 98 F | WEIGHT: 99.81 LBS | RESPIRATION RATE: 18 BRPM | HEART RATE: 81 BPM | OXYGEN SATURATION: 100 % | DIASTOLIC BLOOD PRESSURE: 67 MMHG | SYSTOLIC BLOOD PRESSURE: 105 MMHG | BODY MASS INDEX: 18.84 KG/M2

## 2024-09-04 DIAGNOSIS — K27.9 PEPTIC ULCER: Primary | ICD-10-CM

## 2024-09-04 LAB
ALBUMIN SERPL BCP-MCNC: 3.2 G/DL (ref 3.5–5.2)
ALP SERPL-CCNC: 96 U/L (ref 55–135)
ALT SERPL W/O P-5'-P-CCNC: 11 U/L (ref 10–44)
ANION GAP SERPL CALC-SCNC: 10 MMOL/L (ref 3–11)
AST SERPL-CCNC: 15 U/L (ref 10–40)
BASOPHILS # BLD AUTO: 0.05 K/UL (ref 0–0.2)
BASOPHILS NFR BLD: 0.5 % (ref 0–1.9)
BILIRUB SERPL-MCNC: 0.1 MG/DL (ref 0.1–1)
BUN SERPL-MCNC: 9 MG/DL (ref 8–23)
CALCIUM SERPL-MCNC: 8.3 MG/DL (ref 8.7–10.5)
CHLORIDE SERPL-SCNC: 105 MMOL/L (ref 95–110)
CO2 SERPL-SCNC: 24 MMOL/L (ref 23–29)
CREAT SERPL-MCNC: 0.8 MG/DL (ref 0.5–1.4)
DIFFERENTIAL METHOD BLD: ABNORMAL
EOSINOPHIL # BLD AUTO: 0.1 K/UL (ref 0–0.5)
EOSINOPHIL NFR BLD: 1.5 % (ref 0–8)
ERYTHROCYTE [DISTWIDTH] IN BLOOD BY AUTOMATED COUNT: 19.1 % (ref 11.5–14.5)
EST. GFR  (NO RACE VARIABLE): >60 ML/MIN/1.73 M^2
GLUCOSE SERPL-MCNC: 96 MG/DL (ref 70–110)
HCT VFR BLD AUTO: 33.6 % (ref 37–48.5)
HGB BLD-MCNC: 9.9 G/DL (ref 12–16)
IMM GRANULOCYTES # BLD AUTO: 0.03 K/UL (ref 0–0.04)
IMM GRANULOCYTES NFR BLD AUTO: 0.3 % (ref 0–0.5)
LIPASE SERPL-CCNC: 55 U/L (ref 13–75)
LYMPHOCYTES # BLD AUTO: 1.5 K/UL (ref 1–4.8)
LYMPHOCYTES NFR BLD: 15.5 % (ref 18–48)
MCH RBC QN AUTO: 23.9 PG (ref 27–31)
MCHC RBC AUTO-ENTMCNC: 29.5 G/DL (ref 32–36)
MCV RBC AUTO: 81 FL (ref 82–98)
MONOCYTES # BLD AUTO: 0.6 K/UL (ref 0.3–1)
MONOCYTES NFR BLD: 6.7 % (ref 4–15)
NEUTROPHILS # BLD AUTO: 7.2 K/UL (ref 1.8–7.7)
NEUTROPHILS NFR BLD: 75.5 % (ref 38–73)
NRBC BLD-RTO: 0 /100 WBC
PLATELET # BLD AUTO: 241 K/UL (ref 150–450)
PMV BLD AUTO: 9.6 FL (ref 9.2–12.9)
POTASSIUM SERPL-SCNC: 3.7 MMOL/L (ref 3.5–5.1)
PROT SERPL-MCNC: 6.5 G/DL (ref 6–8.4)
RBC # BLD AUTO: 4.15 M/UL (ref 4–5.4)
SODIUM SERPL-SCNC: 139 MMOL/L (ref 136–145)
WBC # BLD AUTO: 9.52 K/UL (ref 3.9–12.7)

## 2024-09-04 PROCEDURE — 25000003 PHARM REV CODE 250: Performed by: CLINICAL NURSE SPECIALIST

## 2024-09-04 PROCEDURE — 96372 THER/PROPH/DIAG INJ SC/IM: CPT | Performed by: CLINICAL NURSE SPECIALIST

## 2024-09-04 PROCEDURE — 85025 COMPLETE CBC W/AUTO DIFF WBC: CPT | Performed by: CLINICAL NURSE SPECIALIST

## 2024-09-04 PROCEDURE — 99284 EMERGENCY DEPT VISIT MOD MDM: CPT | Mod: 25

## 2024-09-04 PROCEDURE — 80053 COMPREHEN METABOLIC PANEL: CPT | Performed by: CLINICAL NURSE SPECIALIST

## 2024-09-04 PROCEDURE — 83690 ASSAY OF LIPASE: CPT | Performed by: CLINICAL NURSE SPECIALIST

## 2024-09-04 PROCEDURE — 36415 COLL VENOUS BLD VENIPUNCTURE: CPT | Performed by: CLINICAL NURSE SPECIALIST

## 2024-09-04 PROCEDURE — 63600175 PHARM REV CODE 636 W HCPCS: Performed by: CLINICAL NURSE SPECIALIST

## 2024-09-04 RX ORDER — ALUMINUM HYDROXIDE, MAGNESIUM HYDROXIDE, AND SIMETHICONE 1200; 120; 1200 MG/30ML; MG/30ML; MG/30ML
30 SUSPENSION ORAL ONCE
Status: COMPLETED | OUTPATIENT
Start: 2024-09-04 | End: 2024-09-04

## 2024-09-04 RX ORDER — HYDROMORPHONE HYDROCHLORIDE 1 MG/ML
0.5 INJECTION, SOLUTION INTRAMUSCULAR; INTRAVENOUS; SUBCUTANEOUS ONCE
Status: COMPLETED | OUTPATIENT
Start: 2024-09-04 | End: 2024-09-04

## 2024-09-04 RX ORDER — PREGABALIN 50 MG/1
CAPSULE ORAL
COMMUNITY
Start: 2024-08-21

## 2024-09-04 RX ORDER — ONDANSETRON 4 MG/1
8 TABLET, ORALLY DISINTEGRATING ORAL ONCE
Status: COMPLETED | OUTPATIENT
Start: 2024-09-04 | End: 2024-09-04

## 2024-09-04 RX ORDER — LIDOCAINE HYDROCHLORIDE 20 MG/ML
15 SOLUTION OROPHARYNGEAL ONCE
Status: COMPLETED | OUTPATIENT
Start: 2024-09-04 | End: 2024-09-04

## 2024-09-04 RX ADMIN — HYDROMORPHONE HYDROCHLORIDE 0.5 MG: 1 INJECTION, SOLUTION INTRAMUSCULAR; INTRAVENOUS; SUBCUTANEOUS at 04:09

## 2024-09-04 RX ADMIN — ONDANSETRON 8 MG: 4 TABLET, ORALLY DISINTEGRATING ORAL at 03:09

## 2024-09-04 RX ADMIN — LIDOCAINE HYDROCHLORIDE 15 ML: 20 SOLUTION ORAL at 03:09

## 2024-09-04 RX ADMIN — ALUMINUM HYDROXIDE, MAGNESIUM HYDROXIDE, AND SIMETHICONE 30 ML: 200; 200; 20 SUSPENSION ORAL at 03:09

## 2024-09-04 NOTE — ED PROVIDER NOTES
Encounter Date: 9/4/2024       History     Chief Complaint   Patient presents with    Abdominal Pain     Reports chronic epigastric pain, now with diarrhea. States it is her ulcer. Acute on 4 days. Denies n/v     69-year-old female presents to the emergency room with chronic epigastric pain x4 days with diarrhea which is now resolved.  Patient has a history of an ulcer in his currently on Carafate, follows Dr. Limon.  Denies any nausea vomiting.  Patient takes Percocet for chronic pain but states it is not helping this pain.        Review of patient's allergies indicates:  No Known Allergies  Past Medical History:   Diagnosis Date    Anticoagulant long-term use     Anxiety     Arthritis     C. difficile colitis 8/6/2022    Carotid artery disease     Cervical disc disorder     Chronic back pain     COPD (chronic obstructive pulmonary disease)     Coronary artery disease     Dementia     Depression     Diarrhea, unspecified 11/1/2021    DVT (deep venous thrombosis)     CAROTID    GERD (gastroesophageal reflux disease)     Hematuria     Hiatal hernia     High cholesterol     Ill-fitting dentures     STATES DOESN'T WEAR    PVD (peripheral vascular disease)     Renal calculus     Sleep apnea     Sleep apnea     NO C PAP    Urinary frequency     Urinary urgency     Wears glasses     READING      Past Surgical History:   Procedure Laterality Date    BACK SURGERY      BREAST LUMPECTOMY Right     CAROTID ARTERY ANGIOPLASTY      CAROTID ARTERY ANGIOPLASTY      CAROTID ARTERY STENT Left     CAROTID ENDARTERECTOMY Right     CHOLECYSTECTOMY      COLONOSCOPY      CYSTOSCOPY      EGD, WITH CLOSED BIOPSY N/A 4/10/2024    Procedure: EGD, WITH CLOSED BIOPSY;  Surgeon: Meg Ayon MD;  Location: [facility];  Service: General;  Laterality: N/A;    EGD, WITH CLOSED BIOPSY N/A 7/17/2024    Procedure: EGD, WITH CLOSED BIOPSY of pyloric lesion;  Surgeon: Meg Ayon MD;  Location: [facility];  Service: General;  Laterality:  N/A;  6th 0830    HYSTERECTOMY      OOPHORECTOMY      TONSILLECTOMY       Family History   Problem Relation Name Age of Onset    Cancer Mother      Stroke Father      No Known Problems Sister      No Known Problems Daughter      No Known Problems Maternal Aunt      No Known Problems Maternal Uncle      No Known Problems Paternal Aunt      No Known Problems Paternal Uncle      No Known Problems Maternal Grandmother      No Known Problems Maternal Grandfather      No Known Problems Paternal Grandmother      No Known Problems Paternal Grandfather      Breast cancer Neg Hx      Ovarian cancer Neg Hx      BRCA 1/2 Neg Hx       Social History     Tobacco Use    Smoking status: Every Day     Current packs/day: 0.50     Average packs/day: 0.5 packs/day for 54.7 years (27.3 ttl pk-yrs)     Types: Cigarettes     Start date: 1970    Smokeless tobacco: Never   Substance Use Topics    Alcohol use: No    Drug use: No     Review of Systems   Constitutional:  Negative for fever.   HENT:  Negative for sore throat.    Respiratory:  Negative for shortness of breath.    Cardiovascular:  Negative for chest pain.   Gastrointestinal:  Positive for abdominal pain and diarrhea. Negative for nausea.   Genitourinary:  Negative for dysuria.   Musculoskeletal:  Negative for back pain.   Skin:  Negative for rash.   Neurological:  Negative for weakness.   Hematological:  Does not bruise/bleed easily.   All other systems reviewed and are negative.      Physical Exam     Initial Vitals [09/04/24 1459]   BP Pulse Resp Temp SpO2   105/67 81 18 98.4 °F (36.9 °C) 100 %      MAP       --         Physical Exam    Nursing note and vitals reviewed.  Constitutional: She appears well-developed and well-nourished.   HENT:   Head: Normocephalic and atraumatic.   Eyes: Pupils are equal, round, and reactive to light.   Neck:   Normal range of motion.  Cardiovascular:  Normal rate and regular rhythm.           Pulmonary/Chest: Breath sounds normal.   Abdominal:  Abdomen is soft. Bowel sounds are normal. There is abdominal tenderness.   Severe abdominal pain on palpation. There is guarding.   Musculoskeletal:         General: Normal range of motion.      Cervical back: Normal range of motion.     Neurological: She is alert and oriented to person, place, and time.   Skin: Skin is warm and dry.   Psychiatric: She has a normal mood and affect.         ED Course   Procedures  Labs Reviewed   CBC W/ AUTO DIFFERENTIAL - Abnormal       Result Value    WBC 9.52      RBC 4.15      Hemoglobin 9.9 (*)     Hematocrit 33.6 (*)     MCV 81 (*)     MCH 23.9 (*)     MCHC 29.5 (*)     RDW 19.1 (*)     Platelets 241      MPV 9.6      Immature Granulocytes 0.3      Gran # (ANC) 7.2      Immature Grans (Abs) 0.03      Lymph # 1.5      Mono # 0.6      Eos # 0.1      Baso # 0.05      nRBC 0      Gran % 75.5 (*)     Lymph % 15.5 (*)     Mono % 6.7      Eosinophil % 1.5      Basophil % 0.5      Differential Method Automated     COMPREHENSIVE METABOLIC PANEL - Abnormal    Sodium 139      Potassium 3.7      Chloride 105      CO2 24      Glucose 96      BUN 9      Creatinine 0.8      Calcium 8.3 (*)     Total Protein 6.5      Albumin 3.2 (*)     Total Bilirubin 0.1      Alkaline Phosphatase 96      AST 15      ALT 11      eGFR >60.0      Anion Gap 10     LIPASE    Lipase 55            Imaging Results    None          Medications   aluminum-magnesium hydroxide-simethicone 200-200-20 mg/5 mL suspension 30 mL (30 mLs Oral Given 9/4/24 1517)     And   LIDOcaine viscous HCl 2% oral solution 15 mL (15 mLs Oral Given 9/4/24 1517)   ondansetron disintegrating tablet 8 mg (8 mg Oral Given 9/4/24 1517)   HYDROmorphone injection 0.5 mg (0.5 mg Intramuscular Given 9/4/24 1600)     Medical Decision Making  Amount and/or Complexity of Data Reviewed  Labs: ordered.    Risk  OTC drugs.  Prescription drug management.                                      Clinical Impression:  Final diagnoses:  [K27.9] Peptic ulcer  (Primary)          ED Disposition Condition    Discharge Stable          ED Prescriptions    None       Follow-up Information       Follow up With Specialties Details Why Contact Info    Kt Pozo Jr., MD Internal Medicine  As needed [address] [facility] [address]  [phone]               Honey Abdi, MARY  09/04/24 9331

## 2024-09-04 NOTE — DISCHARGE INSTRUCTIONS
Continue to take medication prescribed per Dr. Ayon for peptic ulcer disease.  If you continue to have pain follow-up with PCP or Dr. Ayon

## 2024-09-16 ENCOUNTER — HOSPITAL ENCOUNTER (EMERGENCY)
Facility: HOSPITAL | Age: 69
Discharge: HOME | End: 2024-09-16
Attending: EMERGENCY MEDICINE
Payer: MEDICARE

## 2024-09-16 VITALS
TEMPERATURE: 99 F | BODY MASS INDEX: 18.88 KG/M2 | WEIGHT: 100 LBS | HEIGHT: 61 IN | OXYGEN SATURATION: 98 % | RESPIRATION RATE: 18 BRPM | SYSTOLIC BLOOD PRESSURE: 145 MMHG | HEART RATE: 82 BPM | DIASTOLIC BLOOD PRESSURE: 67 MMHG

## 2024-09-16 DIAGNOSIS — K29.50 CHRONIC GASTRITIS WITHOUT BLEEDING, UNSPECIFIED GASTRITIS TYPE: Primary | ICD-10-CM

## 2024-09-16 DIAGNOSIS — Z76.5 DRUG-SEEKING BEHAVIOR: ICD-10-CM

## 2024-09-16 DIAGNOSIS — N39.0 URINARY TRACT INFECTION WITHOUT HEMATURIA, SITE UNSPECIFIED: ICD-10-CM

## 2024-09-16 DIAGNOSIS — J02.9 SORE THROAT: ICD-10-CM

## 2024-09-16 LAB
ALBUMIN SERPL BCP-MCNC: 3.3 G/DL (ref 3.5–5.2)
ALP SERPL-CCNC: 149 U/L (ref 55–135)
ALT SERPL W/O P-5'-P-CCNC: 17 U/L (ref 10–44)
ANION GAP SERPL CALC-SCNC: 10 MMOL/L (ref 3–11)
AST SERPL-CCNC: 11 U/L (ref 10–40)
BACTERIA #/AREA URNS HPF: NEGATIVE /HPF
BASOPHILS # BLD AUTO: 0.05 K/UL (ref 0–0.2)
BASOPHILS NFR BLD: 0.5 % (ref 0–1.9)
BILIRUB SERPL-MCNC: 0.1 MG/DL (ref 0.1–1)
BILIRUB UR QL STRIP: NEGATIVE
BUN SERPL-MCNC: 7 MG/DL (ref 8–23)
CALCIUM SERPL-MCNC: 9 MG/DL (ref 8.7–10.5)
CHLORIDE SERPL-SCNC: 104 MMOL/L (ref 95–110)
CLARITY UR: CLEAR
CO2 SERPL-SCNC: 27 MMOL/L (ref 23–29)
COLOR UR: YELLOW
CREAT SERPL-MCNC: 0.6 MG/DL (ref 0.5–1.4)
DIFFERENTIAL METHOD BLD: ABNORMAL
EOSINOPHIL # BLD AUTO: 0.2 K/UL (ref 0–0.5)
EOSINOPHIL NFR BLD: 1.4 % (ref 0–8)
ERYTHROCYTE [DISTWIDTH] IN BLOOD BY AUTOMATED COUNT: 19.9 % (ref 11.5–14.5)
EST. GFR  (NO RACE VARIABLE): >60 ML/MIN/1.73 M^2
GLUCOSE SERPL-MCNC: 83 MG/DL (ref 70–110)
GLUCOSE UR QL STRIP: NEGATIVE
GROUP A STREP, MOLECULAR: NEGATIVE
HCT VFR BLD AUTO: 36.9 % (ref 37–48.5)
HGB BLD-MCNC: 11 G/DL (ref 12–16)
HGB UR QL STRIP: ABNORMAL
HYALINE CASTS #/AREA URNS LPF: 0.7 /LPF
IMM GRANULOCYTES # BLD AUTO: 0.02 K/UL (ref 0–0.04)
IMM GRANULOCYTES NFR BLD AUTO: 0.2 % (ref 0–0.5)
KETONES UR QL STRIP: NEGATIVE
LEUKOCYTE ESTERASE UR QL STRIP: ABNORMAL
LIPASE SERPL-CCNC: 67 U/L (ref 13–75)
LYMPHOCYTES # BLD AUTO: 1.6 K/UL (ref 1–4.8)
LYMPHOCYTES NFR BLD: 15.2 % (ref 18–48)
MCH RBC QN AUTO: 24 PG (ref 27–31)
MCHC RBC AUTO-ENTMCNC: 29.8 G/DL (ref 32–36)
MCV RBC AUTO: 80 FL (ref 82–98)
MICROSCOPIC COMMENT: ABNORMAL
MONOCYTES # BLD AUTO: 0.7 K/UL (ref 0.3–1)
MONOCYTES NFR BLD: 6.6 % (ref 4–15)
NEUTROPHILS # BLD AUTO: 7.9 K/UL (ref 1.8–7.7)
NEUTROPHILS NFR BLD: 76.1 % (ref 38–73)
NITRITE UR QL STRIP: NEGATIVE
NRBC BLD-RTO: 0 /100 WBC
PH UR STRIP: 6 [PH] (ref 5–8)
PLATELET # BLD AUTO: 253 K/UL (ref 150–450)
PMV BLD AUTO: 10.3 FL (ref 9.2–12.9)
POTASSIUM SERPL-SCNC: 3.8 MMOL/L (ref 3.5–5.1)
PROT SERPL-MCNC: 7.3 G/DL (ref 6–8.4)
PROT UR QL STRIP: NEGATIVE
RBC # BLD AUTO: 4.59 M/UL (ref 4–5.4)
RBC #/AREA URNS HPF: 4 /HPF (ref 0–4)
SODIUM SERPL-SCNC: 141 MMOL/L (ref 136–145)
SP GR UR STRIP: <=1.005 (ref 1–1.03)
SQUAMOUS #/AREA URNS HPF: 1 /HPF
URN SPEC COLLECT METH UR: ABNORMAL
UROBILINOGEN UR STRIP-ACNC: NEGATIVE EU/DL
WBC # BLD AUTO: 10.44 K/UL (ref 3.9–12.7)
WBC #/AREA URNS HPF: 38 /HPF (ref 0–5)

## 2024-09-16 PROCEDURE — 96372 THER/PROPH/DIAG INJ SC/IM: CPT | Performed by: CLINICAL NURSE SPECIALIST

## 2024-09-16 PROCEDURE — 83690 ASSAY OF LIPASE: CPT | Performed by: CLINICAL NURSE SPECIALIST

## 2024-09-16 PROCEDURE — 80053 COMPREHEN METABOLIC PANEL: CPT | Performed by: CLINICAL NURSE SPECIALIST

## 2024-09-16 PROCEDURE — 85025 COMPLETE CBC W/AUTO DIFF WBC: CPT | Performed by: CLINICAL NURSE SPECIALIST

## 2024-09-16 PROCEDURE — 81000 URINALYSIS NONAUTO W/SCOPE: CPT | Performed by: CLINICAL NURSE SPECIALIST

## 2024-09-16 PROCEDURE — 36415 COLL VENOUS BLD VENIPUNCTURE: CPT | Performed by: CLINICAL NURSE SPECIALIST

## 2024-09-16 PROCEDURE — 99285 EMERGENCY DEPT VISIT HI MDM: CPT | Mod: 25

## 2024-09-16 PROCEDURE — 87651 STREP A DNA AMP PROBE: CPT | Performed by: CLINICAL NURSE SPECIALIST

## 2024-09-16 PROCEDURE — 63600175 PHARM REV CODE 636 W HCPCS: Performed by: CLINICAL NURSE SPECIALIST

## 2024-09-16 PROCEDURE — 87086 URINE CULTURE/COLONY COUNT: CPT | Performed by: CLINICAL NURSE SPECIALIST

## 2024-09-16 RX ORDER — PREDNISONE 20 MG/1
20 TABLET ORAL DAILY
Qty: 5 TABLET | Refills: 0 | Status: SHIPPED | OUTPATIENT
Start: 2024-09-16 | End: 2024-09-21

## 2024-09-16 RX ORDER — PHENAZOPYRIDINE HYDROCHLORIDE 200 MG/1
200 TABLET, FILM COATED ORAL 3 TIMES DAILY
Qty: 15 TABLET | Refills: 0 | Status: SHIPPED | OUTPATIENT
Start: 2024-09-16 | End: 2024-09-21

## 2024-09-16 RX ORDER — PROMETHAZINE HYDROCHLORIDE AND DEXTROMETHORPHAN HYDROBROMIDE 6.25; 15 MG/5ML; MG/5ML
10 SYRUP ORAL EVERY 6 HOURS PRN
Qty: 120 ML | Refills: 0 | Status: SHIPPED | OUTPATIENT
Start: 2024-09-16 | End: 2024-09-26

## 2024-09-16 RX ORDER — KETOROLAC TROMETHAMINE 30 MG/ML
30 INJECTION, SOLUTION INTRAMUSCULAR; INTRAVENOUS ONCE
Status: COMPLETED | OUTPATIENT
Start: 2024-09-16 | End: 2024-09-16

## 2024-09-16 RX ORDER — NITROFURANTOIN 25; 75 MG/1; MG/1
100 CAPSULE ORAL 2 TIMES DAILY
Qty: 10 CAPSULE | Refills: 0 | Status: SHIPPED | OUTPATIENT
Start: 2024-09-16 | End: 2024-09-21

## 2024-09-16 RX ADMIN — KETOROLAC TROMETHAMINE 30 MG: 30 INJECTION, SOLUTION INTRAMUSCULAR at 02:09

## 2024-09-16 NOTE — ED PROVIDER NOTES
Encounter Date: 9/16/2024       History     Chief Complaint   Patient presents with    Sore Throat     Pt stated that she has been experiencing sore throat / cough / subjective fever / headache for the past couple days.     Abdominal Pain     Also noted to have worsened upper abdominal pain since earlier this morning. Hx ulcer     69-year-old female presents to the emergency room with multiple visits for the same issues which include chronic epigastric abdominal pain which she says is worse today.  Patient exhibits drug-seeking behavior.  Patient has a history of ulcers.  Patient states she got her stomach medication refilled today.  Patient states she is supposed to have a EGD done again in November by Dr. Carballo.  Patient's new complaint today is sore throat, cough subjective fever and headache for the last few days.  Declined COVID swab.  Patient does have Percocet prescribed which she has last feels on August 21st        Review of patient's allergies indicates:  No Known Allergies  Past Medical History:   Diagnosis Date    Anticoagulant long-term use     Anxiety     Arthritis     C. difficile colitis 8/6/2022    Carotid artery disease     Cervical disc disorder     Chronic back pain     COPD (chronic obstructive pulmonary disease)     Coronary artery disease     Dementia     Depression     Diarrhea, unspecified 11/1/2021    DVT (deep venous thrombosis)     CAROTID    GERD (gastroesophageal reflux disease)     Hematuria     Hiatal hernia     High cholesterol     Ill-fitting dentures     STATES DOESN'T WEAR    PVD (peripheral vascular disease)     Renal calculus     Sleep apnea     Sleep apnea     NO C PAP    Urinary frequency     Urinary urgency     Wears glasses     READING      Past Surgical History:   Procedure Laterality Date    BACK SURGERY      BREAST LUMPECTOMY Right     CAROTID ARTERY ANGIOPLASTY      CAROTID ARTERY ANGIOPLASTY      CAROTID ARTERY STENT Left     CAROTID ENDARTERECTOMY Right      CHOLECYSTECTOMY      COLONOSCOPY      CYSTOSCOPY      EGD, WITH CLOSED BIOPSY N/A 4/10/2024    Procedure: EGD, WITH CLOSED BIOPSY;  Surgeon: Meg Ayon MD;  Location: [facility];  Service: General;  Laterality: N/A;    EGD, WITH CLOSED BIOPSY N/A 7/17/2024    Procedure: EGD, WITH CLOSED BIOPSY of pyloric lesion;  Surgeon: Meg Ayon MD;  Location: [facility];  Service: General;  Laterality: N/A;  6th 0830    HYSTERECTOMY      OOPHORECTOMY      TONSILLECTOMY       Family History   Problem Relation Name Age of Onset    Cancer Mother      Stroke Father      No Known Problems Sister      No Known Problems Daughter      No Known Problems Maternal Aunt      No Known Problems Maternal Uncle      No Known Problems Paternal Aunt      No Known Problems Paternal Uncle      No Known Problems Maternal Grandmother      No Known Problems Maternal Grandfather      No Known Problems Paternal Grandmother      No Known Problems Paternal Grandfather      Breast cancer Neg Hx      Ovarian cancer Neg Hx      BRCA 1/2 Neg Hx       Social History     Tobacco Use    Smoking status: Every Day     Current packs/day: 1.00     Types: Cigarettes     Passive exposure: Current    Smokeless tobacco: Never   Substance Use Topics    Alcohol use: No    Drug use: No     Review of Systems   Constitutional:  Positive for fever.   HENT:  Positive for sore throat.    Respiratory:  Positive for cough. Negative for shortness of breath.    Cardiovascular:  Negative for chest pain.   Gastrointestinal:  Positive for abdominal pain. Negative for nausea.   Genitourinary:  Negative for dysuria.   Musculoskeletal:  Negative for back pain.   Skin:  Negative for rash.   Neurological:  Positive for headaches. Negative for weakness.   Hematological:  Does not bruise/bleed easily.   All other systems reviewed and are negative.      Physical Exam     Initial Vitals [09/16/24 1307]   BP Pulse Resp Temp SpO2   (!) 163/74 80 18 97.7 °F (36.5 °C) 99 %      MAP        --         Physical Exam    Nursing note and vitals reviewed.  Constitutional: She appears well-developed and well-nourished.   HENT:   Head: Normocephalic and atraumatic.   Mouth/Throat: Posterior oropharyngeal erythema present.   Eyes: Pupils are equal, round, and reactive to light.   Neck:   Normal range of motion.  Cardiovascular:  Normal rate and regular rhythm.           Pulmonary/Chest: Breath sounds normal.   Abdominal: Abdomen is soft. Bowel sounds are normal.   Up on trying to examine patient's abdomen she pushes my hands away while I am trying to do the assessment There is guarding.   Musculoskeletal:         General: Normal range of motion.      Cervical back: Normal range of motion.     Neurological: She is alert and oriented to person, place, and time.   Skin: Skin is warm and dry.   Psychiatric: She has a normal mood and affect.         ED Course   Procedures  Labs Reviewed   URINALYSIS, REFLEX TO URINE CULTURE - Abnormal       Result Value    Specimen UA Urine, Clean Catch      Color, UA Yellow      Appearance, UA Clear      pH, UA 6.0      Specific Gravity, UA <=1.005 (*)     Protein, UA Negative      Glucose, UA Negative      Ketones, UA Negative      Bilirubin (UA) Negative      Occult Blood UA 3+ (*)     Nitrite, UA Negative      Urobilinogen, UA Negative      Leukocytes, UA 3+ (*)     Narrative:     Preferred Collection Type->Urine, Clean Catch  Specimen Source->Urine   CBC W/ AUTO DIFFERENTIAL - Abnormal    WBC 10.44      RBC 4.59      Hemoglobin 11.0 (*)     Hematocrit 36.9 (*)     MCV 80 (*)     MCH 24.0 (*)     MCHC 29.8 (*)     RDW 19.9 (*)     Platelets 253      MPV 10.3      Immature Granulocytes 0.2      Gran # (ANC) 7.9 (*)     Immature Grans (Abs) 0.02      Lymph # 1.6      Mono # 0.7      Eos # 0.2      Baso # 0.05      nRBC 0      Gran % 76.1 (*)     Lymph % 15.2 (*)     Mono % 6.6      Eosinophil % 1.4      Basophil % 0.5      Differential Method Automated     COMPREHENSIVE  METABOLIC PANEL - Abnormal    Sodium 141      Potassium 3.8      Chloride 104      CO2 27      Glucose 83      BUN 7 (*)     Creatinine 0.6      Calcium 9.0      Total Protein 7.3      Albumin 3.3 (*)     Total Bilirubin 0.1      Alkaline Phosphatase 149 (*)     AST 11      ALT 17      eGFR >60.0      Anion Gap 10     URINALYSIS MICROSCOPIC - Abnormal    RBC, UA 4      WBC, UA 38 (*)     Bacteria Negative      Squam Epithel, UA 1      Hyaline Casts, UA 0.7 (*)     Microscopic Comment SEE COMMENT      Narrative:     Preferred Collection Type->Urine, Clean Catch  Specimen Source->Urine   GROUP A STREP, MOLECULAR    Group A Strep, Molecular Negative     CULTURE, URINE   LIPASE    Lipase 67            Imaging Results              CT Abdomen Pelvis  Without Contrast (Final result)  Result time 09/16/24 14:23:51      Final result by Mary Ann Javier MD (09/16/24 14:23:51)                   Impression:      1. Thickened gastric antrum with minimal adjacent fat stranding suggests gastritis, a similar finding to prior contrast enhanced CT of 04/30/2024  2. Mild right renal pelviectasis  3. No other potential acute abnormality or significant change from the prior study      Electronically signed by: Mary Ann Javier MD  Date:    09/16/2024  Time:    14:23               Narrative:    EXAMINATION:  CT ABDOMEN PELVIS WITHOUT CONTRAST    CLINICAL HISTORY:  Epigastric pain;    CT/Cardiac Nuclear exams in prior 12 months: 4    TECHNIQUE:  Axial CT abdomen and pelvis without IV contrast.  Iterative reconstruction utilized.  Coronal reformats prepared.    COMPARISON:  04/30/2024    FINDINGS:  Mild thickening of gastric antrum with faint adjacent fat stranding, suggests gastritis.  No free air.  No bowel obstruction.  No suspicious fluid collections.  Mild right pelviectasis without khushbu hydronephrosis.  No urinary tract stones.    Prior cholecystectomy.  Probable small right hepatic lobe cyst, unchanged.  Unremarkable adrenals,  spleen and pancreas.                                       Medications   ketorolac injection 30 mg (30 mg Intramuscular Given 9/16/24 1428)     Medical Decision Making  Amount and/or Complexity of Data Reviewed  Labs: ordered.  Radiology: ordered.    Risk  Prescription drug management.               ED Course as of 09/16/24 1728   Mon Sep 16, 2024   1402 Patient has been ambulating back and forth to the bathroom without any difficulty [NIKHIL]      ED Course User Index  [NIKHIL] Honey Abdi NP                           Clinical Impression:  Final diagnoses:  [K29.50] Chronic gastritis without bleeding, unspecified gastritis type (Primary)  [N39.0] Urinary tract infection without hematuria, site unspecified  [J02.9] Sore throat  [Z76.5] Drug-seeking behavior          ED Disposition Condition    Discharge Stable          ED Prescriptions       Medication Sig Dispense Start Date End Date Auth. Provider    nitrofurantoin, macrocrystal-monohydrate, (MACROBID) 100 MG capsule Take 1 capsule (100 mg total) by mouth 2 (two) times daily. for 5 days 10 capsule 9/16/2024 9/21/2024 Honey Abdi NP    phenazopyridine (PYRIDIUM) 200 MG tablet Take 1 tablet (200 mg total) by mouth 3 (three) times daily. for 5 days 15 tablet 9/16/2024 9/21/2024 Honey Abdi NP    promethazine-dextromethorphan (PROMETHAZINE-DM) 6.25-15 mg/5 mL Syrp Take 10 mLs by mouth every 6 (six) hours as needed. 120 mL 9/16/2024 9/26/2024 Honey Abdi NP    predniSONE (DELTASONE) 20 MG tablet Take 1 tablet (20 mg total) by mouth once daily. for 5 days 5 tablet 9/16/2024 9/21/2024 Honey Abdi NP          Follow-up Information       Follow up With Specialties Details Why Contact Info    Kt Pozo Jr., MD Internal Medicine  As needed [address] [place] [id number]  [phone]               Honey Abdi NP  09/16/24 1728

## 2024-09-16 NOTE — DISCHARGE INSTRUCTIONS
CT showed chronic gastritis.  Continue taking Carafate and Protonix.  Follow-up with Dr. Limon for continued issues and symptoms.    Strep swab negative.

## 2024-09-18 LAB
BACTERIA UR CULT: NORMAL
BACTERIA UR CULT: NORMAL

## 2024-09-24 ENCOUNTER — HOSPITAL ENCOUNTER (EMERGENCY)
Facility: HOSPITAL | Age: 69
Discharge: HOME | End: 2024-09-24
Attending: EMERGENCY MEDICINE
Payer: MEDICARE

## 2024-09-24 VITALS
OXYGEN SATURATION: 99 % | WEIGHT: 104 LBS | HEIGHT: 61 IN | BODY MASS INDEX: 19.63 KG/M2 | HEART RATE: 76 BPM | SYSTOLIC BLOOD PRESSURE: 174 MMHG | DIASTOLIC BLOOD PRESSURE: 116 MMHG | RESPIRATION RATE: 18 BRPM | TEMPERATURE: 98 F

## 2024-09-24 DIAGNOSIS — R10.9 CHRONIC ABDOMINAL PAIN: Primary | ICD-10-CM

## 2024-09-24 DIAGNOSIS — K27.9 PEPTIC ULCER DISEASE: ICD-10-CM

## 2024-09-24 DIAGNOSIS — G89.29 CHRONIC ABDOMINAL PAIN: Primary | ICD-10-CM

## 2024-09-24 LAB
ANION GAP SERPL CALC-SCNC: 7 MMOL/L (ref 3–11)
BASOPHILS # BLD AUTO: 0.03 K/UL (ref 0–0.2)
BASOPHILS NFR BLD: 0.3 % (ref 0–1.9)
BUN SERPL-MCNC: 9 MG/DL (ref 8–23)
CALCIUM SERPL-MCNC: 8.9 MG/DL (ref 8.7–10.5)
CHLORIDE SERPL-SCNC: 99 MMOL/L (ref 95–110)
CO2 SERPL-SCNC: 28 MMOL/L (ref 23–29)
CREAT SERPL-MCNC: 0.5 MG/DL (ref 0.5–1.4)
DIFFERENTIAL METHOD BLD: ABNORMAL
EOSINOPHIL # BLD AUTO: 0 K/UL (ref 0–0.5)
EOSINOPHIL NFR BLD: 0.1 % (ref 0–8)
ERYTHROCYTE [DISTWIDTH] IN BLOOD BY AUTOMATED COUNT: 19 % (ref 11.5–14.5)
EST. GFR  (NO RACE VARIABLE): >60 ML/MIN/1.73 M^2
GLUCOSE SERPL-MCNC: 103 MG/DL (ref 70–110)
HCT VFR BLD AUTO: 37.1 % (ref 37–48.5)
HGB BLD-MCNC: 11.7 G/DL (ref 12–16)
IMM GRANULOCYTES # BLD AUTO: 0.03 K/UL (ref 0–0.04)
IMM GRANULOCYTES NFR BLD AUTO: 0.3 % (ref 0–0.5)
LYMPHOCYTES # BLD AUTO: 0.9 K/UL (ref 1–4.8)
LYMPHOCYTES NFR BLD: 7.8 % (ref 18–48)
MCH RBC QN AUTO: 24.8 PG (ref 27–31)
MCHC RBC AUTO-ENTMCNC: 31.5 G/DL (ref 32–36)
MCV RBC AUTO: 79 FL (ref 82–98)
MONOCYTES # BLD AUTO: 0.7 K/UL (ref 0.3–1)
MONOCYTES NFR BLD: 6.4 % (ref 4–15)
NEUTROPHILS # BLD AUTO: 9.7 K/UL (ref 1.8–7.7)
NEUTROPHILS NFR BLD: 85.1 % (ref 38–73)
NRBC BLD-RTO: 0 /100 WBC
PLATELET # BLD AUTO: 274 K/UL (ref 150–450)
PMV BLD AUTO: 10.8 FL (ref 9.2–12.9)
POTASSIUM SERPL-SCNC: 4.3 MMOL/L (ref 3.5–5.1)
RBC # BLD AUTO: 4.71 M/UL (ref 4–5.4)
SODIUM SERPL-SCNC: 134 MMOL/L (ref 136–145)
WBC # BLD AUTO: 11.37 K/UL (ref 3.9–12.7)

## 2024-09-24 PROCEDURE — 36415 COLL VENOUS BLD VENIPUNCTURE: CPT | Performed by: NURSE PRACTITIONER

## 2024-09-24 PROCEDURE — 63600175 PHARM REV CODE 636 W HCPCS: Performed by: NURSE PRACTITIONER

## 2024-09-24 PROCEDURE — 80048 BASIC METABOLIC PNL TOTAL CA: CPT | Performed by: NURSE PRACTITIONER

## 2024-09-24 PROCEDURE — 96372 THER/PROPH/DIAG INJ SC/IM: CPT | Performed by: NURSE PRACTITIONER

## 2024-09-24 PROCEDURE — 25000003 PHARM REV CODE 250: Performed by: NURSE PRACTITIONER

## 2024-09-24 PROCEDURE — 85025 COMPLETE CBC W/AUTO DIFF WBC: CPT | Performed by: NURSE PRACTITIONER

## 2024-09-24 PROCEDURE — 99284 EMERGENCY DEPT VISIT MOD MDM: CPT | Mod: 25

## 2024-09-24 RX ORDER — ONDANSETRON 4 MG/1
8 TABLET, ORALLY DISINTEGRATING ORAL
Status: COMPLETED | OUTPATIENT
Start: 2024-09-24 | End: 2024-09-24

## 2024-09-24 RX ORDER — HYDROMORPHONE HYDROCHLORIDE 1 MG/ML
1 INJECTION, SOLUTION INTRAMUSCULAR; INTRAVENOUS; SUBCUTANEOUS
Status: COMPLETED | OUTPATIENT
Start: 2024-09-24 | End: 2024-09-24

## 2024-09-24 RX ORDER — LIDOCAINE HYDROCHLORIDE 20 MG/ML
15 SOLUTION OROPHARYNGEAL ONCE
Status: COMPLETED | OUTPATIENT
Start: 2024-09-24 | End: 2024-09-24

## 2024-09-24 RX ORDER — ALUMINUM HYDROXIDE, MAGNESIUM HYDROXIDE, AND SIMETHICONE 1200; 120; 1200 MG/30ML; MG/30ML; MG/30ML
30 SUSPENSION ORAL ONCE
Status: COMPLETED | OUTPATIENT
Start: 2024-09-24 | End: 2024-09-24

## 2024-09-24 RX ADMIN — LIDOCAINE HYDROCHLORIDE 15 ML: 20 SOLUTION ORAL at 04:09

## 2024-09-24 RX ADMIN — ALUMINUM HYDROXIDE, MAGNESIUM HYDROXIDE, AND SIMETHICONE 30 ML: 1200; 120; 1200 SUSPENSION ORAL at 04:09

## 2024-09-24 RX ADMIN — HYDROMORPHONE HYDROCHLORIDE 1 MG: 1 INJECTION, SOLUTION INTRAMUSCULAR; INTRAVENOUS; SUBCUTANEOUS at 04:09

## 2024-09-24 RX ADMIN — ONDANSETRON 8 MG: 4 TABLET, ORALLY DISINTEGRATING ORAL at 04:09

## 2024-09-24 NOTE — DISCHARGE INSTRUCTIONS
Continue home medications as directed.  Plan to follow-up with your primary doctor this week and return to the emergency department for worsening condition.

## 2024-09-24 NOTE — ED PROVIDER NOTES
Encounter Date: 9/24/2024       History     Chief Complaint   Patient presents with    Pain And Symptom Management     Chronic abdominal pain, acute onset last night.     This is a 69-year-old white female with a history of peptic ulcer disease, chronic abdominal pain, anxiety who presents to the emergency department with complaints of upper abdominal pain over the last 3 days, characterized as aching pain that is relatively constant and associated with nausea and dark diarrhea.  She recently underwent EGD which revealed a large pre-pyloric gastric ulcer, negative biopsies.  Patient is being followed by Dr. Ayon.  Patient denies change from previous symptoms, denies black/bloody stools, denies fever, nausea, vomiting.          Review of patient's allergies indicates:  No Known Allergies  Past Medical History:   Diagnosis Date    Anticoagulant long-term use     Anxiety     Arthritis     C. difficile colitis 8/6/2022    Carotid artery disease     Cervical disc disorder     Chronic back pain     COPD (chronic obstructive pulmonary disease)     Coronary artery disease     Dementia     Depression     Diarrhea, unspecified 11/1/2021    DVT (deep venous thrombosis)     CAROTID    GERD (gastroesophageal reflux disease)     Hematuria     Hiatal hernia     High cholesterol     Ill-fitting dentures     STATES DOESN'T WEAR    PVD (peripheral vascular disease)     Renal calculus     Sleep apnea     Sleep apnea     NO C PAP    Urinary frequency     Urinary urgency     Wears glasses     READING      Past Surgical History:   Procedure Laterality Date    BACK SURGERY      BREAST LUMPECTOMY Right     CAROTID ARTERY ANGIOPLASTY      CAROTID ARTERY ANGIOPLASTY      CAROTID ARTERY STENT Left     CAROTID ENDARTERECTOMY Right     CHOLECYSTECTOMY      COLONOSCOPY      CYSTOSCOPY      EGD, WITH CLOSED BIOPSY N/A 4/10/2024    Procedure: EGD, WITH CLOSED BIOPSY;  Surgeon: Meg Ayon MD;  Location: [facility];  Service: General;   Laterality: N/A;    EGD, WITH CLOSED BIOPSY N/A 7/17/2024    Procedure: EGD, WITH CLOSED BIOPSY of pyloric lesion;  Surgeon: Meg Ayon MD;  Location: [facility];  Service: General;  Laterality: N/A;  6th 0830    HYSTERECTOMY      OOPHORECTOMY      TONSILLECTOMY       Family History   Problem Relation Name Age of Onset    Cancer Mother      Stroke Father      No Known Problems Sister      No Known Problems Daughter      No Known Problems Maternal Aunt      No Known Problems Maternal Uncle      No Known Problems Paternal Aunt      No Known Problems Paternal Uncle      No Known Problems Maternal Grandmother      No Known Problems Maternal Grandfather      No Known Problems Paternal Grandmother      No Known Problems Paternal Grandfather      Breast cancer Neg Hx      Ovarian cancer Neg Hx      BRCA 1/2 Neg Hx       Social History     Tobacco Use    Smoking status: Every Day     Current packs/day: 1.00     Types: Cigarettes     Passive exposure: Current    Smokeless tobacco: Never   Substance Use Topics    Alcohol use: No    Drug use: No     Review of Systems   Constitutional:  Positive for appetite change. Negative for fever.   HENT:  Negative for congestion.    Respiratory:  Negative for cough.    Gastrointestinal:  Positive for abdominal pain, diarrhea and nausea. Negative for vomiting.   Neurological:  Negative for weakness.       Physical Exam     Initial Vitals [09/24/24 1629]   BP Pulse Resp Temp SpO2   (!) 174/116 76 18 98 °F (36.7 °C) 99 %      MAP       --         Physical Exam    Nursing note and vitals reviewed.  Constitutional: She appears well-developed and well-nourished. She is active. No distress.   HENT:   Head: Normocephalic and atraumatic.   Mouth/Throat: Oropharynx is clear and moist.   Eyes: EOM are normal. Pupils are equal, round, and reactive to light.   Neck: Neck supple.   Normal range of motion.  Cardiovascular:  Normal rate, regular rhythm and normal heart sounds.            Pulmonary/Chest: Breath sounds normal. No respiratory distress.   Abdominal: Abdomen is soft. Bowel sounds are normal. She exhibits no distension. There is abdominal tenderness. Rebound: generalized.   Musculoskeletal:         General: Normal range of motion.      Cervical back: Normal range of motion and neck supple.     Neurological: She is alert and oriented to person, place, and time. GCS score is 15. GCS eye subscore is 4. GCS verbal subscore is 5. GCS motor subscore is 6.   Skin: Skin is warm and dry. Capillary refill takes less than 2 seconds.   Psychiatric: She has a normal mood and affect. Her behavior is normal. Thought content normal.         ED Course   Procedures  Labs Reviewed   CBC W/ AUTO DIFFERENTIAL - Abnormal       Result Value    WBC 11.37      RBC 4.71      Hemoglobin 11.7 (*)     Hematocrit 37.1      MCV 79 (*)     MCH 24.8 (*)     MCHC 31.5 (*)     RDW 19.0 (*)     Platelets 274      MPV 10.8      Immature Granulocytes 0.3      Gran # (ANC) 9.7 (*)     Immature Grans (Abs) 0.03      Lymph # 0.9 (*)     Mono # 0.7      Eos # 0.0      Baso # 0.03      nRBC 0      Gran % 85.1 (*)     Lymph % 7.8 (*)     Mono % 6.4      Eosinophil % 0.1      Basophil % 0.3      Differential Method Automated     BASIC METABOLIC PANEL - Abnormal    Sodium 134 (*)     Potassium 4.3      Chloride 99      CO2 28      Glucose 103      BUN 9      Creatinine 0.5      Calcium 8.9      Anion Gap 7      eGFR >60.0            Imaging Results    None          Medications   aluminum-magnesium hydroxide-simethicone 200-200-20 mg/5 mL suspension 30 mL (30 mLs Oral Given 9/24/24 1656)     And   LIDOcaine viscous HCl 2% oral solution 15 mL (15 mLs Oral Given 9/24/24 1656)   HYDROmorphone injection 1 mg (1 mg Intramuscular Given 9/24/24 1656)   ondansetron disintegrating tablet 8 mg (8 mg Oral Given 9/24/24 1656)     Medical Decision Making  Amount and/or Complexity of Data Reviewed  Labs: ordered.    Risk  OTC  drugs.  Prescription drug management.               ED Course as of 09/24/24 1718   Tue Sep 24, 2024   1717 All labs relatively unremarkable.  Patient presents today with chronic abdominal pain, no change from previous.  She is stable for discharge, symptoms improved, patient to follow-up with PCP/gastroenterologist and return for worsening. [CB]      ED Course User Index  [CB] Jyotsna Chilel NP                           Clinical Impression:  Final diagnoses:  [R10.9, G89.29] Chronic abdominal pain (Primary)  [K27.9] Peptic ulcer disease          ED Disposition Condition    Discharge Stable          ED Prescriptions    None       Follow-up Information       Follow up With Specialties Details Why Contact Info    PCP Follow UP  Schedule an appointment as soon as possible for a visit in 2 days for follow-up, for re-evaluation of today's complaint              Jyotsna Chilel NP  09/24/24 1718

## 2024-10-21 PROBLEM — K27.4 GASTROINTESTINAL HEMORRHAGE ASSOCIATED WITH PEPTIC ULCER: Status: RESOLVED | Noted: 2024-04-09 | Resolved: 2024-10-21

## 2024-11-04 ENCOUNTER — HOSPITAL ENCOUNTER (EMERGENCY)
Facility: HOSPITAL | Age: 69
Discharge: HOME | End: 2024-11-04
Attending: EMERGENCY MEDICINE
Payer: MEDICARE

## 2024-11-04 VITALS
BODY MASS INDEX: 17.56 KG/M2 | RESPIRATION RATE: 16 BRPM | OXYGEN SATURATION: 99 % | HEART RATE: 68 BPM | HEIGHT: 61 IN | WEIGHT: 93 LBS | DIASTOLIC BLOOD PRESSURE: 96 MMHG | TEMPERATURE: 98 F | SYSTOLIC BLOOD PRESSURE: 143 MMHG

## 2024-11-04 DIAGNOSIS — S16.1XXA STRAIN OF NECK MUSCLE, INITIAL ENCOUNTER: ICD-10-CM

## 2024-11-04 DIAGNOSIS — F45.42 PAIN DISORDER ASSOCIATED WITH PSYCHOLOGICAL AND PHYSICAL FACTORS: Primary | ICD-10-CM

## 2024-11-04 DIAGNOSIS — M62.838 MUSCLE SPASMS OF NECK: ICD-10-CM

## 2024-11-04 PROCEDURE — 96372 THER/PROPH/DIAG INJ SC/IM: CPT

## 2024-11-04 PROCEDURE — 63600175 PHARM REV CODE 636 W HCPCS

## 2024-11-04 PROCEDURE — 99284 EMERGENCY DEPT VISIT MOD MDM: CPT | Mod: 25

## 2024-11-04 RX ORDER — ONDANSETRON HYDROCHLORIDE 2 MG/ML
4 INJECTION, SOLUTION INTRAVENOUS
Status: COMPLETED | OUTPATIENT
Start: 2024-11-04 | End: 2024-11-04

## 2024-11-04 RX ORDER — HYDROMORPHONE HYDROCHLORIDE 2 MG/ML
2 INJECTION, SOLUTION INTRAMUSCULAR; INTRAVENOUS; SUBCUTANEOUS
Status: COMPLETED | OUTPATIENT
Start: 2024-11-04 | End: 2024-11-04

## 2024-11-04 RX ADMIN — ONDANSETRON 4 MG: 2 INJECTION INTRAMUSCULAR; INTRAVENOUS at 01:11

## 2024-11-04 RX ADMIN — HYDROMORPHONE HYDROCHLORIDE 2 MG: 2 INJECTION, SOLUTION INTRAMUSCULAR; INTRAVENOUS; SUBCUTANEOUS at 01:11

## 2024-11-04 NOTE — ED PROVIDER NOTES
"Encounter Date: 11/4/2024       History     Chief Complaint   Patient presents with    Neck Pain     Complains of "popping" her neck approx 1 week ago. States its real stiff and causing her pain.      This is a 69-year-old white female with a history of peptic ulcer disease, chronic abdominal pain, anxiety, bulging disc in neck who presents to the emergency department with complaints of neck pain and stiffness for the past week.  Patient reports she was a popping sound her neck when she tries to move her neck.  She reports her neck feels real stiff in his causing her pain patient rates pain 9/10 to his neck at this time.  Patient reports she was sitting on a hay bale for Halloween and when she stood up she felt something pop in her neck in her neck has been sore ever since.    MRI of cervical spine on August year 2024 has the following impression per radiologist--within epic system  1. Since the prior study there is anterior hardware fusing C5-6 and C6-7  2. Small bulges or uncinate proliferation and facet changes at C3-4 and C4-5 contributing to narrowing of the neural foramina as above      The history is provided by the patient.     Review of patient's allergies indicates:  No Known Allergies  Past Medical History:   Diagnosis Date    Anticoagulant long-term use     Anxiety     Arthritis     C. difficile colitis 8/6/2022    Carotid artery disease     Cervical disc disorder     Chronic back pain     COPD (chronic obstructive pulmonary disease)     Coronary artery disease     Dementia     Depression     Diarrhea, unspecified 11/1/2021    DVT (deep venous thrombosis)     CAROTID    GERD (gastroesophageal reflux disease)     Hematuria     Hiatal hernia     High cholesterol     Ill-fitting dentures     STATES DOESN'T WEAR    PVD (peripheral vascular disease)     Renal calculus     Sleep apnea     Sleep apnea     NO C PAP    Urinary frequency     Urinary urgency     Wears glasses     READING      Past Surgical History: "   Procedure Laterality Date    BACK SURGERY      BREAST LUMPECTOMY Right     CAROTID ARTERY ANGIOPLASTY      CAROTID ARTERY ANGIOPLASTY      CAROTID ARTERY STENT Left     CAROTID ENDARTERECTOMY Right     CHOLECYSTECTOMY      COLONOSCOPY      CYSTOSCOPY      EGD, WITH CLOSED BIOPSY N/A 4/10/2024    Procedure: EGD, WITH CLOSED BIOPSY;  Surgeon: Meg Ayon MD;  Location: [facility];  Service: General;  Laterality: N/A;    EGD, WITH CLOSED BIOPSY N/A 7/17/2024    Procedure: EGD, WITH CLOSED BIOPSY of pyloric lesion;  Surgeon: Meg Ayon MD;  Location: [facility];  Service: General;  Laterality: N/A;  6th 0830    HYSTERECTOMY      OOPHORECTOMY      TONSILLECTOMY       Family History   Problem Relation Name Age of Onset    Cancer Mother      Stroke Father      No Known Problems Sister      No Known Problems Daughter      No Known Problems Maternal Aunt      No Known Problems Maternal Uncle      No Known Problems Paternal Aunt      No Known Problems Paternal Uncle      No Known Problems Maternal Grandmother      No Known Problems Maternal Grandfather      No Known Problems Paternal Grandmother      No Known Problems Paternal Grandfather      Breast cancer Neg Hx      Ovarian cancer Neg Hx      BRCA 1/2 Neg Hx       Social History     Tobacco Use    Smoking status: Every Day     Current packs/day: 1.00     Types: Cigarettes     Passive exposure: Current    Smokeless tobacco: Never   Substance Use Topics    Alcohol use: No    Drug use: No     Review of Systems   Musculoskeletal:  Positive for neck pain and neck stiffness.   All other systems reviewed and are negative.      Physical Exam     Initial Vitals [11/04/24 1230]   BP Pulse Resp Temp SpO2   (!) 143/96 68 18 98.4 °F (36.9 °C) 99 %      MAP       --         Physical Exam    Constitutional: She appears well-developed.   HENT:   Head: Normocephalic.   Eyes: Pupils are equal, round, and reactive to light. Right eye exhibits no discharge.   Neck:   Normal  range of motion.  Cardiovascular:  Normal rate and regular rhythm.           No murmur heard.  Pulmonary/Chest: Breath sounds normal. No respiratory distress. She has no rales.   Abdominal: Abdomen is soft.   Musculoskeletal:         General: Tenderness (Tenderness to cervical neck.) present.      Cervical back: Normal range of motion.     Neurological: She is alert and oriented to person, place, and time. GCS score is 15. GCS eye subscore is 4. GCS verbal subscore is 5. GCS motor subscore is 6.   Skin: Capillary refill takes less than 2 seconds. No erythema.   Psychiatric: She has a normal mood and affect. Thought content normal.         ED Course   Procedures  Labs Reviewed - No data to display       Imaging Results              X-Ray Cervical Spine AP And Lateral (Final result)  Result time 11/04/24 13:13:54      Final result by Kaila Parekh MD (11/04/24 13:13:54)                   Impression:      Degenerative changes and postsurgical changes with anterior hardware and ray cages fusing C5-6 and C6-7 with significant lower facet changes.      Electronically signed by: Kaila Parekh MD  Date:    11/04/2024  Time:    13:13               Narrative:    EXAMINATION:  XR CERVICAL SPINE AP LATERAL    CLINICAL HISTORY:  neck pain;    COMPARISON:  None    FINDINGS:  There are anterior plate and screws and ray cages fusing C5-6 and C6-7.  There is lower facet changes.  The bodies are of normal height and alignment.  The odontoid is intact.                                       Medications   HYDROmorphone (PF) injection 2 mg (2 mg Intramuscular Given 11/4/24 1320)   ondansetron injection 4 mg (4 mg Intramuscular Given 11/4/24 1320)     Medical Decision Making  Differential diagnosis includes cervical strain, bulging disc of cervical spine., neck pain, neck strain    Patient's symptoms are consistent with cervical strain.  X-ray of cervical spine reviewed.  No obvious fracture or dislocation noted.  Will give patient  Dilaudid shot to help control pain.  Patient instructed to continue her pain med prescriptions.  Unable to prescribe any opioid medications as patient is under care of pain management.  No neurologic deficits noted on physical exam today.  Vital signs stable.  Patient stable at time of discharge in no acute distress.  No life-threatening illnesses were found during ER visit today.  Patient was instructed to follow-up with PCP or other recommended specialist within the next 48-72 hours.  Patient was instructed to return to ER immediately for any worsening or concerning symptoms.  All discharge instructions discussed with patient, and patient agrees to comply with discharge instructions given today.     Amount and/or Complexity of Data Reviewed  Radiology: ordered.    Risk  Prescription drug management.                                      Clinical Impression:  Final diagnoses:  [S16.1XXA] Strain of neck muscle, initial encounter  [M62.838] Muscle spasms of neck  [F45.42] Pain disorder associated with psychological and physical factors (Primary)          ED Disposition Condition    Discharge Stable          ED Prescriptions    None       Follow-up Information       Follow up With Specialties Details Why Contact Info    Kt Pozo Jr., MD Internal Medicine Schedule an appointment as soon as possible for a visit in 3 days  [address] [place] [id number]  [phone]               Chi Calvo NP  11/04/24 0531

## 2024-11-17 ENCOUNTER — HOSPITAL ENCOUNTER (EMERGENCY)
Facility: HOSPITAL | Age: 69
Discharge: HOME | End: 2024-11-17
Attending: EMERGENCY MEDICINE
Payer: MEDICARE

## 2024-11-17 VITALS
WEIGHT: 93 LBS | HEIGHT: 61 IN | TEMPERATURE: 99 F | OXYGEN SATURATION: 98 % | BODY MASS INDEX: 17.56 KG/M2 | SYSTOLIC BLOOD PRESSURE: 151 MMHG | DIASTOLIC BLOOD PRESSURE: 98 MMHG | HEART RATE: 86 BPM | RESPIRATION RATE: 18 BRPM

## 2024-11-17 DIAGNOSIS — Z76.5 DRUG-SEEKING BEHAVIOR: ICD-10-CM

## 2024-11-17 DIAGNOSIS — R10.9 ABDOMINAL PAIN, UNSPECIFIED ABDOMINAL LOCATION: Primary | ICD-10-CM

## 2024-11-17 DIAGNOSIS — R19.7 DIARRHEA, UNSPECIFIED TYPE: ICD-10-CM

## 2024-11-17 PROCEDURE — 63600175 PHARM REV CODE 636 W HCPCS: Performed by: CLINICAL NURSE SPECIALIST

## 2024-11-17 PROCEDURE — 96372 THER/PROPH/DIAG INJ SC/IM: CPT | Performed by: CLINICAL NURSE SPECIALIST

## 2024-11-17 PROCEDURE — 99284 EMERGENCY DEPT VISIT MOD MDM: CPT | Mod: 25

## 2024-11-17 RX ORDER — HYDROMORPHONE HYDROCHLORIDE 1 MG/ML
1 INJECTION, SOLUTION INTRAMUSCULAR; INTRAVENOUS; SUBCUTANEOUS ONCE
Status: COMPLETED | OUTPATIENT
Start: 2024-11-17 | End: 2024-11-17

## 2024-11-17 RX ORDER — DIPHENOXYLATE HYDROCHLORIDE AND ATROPINE SULFATE 2.5; .025 MG/1; MG/1
1 TABLET ORAL 4 TIMES DAILY PRN
Qty: 15 TABLET | Refills: 0 | Status: SHIPPED | OUTPATIENT
Start: 2024-11-17 | End: 2024-11-27

## 2024-11-17 RX ADMIN — HYDROMORPHONE HYDROCHLORIDE 1 MG: 1 INJECTION, SOLUTION INTRAMUSCULAR; INTRAVENOUS; SUBCUTANEOUS at 02:11

## 2024-11-17 NOTE — ED PROVIDER NOTES
Encounter Date: 11/17/2024       History     Chief Complaint   Patient presents with    Abdominal Pain     Pt stated that she has been experiencing left sided abdominal pain with diarrhea for the past few days.      69-year-old female presents to the emergency room with chronic left-sided abdominal pain with diarrhea for the last few days.  Diarrhea has resolved.          Review of patient's allergies indicates:  No Known Allergies  Past Medical History:   Diagnosis Date    Anticoagulant long-term use     Anxiety     Arthritis     C. difficile colitis 8/6/2022    Carotid artery disease     Cervical disc disorder     Chronic back pain     COPD (chronic obstructive pulmonary disease)     Coronary artery disease     Dementia     Depression     Diarrhea, unspecified 11/1/2021    DVT (deep venous thrombosis)     CAROTID    GERD (gastroesophageal reflux disease)     Hematuria     Hiatal hernia     High cholesterol     Ill-fitting dentures     STATES DOESN'T WEAR    PVD (peripheral vascular disease)     Renal calculus     Sleep apnea     Sleep apnea     NO C PAP    Urinary frequency     Urinary urgency     Wears glasses     READING      Past Surgical History:   Procedure Laterality Date    BACK SURGERY      BREAST LUMPECTOMY Right     CAROTID ARTERY ANGIOPLASTY      CAROTID ARTERY ANGIOPLASTY      CAROTID ARTERY STENT Left     CAROTID ENDARTERECTOMY Right     CHOLECYSTECTOMY      COLONOSCOPY      CYSTOSCOPY      EGD, WITH CLOSED BIOPSY N/A 4/10/2024    Procedure: EGD, WITH CLOSED BIOPSY;  Surgeon: Meg Ayon MD;  Location: [facility];  Service: General;  Laterality: N/A;    EGD, WITH CLOSED BIOPSY N/A 7/17/2024    Procedure: EGD, WITH CLOSED BIOPSY of pyloric lesion;  Surgeon: Meg Ayon MD;  Location: [facility];  Service: General;  Laterality: N/A;  6th 0830    HYSTERECTOMY      OOPHORECTOMY      TONSILLECTOMY       Family History   Problem Relation Name Age of Onset    Cancer Mother      Stroke Father       No Known Problems Sister      No Known Problems Daughter      No Known Problems Maternal Aunt      No Known Problems Maternal Uncle      No Known Problems Paternal Aunt      No Known Problems Paternal Uncle      No Known Problems Maternal Grandmother      No Known Problems Maternal Grandfather      No Known Problems Paternal Grandmother      No Known Problems Paternal Grandfather      Breast cancer Neg Hx      Ovarian cancer Neg Hx      BRCA 1/2 Neg Hx       Social History     Tobacco Use    Smoking status: Every Day     Current packs/day: 1.00     Types: Cigarettes     Passive exposure: Current    Smokeless tobacco: Never   Substance Use Topics    Alcohol use: No    Drug use: No     Review of Systems   Constitutional:  Negative for fever.   HENT:  Negative for sore throat.    Respiratory:  Negative for shortness of breath.    Cardiovascular:  Negative for chest pain.   Gastrointestinal:  Positive for abdominal pain. Negative for nausea.   Genitourinary:  Negative for dysuria.   Musculoskeletal:  Negative for back pain.   Skin:  Negative for rash.   Neurological:  Negative for weakness.   Hematological:  Does not bruise/bleed easily.   All other systems reviewed and are negative.      Physical Exam     Initial Vitals [11/17/24 1416]   BP Pulse Resp Temp SpO2   (!) 151/98 86 18 98.6 °F (37 °C) 98 %      MAP       --         Physical Exam    Nursing note and vitals reviewed.  Constitutional: She appears well-developed and well-nourished.   HENT:   Head: Normocephalic and atraumatic.   Eyes: Pupils are equal, round, and reactive to light.   Neck:   Normal range of motion.  Cardiovascular:  Normal rate and regular rhythm.           Pulmonary/Chest: Breath sounds normal.   Abdominal: Abdomen is soft. Bowel sounds are normal. There is abdominal tenderness.   Musculoskeletal:         General: Normal range of motion.      Cervical back: Normal range of motion.     Neurological: She is alert and oriented to person, place,  and time.   Skin: Skin is warm and dry.   Psychiatric: She has a normal mood and affect.         ED Course   Procedures  Labs Reviewed - No data to display       Imaging Results    None          Medications   HYDROmorphone injection 1 mg (has no administration in time range)     Medical Decision Making                                    Clinical Impression:  Final diagnoses:  [R10.9] Abdominal pain, unspecified abdominal location (Primary)  [R19.7] Diarrhea, unspecified type          ED Disposition Condition    Discharge Stable          ED Prescriptions       Medication Sig Dispense Start Date End Date Auth. Provider    diphenoxylate-atropine 2.5-0.025 mg (LOMOTIL) 2.5-0.025 mg per tablet Take 1 tablet by mouth 4 (four) times daily as needed for Diarrhea. 15 tablet 11/17/2024 11/27/2024 Honey Hartman NP          Follow-up Information       Follow up With Specialties Details Why Contact Info    Kt Pozo Jr., MD Internal Medicine  As needed [address] [facility] [address]  [phone]               Honey Hartman NP  11/17/24 2512

## 2024-11-24 ENCOUNTER — HOSPITAL ENCOUNTER (EMERGENCY)
Facility: HOSPITAL | Age: 69
Discharge: HOME | End: 2024-11-24
Attending: STUDENT IN AN ORGANIZED HEALTH CARE EDUCATION/TRAINING PROGRAM
Payer: MEDICARE

## 2024-11-24 VITALS
RESPIRATION RATE: 18 BRPM | HEIGHT: 61 IN | HEART RATE: 70 BPM | BODY MASS INDEX: 16.24 KG/M2 | SYSTOLIC BLOOD PRESSURE: 186 MMHG | WEIGHT: 86 LBS | OXYGEN SATURATION: 98 % | TEMPERATURE: 98 F | DIASTOLIC BLOOD PRESSURE: 81 MMHG

## 2024-11-24 DIAGNOSIS — S09.90XA CLOSED HEAD INJURY, INITIAL ENCOUNTER: Primary | ICD-10-CM

## 2024-11-24 DIAGNOSIS — T14.90XA TRAUMA: ICD-10-CM

## 2024-11-24 DIAGNOSIS — R42 DIZZINESS: ICD-10-CM

## 2024-11-24 DIAGNOSIS — N39.0 URINARY TRACT INFECTION WITHOUT HEMATURIA, SITE UNSPECIFIED: ICD-10-CM

## 2024-11-24 LAB
ALBUMIN SERPL BCP-MCNC: 3.5 G/DL (ref 3.5–5.2)
ALP SERPL-CCNC: 98 U/L (ref 55–135)
ALT SERPL W/O P-5'-P-CCNC: 9 U/L (ref 10–44)
ANION GAP SERPL CALC-SCNC: 10 MMOL/L (ref 3–11)
AST SERPL-CCNC: 15 U/L (ref 10–40)
BACTERIA #/AREA URNS HPF: NEGATIVE /HPF
BASOPHILS # BLD AUTO: 0.05 K/UL (ref 0–0.2)
BASOPHILS NFR BLD: 0.4 % (ref 0–1.9)
BILIRUB SERPL-MCNC: 0.3 MG/DL (ref 0.1–1)
BILIRUB UR QL STRIP: NEGATIVE
BUN SERPL-MCNC: 10 MG/DL (ref 8–23)
CALCIUM SERPL-MCNC: 8.8 MG/DL (ref 8.7–10.5)
CHLORIDE SERPL-SCNC: 100 MMOL/L (ref 95–110)
CLARITY UR: CLEAR
CO2 SERPL-SCNC: 26 MMOL/L (ref 23–29)
COLOR UR: YELLOW
CREAT SERPL-MCNC: 0.8 MG/DL (ref 0.5–1.4)
DIFFERENTIAL METHOD BLD: ABNORMAL
EOSINOPHIL # BLD AUTO: 0.1 K/UL (ref 0–0.5)
EOSINOPHIL NFR BLD: 0.5 % (ref 0–8)
ERYTHROCYTE [DISTWIDTH] IN BLOOD BY AUTOMATED COUNT: 19.1 % (ref 11.5–14.5)
EST. GFR  (NO RACE VARIABLE): >60 ML/MIN/1.73 M^2
GLUCOSE SERPL-MCNC: 88 MG/DL (ref 70–110)
GLUCOSE UR QL STRIP: NEGATIVE
HCT VFR BLD AUTO: 45.9 % (ref 37–48.5)
HGB BLD-MCNC: 14.5 G/DL (ref 12–16)
HGB UR QL STRIP: ABNORMAL
HYALINE CASTS #/AREA URNS LPF: 1.5 /LPF
IMM GRANULOCYTES # BLD AUTO: 0.03 K/UL (ref 0–0.04)
IMM GRANULOCYTES NFR BLD AUTO: 0.2 % (ref 0–0.5)
KETONES UR QL STRIP: ABNORMAL
LEUKOCYTE ESTERASE UR QL STRIP: ABNORMAL
LYMPHOCYTES # BLD AUTO: 2 K/UL (ref 1–4.8)
LYMPHOCYTES NFR BLD: 15.6 % (ref 18–48)
MCH RBC QN AUTO: 26.1 PG (ref 27–31)
MCHC RBC AUTO-ENTMCNC: 31.6 G/DL (ref 32–36)
MCV RBC AUTO: 83 FL (ref 82–98)
MICROSCOPIC COMMENT: ABNORMAL
MONOCYTES # BLD AUTO: 0.9 K/UL (ref 0.3–1)
MONOCYTES NFR BLD: 6.8 % (ref 4–15)
NEUTROPHILS # BLD AUTO: 9.8 K/UL (ref 1.8–7.7)
NEUTROPHILS NFR BLD: 76.5 % (ref 38–73)
NITRITE UR QL STRIP: NEGATIVE
NRBC BLD-RTO: 0 /100 WBC
PH UR STRIP: 7 [PH] (ref 5–8)
PLATELET # BLD AUTO: 314 K/UL (ref 150–450)
PMV BLD AUTO: 10.5 FL (ref 9.2–12.9)
POTASSIUM SERPL-SCNC: 3.3 MMOL/L (ref 3.5–5.1)
PROT SERPL-MCNC: 6.9 G/DL (ref 6–8.4)
PROT UR QL STRIP: NEGATIVE
RBC # BLD AUTO: 5.55 M/UL (ref 4–5.4)
RBC #/AREA URNS HPF: 1 /HPF (ref 0–4)
SODIUM SERPL-SCNC: 136 MMOL/L (ref 136–145)
SP GR UR STRIP: <=1.005 (ref 1–1.03)
SQUAMOUS #/AREA URNS HPF: 2 /HPF
TROPONIN I SERPL DL<=0.01 NG/ML-MCNC: <4 PG/ML (ref 0–60)
URN SPEC COLLECT METH UR: ABNORMAL
UROBILINOGEN UR STRIP-ACNC: NEGATIVE EU/DL
WBC # BLD AUTO: 12.8 K/UL (ref 3.9–12.7)
WBC #/AREA URNS HPF: 29 /HPF (ref 0–5)

## 2024-11-24 PROCEDURE — 96374 THER/PROPH/DIAG INJ IV PUSH: CPT

## 2024-11-24 PROCEDURE — 85025 COMPLETE CBC W/AUTO DIFF WBC: CPT | Performed by: STUDENT IN AN ORGANIZED HEALTH CARE EDUCATION/TRAINING PROGRAM

## 2024-11-24 PROCEDURE — 99285 EMERGENCY DEPT VISIT HI MDM: CPT | Mod: 25

## 2024-11-24 PROCEDURE — 96375 TX/PRO/DX INJ NEW DRUG ADDON: CPT

## 2024-11-24 PROCEDURE — 63600175 PHARM REV CODE 636 W HCPCS: Performed by: STUDENT IN AN ORGANIZED HEALTH CARE EDUCATION/TRAINING PROGRAM

## 2024-11-24 PROCEDURE — 80053 COMPREHEN METABOLIC PANEL: CPT | Performed by: STUDENT IN AN ORGANIZED HEALTH CARE EDUCATION/TRAINING PROGRAM

## 2024-11-24 PROCEDURE — 25000003 PHARM REV CODE 250: Performed by: STUDENT IN AN ORGANIZED HEALTH CARE EDUCATION/TRAINING PROGRAM

## 2024-11-24 PROCEDURE — 84484 ASSAY OF TROPONIN QUANT: CPT | Performed by: STUDENT IN AN ORGANIZED HEALTH CARE EDUCATION/TRAINING PROGRAM

## 2024-11-24 PROCEDURE — 36415 COLL VENOUS BLD VENIPUNCTURE: CPT | Performed by: STUDENT IN AN ORGANIZED HEALTH CARE EDUCATION/TRAINING PROGRAM

## 2024-11-24 PROCEDURE — 81000 URINALYSIS NONAUTO W/SCOPE: CPT | Performed by: STUDENT IN AN ORGANIZED HEALTH CARE EDUCATION/TRAINING PROGRAM

## 2024-11-24 PROCEDURE — 96361 HYDRATE IV INFUSION ADD-ON: CPT

## 2024-11-24 PROCEDURE — 87086 URINE CULTURE/COLONY COUNT: CPT | Performed by: STUDENT IN AN ORGANIZED HEALTH CARE EDUCATION/TRAINING PROGRAM

## 2024-11-24 RX ORDER — NITROFURANTOIN 25; 75 MG/1; MG/1
100 CAPSULE ORAL 2 TIMES DAILY
Qty: 10 CAPSULE | Refills: 0 | Status: SHIPPED | OUTPATIENT
Start: 2024-11-24 | End: 2024-11-29

## 2024-11-24 RX ORDER — CEFTRIAXONE 1 G/1
1 INJECTION, POWDER, FOR SOLUTION INTRAMUSCULAR; INTRAVENOUS
Status: COMPLETED | OUTPATIENT
Start: 2024-11-24 | End: 2024-11-24

## 2024-11-24 RX ORDER — ONDANSETRON HYDROCHLORIDE 2 MG/ML
4 INJECTION, SOLUTION INTRAVENOUS
Status: COMPLETED | OUTPATIENT
Start: 2024-11-24 | End: 2024-11-24

## 2024-11-24 RX ORDER — NAPROXEN 500 MG/1
500 TABLET ORAL 2 TIMES DAILY WITH MEALS
Qty: 30 TABLET | Refills: 0 | Status: SHIPPED | OUTPATIENT
Start: 2024-11-24

## 2024-11-24 RX ORDER — MORPHINE SULFATE 4 MG/ML
4 INJECTION, SOLUTION INTRAMUSCULAR; INTRAVENOUS
Status: COMPLETED | OUTPATIENT
Start: 2024-11-24 | End: 2024-11-24

## 2024-11-24 RX ADMIN — SODIUM CHLORIDE 1000 ML: 9 INJECTION, SOLUTION INTRAVENOUS at 01:11

## 2024-11-24 RX ADMIN — MORPHINE SULFATE 4 MG: 4 INJECTION, SOLUTION INTRAMUSCULAR; INTRAVENOUS at 01:11

## 2024-11-24 RX ADMIN — ONDANSETRON 4 MG: 2 INJECTION INTRAMUSCULAR; INTRAVENOUS at 01:11

## 2024-11-24 RX ADMIN — CEFTRIAXONE SODIUM 1 G: 1 INJECTION, POWDER, FOR SOLUTION INTRAMUSCULAR; INTRAVENOUS at 03:11

## 2024-11-24 NOTE — ED PROVIDER NOTES
Encounter Date: 11/24/2024       History     Chief Complaint   Patient presents with    Weakness     Pt to the ER with several complaints, runny nose, cough, dizziness, weakness, nausea, vomiting, diarrhea, abdominal pain onset yesterday. Pt also reports falling around 1am reports hitting right side of the head, LOC, no bleeding/abrasion noted Pain 10/10, denies taking meds for relief.      69-year-old female history of chronic abdominal pain, hyperlipidemia, hypertension presents to the ED for multiple complaints.  Patient reports over the past several days she has been getting progressively weaker, last night she suffered a ground level fall where she fell and hit her head.  Reports that she lost consciousness for several minutes.  Reports since then she has had a headache and neck pain.  Also reports feeling dizzy and weak.  Presents to ED today for worsening symptoms started denies any chest pain shortness of breath.  Denies any diarrhea or constipation.        Review of patient's allergies indicates:  No Known Allergies  Past Medical History:   Diagnosis Date    Anticoagulant long-term use     Anxiety     Arthritis     C. difficile colitis 8/6/2022    Carotid artery disease     Cervical disc disorder     Chronic back pain     COPD (chronic obstructive pulmonary disease)     Coronary artery disease     Dementia     Depression     Diarrhea, unspecified 11/1/2021    DVT (deep venous thrombosis)     CAROTID    GERD (gastroesophageal reflux disease)     Hematuria     Hiatal hernia     High cholesterol     Ill-fitting dentures     STATES DOESN'T WEAR    PVD (peripheral vascular disease)     Renal calculus     Sleep apnea     Sleep apnea     NO C PAP    Urinary frequency     Urinary urgency     Wears glasses     READING      Past Surgical History:   Procedure Laterality Date    BACK SURGERY      BREAST LUMPECTOMY Right     CAROTID ARTERY ANGIOPLASTY      CAROTID ARTERY ANGIOPLASTY      CAROTID ARTERY STENT Left      CAROTID ENDARTERECTOMY Right     CHOLECYSTECTOMY      COLONOSCOPY      CYSTOSCOPY      EGD, WITH CLOSED BIOPSY N/A 4/10/2024    Procedure: EGD, WITH CLOSED BIOPSY;  Surgeon: Meg Ayon MD;  Location: [facility];  Service: General;  Laterality: N/A;    EGD, WITH CLOSED BIOPSY N/A 7/17/2024    Procedure: EGD, WITH CLOSED BIOPSY of pyloric lesion;  Surgeon: Meg Ayon MD;  Location: [facility];  Service: General;  Laterality: N/A;  6th 0830    HYSTERECTOMY      OOPHORECTOMY      TONSILLECTOMY       Family History   Problem Relation Name Age of Onset    Cancer Mother      Stroke Father      No Known Problems Sister      No Known Problems Daughter      No Known Problems Maternal Aunt      No Known Problems Maternal Uncle      No Known Problems Paternal Aunt      No Known Problems Paternal Uncle      No Known Problems Maternal Grandmother      No Known Problems Maternal Grandfather      No Known Problems Paternal Grandmother      No Known Problems Paternal Grandfather      Breast cancer Neg Hx      Ovarian cancer Neg Hx      BRCA 1/2 Neg Hx       Social History     Tobacco Use    Smoking status: Every Day     Current packs/day: 1.00     Types: Cigarettes     Passive exposure: Current    Smokeless tobacco: Never   Substance Use Topics    Alcohol use: No    Drug use: No     Review of Systems   Respiratory:  Negative for chest tightness and shortness of breath.        Physical Exam     Initial Vitals [11/24/24 1343]   BP Pulse Resp Temp SpO2   (!) 116/56 94 20 98.7 °F (37.1 °C) 100 %      MAP       --         Physical Exam    Vitals reviewed.  Constitutional: She appears well-developed and well-nourished.   HENT:   Head: Normocephalic and atraumatic.   Eyes: EOM are normal. Pupils are equal, round, and reactive to light.   Neck:   Normal range of motion.  Cardiovascular:  Normal rate and regular rhythm.           Pulmonary/Chest: Breath sounds normal.   Abdominal: Abdomen is soft.   Musculoskeletal:          General: Normal range of motion.      Cervical back: Normal range of motion.     Neurological: She is alert. GCS score is 15. GCS eye subscore is 4. GCS verbal subscore is 5. GCS motor subscore is 6.   Skin: Skin is warm. Capillary refill takes less than 2 seconds.         ED Course   Procedures  Labs Reviewed   CBC W/ AUTO DIFFERENTIAL - Abnormal       Result Value    WBC 12.80 (*)     RBC 5.55 (*)     Hemoglobin 14.5      Hematocrit 45.9      MCV 83      MCH 26.1 (*)     MCHC 31.6 (*)     RDW 19.1 (*)     Platelets 314      MPV 10.5      Immature Granulocytes 0.2      Gran # (ANC) 9.8 (*)     Immature Grans (Abs) 0.03      Lymph # 2.0      Mono # 0.9      Eos # 0.1      Baso # 0.05      nRBC 0      Gran % 76.5 (*)     Lymph % 15.6 (*)     Mono % 6.8      Eosinophil % 0.5      Basophil % 0.4      Differential Method Automated     COMPREHENSIVE METABOLIC PANEL - Abnormal    Sodium 136      Potassium 3.3 (*)     Chloride 100      CO2 26      Glucose 88      BUN 10      Creatinine 0.8      Calcium 8.8      Total Protein 6.9      Albumin 3.5      Total Bilirubin 0.3      Alkaline Phosphatase 98      AST 15      ALT 9 (*)     eGFR >60.0      Anion Gap 10     URINALYSIS, REFLEX TO URINE CULTURE - Abnormal    Specimen UA Urine, Clean Catch      Color, UA Yellow      Appearance, UA Clear      pH, UA 7.0      Specific Gravity, UA <=1.005 (*)     Protein, UA Negative      Glucose, UA Negative      Ketones, UA Trace (*)     Bilirubin (UA) Negative      Occult Blood UA 1+ (*)     Nitrite, UA Negative      Urobilinogen, UA Negative      Leukocytes, UA 2+ (*)     Narrative:     Preferred Collection Type->Urine, Clean Catch  Specimen Source->Urine   URINALYSIS MICROSCOPIC - Abnormal    RBC, UA 1      WBC, UA 29 (*)     Bacteria Negative      Squam Epithel, UA 2      Hyaline Casts, UA 1.5 (*)     Microscopic Comment SEE COMMENT      Narrative:     Preferred Collection Type->Urine, Clean Catch  Specimen Source->Urine   CULTURE,  URINE   TROPONIN I HIGH SENSITIVITY    Troponin I High Sensitivity <4.0       EKG Readings: (Independently Interpreted)   Normal sinus rhythm   No STEMI   ST changes in inferior leads   Ventricle rate is 74.        Imaging Results              CT Cervical Spine Without Contrast (In process)                      CT Head Without Contrast (In process)                      X-Ray Pelvis Routine AP (In process)                      X-Ray Shoulder Complete 2 View Right (In process)                      X-Ray Chest AP Portable (In process)                      Medications   sodium chloride 0.9% bolus 1,000 mL 1,000 mL (0 mLs Intravenous Stopped 11/24/24 1457)   morphine injection 4 mg (4 mg Intravenous Given 11/24/24 1356)   ondansetron injection 4 mg (4 mg Intravenous Given 11/24/24 1356)   cefTRIAXone injection 1 g (1 g Intravenous Given 11/24/24 9574)     Medical Decision Making  Patient presents to ED for multiple complaints.  In ED patient is lying comfortably in ED bed.  Has no focal deficits on exam.  Labs and imaging were ordered.  Labs reveal leukocytosis of 12.  Chemistry panel was unremarkable.  Troponin was negative.  Urinalysis does show that she has a UTI.  CT imaging negative for acute findings.  Patient was given analgesics here in the emergency department with improvement.  Reports that she is currently symptom free and feeling better.  Will be discharged home with PCP follow up.  Return precautions will be given.  Patient understands and agrees with plan.    Amount and/or Complexity of Data Reviewed  Labs: ordered.  Radiology: ordered.    Risk  Prescription drug management.                                      Clinical Impression:  Final diagnoses:  [R42] Dizziness  [T14.90XA] Trauma  [I48.91] Rafaela Triplett MD  11/24/24 6562

## 2024-11-25 LAB
BACTERIA UR CULT: NORMAL
BACTERIA UR CULT: NORMAL

## 2024-12-10 PROBLEM — M79.601 RIGHT ARM PAIN: Status: ACTIVE | Noted: 2024-12-10

## 2024-12-20 ENCOUNTER — HOSPITAL ENCOUNTER (EMERGENCY)
Facility: HOSPITAL | Age: 69
Discharge: HOME | End: 2024-12-20
Attending: INTERNAL MEDICINE
Payer: MEDICARE

## 2024-12-20 VITALS
RESPIRATION RATE: 18 BRPM | WEIGHT: 93 LBS | BODY MASS INDEX: 17.56 KG/M2 | OXYGEN SATURATION: 99 % | TEMPERATURE: 98 F | DIASTOLIC BLOOD PRESSURE: 84 MMHG | SYSTOLIC BLOOD PRESSURE: 182 MMHG | HEART RATE: 70 BPM | HEIGHT: 61 IN

## 2024-12-20 DIAGNOSIS — S46.001A ROTATOR CUFF INJURY, RIGHT, INITIAL ENCOUNTER: Primary | ICD-10-CM

## 2024-12-20 PROCEDURE — 99284 EMERGENCY DEPT VISIT MOD MDM: CPT

## 2024-12-20 RX ORDER — PREDNISONE 50 MG/1
50 TABLET ORAL DAILY
Qty: 7 TABLET | Refills: 0 | Status: SHIPPED | OUTPATIENT
Start: 2024-12-20

## 2024-12-20 RX ORDER — TRAMADOL HYDROCHLORIDE 50 MG/1
50 TABLET ORAL EVERY 6 HOURS PRN
Qty: 12 TABLET | Refills: 0 | Status: SHIPPED | OUTPATIENT
Start: 2024-12-20

## 2024-12-20 NOTE — ED PROVIDER NOTES
12/20/2024         10:32 AM    Source of History:  History obtained from patient and significant other.     Chief complaint:  From Nurse Triage:  Shoulder Injury (Reports ground level fall this morning onto hard tin in bathroom. Lost balance.denies loc. Complains of right shoulder pain and neck pain. Reports headache, and also chronic abd pain. )    HISTORY OF PRESENT ILLNES:  Desiree Blake is a 69 y.o. female  has a past medical history of Anticoagulant long-term use, Anxiety, Arthritis, C. difficile colitis (8/6/2022), Carotid artery disease, Cervical disc disorder, Chronic back pain, COPD (chronic obstructive pulmonary disease), Coronary artery disease, Dementia, Depression, Diarrhea, unspecified (11/1/2021), DVT (deep venous thrombosis), GERD (gastroesophageal reflux disease), Hematuria, Hiatal hernia, High cholesterol, Ill-fitting dentures, PVD (peripheral vascular disease), Renal calculus, Sleep apnea, Sleep apnea, Urinary frequency, Urinary urgency, and Wears glasses. presenting with complaints of shoulder pain since a fall 3 weeks ago.  Patient is waiting on appointment with Orthopedics.  Patient has had negative x-rays by her primary care provider.      REVIEW OF SYSTEMS:   Constitutional symptoms:     Skin symptoms:      Eye symptoms:     ENMT symptoms:      Respiratory symptoms:      Cardiovascular symptoms:     Gastrointestinal symptoms:      Genitourinary symptoms:     Musculoskeletal symptoms:      Neurologic symptoms:      Psychiatric symptoms:               Additional review of systems information: Patient Denies Any Other Complaints.    All Other Systems Reviewed With Patient And Negative.    ALLEGIES:  Review of patient's allergies indicates:  No Known Allergies    MEDICINE LIST:  Current Outpatient Medications   Medication Instructions    OLANZapine (ZYPREXA) 5 mg, Oral, Nightly    pantoprazole (PROTONIX) 40 mg, Oral, 2 times daily    predniSONE (DELTASONE) 50 mg, Oral, Daily    sucralfate  (CARAFATE) 1 g, Oral, Before meals & nightly    sumatriptan (IMITREX) 50 mg, Oral, Daily PRN    traMADoL (ULTRAM) 50 mg, Oral, Every 6 hours PRN    traZODone (DESYREL) 150 mg, Oral, Nightly        PMH:  As per HPI and below:    Reviewed and updated in chart.    PAST MEDICAL HISTORY:  Past Medical History:   Diagnosis Date    Anticoagulant long-term use     Anxiety     Arthritis     C. difficile colitis 8/6/2022    Carotid artery disease     Cervical disc disorder     Chronic back pain     COPD (chronic obstructive pulmonary disease)     Coronary artery disease     Dementia     Depression     Diarrhea, unspecified 11/1/2021    DVT (deep venous thrombosis)     CAROTID    GERD (gastroesophageal reflux disease)     Hematuria     Hiatal hernia     High cholesterol     Ill-fitting dentures     STATES DOESN'T WEAR    PVD (peripheral vascular disease)     Renal calculus     Sleep apnea     Sleep apnea     NO C PAP    Urinary frequency     Urinary urgency     Wears glasses     READING         PAST SURGICAL HISTORY:  Past Surgical History:   Procedure Laterality Date    BACK SURGERY      BREAST LUMPECTOMY Right     CAROTID ARTERY ANGIOPLASTY      CAROTID ARTERY ANGIOPLASTY      CAROTID ARTERY STENT Left     CAROTID ENDARTERECTOMY Right     CHOLECYSTECTOMY      COLONOSCOPY      CYSTOSCOPY      EGD, WITH CLOSED BIOPSY N/A 4/10/2024    Procedure: EGD, WITH CLOSED BIOPSY;  Surgeon: Meg Ayon MD;  Location: [facility];  Service: General;  Laterality: N/A;    EGD, WITH CLOSED BIOPSY N/A 7/17/2024    Procedure: EGD, WITH CLOSED BIOPSY of pyloric lesion;  Surgeon: Meg Ayon MD;  Location: [facility];  Service: General;  Laterality: N/A;  6th 0830    HYSTERECTOMY      OOPHORECTOMY      TONSILLECTOMY         SOCIAL HISTORY:  Social History     Tobacco Use    Smoking status: Every Day     Current packs/day: 1.00     Types: Cigarettes     Passive exposure: Current    Smokeless tobacco: Never   Substance Use Topics     Alcohol use: No    Drug use: No       FAMILY HISTORY:  Family History   Problem Relation Name Age of Onset    Cancer Mother      Stroke Father      No Known Problems Sister      No Known Problems Daughter      No Known Problems Maternal Aunt      No Known Problems Maternal Uncle      No Known Problems Paternal Aunt      No Known Problems Paternal Uncle      No Known Problems Maternal Grandmother      No Known Problems Maternal Grandfather      No Known Problems Paternal Grandmother      No Known Problems Paternal Grandfather      Breast cancer Neg Hx      Ovarian cancer Neg Hx      BRCA 1/2 Neg Hx          PROBLEM LIST:  Patient Active Problem List   Diagnosis    Gross hematuria    Frequency of micturition    Urgency of micturition    Flank pain, acute    Anxiety    Insomnia    Hyperlipidemia    Hematuria    Colitis    Stress incontinence    Dementia without behavioral disturbance    Constipation    Pain of upper abdomen    Vitamin D deficiency    Essential fatty acid (EFA) deficiency    Chronic rhinitis    Viral syndrome    Chronic back pain    Non-intractable vomiting    Diarrhea, unspecified    Neuropathy    Right hip pain    Headache    Primary hypertension    Hypokalemia    Cigarette smoker    Fatigue    Chronic obstructive pulmonary disease with acute exacerbation    Costochondritis    Preop exam for internal medicine    Viral URI    Dysuria    Oropharyngeal dysphagia    Pulmonary nodules    Murmur    SOB (shortness of breath)    Symptomatic anemia    PUD (peptic ulcer disease)    Right arm pain        PHYSICAL EXAM:      ED Triage Vitals [12/20/24 1013]   BP (!) 182/84   Pulse 70   Resp 18   Temp 98.2 °F (36.8 °C)   SpO2 99 %        Vital Signs: Reviewed As In Chart.  General:  Alert, No Cardiorespiratory Distress Noted.  Skin: warm and dry  Eye:   Extraocular Movements Are Intact.   ENT: Mucus membranes are moist.   Cardiovascular:  Normal peripheral perfusion     Respiratory:  Normal respiratory rate     Gastrointestinal:  No distention    Neurological:  Alert And Oriented To Person, Place, Time, And Situation, Normal Motor Observed, Normal Speech Observed.  Musculoskeletal:  No Gross Deformity Noted.  Tenderness to anterior shoulder.  Pain with flexion and internal rotation     Psychiatric:  Cooperative.      ED WORKUP FOR MEDICAL DECISION MAKING:    ED ORDERS:  No orders of the defined types were placed in this encounter.      ED MEDICINES:  Medications - No data to display             ED LABS ORDERED AND REVIEWED:  No visits with results within 1 Day(s) from this visit.   Latest known visit with results is:   Admission on 11/24/2024, Discharged on 11/24/2024   Component Date Value Ref Range Status    WBC 11/24/2024 12.80 (H)  3.90 - 12.70 K/uL Final    RBC 11/24/2024 5.55 (H)  4.00 - 5.40 M/uL Final    Hemoglobin 11/24/2024 14.5  12.0 - 16.0 g/dL Final    Hematocrit 11/24/2024 45.9  37.0 - 48.5 % Final    MCV 11/24/2024 83  82 - 98 fL Final    MCH 11/24/2024 26.1 (L)  27.0 - 31.0 pg Final    MCHC 11/24/2024 31.6 (L)  32.0 - 36.0 g/dL Final    RDW 11/24/2024 19.1 (H)  11.5 - 14.5 % Final    Platelets 11/24/2024 314  150 - 450 K/uL Final    MPV 11/24/2024 10.5  9.2 - 12.9 fL Final    Immature Granulocytes 11/24/2024 0.2  0.0 - 0.5 % Final    Gran # (ANC) 11/24/2024 9.8 (H)  1.8 - 7.7 K/uL Final    Immature Grans (Abs) 11/24/2024 0.03  0.00 - 0.04 K/uL Final    Lymph # 11/24/2024 2.0  1.0 - 4.8 K/uL Final    Mono # 11/24/2024 0.9  0.3 - 1.0 K/uL Final    Eos # 11/24/2024 0.1  0.0 - 0.5 K/uL Final    Baso # 11/24/2024 0.05  0.00 - 0.20 K/uL Final    nRBC 11/24/2024 0  0 /100 WBC Final    Gran % 11/24/2024 76.5 (H)  38.0 - 73.0 % Final    Lymph % 11/24/2024 15.6 (L)  18.0 - 48.0 % Final    Mono % 11/24/2024 6.8  4.0 - 15.0 % Final    Eosinophil % 11/24/2024 0.5  0.0 - 8.0 % Final    Basophil % 11/24/2024 0.4  0.0 - 1.9 % Final    Differential Method 11/24/2024 Automated   Final    Sodium 11/24/2024 136  136 - 145  mmol/L Final    Potassium 11/24/2024 3.3 (L)  3.5 - 5.1 mmol/L Final    Chloride 11/24/2024 100  95 - 110 mmol/L Final    CO2 11/24/2024 26  23 - 29 mmol/L Final    Glucose 11/24/2024 88  70 - 110 mg/dL Final    BUN 11/24/2024 10  8 - 23 mg/dL Final    Creatinine 11/24/2024 0.8  0.5 - 1.4 mg/dL Final    Calcium 11/24/2024 8.8  8.7 - 10.5 mg/dL Final    Total Protein 11/24/2024 6.9  6.0 - 8.4 g/dL Final    Albumin 11/24/2024 3.5  3.5 - 5.2 g/dL Final    Total Bilirubin 11/24/2024 0.3  0.1 - 1.0 mg/dL Final    Alkaline Phosphatase 11/24/2024 98  55 - 135 U/L Final    AST 11/24/2024 15  10 - 40 U/L Final    ALT 11/24/2024 9 (L)  10 - 44 U/L Final    eGFR 11/24/2024 >60.0  >60 mL/min/1.73 m^2 Final    Anion Gap 11/24/2024 10  3 - 11 mmol/L Final    Troponin I High Sensitivity 11/24/2024 <4.0  0.0 - 60.0 pg/mL Final    Specimen UA 11/24/2024 Urine, Clean Catch   Final    Color, UA 11/24/2024 Yellow  Yellow, Straw, Rosario Final    Appearance, UA 11/24/2024 Clear  Clear Final    pH, UA 11/24/2024 7.0  5.0 - 8.0 Final    Specific Gravity, UA 11/24/2024 <=1.005 (A)  1.005 - 1.030 Final    Protein, UA 11/24/2024 Negative  Negative Final    Glucose, UA 11/24/2024 Negative  Negative Final    Ketones, UA 11/24/2024 Trace (A)  Negative Final    Bilirubin (UA) 11/24/2024 Negative  Negative Final    Occult Blood UA 11/24/2024 1+ (A)  Negative Final    Nitrite, UA 11/24/2024 Negative  Negative Final    Urobilinogen, UA 11/24/2024 Negative  <2.0 EU/dL Final    Leukocytes, UA 11/24/2024 2+ (A)  Negative Final    RBC, UA 11/24/2024 1  0 - 4 /hpf Final    WBC, UA 11/24/2024 29 (H)  0 - 5 /hpf Final    Bacteria 11/24/2024 Negative  None-Occ /hpf Final    Squam Epithel, UA 11/24/2024 2  /hpf Final    Hyaline Casts, UA 11/24/2024 1.5 (A)  0-1/lpf /lpf Final    Microscopic Comment 11/24/2024 SEE COMMENT   Final    Urine Culture, Routine 11/24/2024 Multiple organisms isolated. None in predominance.  Repeat if   Final    Urine Culture, Routine  11/24/2024 clinically necessary.   Final       RADIOLOGY STUDIES ORDERED AND REVIEWED:  Imaging Results    None         MEDICAL DECISION MAKING:    Desiree Blake is 69 y.o. female who  has a past medical history of Anticoagulant long-term use, Anxiety, Arthritis, C. difficile colitis (8/6/2022), Carotid artery disease, Cervical disc disorder, Chronic back pain, COPD (chronic obstructive pulmonary disease), Coronary artery disease, Dementia, Depression, Diarrhea, unspecified (11/1/2021), DVT (deep venous thrombosis), GERD (gastroesophageal reflux disease), Hematuria, Hiatal hernia, High cholesterol, Ill-fitting dentures, PVD (peripheral vascular disease), Renal calculus, Sleep apnea, Sleep apnea, Urinary frequency, Urinary urgency, and Wears glasses. arrives in ER with c/o Shoulder Injury (Reports ground level fall this morning onto hard tin in bathroom. Lost balance.denies loc. Complains of right shoulder pain and neck pain. Reports headache, and also chronic abd pain. )      Reviewed Nurses Note. Reviewed Vital Signs.     Reviewed Pertinent old records, History and updated as necessary.    Vitals:    12/20/24 1013   BP: (!) 182/84   Pulse: 70   Resp: 18   Temp: 98.2 °F (36.8 °C)        Medical Decision Making  Differential diagnosis includes but is not limited to rotator cuff injury, bursitis, tendinitis, fracture.                        PROCEDURES PERFORMED IN ED:  Procedures    DIAGNOSTIC IMPRESSION:        ICD-10-CM ICD-9-CM   1. Rotator cuff injury, right, initial encounter  S46.001A 959.2         ED Disposition Condition    Discharge Stable               Medication List        START taking these medications      predniSONE 50 MG Tab  Commonly known as: DELTASONE  Take 1 tablet (50 mg total) by mouth once daily.     traMADoL 50 mg tablet  Commonly known as: ULTRAM  Take 1 tablet (50 mg total) by mouth every 6 (six) hours as needed for Pain.            STOP taking these medications      naproxen 500 MG  tablet  Commonly known as: NAPROSYN     oxyCODONE-acetaminophen  mg per tablet  Commonly known as: PERCOCET            ASK your doctor about these medications      OLANZapine 5 MG tablet  Commonly known as: ZyPREXA  Take 1 tablet (5 mg total) by mouth every evening.     pantoprazole 40 MG tablet  Commonly known as: PROTONIX  Take 1 tablet (40 mg total) by mouth 2 (two) times daily.     sucralfate 1 gram tablet  Commonly known as: CARAFATE  Take 1 tablet (1 g total) by mouth 4 (four) times daily before meals and nightly.     sumatriptan 50 MG tablet  Commonly known as: IMITREX  Take 1 tablet (50 mg total) by mouth daily as needed for Migraine.     traZODone 150 MG tablet  Commonly known as: DESYREL  TAKE 1 TABLET BY MOUTH EVERY EVENING               Where to Get Your Medications        These medications were sent to [facility] [address]  [address] [address]      Phone: [phone]   predniSONE 50 MG Tab  traMADoL 50 mg tablet           Follow-up Information       Primary care physician In 2 days.                              ED Prescriptions       Medication Sig Dispense Start Date End Date Auth. Provider    traMADoL (ULTRAM) 50 mg tablet Take 1 tablet (50 mg total) by mouth every 6 (six) hours as needed for Pain. 12 tablet 12/20/2024 -- Rafal Hoff MD    predniSONE (DELTASONE) 50 MG Tab Take 1 tablet (50 mg total) by mouth once daily. 7 tablet 12/20/2024 -- Rafal Hoff MD          Follow-up Information       Follow up With Specialties Details Why Contact Info    Primary care physician  In 2 days                 Rafal Hoff MD  12/20/24 1038

## 2024-12-28 ENCOUNTER — HOSPITAL ENCOUNTER (EMERGENCY)
Facility: HOSPITAL | Age: 69
Discharge: HOME | End: 2024-12-28
Attending: EMERGENCY MEDICINE
Payer: MEDICARE

## 2024-12-28 VITALS
HEIGHT: 61 IN | TEMPERATURE: 98 F | RESPIRATION RATE: 16 BRPM | DIASTOLIC BLOOD PRESSURE: 80 MMHG | HEART RATE: 77 BPM | OXYGEN SATURATION: 100 % | SYSTOLIC BLOOD PRESSURE: 168 MMHG | WEIGHT: 92.25 LBS | BODY MASS INDEX: 17.42 KG/M2

## 2024-12-28 DIAGNOSIS — R10.9 CHRONIC ABDOMINAL PAIN: Primary | ICD-10-CM

## 2024-12-28 DIAGNOSIS — G89.29 CHRONIC ABDOMINAL PAIN: Primary | ICD-10-CM

## 2024-12-28 DIAGNOSIS — M25.511 CHRONIC RIGHT SHOULDER PAIN: ICD-10-CM

## 2024-12-28 DIAGNOSIS — R82.81 PYURIA: ICD-10-CM

## 2024-12-28 DIAGNOSIS — G89.29 CHRONIC RIGHT SHOULDER PAIN: ICD-10-CM

## 2024-12-28 LAB
ALBUMIN SERPL BCP-MCNC: 3.5 G/DL (ref 3.5–5.2)
ALP SERPL-CCNC: 94 U/L (ref 55–135)
ALT SERPL W/O P-5'-P-CCNC: 11 U/L (ref 10–44)
ANION GAP SERPL CALC-SCNC: 9 MMOL/L (ref 3–11)
AST SERPL-CCNC: 9 U/L (ref 10–40)
BACTERIA #/AREA URNS HPF: NEGATIVE /HPF
BASOPHILS # BLD AUTO: 0.05 K/UL (ref 0–0.2)
BASOPHILS NFR BLD: 0.4 % (ref 0–1.9)
BILIRUB SERPL-MCNC: 0.1 MG/DL (ref 0.1–1)
BILIRUB UR QL STRIP: NEGATIVE
BUN SERPL-MCNC: 14 MG/DL (ref 8–23)
CALCIUM SERPL-MCNC: 8.7 MG/DL (ref 8.7–10.5)
CHLORIDE SERPL-SCNC: 103 MMOL/L (ref 95–110)
CLARITY UR: ABNORMAL
CO2 SERPL-SCNC: 27 MMOL/L (ref 23–29)
COLOR UR: YELLOW
CREAT SERPL-MCNC: 0.5 MG/DL (ref 0.5–1.4)
DIFFERENTIAL METHOD BLD: ABNORMAL
EOSINOPHIL # BLD AUTO: 0.2 K/UL (ref 0–0.5)
EOSINOPHIL NFR BLD: 1.6 % (ref 0–8)
ERYTHROCYTE [DISTWIDTH] IN BLOOD BY AUTOMATED COUNT: 19 % (ref 11.5–14.5)
EST. GFR  (NO RACE VARIABLE): >60 ML/MIN/1.73 M^2
GLUCOSE SERPL-MCNC: 98 MG/DL (ref 70–110)
GLUCOSE UR QL STRIP: NEGATIVE
HCT VFR BLD AUTO: 38.4 % (ref 37–48.5)
HGB BLD-MCNC: 12 G/DL (ref 12–16)
HGB UR QL STRIP: ABNORMAL
HYALINE CASTS #/AREA URNS LPF: 2.9 /LPF
IMM GRANULOCYTES # BLD AUTO: 0.03 K/UL (ref 0–0.04)
IMM GRANULOCYTES NFR BLD AUTO: 0.2 % (ref 0–0.5)
KETONES UR QL STRIP: NEGATIVE
LEUKOCYTE ESTERASE UR QL STRIP: ABNORMAL
LYMPHOCYTES # BLD AUTO: 1.5 K/UL (ref 1–4.8)
LYMPHOCYTES NFR BLD: 12.1 % (ref 18–48)
MCH RBC QN AUTO: 27 PG (ref 27–31)
MCHC RBC AUTO-ENTMCNC: 31.3 G/DL (ref 32–36)
MCV RBC AUTO: 87 FL (ref 82–98)
MICROSCOPIC COMMENT: ABNORMAL
MONOCYTES # BLD AUTO: 0.8 K/UL (ref 0.3–1)
MONOCYTES NFR BLD: 6.2 % (ref 4–15)
NEUTROPHILS # BLD AUTO: 10 K/UL (ref 1.8–7.7)
NEUTROPHILS NFR BLD: 79.5 % (ref 38–73)
NITRITE UR QL STRIP: NEGATIVE
NRBC BLD-RTO: 0 /100 WBC
PH UR STRIP: 7 [PH] (ref 5–8)
PLATELET # BLD AUTO: 245 K/UL (ref 150–450)
PMV BLD AUTO: 10.6 FL (ref 9.2–12.9)
POTASSIUM SERPL-SCNC: 3.7 MMOL/L (ref 3.5–5.1)
PROT SERPL-MCNC: 6.7 G/DL (ref 6–8.4)
PROT UR QL STRIP: NEGATIVE
RBC # BLD AUTO: 4.44 M/UL (ref 4–5.4)
RBC #/AREA URNS HPF: 4 /HPF (ref 0–4)
SODIUM SERPL-SCNC: 139 MMOL/L (ref 136–145)
SP GR UR STRIP: 1.02 (ref 1–1.03)
SQUAMOUS #/AREA URNS HPF: 3 /HPF
URN SPEC COLLECT METH UR: ABNORMAL
UROBILINOGEN UR STRIP-ACNC: NEGATIVE EU/DL
WBC # BLD AUTO: 12.58 K/UL (ref 3.9–12.7)
WBC #/AREA URNS HPF: 50 /HPF (ref 0–5)

## 2024-12-28 PROCEDURE — 36415 COLL VENOUS BLD VENIPUNCTURE: CPT | Performed by: NURSE PRACTITIONER

## 2024-12-28 PROCEDURE — 96372 THER/PROPH/DIAG INJ SC/IM: CPT | Performed by: NURSE PRACTITIONER

## 2024-12-28 PROCEDURE — 80053 COMPREHEN METABOLIC PANEL: CPT | Performed by: NURSE PRACTITIONER

## 2024-12-28 PROCEDURE — 63600175 PHARM REV CODE 636 W HCPCS: Performed by: NURSE PRACTITIONER

## 2024-12-28 PROCEDURE — 85025 COMPLETE CBC W/AUTO DIFF WBC: CPT | Performed by: NURSE PRACTITIONER

## 2024-12-28 PROCEDURE — 25000003 PHARM REV CODE 250: Performed by: NURSE PRACTITIONER

## 2024-12-28 PROCEDURE — 87086 URINE CULTURE/COLONY COUNT: CPT | Performed by: NURSE PRACTITIONER

## 2024-12-28 PROCEDURE — 81000 URINALYSIS NONAUTO W/SCOPE: CPT | Performed by: NURSE PRACTITIONER

## 2024-12-28 PROCEDURE — 99284 EMERGENCY DEPT VISIT MOD MDM: CPT | Mod: 25

## 2024-12-28 RX ORDER — LIDOCAINE 50 MG/G
1 PATCH TOPICAL DAILY
Qty: 5 PATCH | Refills: 0 | Status: SHIPPED | OUTPATIENT
Start: 2024-12-28 | End: 2025-01-02

## 2024-12-28 RX ORDER — ONDANSETRON 4 MG/1
4 TABLET, ORALLY DISINTEGRATING ORAL EVERY 8 HOURS PRN
Qty: 6 TABLET | Refills: 0 | Status: SHIPPED | OUTPATIENT
Start: 2024-12-28 | End: 2024-12-30

## 2024-12-28 RX ORDER — HYDROMORPHONE HYDROCHLORIDE 1 MG/ML
1 INJECTION, SOLUTION INTRAMUSCULAR; INTRAVENOUS; SUBCUTANEOUS
Status: COMPLETED | OUTPATIENT
Start: 2024-12-28 | End: 2024-12-28

## 2024-12-28 RX ORDER — NITROFURANTOIN 25; 75 MG/1; MG/1
100 CAPSULE ORAL 2 TIMES DAILY
Qty: 14 CAPSULE | Refills: 0 | Status: SHIPPED | OUTPATIENT
Start: 2024-12-28 | End: 2025-01-04

## 2024-12-28 RX ORDER — ONDANSETRON 4 MG/1
4 TABLET, ORALLY DISINTEGRATING ORAL
Status: COMPLETED | OUTPATIENT
Start: 2024-12-28 | End: 2024-12-28

## 2024-12-28 RX ADMIN — HYDROMORPHONE HYDROCHLORIDE 1 MG: 1 INJECTION, SOLUTION INTRAMUSCULAR; INTRAVENOUS; SUBCUTANEOUS at 01:12

## 2024-12-28 RX ADMIN — ONDANSETRON 4 MG: 4 TABLET, ORALLY DISINTEGRATING ORAL at 01:12

## 2024-12-28 NOTE — DISCHARGE INSTRUCTIONS
Be sure to complete all antibiotics.  Drink plenty of fluids and empty your bladder frequently.  Follow-up with your orthopedist as scheduled.  Continue pain medications as directed by your pain management doctor.  See your primary doctor in 1-2 days and return to the emergency department for worsening condition.

## 2024-12-28 NOTE — ED NOTES
..AAOx4, skin W/D/P, cap refill<3 sec, MAEW, NAD, gait steady ,  no assistance needed upon discharge. Wheelchair and assistance offered, patient declined.  escorting pt out

## 2024-12-28 NOTE — ED PROVIDER NOTES
"Encounter Date: 12/28/2024       History     Chief Complaint   Patient presents with    Generalized Body Aches     Pt stated that she has been experiencing worsened chronic right shoulder/arm pain - body aches / diarrhea / insomnia - "stomach ulcer bothering" her as well causing abdominal cramps.      This is a 69-year-old white female with a history of peptic ulcer disease, chronic abdominal pain, and chronic right shoulder pain currently awaiting orthopedic referral, anxiety who presents to the emergency department with multiple complaints.  She reports constant right shoulder pain since experiencing a fall 2 months ago, worse with movement and improved with rest.  Patient reports difficulty raising the arm.  Symptoms unrelieved with prescribed pain medication.  Negative initial plain films.  Patient is also reporting flare-up of chronic abdominal pain over the last few days, endorsing aching pain in the upper quadrants that is relatively constant and associated with nausea and diarrhea.  EGD performed earlier this year revealed pre-pyloric gastric ulcer with negative biopsies; patient is currently followed by . Patient denies change from previous symptoms, denies black/bloody stools, denies fever, vomiting.              Review of patient's allergies indicates:  No Known Allergies  Past Medical History:   Diagnosis Date    Anticoagulant long-term use     Anxiety     Arthritis     C. difficile colitis 8/6/2022    Carotid artery disease     Cervical disc disorder     Chronic back pain     COPD (chronic obstructive pulmonary disease)     Coronary artery disease     Dementia     Depression     Diarrhea, unspecified 11/1/2021    DVT (deep venous thrombosis)     CAROTID    GERD (gastroesophageal reflux disease)     Hematuria     Hiatal hernia     High cholesterol     Ill-fitting dentures     STATES DOESN'T WEAR    PVD (peripheral vascular disease)     Renal calculus     Sleep apnea     Sleep apnea     NO C " PAP    Urinary frequency     Urinary urgency     Wears glasses     READING      Past Surgical History:   Procedure Laterality Date    BACK SURGERY      BREAST LUMPECTOMY Right     CAROTID ARTERY ANGIOPLASTY      CAROTID ARTERY ANGIOPLASTY      CAROTID ARTERY STENT Left     CAROTID ENDARTERECTOMY Right     CHOLECYSTECTOMY      COLONOSCOPY      CYSTOSCOPY      EGD, WITH CLOSED BIOPSY N/A 4/10/2024    Procedure: EGD, WITH CLOSED BIOPSY;  Surgeon: Meg Ayon MD;  Location: [facility];  Service: General;  Laterality: N/A;    EGD, WITH CLOSED BIOPSY N/A 7/17/2024    Procedure: EGD, WITH CLOSED BIOPSY of pyloric lesion;  Surgeon: Meg Ayon MD;  Location: [facility] ENDO;  Service: General;  Laterality: N/A;  6th 0830    HYSTERECTOMY      OOPHORECTOMY      TONSILLECTOMY       Family History   Problem Relation Name Age of Onset    Cancer Mother      Stroke Father      No Known Problems Sister      No Known Problems Daughter      No Known Problems Maternal Aunt      No Known Problems Maternal Uncle      No Known Problems Paternal Aunt      No Known Problems Paternal Uncle      No Known Problems Maternal Grandmother      No Known Problems Maternal Grandfather      No Known Problems Paternal Grandmother      No Known Problems Paternal Grandfather      Breast cancer Neg Hx      Ovarian cancer Neg Hx      BRCA 1/2 Neg Hx       Social History     Tobacco Use    Smoking status: Every Day     Current packs/day: 1.00     Types: Cigarettes     Passive exposure: Current    Smokeless tobacco: Never   Substance Use Topics    Alcohol use: No    Drug use: No     Review of Systems   Constitutional:  Positive for appetite change. Negative for fever.   HENT:  Negative for congestion.    Respiratory:  Negative for cough.    Gastrointestinal:  Positive for abdominal pain, diarrhea and nausea. Negative for vomiting.   Musculoskeletal:  Positive for arthralgias and myalgias.   Skin: Negative.    Neurological:  Negative for numbness.        Physical Exam     Initial Vitals [12/28/24 1158]   BP Pulse Resp Temp SpO2   (!) 168/80 77 20 98 °F (36.7 °C) 100 %      MAP       --         Physical Exam    Nursing note and vitals reviewed.  Constitutional: She appears well-developed and well-nourished. She is active. No distress.   HENT:   Head: Normocephalic and atraumatic.   Eyes: EOM are normal. Pupils are equal, round, and reactive to light.   Neck: Neck supple.   Normal range of motion.  Cardiovascular:  Normal rate, regular rhythm and normal heart sounds.           Pulmonary/Chest: Breath sounds normal. No respiratory distress.   Abdominal: Abdomen is soft. Bowel sounds are normal. She exhibits no distension. There is no abdominal tenderness.   Musculoskeletal:         General: Tenderness present.      Cervical back: Normal range of motion and neck supple.      Comments: The right shoulder appears normal upon inspection, no rash or discoloration noted.  Patient does experience tenderness laterally.  Decreased range of motion secondary to pain.  Distally NVI.     Neurological: She is alert and oriented to person, place, and time. GCS score is 15. GCS eye subscore is 4. GCS verbal subscore is 5. GCS motor subscore is 6.   Skin: Skin is warm and dry. Capillary refill takes less than 2 seconds.   Psychiatric: She has a normal mood and affect. Her behavior is normal. Thought content normal.         ED Course   Procedures  Labs Reviewed   CBC W/ AUTO DIFFERENTIAL - Abnormal       Result Value    WBC 12.58      RBC 4.44      Hemoglobin 12.0      Hematocrit 38.4      MCV 87      MCH 27.0      MCHC 31.3 (*)     RDW 19.0 (*)     Platelets 245      MPV 10.6      Immature Granulocytes 0.2      Gran # (ANC) 10.0 (*)     Immature Grans (Abs) 0.03      Lymph # 1.5      Mono # 0.8      Eos # 0.2      Baso # 0.05      nRBC 0      Gran % 79.5 (*)     Lymph % 12.1 (*)     Mono % 6.2      Eosinophil % 1.6      Basophil % 0.4      Differential Method Automated      COMPREHENSIVE METABOLIC PANEL - Abnormal    Sodium 139      Potassium 3.7      Chloride 103      CO2 27      Glucose 98      BUN 14      Creatinine 0.5      Calcium 8.7      Total Protein 6.7      Albumin 3.5      Total Bilirubin 0.1      Alkaline Phosphatase 94      AST 9 (*)     ALT 11      eGFR >60.0      Anion Gap 9     URINALYSIS, REFLEX TO URINE CULTURE - Abnormal    Specimen UA Urine, Clean Catch      Color, UA Yellow      Appearance, UA Cloudy (*)     pH, UA 7.0      Specific Gravity, UA 1.020      Protein, UA Negative      Glucose, UA Negative      Ketones, UA Negative      Bilirubin (UA) Negative      Occult Blood UA Trace (*)     Nitrite, UA Negative      Urobilinogen, UA Negative      Leukocytes, UA 2+ (*)     Narrative:     Preferred Collection Type->Urine, Clean Catch  Specimen Source->Urine   URINALYSIS MICROSCOPIC - Abnormal    RBC, UA 4      WBC, UA 50 (*)     Bacteria Negative      Squam Epithel, UA 3      Hyaline Casts, UA 2.9 (*)     Microscopic Comment SEE COMMENT      Narrative:     Preferred Collection Type->Urine, Clean Catch  Specimen Source->Urine   CULTURE, URINE          Imaging Results    None          Medications   ondansetron disintegrating tablet 4 mg (has no administration in time range)   HYDROmorphone injection 1 mg (has no administration in time range)     Medical Decision Making  Amount and/or Complexity of Data Reviewed  Labs: ordered.    Risk  Prescription drug management.               ED Course as of 12/28/24 1335   Sat Dec 28, 2024   1320 All labs relatively unremarkable except for finding of 50 WBCs on urinalysis.  Given symptoms will cover for UTI, culture pending.  Otherwise labs normal WBCs 57817 with normal chemistries.  Patient does have appointment to follow-up with ortho in [place].  [CB]      ED Course User Index  [CB] Jyotsna Chilel NP                           Clinical Impression:  Final diagnoses:  [R10.9, G89.29] Chronic abdominal pain (Primary)  [M25.511,  G89.29] Chronic right shoulder pain  [R82.81] Pyuria          ED Disposition Condition    Discharge Stable          ED Prescriptions       Medication Sig Dispense Start Date End Date Auth. Provider    nitrofurantoin, macrocrystal-monohydrate, (MACROBID) 100 MG capsule Take 1 capsule (100 mg total) by mouth 2 (two) times daily. for 7 days 14 capsule 12/28/2024 1/4/2025 Jyotsna Chilel NP    ondansetron (ZOFRAN-ODT) 4 MG TbDL Take 1 tablet (4 mg total) by mouth every 8 (eight) hours as needed. 6 tablet 12/28/2024 12/30/2024 Jyotsna Chilel NP    LIDOcaine (LIDODERM) 5 % Place 1 patch onto the skin once daily. Remove & Discard patch within 12 hours or as directed by MD for 5 days 5 patch 12/28/2024 1/2/2025 Jyotsna Chilel NP          Follow-up Information       Follow up With Specialties Details Why Contact Info    PCP Follow UP  Schedule an appointment as soon as possible for a visit in 2 days for follow-up, for re-evaluation of today's complaint              Jyotsna Chilel NP  12/28/24 5757       Jyotsna Chilel NP  12/28/24 1490

## 2024-12-29 LAB
BACTERIA UR CULT: NORMAL
BACTERIA UR CULT: NORMAL

## 2025-01-18 ENCOUNTER — HOSPITAL ENCOUNTER (EMERGENCY)
Facility: HOSPITAL | Age: 70
Discharge: HOME | End: 2025-01-18
Attending: EMERGENCY MEDICINE
Payer: MEDICARE

## 2025-01-18 VITALS
BODY MASS INDEX: 18.88 KG/M2 | DIASTOLIC BLOOD PRESSURE: 70 MMHG | OXYGEN SATURATION: 100 % | WEIGHT: 100 LBS | HEIGHT: 61 IN | SYSTOLIC BLOOD PRESSURE: 103 MMHG | RESPIRATION RATE: 18 BRPM | HEART RATE: 83 BPM | TEMPERATURE: 98 F

## 2025-01-18 DIAGNOSIS — R10.9 CHRONIC ABDOMINAL PAIN: Primary | ICD-10-CM

## 2025-01-18 DIAGNOSIS — G89.29 CHRONIC ABDOMINAL PAIN: Primary | ICD-10-CM

## 2025-01-18 PROCEDURE — 99284 EMERGENCY DEPT VISIT MOD MDM: CPT | Mod: 25

## 2025-01-18 PROCEDURE — 63600175 PHARM REV CODE 636 W HCPCS: Performed by: EMERGENCY MEDICINE

## 2025-01-18 PROCEDURE — 96372 THER/PROPH/DIAG INJ SC/IM: CPT | Performed by: EMERGENCY MEDICINE

## 2025-01-18 RX ORDER — DICYCLOMINE HYDROCHLORIDE 10 MG/ML
20 INJECTION INTRAMUSCULAR
Status: COMPLETED | OUTPATIENT
Start: 2025-01-18 | End: 2025-01-18

## 2025-01-18 RX ORDER — PROMETHAZINE HYDROCHLORIDE 25 MG/ML
25 INJECTION, SOLUTION INTRAMUSCULAR; INTRAVENOUS
Status: COMPLETED | OUTPATIENT
Start: 2025-01-18 | End: 2025-01-18

## 2025-01-18 RX ADMIN — PROMETHAZINE HYDROCHLORIDE 25 MG: 25 INJECTION INTRAMUSCULAR; INTRAVENOUS at 12:01

## 2025-01-18 RX ADMIN — DICYCLOMINE HYDROCHLORIDE 20 MG: 20 INJECTION, SOLUTION INTRAMUSCULAR at 12:01

## 2025-01-18 NOTE — ED PROVIDER NOTES
"Encounter Date: 1/18/2025       History     Chief Complaint   Patient presents with    Vomiting     Patient complaining of abdominal pain and vomiting "for a couple of days".      69-year-old female with chronic pain, chronic abdominal pain, peptic ulcer disease, here often for chronic abdominal pain complaining of abdominal pain and vomiting and diarrhea for the past 2-3 days.  History of this multiple times in the past, has had multiple workups for same.  Denies blood in stool or vomit.  No melena.  Not ill appearing.  Vital signs are stable.  Alert and oriented x4, GCS is 15      Review of patient's allergies indicates:  No Known Allergies  Past Medical History:   Diagnosis Date    Anticoagulant long-term use     Anxiety     Arthritis     C. difficile colitis 8/6/2022    Carotid artery disease     Cervical disc disorder     Chronic back pain     COPD (chronic obstructive pulmonary disease)     Coronary artery disease     Dementia     Depression     Diarrhea, unspecified 11/1/2021    DVT (deep venous thrombosis)     CAROTID    GERD (gastroesophageal reflux disease)     Hematuria     Hiatal hernia     High cholesterol     Ill-fitting dentures     STATES DOESN'T WEAR    PVD (peripheral vascular disease)     Renal calculus     Sleep apnea     Sleep apnea     NO C PAP    Urinary frequency     Urinary urgency     Wears glasses     READING      Past Surgical History:   Procedure Laterality Date    BACK SURGERY      BREAST LUMPECTOMY Right     CAROTID ARTERY ANGIOPLASTY      CAROTID ARTERY ANGIOPLASTY      CAROTID ARTERY STENT Left     CAROTID ENDARTERECTOMY Right     CHOLECYSTECTOMY      COLONOSCOPY      CYSTOSCOPY      EGD, WITH CLOSED BIOPSY N/A 4/10/2024    Procedure: EGD, WITH CLOSED BIOPSY;  Surgeon: Meg Ayon MD;  Location: [facility];  Service: General;  Laterality: N/A;    EGD, WITH CLOSED BIOPSY N/A 7/17/2024    Procedure: EGD, WITH CLOSED BIOPSY of pyloric lesion;  Surgeon: Meg Ayon MD;  " Location: [facility];  Service: General;  Laterality: N/A;  6th 0830    HYSTERECTOMY      OOPHORECTOMY      TONSILLECTOMY       Family History   Problem Relation Name Age of Onset    Cancer Mother      Stroke Father      No Known Problems Sister      No Known Problems Daughter      No Known Problems Maternal Aunt      No Known Problems Maternal Uncle      No Known Problems Paternal Aunt      No Known Problems Paternal Uncle      No Known Problems Maternal Grandmother      No Known Problems Maternal Grandfather      No Known Problems Paternal Grandmother      No Known Problems Paternal Grandfather      Breast cancer Neg Hx      Ovarian cancer Neg Hx      BRCA 1/2 Neg Hx       Social History     Tobacco Use    Smoking status: Every Day     Current packs/day: 1.00     Types: Cigarettes     Passive exposure: Current    Smokeless tobacco: Never   Substance Use Topics    Alcohol use: No    Drug use: No     Review of Systems   Constitutional:  Negative for fever.   HENT:  Negative for sore throat.    Respiratory:  Negative for shortness of breath.    Cardiovascular:  Negative for chest pain.   Gastrointestinal:  Positive for abdominal pain. Negative for nausea.   Genitourinary:  Negative for dysuria.   Musculoskeletal:  Negative for back pain.   Skin:  Negative for rash.   Neurological:  Negative for weakness.   Hematological:  Does not bruise/bleed easily.   All other systems reviewed and are negative.      Physical Exam     Initial Vitals [01/18/25 1213]   BP Pulse Resp Temp SpO2   103/70 83 18 97.9 °F (36.6 °C) 100 %      MAP       --         Physical Exam    Nursing note and vitals reviewed.  Constitutional: She appears well-developed and well-nourished. She is not diaphoretic. No distress.   HENT:   Head: Normocephalic and atraumatic.   Eyes: Conjunctivae and EOM are normal. Pupils are equal, round, and reactive to light.   Neck: Neck supple.   Normal range of motion.  Cardiovascular:  Normal rate, regular rhythm,  normal heart sounds and intact distal pulses.           No murmur heard.  Pulmonary/Chest: Breath sounds normal. No respiratory distress. She has no wheezes. She has no rhonchi. She has no rales. She exhibits no tenderness.   Abdominal: Abdomen is soft. Bowel sounds are normal. She exhibits no distension and no mass. There is abdominal tenderness.   Minimal tenderness noted to epigastric area which is normal for her.  No hernia There is no rebound and no guarding.   Musculoskeletal:         General: No tenderness or edema. Normal range of motion.      Cervical back: Normal range of motion and neck supple.     Neurological: She is alert and oriented to person, place, and time. She has normal strength. No cranial nerve deficit. GCS score is 15. GCS eye subscore is 4. GCS verbal subscore is 5. GCS motor subscore is 6.   Skin: Skin is warm and dry. Capillary refill takes less than 2 seconds.         ED Course   Procedures  Labs Reviewed - No data to display       Imaging Results    None          Medications   dicyclomine injection 20 mg (has no administration in time range)   promethazine injection 25 mg (has no administration in time range)     Medical Decision Making  Risk  Prescription drug management.                          Medical Decision Making:   Differential Diagnosis:   Chronic abdominal pain, chronic pain syndrome             Clinical Impression:  Final diagnoses:  [R10.9, G89.29] Chronic abdominal pain (Primary)          ED Disposition Condition    Discharge Stable          ED Prescriptions    None       Follow-up Information       Follow up With Specialties Details Why Contact Info Additional Information    Kt Pozo Jr., MD Internal Medicine In 3 days  [address] [facility] [address]  [phone]       [facility] Emergency Department Emergency Medicine  If symptoms worsen [address] [place] [address] [id number] [phone] Floor [address]             Marek Rai MD  01/18/25  3655

## 2025-01-31 PROBLEM — F32.1 MODERATE MAJOR DEPRESSION, SINGLE EPISODE: Status: ACTIVE | Noted: 2025-01-31

## 2025-01-31 PROBLEM — B34.9 VIRAL SYNDROME: Status: RESOLVED | Noted: 2021-08-03 | Resolved: 2025-01-31

## 2025-01-31 PROBLEM — N30.00 ACUTE CYSTITIS WITHOUT HEMATURIA: Status: ACTIVE | Noted: 2025-01-31

## 2025-02-03 ENCOUNTER — TELEPHONE (OUTPATIENT)
Dept: SURGERY | Facility: CLINIC | Age: 70
End: 2025-02-03
Payer: MEDICARE

## 2025-02-11 ENCOUNTER — HOSPITAL ENCOUNTER (EMERGENCY)
Facility: HOSPITAL | Age: 70
Discharge: HOME | End: 2025-02-11
Attending: EMERGENCY MEDICINE
Payer: MEDICARE

## 2025-02-11 VITALS
SYSTOLIC BLOOD PRESSURE: 147 MMHG | DIASTOLIC BLOOD PRESSURE: 81 MMHG | HEIGHT: 61 IN | TEMPERATURE: 98 F | BODY MASS INDEX: 17.18 KG/M2 | RESPIRATION RATE: 16 BRPM | HEART RATE: 78 BPM | OXYGEN SATURATION: 100 % | WEIGHT: 91 LBS

## 2025-02-11 DIAGNOSIS — R11.2 NAUSEA AND VOMITING, UNSPECIFIED VOMITING TYPE: Primary | ICD-10-CM

## 2025-02-11 DIAGNOSIS — R19.7 DIARRHEA, UNSPECIFIED TYPE: ICD-10-CM

## 2025-02-11 DIAGNOSIS — K27.9 PUD (PEPTIC ULCER DISEASE): ICD-10-CM

## 2025-02-11 PROCEDURE — 87502 INFLUENZA DNA AMP PROBE: CPT

## 2025-02-11 PROCEDURE — 99284 EMERGENCY DEPT VISIT MOD MDM: CPT | Mod: 25

## 2025-02-11 PROCEDURE — 96372 THER/PROPH/DIAG INJ SC/IM: CPT | Performed by: EMERGENCY MEDICINE

## 2025-02-11 PROCEDURE — 63600175 PHARM REV CODE 636 W HCPCS: Performed by: EMERGENCY MEDICINE

## 2025-02-11 PROCEDURE — 87635 SARS-COV-2 COVID-19 AMP PRB: CPT | Performed by: EMERGENCY MEDICINE

## 2025-02-11 RX ORDER — PROMETHAZINE HYDROCHLORIDE 25 MG/ML
25 INJECTION, SOLUTION INTRAMUSCULAR; INTRAVENOUS
Status: COMPLETED | OUTPATIENT
Start: 2025-02-11 | End: 2025-02-11

## 2025-02-11 RX ORDER — DICYCLOMINE HYDROCHLORIDE 10 MG/ML
20 INJECTION INTRAMUSCULAR
Status: COMPLETED | OUTPATIENT
Start: 2025-02-11 | End: 2025-02-11

## 2025-02-11 RX ORDER — ONDANSETRON 4 MG/1
4 TABLET, ORALLY DISINTEGRATING ORAL EVERY 6 HOURS PRN
Qty: 20 TABLET | Refills: 0 | Status: SHIPPED | OUTPATIENT
Start: 2025-02-11

## 2025-02-11 RX ADMIN — DICYCLOMINE HYDROCHLORIDE 20 MG: 20 INJECTION, SOLUTION INTRAMUSCULAR at 11:02

## 2025-02-11 RX ADMIN — PROMETHAZINE HYDROCHLORIDE 25 MG: 25 INJECTION INTRAMUSCULAR; INTRAVENOUS at 11:02

## 2025-02-11 NOTE — ED PROVIDER NOTES
Encounter Date: 2/11/2025       History     Chief Complaint   Patient presents with    Abdominal Pain     Patient to the ER with complaints of abdominal pain, nausea/vomiting, and diarrhea onset last night.     70 yo female with extensive history as below here via POV with N/V/D onset yesterday. No fever. Long history of same. No known sick contacts.       Review of patient's allergies indicates:  No Known Allergies  Past Medical History:   Diagnosis Date    Anticoagulant long-term use     Anxiety     Arthritis     C. difficile colitis 8/6/2022    Carotid artery disease     Cervical disc disorder     Chronic back pain     COPD (chronic obstructive pulmonary disease)     Coronary artery disease     Dementia     Depression     Diarrhea, unspecified 11/1/2021    DVT (deep venous thrombosis)     CAROTID    GERD (gastroesophageal reflux disease)     Hematuria     Hiatal hernia     High cholesterol     Ill-fitting dentures     STATES DOESN'T WEAR    PVD (peripheral vascular disease)     Renal calculus     Sleep apnea     Sleep apnea     NO C PAP    Urinary frequency     Urinary urgency     Wears glasses     READING      Past Surgical History:   Procedure Laterality Date    BACK SURGERY      BREAST LUMPECTOMY Right     CAROTID ARTERY ANGIOPLASTY      CAROTID ARTERY ANGIOPLASTY      CAROTID ARTERY STENT Left     CAROTID ENDARTERECTOMY Right     CHOLECYSTECTOMY      COLONOSCOPY      CYSTOSCOPY      EGD, WITH CLOSED BIOPSY N/A 4/10/2024    Procedure: EGD, WITH CLOSED BIOPSY;  Surgeon: Meg Ayon MD;  Location: [facility];  Service: General;  Laterality: N/A;    EGD, WITH CLOSED BIOPSY N/A 7/17/2024    Procedure: EGD, WITH CLOSED BIOPSY of pyloric lesion;  Surgeon: Meg Ayon MD;  Location: [facility];  Service: General;  Laterality: N/A;  6th 0830    HYSTERECTOMY      OOPHORECTOMY      TONSILLECTOMY       Family History   Problem Relation Name Age of Onset    Cancer Mother      Stroke Father      No Known  Problems Sister      No Known Problems Daughter      No Known Problems Maternal Aunt      No Known Problems Maternal Uncle      No Known Problems Paternal Aunt      No Known Problems Paternal Uncle      No Known Problems Maternal Grandmother      No Known Problems Maternal Grandfather      No Known Problems Paternal Grandmother      No Known Problems Paternal Grandfather      Breast cancer Neg Hx      Ovarian cancer Neg Hx      BRCA 1/2 Neg Hx       Social History     Tobacco Use    Smoking status: Every Day     Current packs/day: 1.00     Types: Cigarettes     Passive exposure: Current    Smokeless tobacco: Never   Substance Use Topics    Alcohol use: No    Drug use: No     Review of Systems   Constitutional: Negative.    Respiratory: Negative.     Cardiovascular: Negative.    Gastrointestinal:  Positive for diarrhea, nausea and vomiting.   All other systems reviewed and are negative.      Physical Exam     Initial Vitals [02/11/25 1022]   BP Pulse Resp Temp SpO2   (!) 155/84 81 18 98.6 °F (37 °C) 100 %      MAP       --         Physical Exam    Nursing note and vitals reviewed.  Constitutional: She appears well-developed and well-nourished. She is not diaphoretic. No distress.   HENT:   Head: Normocephalic and atraumatic.   Eyes: EOM are normal. Pupils are equal, round, and reactive to light.   Neck: Neck supple.   Normal range of motion.  Cardiovascular:  Normal rate, regular rhythm and intact distal pulses.           Pulmonary/Chest: Breath sounds normal. No respiratory distress. She has no wheezes. She has no rales.   Abdominal: Abdomen is soft. Bowel sounds are normal. She exhibits no distension. There is no abdominal tenderness. There is no rebound.   Musculoskeletal:         General: No tenderness or edema. Normal range of motion.      Cervical back: Normal range of motion and neck supple.     Neurological: She is alert and oriented to person, place, and time. She has normal strength and normal reflexes. GCS  score is 15. GCS eye subscore is 4. GCS verbal subscore is 5. GCS motor subscore is 6.   Skin: Skin is warm and dry. Capillary refill takes less than 2 seconds.         ED Course   Procedures  Labs Reviewed   POCT INFLUENZA A/B MOLECULAR       Result Value    POC Molecular Influenza A Ag Negative      POC Molecular Influenza B Ag Negative       Acceptable Yes     SARS-COV-2 RDRP GENE    POC Rapid COVID Negative       Acceptable Yes      Narrative:     This test utilizes isothermal nucleic acid amplification technology to detect the SARS-CoV-2 RdRp nucleic acid segment. The analytical sensitivity (limit of detection) is 500 copies/swab.     A POSITIVE result is indicative of the presence of SARS-CoV-2 RNA; clinical correlation with patient history and other diagnostic information is necessary to determine patient infection status.    A NEGATIVE result means that SARS-CoV-2 nucleic acids are not present above the limit of detection. A NEGATIVE result should be treated as presumptive. It does not rule out the possibility of COVID-19 and should not be the sole basis for treatment decisions. If COVID-19 is strongly suspected based on clinical and exposure history, re-testing using an alternate molecular assay should be considered.     Commercial kits are provided by [email or web address].              Imaging Results    None          Medications   dicyclomine injection 20 mg (20 mg Intramuscular Given 2/11/25 1111)   promethazine injection 25 mg (25 mg Intramuscular Given 2/11/25 1111)     Medical Decision Making  Appears chronically but not acutely ill. Vitals are stable. Symptoms are chronic in nature and recurrent. Flu and COVID are negative. Will treat symptomatically.     Amount and/or Complexity of Data Reviewed  Labs: ordered. Decision-making details documented in ED Course.    Risk  Prescription drug management.                                      Clinical Impression:  Final  diagnoses:  [R11.2] Nausea and vomiting, unspecified vomiting type (Primary)  [R19.7] Diarrhea, unspecified type          ED Disposition Condition    Discharge Stable          ED Prescriptions       Medication Sig Dispense Start Date End Date Auth. Provider    ondansetron (ZOFRAN-ODT) 4 MG TbDL Take 1 tablet (4 mg total) by mouth every 6 (six) hours as needed. 20 tablet 2/11/2025 -- Nitesh Fernandez MD          Follow-up Information       Follow up With Specialties Details Why Contact Info    Kt Pozo Jr., MD Internal Medicine Schedule an appointment as soon as possible for a visit   [address] [place] [address]  [phone]               Nitesh Fernandez MD  02/11/25 3248

## 2025-02-18 ENCOUNTER — HOSPITAL ENCOUNTER (INPATIENT)
Facility: HOSPITAL | Age: 70
LOS: 3 days | Discharge: HOME | DRG: 384 | End: 2025-02-21
Attending: EMERGENCY MEDICINE | Admitting: EMERGENCY MEDICINE
Payer: MEDICARE

## 2025-02-18 DIAGNOSIS — R30.0 DYSURIA: ICD-10-CM

## 2025-02-18 DIAGNOSIS — N30.00 ACUTE CYSTITIS WITHOUT HEMATURIA: ICD-10-CM

## 2025-02-18 DIAGNOSIS — A09 DIARRHEA OF INFECTIOUS ORIGIN: ICD-10-CM

## 2025-02-18 DIAGNOSIS — G89.29 CHRONIC ABDOMINAL PAIN: Primary | ICD-10-CM

## 2025-02-18 DIAGNOSIS — R03.0 ELEVATED BLOOD PRESSURE READING: ICD-10-CM

## 2025-02-18 DIAGNOSIS — K27.9 PUD (PEPTIC ULCER DISEASE): ICD-10-CM

## 2025-02-18 DIAGNOSIS — R63.4 UNINTENTIONAL WEIGHT LOSS: ICD-10-CM

## 2025-02-18 DIAGNOSIS — R10.9 CHRONIC ABDOMINAL PAIN: Primary | ICD-10-CM

## 2025-02-18 DIAGNOSIS — R91.8 PULMONARY NODULES: ICD-10-CM

## 2025-02-18 DIAGNOSIS — K27.9 PEPTIC ULCER DISEASE: ICD-10-CM

## 2025-02-18 DIAGNOSIS — R01.1 MURMUR: ICD-10-CM

## 2025-02-18 LAB
ALBUMIN SERPL BCP-MCNC: 3.8 G/DL (ref 3.5–5.2)
ALP SERPL-CCNC: 84 U/L (ref 55–135)
ALT SERPL W/O P-5'-P-CCNC: 10 U/L (ref 10–44)
AMPHET+METHAMPHET UR QL: NEGATIVE
ANION GAP SERPL CALC-SCNC: 10 MMOL/L (ref 8–16)
AST SERPL-CCNC: 19 U/L (ref 10–40)
BARBITURATES UR QL SCN>200 NG/ML: NEGATIVE
BASOPHILS # BLD AUTO: 0.06 K/UL (ref 0–0.2)
BASOPHILS NFR BLD: 0.4 % (ref 0–1.9)
BENZODIAZ UR QL SCN>200 NG/ML: NEGATIVE
BILIRUB SERPL-MCNC: 0.2 MG/DL (ref 0.1–1)
BILIRUB UR QL STRIP: NEGATIVE
BUN SERPL-MCNC: 15 MG/DL (ref 8–23)
BZE UR QL SCN: NEGATIVE
CALCIUM SERPL-MCNC: 8.8 MG/DL (ref 8.7–10.5)
CANNABINOIDS UR QL SCN: NEGATIVE
CHLORIDE SERPL-SCNC: 106 MMOL/L (ref 95–110)
CLARITY UR: CLEAR
CO2 SERPL-SCNC: 22 MMOL/L (ref 23–29)
COLOR UR: YELLOW
CREAT SERPL-MCNC: 0.5 MG/DL (ref 0.5–1.4)
CREAT UR-MCNC: 38.7 MG/DL (ref 15–325)
CTP QC/QA: YES
CTP QC/QA: YES
DIFFERENTIAL METHOD BLD: ABNORMAL
EOSINOPHIL # BLD AUTO: 0.1 K/UL (ref 0–0.5)
EOSINOPHIL NFR BLD: 0.5 % (ref 0–8)
ERYTHROCYTE [DISTWIDTH] IN BLOOD BY AUTOMATED COUNT: 17.6 % (ref 11.5–14.5)
EST. GFR  (NO RACE VARIABLE): >60 ML/MIN/1.73 M^2
GLUCOSE SERPL-MCNC: 99 MG/DL (ref 70–110)
GLUCOSE UR QL STRIP: NEGATIVE
HCT VFR BLD AUTO: 40.7 % (ref 37–48.5)
HGB BLD-MCNC: 13.2 G/DL (ref 12–16)
HGB UR QL STRIP: NEGATIVE
IMM GRANULOCYTES # BLD AUTO: 0.04 K/UL (ref 0–0.04)
IMM GRANULOCYTES NFR BLD AUTO: 0.3 % (ref 0–0.5)
KETONES UR QL STRIP: NEGATIVE
LACTATE SERPL-SCNC: 1.2 MMOL/L (ref 0.5–2.2)
LEUKOCYTE ESTERASE UR QL STRIP: NEGATIVE
LIPASE SERPL-CCNC: 21 U/L (ref 4–60)
LYMPHOCYTES # BLD AUTO: 1.7 K/UL (ref 1–4.8)
LYMPHOCYTES NFR BLD: 12.3 % (ref 18–48)
MAGNESIUM SERPL-MCNC: 1.9 MG/DL (ref 1.6–2.6)
MCH RBC QN AUTO: 29.6 PG (ref 27–31)
MCHC RBC AUTO-ENTMCNC: 32.4 G/DL (ref 32–36)
MCV RBC AUTO: 91 FL (ref 82–98)
METHADONE UR QL SCN>300 NG/ML: NEGATIVE
MONOCYTES # BLD AUTO: 0.9 K/UL (ref 0.3–1)
MONOCYTES NFR BLD: 6.3 % (ref 4–15)
NEUTROPHILS # BLD AUTO: 11 K/UL (ref 1.8–7.7)
NEUTROPHILS NFR BLD: 80.2 % (ref 38–73)
NITRITE UR QL STRIP: NEGATIVE
NRBC BLD-RTO: 0 /100 WBC
OPIATES UR QL SCN: NEGATIVE
PCP UR QL SCN>25 NG/ML: NEGATIVE
PH UR STRIP: 7 [PH] (ref 5–8)
PLATELET # BLD AUTO: 302 K/UL (ref 150–450)
PMV BLD AUTO: 10 FL (ref 9.2–12.9)
POC MOLECULAR INFLUENZA A AGN: NEGATIVE
POC MOLECULAR INFLUENZA B AGN: NEGATIVE
POTASSIUM SERPL-SCNC: 4.3 MMOL/L (ref 3.5–5.1)
PROT SERPL-MCNC: 7 G/DL (ref 6–8.4)
PROT UR QL STRIP: NEGATIVE
RBC # BLD AUTO: 4.46 M/UL (ref 4–5.4)
SARS-COV-2 RDRP RESP QL NAA+PROBE: NEGATIVE
SODIUM SERPL-SCNC: 138 MMOL/L (ref 136–145)
SP GR UR STRIP: >1.045 (ref 1–1.03)
TOXICOLOGY INFORMATION: NORMAL
TROPONIN I SERPL DL<=0.01 NG/ML-MCNC: <3 NG/L (ref 0–14)
URN SPEC COLLECT METH UR: NORMAL
UROBILINOGEN UR STRIP-ACNC: NEGATIVE EU/DL
WBC # BLD AUTO: 13.73 K/UL (ref 3.9–12.7)

## 2025-02-18 PROCEDURE — 83605 ASSAY OF LACTIC ACID: CPT | Performed by: EMERGENCY MEDICINE

## 2025-02-18 PROCEDURE — 25500020 PHARM REV CODE 255: Performed by: EMERGENCY MEDICINE

## 2025-02-18 PROCEDURE — 80053 COMPREHEN METABOLIC PANEL: CPT | Performed by: EMERGENCY MEDICINE

## 2025-02-18 PROCEDURE — 25000003 PHARM REV CODE 250: Performed by: EMERGENCY MEDICINE

## 2025-02-18 PROCEDURE — 63600175 PHARM REV CODE 636 W HCPCS: Mod: JZ,TB | Performed by: NURSE PRACTITIONER

## 2025-02-18 PROCEDURE — 63600175 PHARM REV CODE 636 W HCPCS: Performed by: INTERNAL MEDICINE

## 2025-02-18 PROCEDURE — 25000003 PHARM REV CODE 250: Performed by: INTERNAL MEDICINE

## 2025-02-18 PROCEDURE — 63600175 PHARM REV CODE 636 W HCPCS: Performed by: EMERGENCY MEDICINE

## 2025-02-18 PROCEDURE — 85025 COMPLETE CBC W/AUTO DIFF WBC: CPT | Performed by: EMERGENCY MEDICINE

## 2025-02-18 PROCEDURE — 36415 COLL VENOUS BLD VENIPUNCTURE: CPT | Performed by: EMERGENCY MEDICINE

## 2025-02-18 PROCEDURE — 25000003 PHARM REV CODE 250: Performed by: NURSE PRACTITIONER

## 2025-02-18 PROCEDURE — 84484 ASSAY OF TROPONIN QUANT: CPT | Performed by: EMERGENCY MEDICINE

## 2025-02-18 PROCEDURE — S4991 NICOTINE PATCH NONLEGEND: HCPCS | Performed by: INTERNAL MEDICINE

## 2025-02-18 PROCEDURE — 87635 SARS-COV-2 COVID-19 AMP PRB: CPT | Performed by: EMERGENCY MEDICINE

## 2025-02-18 PROCEDURE — 11000001 HC ACUTE MED/SURG PRIVATE ROOM

## 2025-02-18 PROCEDURE — 81003 URINALYSIS AUTO W/O SCOPE: CPT | Mod: 59 | Performed by: EMERGENCY MEDICINE

## 2025-02-18 PROCEDURE — 80307 DRUG TEST PRSMV CHEM ANLYZR: CPT | Performed by: EMERGENCY MEDICINE

## 2025-02-18 PROCEDURE — 83690 ASSAY OF LIPASE: CPT | Performed by: EMERGENCY MEDICINE

## 2025-02-18 PROCEDURE — 83735 ASSAY OF MAGNESIUM: CPT | Performed by: EMERGENCY MEDICINE

## 2025-02-18 RX ORDER — SUCRALFATE 1 G/1
1 TABLET ORAL
Status: DISCONTINUED | OUTPATIENT
Start: 2025-02-18 | End: 2025-02-21 | Stop reason: HOSPADM

## 2025-02-18 RX ORDER — LOSARTAN POTASSIUM 25 MG/1
25 TABLET ORAL DAILY
Status: DISCONTINUED | OUTPATIENT
Start: 2025-02-19 | End: 2025-02-18

## 2025-02-18 RX ORDER — ONDANSETRON HYDROCHLORIDE 2 MG/ML
4 INJECTION, SOLUTION INTRAVENOUS EVERY 8 HOURS PRN
Status: DISCONTINUED | OUTPATIENT
Start: 2025-02-18 | End: 2025-02-21 | Stop reason: HOSPADM

## 2025-02-18 RX ORDER — TALC
6 POWDER (GRAM) TOPICAL NIGHTLY PRN
Status: DISCONTINUED | OUTPATIENT
Start: 2025-02-18 | End: 2025-02-21 | Stop reason: HOSPADM

## 2025-02-18 RX ORDER — OXYCODONE AND ACETAMINOPHEN 10; 325 MG/1; MG/1
1 TABLET ORAL 2 TIMES DAILY PRN
Status: DISCONTINUED | OUTPATIENT
Start: 2025-02-18 | End: 2025-02-21 | Stop reason: HOSPADM

## 2025-02-18 RX ORDER — OLANZAPINE 2.5 MG/1
5 TABLET ORAL NIGHTLY
Status: DISCONTINUED | OUTPATIENT
Start: 2025-02-18 | End: 2025-02-21 | Stop reason: HOSPADM

## 2025-02-18 RX ORDER — FAMOTIDINE 10 MG/ML
20 INJECTION INTRAVENOUS EVERY 12 HOURS
Status: DISCONTINUED | OUTPATIENT
Start: 2025-02-18 | End: 2025-02-20

## 2025-02-18 RX ORDER — ACETAMINOPHEN 325 MG/1
650 TABLET ORAL EVERY 8 HOURS PRN
Status: DISCONTINUED | OUTPATIENT
Start: 2025-02-18 | End: 2025-02-21 | Stop reason: HOSPADM

## 2025-02-18 RX ORDER — AMLODIPINE BESYLATE 10 MG/1
10 TABLET ORAL DAILY
Status: DISCONTINUED | OUTPATIENT
Start: 2025-02-18 | End: 2025-02-21 | Stop reason: HOSPADM

## 2025-02-18 RX ORDER — SODIUM CHLORIDE 0.9 % (FLUSH) 0.9 %
10 SYRINGE (ML) INJECTION
Status: DISCONTINUED | OUTPATIENT
Start: 2025-02-18 | End: 2025-02-21 | Stop reason: HOSPADM

## 2025-02-18 RX ORDER — HYDROMORPHONE HYDROCHLORIDE 1 MG/ML
0.5 INJECTION, SOLUTION INTRAMUSCULAR; INTRAVENOUS; SUBCUTANEOUS EVERY 6 HOURS PRN
Status: DISCONTINUED | OUTPATIENT
Start: 2025-02-18 | End: 2025-02-21 | Stop reason: HOSPADM

## 2025-02-18 RX ORDER — AMLODIPINE BESYLATE 10 MG/1
10 TABLET ORAL DAILY
Status: DISCONTINUED | OUTPATIENT
Start: 2025-02-19 | End: 2025-02-18

## 2025-02-18 RX ORDER — LABETALOL HCL 20 MG/4 ML
10 SYRINGE (ML) INTRAVENOUS
Status: COMPLETED | OUTPATIENT
Start: 2025-02-18 | End: 2025-02-18

## 2025-02-18 RX ORDER — IBUPROFEN 200 MG
1 TABLET ORAL DAILY
Status: DISCONTINUED | OUTPATIENT
Start: 2025-02-18 | End: 2025-02-21 | Stop reason: HOSPADM

## 2025-02-18 RX ORDER — MORPHINE SULFATE 2 MG/ML
2 INJECTION, SOLUTION INTRAMUSCULAR; INTRAVENOUS EVERY 4 HOURS PRN
Status: DISCONTINUED | OUTPATIENT
Start: 2025-02-18 | End: 2025-02-18

## 2025-02-18 RX ORDER — MORPHINE SULFATE 4 MG/ML
4 INJECTION, SOLUTION INTRAMUSCULAR; INTRAVENOUS EVERY 4 HOURS PRN
Status: DISCONTINUED | OUTPATIENT
Start: 2025-02-18 | End: 2025-02-18

## 2025-02-18 RX ORDER — OXYCODONE AND ACETAMINOPHEN 10; 325 MG/1; MG/1
1 TABLET ORAL ONCE
Refills: 0 | Status: COMPLETED | OUTPATIENT
Start: 2025-02-18 | End: 2025-02-18

## 2025-02-18 RX ORDER — LOSARTAN POTASSIUM 25 MG/1
25 TABLET ORAL DAILY
Status: DISCONTINUED | OUTPATIENT
Start: 2025-02-18 | End: 2025-02-21 | Stop reason: HOSPADM

## 2025-02-18 RX ADMIN — OXYCODONE HYDROCHLORIDE AND ACETAMINOPHEN 1 TABLET: 10; 325 TABLET ORAL at 05:02

## 2025-02-18 RX ADMIN — FAMOTIDINE 20 MG: 10 INJECTION, SOLUTION INTRAVENOUS at 08:02

## 2025-02-18 RX ADMIN — HYDROMORPHONE HYDROCHLORIDE 0.5 MG: 1 INJECTION, SOLUTION INTRAMUSCULAR; INTRAVENOUS; SUBCUTANEOUS at 05:02

## 2025-02-18 RX ADMIN — SUCRALFATE 1 G: 1 TABLET ORAL at 08:02

## 2025-02-18 RX ADMIN — NICOTINE 1 PATCH: 21 PATCH, EXTENDED RELEASE TRANSDERMAL at 07:02

## 2025-02-18 RX ADMIN — PIPERACILLIN SODIUM AND TAZOBACTAM SODIUM 4.5 G: 4; .5 INJECTION, POWDER, FOR SOLUTION INTRAVENOUS at 08:02

## 2025-02-18 RX ADMIN — OLANZAPINE 5 MG: 2.5 TABLET, FILM COATED ORAL at 08:02

## 2025-02-18 RX ADMIN — AMLODIPINE BESYLATE 10 MG: 10 TABLET ORAL at 05:02

## 2025-02-18 RX ADMIN — LOSARTAN POTASSIUM 25 MG: 25 TABLET, FILM COATED ORAL at 05:02

## 2025-02-18 RX ADMIN — LABETALOL HYDROCHLORIDE 10 MG: 5 INJECTION, SOLUTION INTRAVENOUS at 03:02

## 2025-02-18 RX ADMIN — OXYCODONE HYDROCHLORIDE AND ACETAMINOPHEN 1 TABLET: 10; 325 TABLET ORAL at 10:02

## 2025-02-18 RX ADMIN — IOHEXOL 100 ML: 350 INJECTION, SOLUTION INTRAVENOUS at 04:02

## 2025-02-18 RX ADMIN — TRAZODONE HYDROCHLORIDE 150 MG: 100 TABLET ORAL at 08:02

## 2025-02-18 RX ADMIN — SUCRALFATE 1 G: 1 TABLET ORAL at 05:02

## 2025-02-18 NOTE — ED PROVIDER NOTES
Encounter Date: 2/18/2025       History     Chief Complaint   Patient presents with    Abdominal Pain     Pt referred to ED from Dr. Pozo's office - patient with complaints of abdominal pain / nausea / dizziness / headache / cough / congestion for the past couple days. No testing PTA.      69-year-old female history of chronic abdominal pain since the emergency room by her primary care physician for abdominal pain, cough cold congestion for the past few days.  Here often for chronic abdominal pain.  Diagnosed with peptic ulcer disease, was sent here to be admitted outer to have a scope by  - patient states she has not taken her blood pressure medicine in quite some time because her daughter has been sick, and daughter usually prepares her medications for her.  Denies headache.  Chest pain or shortness of breath      Review of patient's allergies indicates:  No Known Allergies  Past Medical History:   Diagnosis Date    Anticoagulant long-term use     Anxiety     Arthritis     C. difficile colitis 8/6/2022    Carotid artery disease     Cervical disc disorder     Chronic back pain     COPD (chronic obstructive pulmonary disease)     Coronary artery disease     Dementia     Depression     Diarrhea, unspecified 11/1/2021    DVT (deep venous thrombosis)     CAROTID    GERD (gastroesophageal reflux disease)     Hematuria     Hiatal hernia     High cholesterol     Ill-fitting dentures     STATES DOESN'T WEAR    PVD (peripheral vascular disease)     Renal calculus     Sleep apnea     Sleep apnea     NO C PAP    Urinary frequency     Urinary urgency     Wears glasses     READING      Past Surgical History:   Procedure Laterality Date    BACK SURGERY      BREAST LUMPECTOMY Right     CAROTID ARTERY ANGIOPLASTY      CAROTID ARTERY ANGIOPLASTY      CAROTID ARTERY STENT Left     CAROTID ENDARTERECTOMY Right     CHOLECYSTECTOMY      COLONOSCOPY      CYSTOSCOPY      EGD, WITH CLOSED BIOPSY N/A 4/10/2024    Procedure:  EGD, WITH CLOSED BIOPSY;  Surgeon: Meg Ayon MD;  Location: [facility];  Service: General;  Laterality: N/A;    EGD, WITH CLOSED BIOPSY N/A 7/17/2024    Procedure: EGD, WITH CLOSED BIOPSY of pyloric lesion;  Surgeon: Meg Ayon MD;  Location: [facility];  Service: General;  Laterality: N/A;  6th 0830    HYSTERECTOMY      OOPHORECTOMY      TONSILLECTOMY       Family History   Problem Relation Name Age of Onset    Cancer Mother      Stroke Father      No Known Problems Sister      No Known Problems Daughter      No Known Problems Maternal Aunt      No Known Problems Maternal Uncle      No Known Problems Paternal Aunt      No Known Problems Paternal Uncle      No Known Problems Maternal Grandmother      No Known Problems Maternal Grandfather      No Known Problems Paternal Grandmother      No Known Problems Paternal Grandfather      Breast cancer Neg Hx      Ovarian cancer Neg Hx      BRCA 1/2 Neg Hx       Social History[1]  Review of Systems   Constitutional:  Negative for fever.   HENT:  Positive for congestion. Negative for sore throat.    Respiratory:  Negative for shortness of breath.    Cardiovascular:  Negative for chest pain.   Gastrointestinal:  Positive for abdominal pain. Negative for nausea.   Genitourinary:  Negative for dysuria.   Musculoskeletal:  Negative for back pain.   Skin:  Negative for rash.   Neurological:  Negative for weakness.   Hematological:  Does not bruise/bleed easily.   All other systems reviewed and are negative.      Physical Exam     Initial Vitals [02/18/25 1504]   BP Pulse Resp Temp SpO2   (!) 239/99 86 16 98.3 °F (36.8 °C) 100 %      MAP       --         Physical Exam    Nursing note and vitals reviewed.  Constitutional: She appears well-developed and well-nourished. She is not diaphoretic. No distress.   HENT:   Head: Normocephalic and atraumatic.   Eyes: Conjunctivae and EOM are normal. Pupils are equal, round, and reactive to light.   Neck: Neck supple. No  tracheal deviation present. No JVD present.   Normal range of motion.  Cardiovascular:  Normal rate, regular rhythm, normal heart sounds and intact distal pulses.           No murmur heard.  Pulmonary/Chest: Breath sounds normal. No stridor. No respiratory distress. She has no wheezes. She has no rhonchi. She has no rales. She exhibits no tenderness.   Abdominal: Abdomen is soft. Bowel sounds are normal. She exhibits no distension. There is abdominal tenderness. There is no rebound and no guarding.   Musculoskeletal:         General: No tenderness or edema. Normal range of motion.      Cervical back: Normal range of motion and neck supple.     Neurological: She is alert and oriented to person, place, and time. She has normal strength. No cranial nerve deficit. GCS score is 15. GCS eye subscore is 4. GCS verbal subscore is 5. GCS motor subscore is 6.   Skin: Skin is warm and dry. Capillary refill takes less than 2 seconds.         ED Course   Procedures  Labs Reviewed   CBC W/ AUTO DIFFERENTIAL   COMPREHENSIVE METABOLIC PANEL   MAGNESIUM   LIPASE   LACTIC ACID, PLASMA   DRUG SCREEN PANEL, URINE EMERGENCY   URINALYSIS, REFLEX TO URINE CULTURE   TROPONIN I HIGH SENSITIVITY   POCT INFLUENZA A/B MOLECULAR   SARS-COV-2 RDRP GENE          Imaging Results    None          Medications   labetalol 20 mg/4 mL (5 mg/mL) IV syring (has no administration in time range)   iohexoL (OMNIPAQUE 350) injection 100 mL (has no administration in time range)     Medical Decision Making  Amount and/or Complexity of Data Reviewed  Labs: ordered.  Radiology: ordered.    Risk  Prescription drug management.  Decision regarding hospitalization.                          Medical Decision Making:   Differential Diagnosis:   Peptic ulcer disease, chronic abdominal pain, elevated blood pressure reading             Clinical Impression:  Final diagnoses:  [R10.9, G89.29] Chronic abdominal pain (Primary)  [K27.9] Peptic ulcer disease  [R03.0] Elevated  blood pressure reading          ED Disposition Condition    Admit Stable                    [1]   Social History  Tobacco Use    Smoking status: Every Day     Current packs/day: 1.00     Types: Cigarettes     Passive exposure: Current    Smokeless tobacco: Never   Substance Use Topics    Alcohol use: No    Drug use: No        Marek Rai MD  02/18/25 0814

## 2025-02-19 ENCOUNTER — ANESTHESIA EVENT (OUTPATIENT)
Dept: ENDOSCOPY | Facility: HOSPITAL | Age: 70
End: 2025-02-19
Payer: MEDICARE

## 2025-02-19 ENCOUNTER — ANESTHESIA (OUTPATIENT)
Dept: ENDOSCOPY | Facility: HOSPITAL | Age: 70
End: 2025-02-19
Payer: MEDICARE

## 2025-02-19 PROBLEM — R63.4 UNINTENTIONAL WEIGHT LOSS: Status: ACTIVE | Noted: 2025-02-19

## 2025-02-19 LAB
ALBUMIN SERPL BCP-MCNC: 3.6 G/DL (ref 3.5–5.2)
ALP SERPL-CCNC: 80 U/L (ref 55–135)
ALT SERPL W/O P-5'-P-CCNC: 10 U/L (ref 10–44)
ANION GAP SERPL CALC-SCNC: 7 MMOL/L (ref 8–16)
AST SERPL-CCNC: 12 U/L (ref 10–40)
BASOPHILS # BLD AUTO: 0.03 K/UL (ref 0–0.2)
BASOPHILS NFR BLD: 0.3 % (ref 0–1.9)
BILIRUB SERPL-MCNC: 0.3 MG/DL (ref 0.1–1)
BUN SERPL-MCNC: 11 MG/DL (ref 8–23)
CALCIUM SERPL-MCNC: 8.7 MG/DL (ref 8.7–10.5)
CHLORIDE SERPL-SCNC: 110 MMOL/L (ref 95–110)
CO2 SERPL-SCNC: 24 MMOL/L (ref 23–29)
CREAT SERPL-MCNC: 0.6 MG/DL (ref 0.5–1.4)
DIFFERENTIAL METHOD BLD: ABNORMAL
EOSINOPHIL # BLD AUTO: 0.1 K/UL (ref 0–0.5)
EOSINOPHIL NFR BLD: 1.1 % (ref 0–8)
ERYTHROCYTE [DISTWIDTH] IN BLOOD BY AUTOMATED COUNT: 17.9 % (ref 11.5–14.5)
EST. GFR  (NO RACE VARIABLE): >60 ML/MIN/1.73 M^2
GLUCOSE SERPL-MCNC: 109 MG/DL (ref 70–110)
HCT VFR BLD AUTO: 38.1 % (ref 37–48.5)
HGB BLD-MCNC: 11.9 G/DL (ref 12–16)
IMM GRANULOCYTES # BLD AUTO: 0.03 K/UL (ref 0–0.04)
IMM GRANULOCYTES NFR BLD AUTO: 0.3 % (ref 0–0.5)
LYMPHOCYTES # BLD AUTO: 1.2 K/UL (ref 1–4.8)
LYMPHOCYTES NFR BLD: 13 % (ref 18–48)
MAGNESIUM SERPL-MCNC: 2 MG/DL (ref 1.6–2.6)
MCH RBC QN AUTO: 28.9 PG (ref 27–31)
MCHC RBC AUTO-ENTMCNC: 31.2 G/DL (ref 32–36)
MCV RBC AUTO: 93 FL (ref 82–98)
MONOCYTES # BLD AUTO: 0.7 K/UL (ref 0.3–1)
MONOCYTES NFR BLD: 7.3 % (ref 4–15)
NEUTROPHILS # BLD AUTO: 7.2 K/UL (ref 1.8–7.7)
NEUTROPHILS NFR BLD: 78 % (ref 38–73)
NRBC BLD-RTO: 0 /100 WBC
PLATELET # BLD AUTO: 270 K/UL (ref 150–450)
PMV BLD AUTO: 9.8 FL (ref 9.2–12.9)
POTASSIUM SERPL-SCNC: 3.9 MMOL/L (ref 3.5–5.1)
PROT SERPL-MCNC: 6.5 G/DL (ref 6–8.4)
RBC # BLD AUTO: 4.12 M/UL (ref 4–5.4)
SODIUM SERPL-SCNC: 141 MMOL/L (ref 136–145)
WBC # BLD AUTO: 9.29 K/UL (ref 3.9–12.7)

## 2025-02-19 PROCEDURE — 36415 COLL VENOUS BLD VENIPUNCTURE: CPT | Performed by: INTERNAL MEDICINE

## 2025-02-19 PROCEDURE — 25000003 PHARM REV CODE 250: Performed by: NURSE PRACTITIONER

## 2025-02-19 PROCEDURE — 25000003 PHARM REV CODE 250: Performed by: EMERGENCY MEDICINE

## 2025-02-19 PROCEDURE — S4991 NICOTINE PATCH NONLEGEND: HCPCS | Performed by: INTERNAL MEDICINE

## 2025-02-19 PROCEDURE — 37000009 HC ANESTHESIA EA ADD 15 MINS: Performed by: STUDENT IN AN ORGANIZED HEALTH CARE EDUCATION/TRAINING PROGRAM

## 2025-02-19 PROCEDURE — 37000008 HC ANESTHESIA 1ST 15 MINUTES: Performed by: STUDENT IN AN ORGANIZED HEALTH CARE EDUCATION/TRAINING PROGRAM

## 2025-02-19 PROCEDURE — 80053 COMPREHEN METABOLIC PANEL: CPT | Performed by: INTERNAL MEDICINE

## 2025-02-19 PROCEDURE — 0DB78ZX EXCISION OF STOMACH, PYLORUS, VIA NATURAL OR ARTIFICIAL OPENING ENDOSCOPIC, DIAGNOSTIC: ICD-10-PCS | Performed by: STUDENT IN AN ORGANIZED HEALTH CARE EDUCATION/TRAINING PROGRAM

## 2025-02-19 PROCEDURE — 63600175 PHARM REV CODE 636 W HCPCS: Mod: JZ,TB | Performed by: NURSE PRACTITIONER

## 2025-02-19 PROCEDURE — 63600175 PHARM REV CODE 636 W HCPCS: Performed by: NURSE ANESTHETIST, CERTIFIED REGISTERED

## 2025-02-19 PROCEDURE — 85025 COMPLETE CBC W/AUTO DIFF WBC: CPT | Performed by: INTERNAL MEDICINE

## 2025-02-19 PROCEDURE — 99223 1ST HOSP IP/OBS HIGH 75: CPT | Mod: ,,, | Performed by: STUDENT IN AN ORGANIZED HEALTH CARE EDUCATION/TRAINING PROGRAM

## 2025-02-19 PROCEDURE — 25000003 PHARM REV CODE 250: Performed by: STUDENT IN AN ORGANIZED HEALTH CARE EDUCATION/TRAINING PROGRAM

## 2025-02-19 PROCEDURE — 25000003 PHARM REV CODE 250: Performed by: INTERNAL MEDICINE

## 2025-02-19 PROCEDURE — 11000001 HC ACUTE MED/SURG PRIVATE ROOM

## 2025-02-19 PROCEDURE — 27201423 OPTIME MED/SURG SUP & DEVICES STERILE SUPPLY: Performed by: STUDENT IN AN ORGANIZED HEALTH CARE EDUCATION/TRAINING PROGRAM

## 2025-02-19 PROCEDURE — 0DB68ZX EXCISION OF STOMACH, VIA NATURAL OR ARTIFICIAL OPENING ENDOSCOPIC, DIAGNOSTIC: ICD-10-PCS | Performed by: STUDENT IN AN ORGANIZED HEALTH CARE EDUCATION/TRAINING PROGRAM

## 2025-02-19 PROCEDURE — 83735 ASSAY OF MAGNESIUM: CPT | Performed by: INTERNAL MEDICINE

## 2025-02-19 PROCEDURE — 63600175 PHARM REV CODE 636 W HCPCS: Performed by: INTERNAL MEDICINE

## 2025-02-19 RX ORDER — TOPICAL ANESTHETIC 200 MG/ML
SPRAY DENTAL; PERIODONTAL
Status: COMPLETED | OUTPATIENT
Start: 2025-02-19 | End: 2025-02-19

## 2025-02-19 RX ORDER — SODIUM CHLORIDE 450 MG/100ML
INJECTION, SOLUTION INTRAVENOUS CONTINUOUS
Status: DISCONTINUED | OUTPATIENT
Start: 2025-02-19 | End: 2025-02-21 | Stop reason: HOSPADM

## 2025-02-19 RX ORDER — PROPOFOL 10 MG/ML
VIAL (ML) INTRAVENOUS
Status: DISCONTINUED | OUTPATIENT
Start: 2025-02-19 | End: 2025-02-19

## 2025-02-19 RX ORDER — LIDOCAINE HYDROCHLORIDE 20 MG/ML
INJECTION INTRAVENOUS
Status: DISCONTINUED | OUTPATIENT
Start: 2025-02-19 | End: 2025-02-19

## 2025-02-19 RX ADMIN — PROPOFOL 25 MG: 10 INJECTION, EMULSION INTRAVENOUS at 03:02

## 2025-02-19 RX ADMIN — OLANZAPINE 5 MG: 2.5 TABLET, FILM COATED ORAL at 10:02

## 2025-02-19 RX ADMIN — NICOTINE 1 PATCH: 21 PATCH, EXTENDED RELEASE TRANSDERMAL at 11:02

## 2025-02-19 RX ADMIN — LIDOCAINE HYDROCHLORIDE 50 MG: 20 INJECTION, SOLUTION INTRAVENOUS at 03:02

## 2025-02-19 RX ADMIN — SUCRALFATE 1 G: 1 TABLET ORAL at 04:02

## 2025-02-19 RX ADMIN — HYDROMORPHONE HYDROCHLORIDE 0.5 MG: 1 INJECTION, SOLUTION INTRAMUSCULAR; INTRAVENOUS; SUBCUTANEOUS at 08:02

## 2025-02-19 RX ADMIN — HYDROMORPHONE HYDROCHLORIDE 0.5 MG: 1 INJECTION, SOLUTION INTRAMUSCULAR; INTRAVENOUS; SUBCUTANEOUS at 12:02

## 2025-02-19 RX ADMIN — HYDROMORPHONE HYDROCHLORIDE 0.5 MG: 1 INJECTION, SOLUTION INTRAMUSCULAR; INTRAVENOUS; SUBCUTANEOUS at 11:02

## 2025-02-19 RX ADMIN — FAMOTIDINE 20 MG: 10 INJECTION, SOLUTION INTRAVENOUS at 08:02

## 2025-02-19 RX ADMIN — Medication 6 MG: at 10:02

## 2025-02-19 RX ADMIN — PIPERACILLIN SODIUM AND TAZOBACTAM SODIUM 4.5 G: 4; .5 INJECTION, POWDER, FOR SOLUTION INTRAVENOUS at 11:02

## 2025-02-19 RX ADMIN — SUCRALFATE 1 G: 1 TABLET ORAL at 10:02

## 2025-02-19 RX ADMIN — HYDROMORPHONE HYDROCHLORIDE 0.5 MG: 1 INJECTION, SOLUTION INTRAMUSCULAR; INTRAVENOUS; SUBCUTANEOUS at 06:02

## 2025-02-19 RX ADMIN — SODIUM CHLORIDE: 4.5 INJECTION, SOLUTION INTRAVENOUS at 12:02

## 2025-02-19 RX ADMIN — TRAZODONE HYDROCHLORIDE 150 MG: 100 TABLET ORAL at 10:02

## 2025-02-19 RX ADMIN — Medication 6 MG: at 12:02

## 2025-02-19 RX ADMIN — PROPOFOL 50 MG: 10 INJECTION, EMULSION INTRAVENOUS at 03:02

## 2025-02-19 RX ADMIN — ACETAMINOPHEN 650 MG: 325 TABLET ORAL at 11:02

## 2025-02-19 RX ADMIN — FAMOTIDINE 20 MG: 10 INJECTION, SOLUTION INTRAVENOUS at 10:02

## 2025-02-19 RX ADMIN — PIPERACILLIN SODIUM AND TAZOBACTAM SODIUM 4.5 G: 4; .5 INJECTION, POWDER, FOR SOLUTION INTRAVENOUS at 05:02

## 2025-02-19 RX ADMIN — OXYCODONE HYDROCHLORIDE AND ACETAMINOPHEN 1 TABLET: 10; 325 TABLET ORAL at 04:02

## 2025-02-19 NOTE — H&P
[facility] Medicine  History & Physical    Patient Name: Desiree Blake  MRN: [id number]  Patient Class: IP- Inpatient  Admission Date: 2/18/2025  Attending Physician: Kt Pozo Jr., MD   Primary Care Provider: Kt Pozo Jr., MD         Patient information was obtained from ER records.     Subjective:     Principal Problem:Colitis    Chief Complaint:   Chief Complaint   Patient presents with    Abdominal Pain     Pt referred to ED from Dr. Pozo's office - patient with complaints of abdominal pain / nausea / dizziness / headache / cough / congestion for the past couple days. No testing PTA.         HPI:   Chief Complaint   Patient presents with    Abdominal Pain       Pt referred to ED from Dr. Pozo's office - patient with complaints of abdominal pain / nausea / dizziness / headache / cough / congestion for the past couple days. No testing PTA.       69-year-old female history of chronic abdominal pain since the emergency room by her primary care physician for abdominal pain, cough cold congestion for the past few days.  Here often for chronic abdominal pain.  Diagnosed with peptic ulcer disease, was sent here to be admitted outer to have a scope by  - patient states she has not taken her blood pressure medicine in quite some time because her daughter has been sick, and daughter usually prepares her medications for her.  Denies headache.  Chest pain or shortness of breath     Updated HPI: Patient well known to me with a history of chronic pain syndrome and noncompliance.  She has previous diagnosis of severe peptic ulcer disease.  Treatment has been difficult to ensure.  Patient presented to the office with ongoing weight loss and overall debility.  Discussions with General surgery were pursued.  Our plan is to admit the patient for IV fluids and nutritional evaluation as well as endoscopy.  CT scan does reveal colitis as well as likely underlying peptic ulcer disease  and inflammation.    Past Medical History:   Diagnosis Date    Anticoagulant long-term use     Anxiety     Arthritis     C. difficile colitis 8/6/2022    Carotid artery disease     Cervical disc disorder     Chronic back pain     COPD (chronic obstructive pulmonary disease)     Coronary artery disease     Dementia     Depression     Diarrhea, unspecified 11/1/2021    DVT (deep venous thrombosis)     CAROTID    GERD (gastroesophageal reflux disease)     Hematuria     Hiatal hernia     High cholesterol     Ill-fitting dentures     STATES DOESN'T WEAR    PVD (peripheral vascular disease)     Renal calculus     Sleep apnea     Sleep apnea     NO C PAP    Urinary frequency     Urinary urgency     Wears glasses     READING        Past Surgical History:   Procedure Laterality Date    BACK SURGERY      BREAST LUMPECTOMY Right     CAROTID ARTERY ANGIOPLASTY      CAROTID ARTERY ANGIOPLASTY      CAROTID ARTERY STENT Left     CAROTID ENDARTERECTOMY Right     CHOLECYSTECTOMY      COLONOSCOPY      CYSTOSCOPY      EGD, WITH CLOSED BIOPSY N/A 4/10/2024    Procedure: EGD, WITH CLOSED BIOPSY;  Surgeon: Meg Ayon MD;  Location: [facility];  Service: General;  Laterality: N/A;    EGD, WITH CLOSED BIOPSY N/A 7/17/2024    Procedure: EGD, WITH CLOSED BIOPSY of pyloric lesion;  Surgeon: Meg Ayon MD;  Location: [facility];  Service: General;  Laterality: N/A;  6th 0830    HYSTERECTOMY      OOPHORECTOMY      TONSILLECTOMY         Review of patient's allergies indicates:  No Known Allergies    No current facility-administered medications on file prior to encounter.     Current Outpatient Medications on File Prior to Encounter   Medication Sig    dicyclomine (BENTYL) 10 MG capsule Take 1 capsule (10 mg total) by mouth 4 (four) times daily before meals and nightly.    OLANZapine (ZYPREXA) 5 MG tablet Take 1 tablet (5 mg total) by mouth every evening.    ondansetron (ZOFRAN-ODT) 4 MG TbDL Take 1 tablet (4 mg total) by mouth  every 6 (six) hours as needed.    oxyCODONE-acetaminophen (PERCOCET)  mg per tablet Take 1 tablet by mouth every 12 (twelve) hours as needed for Pain.    pantoprazole (PROTONIX) 40 MG tablet Take 1 tablet (40 mg total) by mouth 2 (two) times daily.    pregabalin (LYRICA) 75 MG capsule Take 1 capsule (75 mg total) by mouth 2 (two) times daily.    sucralfate (CARAFATE) 1 gram tablet Take 1 tablet (1 g total) by mouth 4 (four) times daily before meals and nightly.    sumatriptan (IMITREX) 50 MG tablet Take 1 tablet (50 mg total) by mouth daily as needed for Migraine.    traZODone (DESYREL) 150 MG tablet Take 1 tablet (150 mg total) by mouth every evening.     Family History       Problem Relation (Age of Onset)    Cancer Mother    No Known Problems Sister, Daughter, Maternal Aunt, Maternal Uncle, Paternal Aunt, Paternal Uncle, Maternal Grandmother, Maternal Grandfather, Paternal Grandmother, Paternal Grandfather    Stroke Father          Tobacco Use    Smoking status: Every Day     Current packs/day: 1.00     Types: Cigarettes     Passive exposure: Current    Smokeless tobacco: Never   Substance and Sexual Activity    Alcohol use: No    Drug use: No    Sexual activity: Not on file     Review of Systems   Constitutional:  Positive for activity change, appetite change and unexpected weight change. Negative for chills and fever.   HENT:  Negative for rhinorrhea, sneezing and sore throat.    Eyes:  Negative for pain and visual disturbance.   Respiratory:  Negative for cough and shortness of breath.    Cardiovascular:  Negative for chest pain.   Gastrointestinal:  Positive for abdominal pain and nausea. Negative for constipation and diarrhea.   Endocrine: Negative for cold intolerance and heat intolerance.   Genitourinary:  Negative for difficulty urinating.   Musculoskeletal:  Positive for arthralgias, back pain, gait problem and myalgias. Negative for joint swelling.   Skin:  Negative for rash.    Allergic/Immunologic: Negative for environmental allergies.   Neurological:  Negative for dizziness, syncope and weakness.   Hematological:  Does not bruise/bleed easily.   Psychiatric/Behavioral:  Negative for dysphoric mood. The patient is nervous/anxious.      Objective:     Vital Signs (Most Recent):  Temp: 98.3 °F (36.8 °C) (02/19/25 0809)  Pulse: (!) 56 (02/19/25 0809)  Resp: 18 (02/19/25 0809)  BP: (!) 122/59 (02/19/25 0809)  SpO2: 95 % (02/19/25 0809) Vital Signs (24h Range):  Temp:  [98.2 °F (36.8 °C)-98.5 °F (36.9 °C)] 98.3 °F (36.8 °C)  Pulse:  [56-86] 56  Resp:  [16-20] 18  SpO2:  [94 %-100 %] 95 %  BP: (102-239)/(54-99) 122/59     Weight: 39.9 kg (88 lb)  Body mass index is 16.63 kg/m².     Physical Exam  Vitals and nursing note reviewed.   Constitutional:       General: She is in acute distress.      Appearance: Normal appearance. She is ill-appearing. She is not toxic-appearing.   HENT:      Head: Normocephalic and atraumatic.      Nose: Nose normal.   Eyes:      Extraocular Movements: Extraocular movements intact.      Pupils: Pupils are equal, round, and reactive to light.   Cardiovascular:      Rate and Rhythm: Normal rate and regular rhythm.      Heart sounds: No murmur heard.     No friction rub. No gallop.   Pulmonary:      Effort: Pulmonary effort is normal. No respiratory distress.      Breath sounds: Normal breath sounds.   Abdominal:      General: Abdomen is flat. Bowel sounds are normal. There is no distension.      Palpations: Abdomen is soft.      Tenderness: There is abdominal tenderness (epigastric). There is guarding.      Hernia: No hernia is present.   Musculoskeletal:         General: Tenderness present. No swelling or deformity.      Cervical back: Normal range of motion and neck supple.   Skin:     General: Skin is warm and dry.      Capillary Refill: Capillary refill takes less than 2 seconds.      Findings: Bruising present.   Neurological:      General: No focal deficit  present.      Mental Status: She is alert and oriented to person, place, and time. Mental status is at baseline.      Motor: Weakness present.      Gait: Gait abnormal.   Psychiatric:         Mood and Affect: Mood normal.         Behavior: Behavior normal.              CRANIAL NERVES     CN III, IV, VI   Pupils are equal, round, and reactive to light.       Significant Labs: All pertinent labs within the past 24 hours have been reviewed.    Significant Imaging: I have reviewed all pertinent imaging results/findings within the past 24 hours.  Assessment/Plan:     * Colitis  Patient with evidence yet again on CT scan of long segment nonspecific colitis.  In the transverse colon.  Continue Zosyn for now.  Awaiting further evaluation by General surgery PCR GI panel being processed      Unintentional weight loss  Nutrition consulted. Most recent weight and BMI monitored-     Measurements:  Wt Readings from Last 1 Encounters:   02/19/25 39.9 kg (88 lb)   Body mass index is 16.63 kg/m².    Patient has been screened and assessed by RD.    Malnutrition Type:  Context:    Level:      Malnutrition Characteristic Summary:       Interventions/Recommendations (treatment strategy):         Moderate major depression, single episode  Continue home medical therapy while hospitalized.    PUD (peptic ulcer disease)  Continue Pepcid and Carafate.  General surgery on board      Chronic back pain  Continue opioid regimen while hospitalized.      Pain of upper abdomen  Patient with evidence of ongoing thickening of the gastric antrum possibly related to gastritis.  Continue IV Pepcid and General surgery evaluation.      Anxiety  Patient with severe underlying baseline anxiety will continue home medical therapy while hospitalized.        VTE Risk Mitigation (From admission, onward)           Ordered     IP VTE LOW RISK PATIENT  Once         02/18/25 1527     Place VANDANA hose  Until discontinued         02/18/25 1527     Place sequential  compression device  Until discontinued         02/18/25 1527                               Pharmacist Renal Dose Adjustment Note    Desiree Blake is a 69 y.o. female being treated with the medication zosyn    Patient Data:    Vital Signs (Most Recent):  Temp: 98.2 °F (36.8 °C) (02/18/25 1845)  Pulse: 78 (02/18/25 1845)  Resp: 18 (02/18/25 1845)  BP: (!) 164/72 (02/18/25 1845)  SpO2: 100 % (02/18/25 1845) Vital Signs (72h Range):  Temp:  [98.2 °F (36.8 °C)-98.3 °F (36.8 °C)]   Pulse:  [68-86]   Resp:  [16-18]   BP: (160-239)/(69-99)   SpO2:  [98 %-100 %]      Recent Labs   Lab 02/18/25  1529   CREATININE 0.5     Serum creatinine: 0.5 mg/dL 02/18/25 1529  Estimated creatinine clearance: 66.9 mL/min    Medication:zosyn dose: 3.375gm frequency q6h will be changed to medication:zosyn dose:4.5gm frequency:q8h extended dosing for CrCl > 20    Pharmacist's Name: Richard Pozo Jr, MD  Department of Hospital Medicine  [facility]

## 2025-02-19 NOTE — HPI
Chief Complaint   Patient presents with    Abdominal Pain       Pt referred to ED from Dr. Pozo's office - patient with complaints of abdominal pain / nausea / dizziness / headache / cough / congestion for the past couple days. No testing PTA.       69-year-old female history of chronic abdominal pain since the emergency room by her primary care physician for abdominal pain, cough cold congestion for the past few days.  Here often for chronic abdominal pain.  Diagnosed with peptic ulcer disease, was sent here to be admitted outer to have a scope by  - patient states she has not taken her blood pressure medicine in quite some time because her daughter has been sick, and daughter usually prepares her medications for her.  Denies headache.  Chest pain or shortness of breath     Updated HPI: Patient well known to me with a history of chronic pain syndrome and noncompliance.  She has previous diagnosis of severe peptic ulcer disease.  Treatment has been difficult to ensure.  Patient presented to the office with ongoing weight loss and overall debility.  Discussions with General surgery were pursued.  Our plan is to admit the patient for IV fluids and nutritional evaluation as well as endoscopy.  CT scan does reveal colitis as well as likely underlying peptic ulcer disease and inflammation.

## 2025-02-19 NOTE — PLAN OF CARE
Problem: Adult Inpatient Plan of Care  Goal: Optimal Comfort and Wellbeing  Outcome: Not Progressing  Goal: Readiness for Transition of Care  Outcome: Not Progressing     Problem: Pain Chronic (Persistent)  Goal: Optimal Pain Control and Function  Outcome: Not Progressing         Problem: Adult Inpatient Plan of Care  Goal: Plan of Care Review  Outcome: Progressing  Goal: Patient-Specific Goal (Individualized)  Outcome: Progressing  Goal: Absence of Hospital-Acquired Illness or Injury  Outcome: Progressing

## 2025-02-19 NOTE — SUBJECTIVE & OBJECTIVE
Past Medical History:   Diagnosis Date    Anticoagulant long-term use     Anxiety     Arthritis     C. difficile colitis 8/6/2022    Carotid artery disease     Cervical disc disorder     Chronic back pain     COPD (chronic obstructive pulmonary disease)     Coronary artery disease     Dementia     Depression     Diarrhea, unspecified 11/1/2021    DVT (deep venous thrombosis)     CAROTID    GERD (gastroesophageal reflux disease)     Hematuria     Hiatal hernia     High cholesterol     Ill-fitting dentures     STATES DOESN'T WEAR    PVD (peripheral vascular disease)     Renal calculus     Sleep apnea     Sleep apnea     NO C PAP    Urinary frequency     Urinary urgency     Wears glasses     READING        Past Surgical History:   Procedure Laterality Date    BACK SURGERY      BREAST LUMPECTOMY Right     CAROTID ARTERY ANGIOPLASTY      CAROTID ARTERY ANGIOPLASTY      CAROTID ARTERY STENT Left     CAROTID ENDARTERECTOMY Right     CHOLECYSTECTOMY      COLONOSCOPY      CYSTOSCOPY      EGD, WITH CLOSED BIOPSY N/A 4/10/2024    Procedure: EGD, WITH CLOSED BIOPSY;  Surgeon: Meg Ayon MD;  Location: [facility];  Service: General;  Laterality: N/A;    EGD, WITH CLOSED BIOPSY N/A 7/17/2024    Procedure: EGD, WITH CLOSED BIOPSY of pyloric lesion;  Surgeon: Meg Ayon MD;  Location: [facility];  Service: General;  Laterality: N/A;  6th 0830    HYSTERECTOMY      OOPHORECTOMY      TONSILLECTOMY         Review of patient's allergies indicates:  No Known Allergies    No current facility-administered medications on file prior to encounter.     Current Outpatient Medications on File Prior to Encounter   Medication Sig    dicyclomine (BENTYL) 10 MG capsule Take 1 capsule (10 mg total) by mouth 4 (four) times daily before meals and nightly.    OLANZapine (ZYPREXA) 5 MG tablet Take 1 tablet (5 mg total) by mouth every evening.    ondansetron (ZOFRAN-ODT) 4 MG TbDL Take 1 tablet (4 mg total) by mouth every 6 (six) hours as  needed.    oxyCODONE-acetaminophen (PERCOCET)  mg per tablet Take 1 tablet by mouth every 12 (twelve) hours as needed for Pain.    pantoprazole (PROTONIX) 40 MG tablet Take 1 tablet (40 mg total) by mouth 2 (two) times daily.    pregabalin (LYRICA) 75 MG capsule Take 1 capsule (75 mg total) by mouth 2 (two) times daily.    sucralfate (CARAFATE) 1 gram tablet Take 1 tablet (1 g total) by mouth 4 (four) times daily before meals and nightly.    sumatriptan (IMITREX) 50 MG tablet Take 1 tablet (50 mg total) by mouth daily as needed for Migraine.    traZODone (DESYREL) 150 MG tablet Take 1 tablet (150 mg total) by mouth every evening.     Family History       Problem Relation (Age of Onset)    Cancer Mother    No Known Problems Sister, Daughter, Maternal Aunt, Maternal Uncle, Paternal Aunt, Paternal Uncle, Maternal Grandmother, Maternal Grandfather, Paternal Grandmother, Paternal Grandfather    Stroke Father          Tobacco Use    Smoking status: Every Day     Current packs/day: 1.00     Types: Cigarettes     Passive exposure: Current    Smokeless tobacco: Never   Substance and Sexual Activity    Alcohol use: No    Drug use: No    Sexual activity: Not on file     Review of Systems   Constitutional:  Positive for activity change, appetite change and unexpected weight change. Negative for chills and fever.   HENT:  Negative for rhinorrhea, sneezing and sore throat.    Eyes:  Negative for pain and visual disturbance.   Respiratory:  Negative for cough and shortness of breath.    Cardiovascular:  Negative for chest pain.   Gastrointestinal:  Positive for abdominal pain and nausea. Negative for constipation and diarrhea.   Endocrine: Negative for cold intolerance and heat intolerance.   Genitourinary:  Negative for difficulty urinating.   Musculoskeletal:  Positive for arthralgias, back pain, gait problem and myalgias. Negative for joint swelling.   Skin:  Negative for rash.   Allergic/Immunologic: Negative for  environmental allergies.   Neurological:  Negative for dizziness, syncope and weakness.   Hematological:  Does not bruise/bleed easily.   Psychiatric/Behavioral:  Negative for dysphoric mood. The patient is nervous/anxious.      Objective:     Vital Signs (Most Recent):  Temp: 98.3 °F (36.8 °C) (02/19/25 0809)  Pulse: (!) 56 (02/19/25 0809)  Resp: 18 (02/19/25 0809)  BP: (!) 122/59 (02/19/25 0809)  SpO2: 95 % (02/19/25 0809) Vital Signs (24h Range):  Temp:  [98.2 °F (36.8 °C)-98.5 °F (36.9 °C)] 98.3 °F (36.8 °C)  Pulse:  [56-86] 56  Resp:  [16-20] 18  SpO2:  [94 %-100 %] 95 %  BP: (102-239)/(54-99) 122/59     Weight: 39.9 kg (88 lb)  Body mass index is 16.63 kg/m².     Physical Exam  Vitals and nursing note reviewed.   Constitutional:       General: She is in acute distress.      Appearance: Normal appearance. She is ill-appearing. She is not toxic-appearing.   HENT:      Head: Normocephalic and atraumatic.      Nose: Nose normal.   Eyes:      Extraocular Movements: Extraocular movements intact.      Pupils: Pupils are equal, round, and reactive to light.   Cardiovascular:      Rate and Rhythm: Normal rate and regular rhythm.      Heart sounds: No murmur heard.     No friction rub. No gallop.   Pulmonary:      Effort: Pulmonary effort is normal. No respiratory distress.      Breath sounds: Normal breath sounds.   Abdominal:      General: Abdomen is flat. Bowel sounds are normal. There is no distension.      Palpations: Abdomen is soft.      Tenderness: There is abdominal tenderness (epigastric). There is guarding.      Hernia: No hernia is present.   Musculoskeletal:         General: Tenderness present. No swelling or deformity.      Cervical back: Normal range of motion and neck supple.   Skin:     General: Skin is warm and dry.      Capillary Refill: Capillary refill takes less than 2 seconds.      Findings: Bruising present.   Neurological:      General: No focal deficit present.      Mental Status: She is alert  and oriented to person, place, and time. Mental status is at baseline.      Motor: Weakness present.      Gait: Gait abnormal.   Psychiatric:         Mood and Affect: Mood normal.         Behavior: Behavior normal.              CRANIAL NERVES     CN III, IV, VI   Pupils are equal, round, and reactive to light.       Significant Labs: All pertinent labs within the past 24 hours have been reviewed.    Significant Imaging: I have reviewed all pertinent imaging results/findings within the past 24 hours.

## 2025-02-19 NOTE — HPI
9-year-old female with history of chronic abdominal pain and pre-pyloric ulcer status post EGD x2 who presents with refractory abdominal pain nausea and unintended weight loss.  General surgery consulted for EGD.

## 2025-02-19 NOTE — ASSESSMENT & PLAN NOTE
Nutrition consulted. Most recent weight and BMI monitored-     Measurements:  Wt Readings from Last 1 Encounters:   02/19/25 39.9 kg (88 lb)   Body mass index is 16.63 kg/m².    Patient has been screened and assessed by RD.    Malnutrition Type:  Context:    Level:      Malnutrition Characteristic Summary:       Interventions/Recommendations (treatment strategy):

## 2025-02-19 NOTE — PLAN OF CARE
[facility]  Initial Discharge Assessment       Primary Care Provider: Kt Pozo Jr., MD    Admission Diagnosis: Peptic ulcer disease [K27.9]  Elevated blood pressure reading [R03.0]  Chronic abdominal pain [R10.9, G89.29]    Admission Date: 2/18/2025  Expected Discharge Date:          [facility] / Plan: [employer] / Product Type: Medicare Advantage /     Extended Emergency Contact Information  Primary Emergency Contact: ZainKirsten  Address: [address]            [address]  Relation: Daughter  Secondary Emergency Contact: HaydenUrbano  Address: [address]           [address]  Mobile Phone: [phone]  Relation: Spouse    Discharge Plan A: Home with family, Home  Discharge Plan B: Home with family, Home Health      Clinic Pharmacy - [facility] [address]  [address]  [address]  [facility] [address]  Phone: [phone] Fax: [phone]    [id number] [place], [place] - [address] AT [facility] [address]  [address]  [facility] [address]  Phone: [phone] Fax: [phone]    [facility] [address] [place], [place] - [address] [place] [address]  [address]  [facility] [address]  Phone: [phone] Fax: [phone]      Initial Assessment (most recent)       Adult Discharge Assessment - 02/19/25 1137          Discharge Assessment    Assessment Type Discharge Planning Assessment     Confirmed/corrected address, phone number and insurance Yes     Confirmed Demographics Correct on Facesheet     Source of Information patient;family     When was your last doctors appointment? 02/18/25     Reason For Admission Colitis     People in Home spouse     Do you expect to return to your current living situation? Yes     Do you have help at home or someone to help you manage your care at home? --   The patient has a spouse who is  invovled in her care.    Prior to hospitilization cognitive status: Alert/Oriented     Current cognitive status: Alert/Oriented     Walking or Climbing Stairs Difficulty no   The patient reported that she is independent.    Dressing/Bathing Difficulty yes     Dressing/Bathing bathing difficulty, requires equipment     Dressing/Bathing Management shower chair and grab bars     Do you have any problems with: Errands/Grocery   The patient's spouse stated that they help each other.    Home Accessibility stairs to enter home   The patient's spouse reports that there is a ramp attached to their home.    Number of Stairs, Main Entrance three     Home Layout Able to live on 1st floor     Equipment Currently Used at Home shower chair;grab bar     Readmission within 30 days? No     Patient currently being followed by outpatient case management? Unable to determine (comments)     Do you currently have service(s) that help you manage your care at home? No     Do you take prescription medications? Yes     Do you have prescription coverage? Yes     Coverage [facility]GD [facility] SECURE SNP     Do you have any problems affording any of your prescribed medications? TBD     Is the patient taking medications as prescribed? no     If no, which medications is patient not taking? According to the patient's discharge plan of care, she has not been taking her blood pressure medications.     Who is going to help you get home at discharge? Urbano     How do you get to doctors appointments? car, drives self     Are you on dialysis? No     Do you take coumadin? No     Discharge Plan A Home with family;Home     Discharge Plan B Home with family;Home Health     DME Needed Upon Discharge  other (see comments)   TBD    Discharge Plan discussed with: Patient;Spouse/sig other     Name(s) and Number(s) Urbano (spouse)                 Discharge assessment completed with the patient and her spouse, Urbano. I attempted to contact  the patient's daughter, Kirsten, but the number listed was inactive. I asked the patient's spouse if he had an updated number, and he expressed that Kirsten changed her number. They also have not hear from her in about two weeks.     The patient is from home. She reports that she walks independently. I spoke to her about her medication management. I informed her that we can look at options that would be able to assist her, but she was not receptive to any assistance at this time.     She stated that she wants to keep the pharmacy that she has, and her spouse plans to  her medications. I spoke to the patient about possible home health, and she was not open to talking about home health either.     Discharge planning checklist given to the patient/family with instructions to contact  for any needs.  SW will follow as needed.

## 2025-02-19 NOTE — ASSESSMENT & PLAN NOTE
Patient with evidence yet again on CT scan of long segment nonspecific colitis.  In the transverse colon.  Continue Zosyn for now.  Awaiting further evaluation by General surgery PCR GI panel being processed

## 2025-02-19 NOTE — SUBJECTIVE & OBJECTIVE
No current facility-administered medications on file prior to encounter.     Current Outpatient Medications on File Prior to Encounter   Medication Sig    dicyclomine (BENTYL) 10 MG capsule Take 1 capsule (10 mg total) by mouth 4 (four) times daily before meals and nightly.    OLANZapine (ZYPREXA) 5 MG tablet Take 1 tablet (5 mg total) by mouth every evening.    ondansetron (ZOFRAN-ODT) 4 MG TbDL Take 1 tablet (4 mg total) by mouth every 6 (six) hours as needed.    oxyCODONE-acetaminophen (PERCOCET)  mg per tablet Take 1 tablet by mouth every 12 (twelve) hours as needed for Pain.    pantoprazole (PROTONIX) 40 MG tablet Take 1 tablet (40 mg total) by mouth 2 (two) times daily.    pregabalin (LYRICA) 75 MG capsule Take 1 capsule (75 mg total) by mouth 2 (two) times daily.    sucralfate (CARAFATE) 1 gram tablet Take 1 tablet (1 g total) by mouth 4 (four) times daily before meals and nightly.    sumatriptan (IMITREX) 50 MG tablet Take 1 tablet (50 mg total) by mouth daily as needed for Migraine.    traZODone (DESYREL) 150 MG tablet Take 1 tablet (150 mg total) by mouth every evening.       Review of patient's allergies indicates:  No Known Allergies    Past Medical History:   Diagnosis Date    Anticoagulant long-term use     Anxiety     Arthritis     C. difficile colitis 8/6/2022    Carotid artery disease     Cervical disc disorder     Chronic back pain     COPD (chronic obstructive pulmonary disease)     Coronary artery disease     Dementia     Depression     Diarrhea, unspecified 11/1/2021    DVT (deep venous thrombosis)     CAROTID    GERD (gastroesophageal reflux disease)     Hematuria     Hiatal hernia     High cholesterol     Ill-fitting dentures     STATES DOESN'T WEAR    PVD (peripheral vascular disease)     Renal calculus     Sleep apnea     Sleep apnea     NO C PAP    Urinary frequency     Urinary urgency     Wears glasses     READING      Past Surgical History:   Procedure Laterality Date    BACK  SURGERY      BREAST LUMPECTOMY Right     CAROTID ARTERY ANGIOPLASTY      CAROTID ARTERY ANGIOPLASTY      CAROTID ARTERY STENT Left     CAROTID ENDARTERECTOMY Right     CHOLECYSTECTOMY      COLONOSCOPY      CYSTOSCOPY      EGD, WITH CLOSED BIOPSY N/A 4/10/2024    Procedure: EGD, WITH CLOSED BIOPSY;  Surgeon: Meg Ayon MD;  Location: [facility];  Service: General;  Laterality: N/A;    EGD, WITH CLOSED BIOPSY N/A 7/17/2024    Procedure: EGD, WITH CLOSED BIOPSY of pyloric lesion;  Surgeon: Meg Ayon MD;  Location: [facility];  Service: General;  Laterality: N/A;  6th 0830    HYSTERECTOMY      OOPHORECTOMY      TONSILLECTOMY       Family History       Problem Relation (Age of Onset)    Cancer Mother    No Known Problems Sister, Daughter, Maternal Aunt, Maternal Uncle, Paternal Aunt, Paternal Uncle, Maternal Grandmother, Maternal Grandfather, Paternal Grandmother, Paternal Grandfather    Stroke Father          Tobacco Use    Smoking status: Every Day     Current packs/day: 1.00     Types: Cigarettes     Passive exposure: Current    Smokeless tobacco: Never   Substance and Sexual Activity    Alcohol use: No    Drug use: No    Sexual activity: Not on file     Review of Systems   Constitutional:  Negative for activity change, appetite change, fatigue and fever.   Respiratory:  Negative for apnea, cough, chest tightness and shortness of breath.    Cardiovascular:  Negative for chest pain.   Gastrointestinal:  Positive for abdominal pain. Negative for abdominal distention, anal bleeding, blood in stool, constipation, diarrhea, nausea, rectal pain and vomiting.   All other systems reviewed and are negative.    Objective:     Vital Signs (Most Recent):  Temp: 98.3 °F (36.8 °C) (02/19/25 0809)  Pulse: 64 (02/19/25 1149)  Resp: 18 (02/19/25 1158)  BP: (!) 175/77 (02/19/25 1149)  SpO2: 99 % (02/19/25 1149) Vital Signs (24h Range):  Temp:  [98.2 °F (36.8 °C)-98.5 °F (36.9 °C)] 98.3 °F (36.8 °C)  Pulse:  [56-86]  64  Resp:  [16-20] 18  SpO2:  [94 %-100 %] 99 %  BP: (102-215)/(54-94) 175/77     Weight: 39.9 kg (88 lb)  Body mass index is 16.63 kg/m².     Physical Exam  Constitutional:       General: She is not in acute distress.     Appearance: She is not ill-appearing or toxic-appearing.      Comments: Cachectic   Cardiovascular:      Rate and Rhythm: Normal rate and regular rhythm.   Pulmonary:      Effort: Pulmonary effort is normal. No respiratory distress.   Abdominal:      General: There is no distension.      Palpations: Abdomen is soft.      Tenderness: There is no abdominal tenderness. There is no guarding.      Hernia: No hernia is present.   Skin:     Capillary Refill: Capillary refill takes less than 2 seconds.   Neurological:      Mental Status: Mental status is at baseline.            I have reviewed all pertinent lab results within the past 24 hours.  CBC:   Recent Labs   Lab 02/19/25  0539   WBC 9.29   RBC 4.12   HGB 11.9*   HCT 38.1      MCV 93   MCH 28.9   MCHC 31.2*     CMP:   Recent Labs   Lab 02/19/25  0539      CALCIUM 8.7   ALBUMIN 3.6   PROT 6.5      K 3.9   CO2 24      BUN 11   CREATININE 0.6   ALKPHOS 80   ALT 10   AST 12   BILITOT 0.3       Significant Diagnostics:  I have reviewed all pertinent imaging results/findings within the past 24 hours.

## 2025-02-19 NOTE — CONSULTS
[facility]  General Surgery  Consult Note    Patient Name: Desiree Blake  MRN: [id number]  Code Status: Full Code  Admission Date: 2/18/2025  Hospital Length of Stay: 1 days  Attending Physician: Kt Pozo Jr., MD  Primary Care Provider: Kt Pozo Jr., MD    Patient information was obtained from patient and ER records.     Inpatient consult to General Surgery  Consult performed by: Meg Ayon MD  Consult ordered by: Marek Rai MD        Subjective:     Principal Problem: Colitis    History of Present Illness: 9-year-old female with history of chronic abdominal pain and pre-pyloric ulcer status post EGD x2 who presents with refractory abdominal pain nausea and unintended weight loss.  General surgery consulted for EGD.    No current facility-administered medications on file prior to encounter.     Current Outpatient Medications on File Prior to Encounter   Medication Sig    dicyclomine (BENTYL) 10 MG capsule Take 1 capsule (10 mg total) by mouth 4 (four) times daily before meals and nightly.    OLANZapine (ZYPREXA) 5 MG tablet Take 1 tablet (5 mg total) by mouth every evening.    ondansetron (ZOFRAN-ODT) 4 MG TbDL Take 1 tablet (4 mg total) by mouth every 6 (six) hours as needed.    oxyCODONE-acetaminophen (PERCOCET)  mg per tablet Take 1 tablet by mouth every 12 (twelve) hours as needed for Pain.    pantoprazole (PROTONIX) 40 MG tablet Take 1 tablet (40 mg total) by mouth 2 (two) times daily.    pregabalin (LYRICA) 75 MG capsule Take 1 capsule (75 mg total) by mouth 2 (two) times daily.    sucralfate (CARAFATE) 1 gram tablet Take 1 tablet (1 g total) by mouth 4 (four) times daily before meals and nightly.    sumatriptan (IMITREX) 50 MG tablet Take 1 tablet (50 mg total) by mouth daily as needed for Migraine.    traZODone (DESYREL) 150 MG tablet Take 1 tablet (150 mg total) by mouth every evening.       Review of patient's allergies indicates:  No Known Allergies    Past  Medical History:   Diagnosis Date    Anticoagulant long-term use     Anxiety     Arthritis     C. difficile colitis 8/6/2022    Carotid artery disease     Cervical disc disorder     Chronic back pain     COPD (chronic obstructive pulmonary disease)     Coronary artery disease     Dementia     Depression     Diarrhea, unspecified 11/1/2021    DVT (deep venous thrombosis)     CAROTID    GERD (gastroesophageal reflux disease)     Hematuria     Hiatal hernia     High cholesterol     Ill-fitting dentures     STATES DOESN'T WEAR    PVD (peripheral vascular disease)     Renal calculus     Sleep apnea     Sleep apnea     NO C PAP    Urinary frequency     Urinary urgency     Wears glasses     READING      Past Surgical History:   Procedure Laterality Date    BACK SURGERY      BREAST LUMPECTOMY Right     CAROTID ARTERY ANGIOPLASTY      CAROTID ARTERY ANGIOPLASTY      CAROTID ARTERY STENT Left     CAROTID ENDARTERECTOMY Right     CHOLECYSTECTOMY      COLONOSCOPY      CYSTOSCOPY      EGD, WITH CLOSED BIOPSY N/A 4/10/2024    Procedure: EGD, WITH CLOSED BIOPSY;  Surgeon: Meg Ayon MD;  Location: [facility];  Service: General;  Laterality: N/A;    EGD, WITH CLOSED BIOPSY N/A 7/17/2024    Procedure: EGD, WITH CLOSED BIOPSY of pyloric lesion;  Surgeon: Meg Ayon MD;  Location: [facility];  Service: General;  Laterality: N/A;  6th 0830    HYSTERECTOMY      OOPHORECTOMY      TONSILLECTOMY       Family History       Problem Relation (Age of Onset)    Cancer Mother    No Known Problems Sister, Daughter, Maternal Aunt, Maternal Uncle, Paternal Aunt, Paternal Uncle, Maternal Grandmother, Maternal Grandfather, Paternal Grandmother, Paternal Grandfather    Stroke Father          Tobacco Use    Smoking status: Every Day     Current packs/day: 1.00     Types: Cigarettes     Passive exposure: Current    Smokeless tobacco: Never   Substance and Sexual Activity    Alcohol use: No    Drug use: No    Sexual activity: Not on file      Review of Systems   Constitutional:  Negative for activity change, appetite change, fatigue and fever.   Respiratory:  Negative for apnea, cough, chest tightness and shortness of breath.    Cardiovascular:  Negative for chest pain.   Gastrointestinal:  Positive for abdominal pain. Negative for abdominal distention, anal bleeding, blood in stool, constipation, diarrhea, nausea, rectal pain and vomiting.   All other systems reviewed and are negative.    Objective:     Vital Signs (Most Recent):  Temp: 98.3 °F (36.8 °C) (02/19/25 0809)  Pulse: 64 (02/19/25 1149)  Resp: 18 (02/19/25 1158)  BP: (!) 175/77 (02/19/25 1149)  SpO2: 99 % (02/19/25 1149) Vital Signs (24h Range):  Temp:  [98.2 °F (36.8 °C)-98.5 °F (36.9 °C)] 98.3 °F (36.8 °C)  Pulse:  [56-86] 64  Resp:  [16-20] 18  SpO2:  [94 %-100 %] 99 %  BP: (102-215)/(54-94) 175/77     Weight: 39.9 kg (88 lb)  Body mass index is 16.63 kg/m².     Physical Exam  Constitutional:       General: She is not in acute distress.     Appearance: She is not ill-appearing or toxic-appearing.      Comments: Cachectic   Cardiovascular:      Rate and Rhythm: Normal rate and regular rhythm.   Pulmonary:      Effort: Pulmonary effort is normal. No respiratory distress.   Abdominal:      General: There is no distension.      Palpations: Abdomen is soft.      Tenderness: There is no abdominal tenderness. There is no guarding.      Hernia: No hernia is present.   Skin:     Capillary Refill: Capillary refill takes less than 2 seconds.   Neurological:      Mental Status: Mental status is at baseline.            I have reviewed all pertinent lab results within the past 24 hours.  CBC:   Recent Labs   Lab 02/19/25  0539   WBC 9.29   RBC 4.12   HGB 11.9*   HCT 38.1      MCV 93   MCH 28.9   MCHC 31.2*     CMP:   Recent Labs   Lab 02/19/25  0539      CALCIUM 8.7   ALBUMIN 3.6   PROT 6.5      K 3.9   CO2 24      BUN 11   CREATININE 0.6   ALKPHOS 80   ALT 10   AST 12    BILITOT 0.3       Significant Diagnostics:  I have reviewed all pertinent imaging results/findings within the past 24 hours.    Assessment/Plan:     PUD (peptic ulcer disease)  To endo today for EGD   NPO   Informed consent obtained  Rest of care per primary      VTE Risk Mitigation (From admission, onward)           Ordered     IP VTE LOW RISK PATIENT  Once         02/18/25 1527     Place VANDANA hose  Until discontinued         02/18/25 1527     Place sequential compression device  Until discontinued         02/18/25 1527                    Thank you for your consult. I will follow-up with patient. Please contact us if you have any additional questions.    Meg Ayon MD  General Surgery  [facility]

## 2025-02-19 NOTE — ANESTHESIA PREPROCEDURE EVALUATION
02/19/2025  Desiree Blake is a 69 y.o., female.      Pre-op Assessment    I have reviewed the Patient Summary Reports.    I have reviewed the NPO Status.   I have reviewed the Medications.     Review of Systems  Anesthesia Hx:  No problems with previous Anesthesia             Denies Family Hx of Anesthesia complications.    Denies Personal Hx of Anesthesia complications.                    Social:  Smoker       Cardiovascular:     Hypertension, well controlled   CAD  asymptomatic                                        Pulmonary:   COPD, moderate Asthma moderate Shortness of breath   DENIES HARSHAD               Renal/:   renal calculi               Hepatic/GI:   PUD, Hiatal Hernia, GERD, poorly controlled                Neurological:      Headaches                                 Endocrine:  Endocrine Normal            Psych:  Psychiatric History anxiety depression              Lab Results   Component Value Date    WBC 9.29 02/19/2025    HGB 11.9 (L) 02/19/2025    HCT 38.1 02/19/2025    MCV 93 02/19/2025     02/19/2025      CMP  Sodium   Date Value Ref Range Status   02/19/2025 141 136 - 145 mmol/L Final     Potassium   Date Value Ref Range Status   02/19/2025 3.9 3.5 - 5.1 mmol/L Final     Chloride   Date Value Ref Range Status   02/19/2025 110 95 - 110 mmol/L Final     CO2   Date Value Ref Range Status   02/19/2025 24 23 - 29 mmol/L Final     Glucose   Date Value Ref Range Status   02/19/2025 109 70 - 110 mg/dL Final   01/26/2017 82 74 - 106 MG/DL Final     BUN   Date Value Ref Range Status   02/19/2025 11 8 - 23 mg/dL Final     Creatinine   Date Value Ref Range Status   02/19/2025 0.6 0.5 - 1.4 mg/dL Final     Calcium   Date Value Ref Range Status   02/19/2025 8.7 8.7 - 10.5 mg/dL Final     Total Protein   Date Value Ref Range Status   02/19/2025 6.5 6.0 - 8.4 g/dL Final     Albumin   Date Value Ref  Range Status   02/19/2025 3.6 3.5 - 5.2 g/dL Final     Total Bilirubin   Date Value Ref Range Status   02/19/2025 0.3 0.1 - 1.0 mg/dL Final     Comment:     For infants and newborns, interpretation of results should be based  on gestational age, weight and in agreement with clinical  observations.    Premature Infant recommended reference ranges:  Up to 24 hours.............<8.0 mg/dL  Up to 48 hours............<12.0 mg/dL  3-5 days..................<15.0 mg/dL  6-29 days.................<15.0 mg/dL       Alkaline Phosphatase   Date Value Ref Range Status   02/19/2025 80 55 - 135 U/L Final     AST   Date Value Ref Range Status   02/19/2025 12 10 - 40 U/L Final     ALT   Date Value Ref Range Status   02/19/2025 10 10 - 44 U/L Final     Anion Gap   Date Value Ref Range Status   02/19/2025 7 (L) 8 - 16 mmol/L Final     eGFR   Date Value Ref Range Status   02/19/2025 >60.0 >60 mL/min/1.73 m^2 Final   01/04/2023 96 > OR = 60 mL/min/1.73m2 Final     Comment:     The eGFR is based on the CKD-EPI 2021 equation. To calculate   the new eGFR from a previous Creatinine or Cystatin C  result, go to https://www.kidney.org/professionals/  kdoqi/gfr%5Fcalculator          Physical Exam  General: Well nourished    Airway:  Mallampati: II / I  Mouth Opening: Normal  TM Distance: Normal  Tongue: Normal  Neck ROM: Normal ROM    Dental:  Periodontal disease    Chest/Lungs:  Clear to auscultation    Heart:  Rate: Normal  Rhythm: Regular Rhythm  Sounds: Normal        Anesthesia Plan  Type of Anesthesia, risks & benefits discussed:    Anesthesia Type: MAC  Intra-op Monitoring Plan: Standard ASA Monitors  Post Op Pain Control Plan: multimodal analgesia  Induction:  IV  Airway Plan: Direct  Informed Consent: Informed consent signed with the Patient and all parties understand the risks and agree with anesthesia plan.  All questions answered.   ASA Score: 4  Day of Surgery Review of History & Physical: I have interviewed and examined the  patient. I have reviewed the patient's H&P dated: There are no significant changes.     Ready For Surgery From Anesthesia Perspective.     .

## 2025-02-19 NOTE — PROGRESS NOTES
Pharmacist Renal Dose Adjustment Note    Desiree Blake is a 69 y.o. female being treated with the medication zosyn    Patient Data:    Vital Signs (Most Recent):  Temp: 98.2 °F (36.8 °C) (02/18/25 1845)  Pulse: 78 (02/18/25 1845)  Resp: 18 (02/18/25 1845)  BP: (!) 164/72 (02/18/25 1845)  SpO2: 100 % (02/18/25 1845) Vital Signs (72h Range):  Temp:  [98.2 °F (36.8 °C)-98.3 °F (36.8 °C)]   Pulse:  [68-86]   Resp:  [16-18]   BP: (160-239)/(69-99)   SpO2:  [98 %-100 %]      Recent Labs   Lab 02/18/25  1529   CREATININE 0.5     Serum creatinine: 0.5 mg/dL 02/18/25 1529  Estimated creatinine clearance: 66.9 mL/min    Medication:zosyn dose: 3.375gm frequency q6h will be changed to medication:zosyn dose:4.5gm frequency:q8h extended dosing for CrCl > 20    Pharmacist's Name: Richard Harrington

## 2025-02-19 NOTE — TRANSFER OF CARE
Anesthesia Transfer of Care Note    Patient: Desiree Blake    Procedure(s) Performed: Procedure(s) (LRB):  EGD, WITH CLOSED BIOPSY (N/A)    Patient location: GI    Anesthesia Type: MAC    Transport from OR: Transported from OR on room air with adequate spontaneous ventilation    Post pain: adequate analgesia    Post assessment: no apparent anesthetic complications    Post vital signs: stable    Level of consciousness: awake    Nausea/Vomiting: no nausea/vomiting    Complications: none    Transfer of care protocol was followed      Last vitals:   149/68  16 RR  68 HR  100% O2 SAT   Abbe Flap (Upper To Lower Lip) Text: The defect of the lower lip was assessed and measured.  Given the location and size of the defect, an Abbe flap was deemed most appropriate.  Using a sterile surgical marker, an appropriate Abbe flap was measured and drawn on the upper lip. Local anesthesia was then infiltrated.  A scalpel was then used to incise the upper lip through and through the skin, vermilion, muscle and mucosa, leaving the flap pedicled on the opposite side.  The flap was then rotated and transferred to the lower lip defect.  The flap was then sutured into place with a three layer technique, closing the orbicularis oris muscle layer with subcutaneous buried sutures, followed by a mucosal layer and an epidermal layer.

## 2025-02-19 NOTE — ASSESSMENT & PLAN NOTE
Patient with severe underlying baseline anxiety will continue home medical therapy while hospitalized.

## 2025-02-19 NOTE — PLAN OF CARE
02/19/25 1339   Medicare Message   Important Message from Medicare regarding Discharge Appeal Rights Given to patient/caregiver;Explained to patient/caregiver;Signed/date by patient/caregiver;Other (comments)  (completed in registration)   Date IMM was signed 02/18/25   Time IMM was signed 1012

## 2025-02-19 NOTE — ASSESSMENT & PLAN NOTE
Patient with evidence of ongoing thickening of the gastric antrum possibly related to gastritis.  Continue IV Pepcid and General surgery evaluation.

## 2025-02-20 ENCOUNTER — CLINICAL SUPPORT (OUTPATIENT)
Dept: SMOKING CESSATION | Facility: CLINIC | Age: 70
End: 2025-02-20

## 2025-02-20 DIAGNOSIS — F17.200 NICOTINE DEPENDENCE: Primary | ICD-10-CM

## 2025-02-20 LAB
ALBUMIN SERPL BCP-MCNC: 3.4 G/DL (ref 3.5–5.2)
ALP SERPL-CCNC: 82 U/L (ref 55–135)
ALT SERPL W/O P-5'-P-CCNC: 8 U/L (ref 10–44)
ANION GAP SERPL CALC-SCNC: 9 MMOL/L (ref 8–16)
AST SERPL-CCNC: 13 U/L (ref 10–40)
BASOPHILS # BLD AUTO: 0.05 K/UL (ref 0–0.2)
BASOPHILS NFR BLD: 0.6 % (ref 0–1.9)
BILIRUB SERPL-MCNC: 0.2 MG/DL (ref 0.1–1)
BUN SERPL-MCNC: 12 MG/DL (ref 8–23)
CALCIUM SERPL-MCNC: 8.6 MG/DL (ref 8.7–10.5)
CHLORIDE SERPL-SCNC: 108 MMOL/L (ref 95–110)
CO2 SERPL-SCNC: 25 MMOL/L (ref 23–29)
CREAT SERPL-MCNC: 0.5 MG/DL (ref 0.5–1.4)
DIFFERENTIAL METHOD BLD: ABNORMAL
EOSINOPHIL # BLD AUTO: 0.1 K/UL (ref 0–0.5)
EOSINOPHIL NFR BLD: 1.2 % (ref 0–8)
ERYTHROCYTE [DISTWIDTH] IN BLOOD BY AUTOMATED COUNT: 18 % (ref 11.5–14.5)
EST. GFR  (NO RACE VARIABLE): >60 ML/MIN/1.73 M^2
GLUCOSE SERPL-MCNC: 94 MG/DL (ref 70–110)
HCT VFR BLD AUTO: 36.7 % (ref 37–48.5)
HGB BLD-MCNC: 11.6 G/DL (ref 12–16)
IMM GRANULOCYTES # BLD AUTO: 0.04 K/UL (ref 0–0.04)
IMM GRANULOCYTES NFR BLD AUTO: 0.4 % (ref 0–0.5)
LYMPHOCYTES # BLD AUTO: 1.3 K/UL (ref 1–4.8)
LYMPHOCYTES NFR BLD: 14.6 % (ref 18–48)
MAGNESIUM SERPL-MCNC: 2.1 MG/DL (ref 1.6–2.6)
MCH RBC QN AUTO: 28.9 PG (ref 27–31)
MCHC RBC AUTO-ENTMCNC: 31.6 G/DL (ref 32–36)
MCV RBC AUTO: 91 FL (ref 82–98)
MONOCYTES # BLD AUTO: 0.8 K/UL (ref 0.3–1)
MONOCYTES NFR BLD: 8.6 % (ref 4–15)
NEUTROPHILS # BLD AUTO: 6.7 K/UL (ref 1.8–7.7)
NEUTROPHILS NFR BLD: 74.6 % (ref 38–73)
NRBC BLD-RTO: 0 /100 WBC
PLATELET # BLD AUTO: 252 K/UL (ref 150–450)
PMV BLD AUTO: 10.2 FL (ref 9.2–12.9)
POTASSIUM SERPL-SCNC: 3.9 MMOL/L (ref 3.5–5.1)
PROT SERPL-MCNC: 6.1 G/DL (ref 6–8.4)
RBC # BLD AUTO: 4.02 M/UL (ref 4–5.4)
SODIUM SERPL-SCNC: 142 MMOL/L (ref 136–145)
WBC # BLD AUTO: 8.95 K/UL (ref 3.9–12.7)

## 2025-02-20 PROCEDURE — 25000003 PHARM REV CODE 250: Performed by: INTERNAL MEDICINE

## 2025-02-20 PROCEDURE — 97162 PT EVAL MOD COMPLEX 30 MIN: CPT

## 2025-02-20 PROCEDURE — 63600175 PHARM REV CODE 636 W HCPCS: Mod: JZ,TB | Performed by: NURSE PRACTITIONER

## 2025-02-20 PROCEDURE — 25000003 PHARM REV CODE 250: Performed by: NURSE PRACTITIONER

## 2025-02-20 PROCEDURE — 11000001 HC ACUTE MED/SURG PRIVATE ROOM

## 2025-02-20 PROCEDURE — 85025 COMPLETE CBC W/AUTO DIFF WBC: CPT | Performed by: INTERNAL MEDICINE

## 2025-02-20 PROCEDURE — 63600175 PHARM REV CODE 636 W HCPCS: Performed by: INTERNAL MEDICINE

## 2025-02-20 PROCEDURE — 36415 COLL VENOUS BLD VENIPUNCTURE: CPT | Performed by: INTERNAL MEDICINE

## 2025-02-20 PROCEDURE — 83735 ASSAY OF MAGNESIUM: CPT | Performed by: INTERNAL MEDICINE

## 2025-02-20 PROCEDURE — S4991 NICOTINE PATCH NONLEGEND: HCPCS | Performed by: INTERNAL MEDICINE

## 2025-02-20 PROCEDURE — 25000003 PHARM REV CODE 250: Performed by: EMERGENCY MEDICINE

## 2025-02-20 PROCEDURE — 80053 COMPREHEN METABOLIC PANEL: CPT | Performed by: INTERNAL MEDICINE

## 2025-02-20 RX ORDER — PANTOPRAZOLE SODIUM 40 MG/1
40 TABLET, DELAYED RELEASE ORAL 2 TIMES DAILY
Status: DISCONTINUED | OUTPATIENT
Start: 2025-02-20 | End: 2025-02-21 | Stop reason: HOSPADM

## 2025-02-20 RX ORDER — ALPRAZOLAM 0.5 MG/1
0.5 TABLET ORAL EVERY 4 HOURS PRN
Status: DISCONTINUED | OUTPATIENT
Start: 2025-02-20 | End: 2025-02-21 | Stop reason: HOSPADM

## 2025-02-20 RX ORDER — FAMOTIDINE 20 MG/1
20 TABLET, FILM COATED ORAL 2 TIMES DAILY
Status: DISCONTINUED | OUTPATIENT
Start: 2025-02-20 | End: 2025-02-21 | Stop reason: HOSPADM

## 2025-02-20 RX ORDER — PREDNISONE 20 MG/1
20 TABLET ORAL DAILY
Status: DISCONTINUED | OUTPATIENT
Start: 2025-02-20 | End: 2025-02-21 | Stop reason: HOSPADM

## 2025-02-20 RX ADMIN — PIPERACILLIN SODIUM AND TAZOBACTAM SODIUM 4.5 G: 4; .5 INJECTION, POWDER, FOR SOLUTION INTRAVENOUS at 04:02

## 2025-02-20 RX ADMIN — AMLODIPINE BESYLATE 10 MG: 10 TABLET ORAL at 09:02

## 2025-02-20 RX ADMIN — PIPERACILLIN SODIUM AND TAZOBACTAM SODIUM 4.5 G: 4; .5 INJECTION, POWDER, FOR SOLUTION INTRAVENOUS at 12:02

## 2025-02-20 RX ADMIN — SUCRALFATE 1 G: 1 TABLET ORAL at 08:02

## 2025-02-20 RX ADMIN — PREDNISONE 20 MG: 20 TABLET ORAL at 09:02

## 2025-02-20 RX ADMIN — ALPRAZOLAM 0.5 MG: 0.5 TABLET ORAL at 08:02

## 2025-02-20 RX ADMIN — HYDROMORPHONE HYDROCHLORIDE 0.5 MG: 1 INJECTION, SOLUTION INTRAMUSCULAR; INTRAVENOUS; SUBCUTANEOUS at 09:02

## 2025-02-20 RX ADMIN — OXYCODONE HYDROCHLORIDE AND ACETAMINOPHEN 1 TABLET: 10; 325 TABLET ORAL at 04:02

## 2025-02-20 RX ADMIN — FAMOTIDINE 20 MG: 20 TABLET, FILM COATED ORAL at 08:02

## 2025-02-20 RX ADMIN — OLANZAPINE 5 MG: 2.5 TABLET, FILM COATED ORAL at 08:02

## 2025-02-20 RX ADMIN — SUCRALFATE 1 G: 1 TABLET ORAL at 09:02

## 2025-02-20 RX ADMIN — FAMOTIDINE 20 MG: 10 INJECTION, SOLUTION INTRAVENOUS at 09:02

## 2025-02-20 RX ADMIN — Medication 6 MG: at 08:02

## 2025-02-20 RX ADMIN — LOSARTAN POTASSIUM 25 MG: 25 TABLET, FILM COATED ORAL at 09:02

## 2025-02-20 RX ADMIN — NICOTINE 1 PATCH: 21 PATCH, EXTENDED RELEASE TRANSDERMAL at 09:02

## 2025-02-20 RX ADMIN — HYDROMORPHONE HYDROCHLORIDE 0.5 MG: 1 INJECTION, SOLUTION INTRAMUSCULAR; INTRAVENOUS; SUBCUTANEOUS at 03:02

## 2025-02-20 RX ADMIN — ACETAMINOPHEN 650 MG: 325 TABLET ORAL at 06:02

## 2025-02-20 RX ADMIN — OXYCODONE HYDROCHLORIDE AND ACETAMINOPHEN 1 TABLET: 10; 325 TABLET ORAL at 08:02

## 2025-02-20 RX ADMIN — TRAZODONE HYDROCHLORIDE 150 MG: 100 TABLET ORAL at 08:02

## 2025-02-20 RX ADMIN — PANTOPRAZOLE SODIUM 40 MG: 40 TABLET, DELAYED RELEASE ORAL at 08:02

## 2025-02-20 RX ADMIN — SUCRALFATE 1 G: 1 TABLET ORAL at 03:02

## 2025-02-20 RX ADMIN — SUCRALFATE 1 G: 1 TABLET ORAL at 06:02

## 2025-02-20 RX ADMIN — HYDROMORPHONE HYDROCHLORIDE 0.5 MG: 1 INJECTION, SOLUTION INTRAMUSCULAR; INTRAVENOUS; SUBCUTANEOUS at 02:02

## 2025-02-20 NOTE — PLAN OF CARE
Problem: Physical Therapy  Goal: Physical Therapy Goal  Description: Patient will increase functional independence with mobility by performin. Sit to stand transfer with Modified [place] with Rolling Walker or appropriate assistive device   2. Bed to chair transfer with Modified [place] using Rolling Walker or appropriate assistive device.  3. Gait  x 150 feet with Modified [place] using Rolling Walker or appropriate assistive device.   4. Ascend/descend 4 steps without handrails with Rolling Walker or appropriate assistive device.     Outcome: POC Established

## 2025-02-20 NOTE — PLAN OF CARE
POC reviewed with pt, VSS, IVF's infusing per MD orders without complications. IV site clean/Dry/Intact. Pt denies pain/needs at this time,CB in reach, bed in lowest position, lighting adjusted to pt's preference will continue to monitor.   Problem: Adult Inpatient Plan of Care  Goal: Plan of Care Review  Outcome: Progressing  Goal: Patient-Specific Goal (Individualized)  Outcome: Progressing  Goal: Absence of Hospital-Acquired Illness or Injury  Outcome: Progressing  Goal: Optimal Comfort and Wellbeing  Outcome: Progressing  Goal: Readiness for Transition of Care  Outcome: Progressing     Problem: Pain Chronic (Persistent)  Goal: Optimal Pain Control and Function  Outcome: Progressing

## 2025-02-20 NOTE — PT/OT/SLP EVAL
Physical Therapy Evaluation    Patient Name:  Desiree Blake   MRN:  [id number]    Recommendations:     Discharge Recommendations: Low Intensity Therapy   Discharge Equipment Recommendations: none   Barriers to discharge: Inaccessible home    Assessment:     Desiree Blake is a 69 y.o. female admitted with a medical diagnosis of Colitis.  She presents with the following impairments/functional limitations: weakness, impaired endurance, impaired self care skills, impaired functional mobility, gait instability, impaired balance, decreased lower extremity function, decreased upper extremity function, decreased safety awareness, pain, impaired cardiopulmonary response to activity.    Pt presents to acute care physical therapy evaluation with pmhx of chronic abdominal pain and cough cold congestion as per H&P note dated 02/19/2025. Pt's  was present during the evaluation. Pt reports 10/10 pain in R shoulder and abdomen but was willing to participate in therapy and was able to ambulate with Adrián and use of side rails in hallway. Pt did c/o of dizziness with achieving more upright positions that subsides with diaphragmatic breathing. Pt was able to perform bed mobility with Mod(I) with use of bed rails. Pt easily loses balance when ambulating without UE support and may require an AD for ambulation.    Rehab Prognosis: Fair; patient would benefit from acute skilled PT services to address these deficits and reach maximum level of function.    Recent Surgery: Procedure(s) (LRB):  EGD, WITH CLOSED BIOPSY (N/A) 1 Day Post-Op    Plan:     During this hospitalization, patient to be seen 5 x/week to address the identified rehab impairments via gait training, therapeutic activities, therapeutic exercises, neuromuscular re-education and progress toward the following goals:    Plan of Care Expires:  02/27/25    Subjective     Chief Complaint: pain  Patient/Family Comments/goals: decrease pain  Pain/Comfort:  Pain Rating 1:  10/10  Location - Side 1: Right  Location - Orientation 1: generalized  Location 1: shoulder  Pain Addressed 1: Cessation of Activity, Distraction, Reposition  Pain Rating Post-Intervention 1: other (see comments) (pt did not quantify)  Pain Rating 2: 10/10  Location - Side 2: Bilateral  Location - Orientation 2: anterior  Location 2: abdomen  Pain Addressed 2: Distraction, Cessation of Activity  Pain Rating Post-Intervention 2: other (see comments) (pt did not quantify)    Patients cultural, spiritual, Muslim conflicts given the current situation: no    Living Environment:  Pt lives with spouse in [facility] with one step to enter/exit.   Prior to admission, patients level of function was independent with household ambulation.  Equipment used at home: walker, rolling, rollator, grab bar, shower chair, cane, straight.  DME owned (not currently used): rolling walker and rollator, and cane, straight .  Upon discharge, patient will have assistance from spouse.    Objective:     Communicated with pt prior to session.  Patient found  long sitting on hospital bed  with peripheral IV  upon PT entry to room.    General Precautions: Standard, fall  Orthopedic Precautions:N/A   Braces: N/A  Respiratory Status: Room air    Exams:  Cognitive Exam:  Patient is oriented to Person, Place, and Situation  Fine Motor Coordination:    -       Intact  Gross Motor Coordination:  WFL  Postural Exam:  Patient presented with the following abnormalities:    -       Rounded shoulders  -       Forward head  Sensation:    -       Intact  Skin Integrity/Edema:      -       Skin integrity: Visible skin intact  -       Edema: None noted to BLEs  RLE ROM: WFL  RLE Strength: 3/5 grossly  LLE ROM: WFL  LLE Strength: 3/5 grossly    Functional Mobility:  Bed Mobility:     Rolling Left:  modified independence  Rolling Right: modified independence  Supine to Sit: independence  Sit to Supine: independence  Transfers:     Sit to Stand:  contact guard  assistance with no AD  Gait: Pt ambulated ~100ft with no AD and UE railing in hallway. Pt loses balance easily when no UE support is available and requires Adrián to maintain balance and safety. Pt exhibits some abnormal instability or accessory movements in BLE when ambulating.     Balance: CGA for static standing and Adrián for ambulation with no AD.       AM-PAC 6 CLICK MOBILITY  Total Score:20       Treatment & Education:  Rolling x 2  Supine to sit x 2  Seated scooting x 4  Sit to stand x 2  Gait x 100ft      Patient left supine with all lines intact, call button in reach, and spouse present.    GOALS:   Multidisciplinary Problems       Physical Therapy Goals          Problem: Physical Therapy    Goal Priority Disciplines Outcome Interventions   Physical Therapy Goal     PT, PT/OT Progressing    Description: Patient will increase functional independence with mobility by performin. Sit to stand transfer with Modified [place] with Rolling Walker or appropriate assistive device   2. Bed to chair transfer with Modified [place] using Rolling Walker or appropriate assistive device.  3. Gait  x 150 feet with Modified [place] using Rolling Walker or appropriate assistive device.   4. Ascend/descend 4 steps without handrails with Rolling Walker or appropriate assistive device.                         History:     Past Medical History:   Diagnosis Date    Anticoagulant long-term use     Anxiety     Arthritis     C. difficile colitis 2022    Carotid artery disease     Cervical disc disorder     Chronic back pain     COPD (chronic obstructive pulmonary disease)     Coronary artery disease     Dementia     Depression     Diarrhea, unspecified 2021    DVT (deep venous thrombosis)     CAROTID    GERD (gastroesophageal reflux disease)     Hematuria     Hiatal hernia     High cholesterol     Ill-fitting dentures     STATES DOESN'T WEAR    PVD (peripheral vascular disease)     Renal calculus     Sleep  apnea     Sleep apnea     NO C PAP    Urinary frequency     Urinary urgency     Wears glasses     READING        Past Surgical History:   Procedure Laterality Date    BACK SURGERY      BREAST LUMPECTOMY Right     CAROTID ARTERY ANGIOPLASTY      CAROTID ARTERY ANGIOPLASTY      CAROTID ARTERY STENT Left     CAROTID ENDARTERECTOMY Right     CHOLECYSTECTOMY      COLONOSCOPY      CYSTOSCOPY      EGD, WITH CLOSED BIOPSY N/A 4/10/2024    Procedure: EGD, WITH CLOSED BIOPSY;  Surgeon: Meg Ayon MD;  Location: [facility];  Service: General;  Laterality: N/A;    EGD, WITH CLOSED BIOPSY N/A 7/17/2024    Procedure: EGD, WITH CLOSED BIOPSY of pyloric lesion;  Surgeon: Meg Ayon MD;  Location: [facility];  Service: General;  Laterality: N/A;  6th 0830    HYSTERECTOMY      OOPHORECTOMY      TONSILLECTOMY         Time Tracking:     PT Received On: 02/20/25  PT Start Time: 0935     PT Stop Time: 0953  PT Total Time (min): 18 min     Billable Minutes: Evaluation moderate      02/20/2025

## 2025-02-20 NOTE — ASSESSMENT & PLAN NOTE
Patient with evidence of ongoing thickening of the gastric antrum possibly related to gastritis.  Continue IV Pepcid and General surgery evaluation.    02/20/2025: Patient with evidence of healing ulcers well as ongoing gastritis on upper endoscopy.  Appreciate General surgery recommendations.

## 2025-02-20 NOTE — SUBJECTIVE & OBJECTIVE
Past Medical History:   Diagnosis Date    Anticoagulant long-term use     Anxiety     Arthritis     C. difficile colitis 8/6/2022    Carotid artery disease     Cervical disc disorder     Chronic back pain     COPD (chronic obstructive pulmonary disease)     Coronary artery disease     Dementia     Depression     Diarrhea, unspecified 11/1/2021    DVT (deep venous thrombosis)     CAROTID    GERD (gastroesophageal reflux disease)     Hematuria     Hiatal hernia     High cholesterol     Ill-fitting dentures     STATES DOESN'T WEAR    PVD (peripheral vascular disease)     Renal calculus     Sleep apnea     Sleep apnea     NO C PAP    Urinary frequency     Urinary urgency     Wears glasses     READING        Past Surgical History:   Procedure Laterality Date    BACK SURGERY      BREAST LUMPECTOMY Right     CAROTID ARTERY ANGIOPLASTY      CAROTID ARTERY ANGIOPLASTY      CAROTID ARTERY STENT Left     CAROTID ENDARTERECTOMY Right     CHOLECYSTECTOMY      COLONOSCOPY      CYSTOSCOPY      EGD, WITH CLOSED BIOPSY N/A 4/10/2024    Procedure: EGD, WITH CLOSED BIOPSY;  Surgeon: Meg Ayon MD;  Location: [facility];  Service: General;  Laterality: N/A;    EGD, WITH CLOSED BIOPSY N/A 7/17/2024    Procedure: EGD, WITH CLOSED BIOPSY of pyloric lesion;  Surgeon: Meg Ayon MD;  Location: [facility];  Service: General;  Laterality: N/A;  6th 0830    HYSTERECTOMY      OOPHORECTOMY      TONSILLECTOMY         Review of patient's allergies indicates:  No Known Allergies    No current facility-administered medications on file prior to encounter.     Current Outpatient Medications on File Prior to Encounter   Medication Sig    dicyclomine (BENTYL) 10 MG capsule Take 1 capsule (10 mg total) by mouth 4 (four) times daily before meals and nightly.    OLANZapine (ZYPREXA) 5 MG tablet Take 1 tablet (5 mg total) by mouth every evening.    ondansetron (ZOFRAN-ODT) 4 MG TbDL Take 1 tablet (4 mg total) by mouth every 6 (six) hours as  needed.    oxyCODONE-acetaminophen (PERCOCET)  mg per tablet Take 1 tablet by mouth every 12 (twelve) hours as needed for Pain.    pantoprazole (PROTONIX) 40 MG tablet Take 1 tablet (40 mg total) by mouth 2 (two) times daily.    pregabalin (LYRICA) 75 MG capsule Take 1 capsule (75 mg total) by mouth 2 (two) times daily.    sucralfate (CARAFATE) 1 gram tablet Take 1 tablet (1 g total) by mouth 4 (four) times daily before meals and nightly.    sumatriptan (IMITREX) 50 MG tablet Take 1 tablet (50 mg total) by mouth daily as needed for Migraine.    traZODone (DESYREL) 150 MG tablet Take 1 tablet (150 mg total) by mouth every evening.     Family History       Problem Relation (Age of Onset)    Cancer Mother    No Known Problems Sister, Daughter, Maternal Aunt, Maternal Uncle, Paternal Aunt, Paternal Uncle, Maternal Grandmother, Maternal Grandfather, Paternal Grandmother, Paternal Grandfather    Stroke Father          Tobacco Use    Smoking status: Every Day     Current packs/day: 1.00     Types: Cigarettes     Passive exposure: Current    Smokeless tobacco: Never   Substance and Sexual Activity    Alcohol use: No    Drug use: No    Sexual activity: Not on file     Review of Systems   Constitutional:  Positive for activity change, appetite change and unexpected weight change. Negative for chills and fever.   HENT:  Negative for rhinorrhea, sneezing and sore throat.    Eyes:  Negative for pain and visual disturbance.   Respiratory:  Negative for cough and shortness of breath.    Cardiovascular:  Negative for chest pain.   Gastrointestinal:  Positive for abdominal pain and nausea. Negative for constipation and diarrhea.   Endocrine: Negative for cold intolerance and heat intolerance.   Genitourinary:  Negative for difficulty urinating.   Musculoskeletal:  Positive for arthralgias, back pain, gait problem and myalgias. Negative for joint swelling.   Skin:  Negative for rash.   Allergic/Immunologic: Negative for  environmental allergies.   Neurological:  Negative for dizziness, syncope and weakness.   Hematological:  Does not bruise/bleed easily.   Psychiatric/Behavioral:  Negative for dysphoric mood. The patient is nervous/anxious.      Objective:     Vital Signs (Most Recent):  Temp: 97.8 °F (36.6 °C) (02/20/25 0621)  Pulse: (!) 57 (02/20/25 0411)  Resp: 15 (02/20/25 0412)  BP: (!) 146/65 (02/20/25 0411)  SpO2: 98 % (02/20/25 0411) Vital Signs (24h Range):  Temp:  [97.8 °F (36.6 °C)-98.6 °F (37 °C)] 97.8 °F (36.6 °C)  Pulse:  [57-67] 57  Resp:  [15-19] 15  SpO2:  [96 %-99 %] 98 %  BP: (132-175)/(62-77) 146/65     Weight: 39.9 kg (88 lb)  Body mass index is 16.63 kg/m².     Physical Exam  Vitals and nursing note reviewed.   Constitutional:       General: She is in acute distress.      Appearance: Normal appearance. She is ill-appearing. She is not toxic-appearing.   HENT:      Head: Normocephalic and atraumatic.      Nose: Nose normal.   Eyes:      Extraocular Movements: Extraocular movements intact.      Pupils: Pupils are equal, round, and reactive to light.   Cardiovascular:      Rate and Rhythm: Normal rate and regular rhythm.      Heart sounds: No murmur heard.     No friction rub. No gallop.   Pulmonary:      Effort: Pulmonary effort is normal. No respiratory distress.      Breath sounds: Normal breath sounds.   Abdominal:      General: Abdomen is flat. Bowel sounds are normal. There is no distension.      Palpations: Abdomen is soft.      Tenderness: There is abdominal tenderness (epigastric). There is guarding.      Hernia: No hernia is present.   Musculoskeletal:         General: Tenderness present. No swelling or deformity.      Cervical back: Normal range of motion and neck supple.   Skin:     General: Skin is warm and dry.      Capillary Refill: Capillary refill takes less than 2 seconds.      Findings: Bruising present.   Neurological:      General: No focal deficit present.      Mental Status: She is alert and  oriented to person, place, and time. Mental status is at baseline.      Motor: Weakness present.      Gait: Gait abnormal.   Psychiatric:         Mood and Affect: Mood normal.         Behavior: Behavior normal.              CRANIAL NERVES     CN III, IV, VI   Pupils are equal, round, and reactive to light.       Significant Labs: All pertinent labs within the past 24 hours have been reviewed.    Significant Imaging: I have reviewed all pertinent imaging results/findings within the past 24 hours.

## 2025-02-20 NOTE — ASSESSMENT & PLAN NOTE
Patient with evidence yet again on CT scan of long segment nonspecific colitis.  In the transverse colon.  Continue Zosyn for now.  Awaiting further evaluation by General surgery PCR GI panel being processed    02/20/2025: Improvement noted

## 2025-02-20 NOTE — HOSPITAL COURSE
02/20/2025: Patient clinically improved.  Titrate diet as tolerated.  Appreciate General surgery input.  Evidence of healing ulcer and ongoing gastritis.  02/21/2025: Patient doing very well today.  No issues overnight.  Tolerating p.o..  Appears to be much more comfortable.  Ultrasound of the kidneys reveals a dilated right renal pelvis but no khushbu hydronephrosis.  Overall the patient is stable for discharge home with close outpatient follow-up.

## 2025-02-20 NOTE — PROGRESS NOTES
[facility] Medicine  Progress Note    Patient Name: Desiree Blake  MRN: [id number]  Patient Class: IP- Inpatient   Admission Date: 2/18/2025  Length of Stay: 2 days  Attending Physician: Kt Pozo Jr., MD  Primary Care Provider: Kt Pozo Jr., MD        Subjective     Principal Problem:Colitis        HPI:  Chief Complaint   Patient presents with    Abdominal Pain       Pt referred to ED from Dr. Pozo's office - patient with complaints of abdominal pain / nausea / dizziness / headache / cough / congestion for the past couple days. No testing PTA.       69-year-old female history of chronic abdominal pain since the emergency room by her primary care physician for abdominal pain, cough cold congestion for the past few days.  Here often for chronic abdominal pain.  Diagnosed with peptic ulcer disease, was sent here to be admitted outer to have a scope by  - patient states she has not taken her blood pressure medicine in quite some time because her daughter has been sick, and daughter usually prepares her medications for her.  Denies headache.  Chest pain or shortness of breath     Updated HPI: Patient well known to me with a history of chronic pain syndrome and noncompliance.  She has previous diagnosis of severe peptic ulcer disease.  Treatment has been difficult to ensure.  Patient presented to the office with ongoing weight loss and overall debility.  Discussions with General surgery were pursued.  Our plan is to admit the patient for IV fluids and nutritional evaluation as well as endoscopy.  CT scan does reveal colitis as well as likely underlying peptic ulcer disease and inflammation.    Overview/Hospital Course:  02/20/2025: Patient clinically improved.  Titrate diet as tolerated.  Appreciate General surgery input.  Evidence of healing ulcer and ongoing gastritis.    Past Medical History:   Diagnosis Date    Anticoagulant long-term use     Anxiety     Arthritis     C.  difficile colitis 8/6/2022    Carotid artery disease     Cervical disc disorder     Chronic back pain     COPD (chronic obstructive pulmonary disease)     Coronary artery disease     Dementia     Depression     Diarrhea, unspecified 11/1/2021    DVT (deep venous thrombosis)     CAROTID    GERD (gastroesophageal reflux disease)     Hematuria     Hiatal hernia     High cholesterol     Ill-fitting dentures     STATES DOESN'T WEAR    PVD (peripheral vascular disease)     Renal calculus     Sleep apnea     Sleep apnea     NO C PAP    Urinary frequency     Urinary urgency     Wears glasses     READING        Past Surgical History:   Procedure Laterality Date    BACK SURGERY      BREAST LUMPECTOMY Right     CAROTID ARTERY ANGIOPLASTY      CAROTID ARTERY ANGIOPLASTY      CAROTID ARTERY STENT Left     CAROTID ENDARTERECTOMY Right     CHOLECYSTECTOMY      COLONOSCOPY      CYSTOSCOPY      EGD, WITH CLOSED BIOPSY N/A 4/10/2024    Procedure: EGD, WITH CLOSED BIOPSY;  Surgeon: Meg Ayon MD;  Location: [facility];  Service: General;  Laterality: N/A;    EGD, WITH CLOSED BIOPSY N/A 7/17/2024    Procedure: EGD, WITH CLOSED BIOPSY of pyloric lesion;  Surgeon: Meg Ayon MD;  Location: [facility];  Service: General;  Laterality: N/A;  6th 0830    HYSTERECTOMY      OOPHORECTOMY      TONSILLECTOMY         Review of patient's allergies indicates:  No Known Allergies    No current facility-administered medications on file prior to encounter.     Current Outpatient Medications on File Prior to Encounter   Medication Sig    dicyclomine (BENTYL) 10 MG capsule Take 1 capsule (10 mg total) by mouth 4 (four) times daily before meals and nightly.    OLANZapine (ZYPREXA) 5 MG tablet Take 1 tablet (5 mg total) by mouth every evening.    ondansetron (ZOFRAN-ODT) 4 MG TbDL Take 1 tablet (4 mg total) by mouth every 6 (six) hours as needed.    oxyCODONE-acetaminophen (PERCOCET)  mg per tablet Take 1 tablet by mouth every 12  (twelve) hours as needed for Pain.    pantoprazole (PROTONIX) 40 MG tablet Take 1 tablet (40 mg total) by mouth 2 (two) times daily.    pregabalin (LYRICA) 75 MG capsule Take 1 capsule (75 mg total) by mouth 2 (two) times daily.    sucralfate (CARAFATE) 1 gram tablet Take 1 tablet (1 g total) by mouth 4 (four) times daily before meals and nightly.    sumatriptan (IMITREX) 50 MG tablet Take 1 tablet (50 mg total) by mouth daily as needed for Migraine.    traZODone (DESYREL) 150 MG tablet Take 1 tablet (150 mg total) by mouth every evening.     Family History       Problem Relation (Age of Onset)    Cancer Mother    No Known Problems Sister, Daughter, Maternal Aunt, Maternal Uncle, Paternal Aunt, Paternal Uncle, Maternal Grandmother, Maternal Grandfather, Paternal Grandmother, Paternal Grandfather    Stroke Father          Tobacco Use    Smoking status: Every Day     Current packs/day: 1.00     Types: Cigarettes     Passive exposure: Current    Smokeless tobacco: Never   Substance and Sexual Activity    Alcohol use: No    Drug use: No    Sexual activity: Not on file     Review of Systems   Constitutional:  Positive for activity change, appetite change and unexpected weight change. Negative for chills and fever.   HENT:  Negative for rhinorrhea, sneezing and sore throat.    Eyes:  Negative for pain and visual disturbance.   Respiratory:  Negative for cough and shortness of breath.    Cardiovascular:  Negative for chest pain.   Gastrointestinal:  Positive for abdominal pain and nausea. Negative for constipation and diarrhea.   Endocrine: Negative for cold intolerance and heat intolerance.   Genitourinary:  Negative for difficulty urinating.   Musculoskeletal:  Positive for arthralgias, back pain, gait problem and myalgias. Negative for joint swelling.   Skin:  Negative for rash.   Allergic/Immunologic: Negative for environmental allergies.   Neurological:  Negative for dizziness, syncope and weakness.   Hematological:   Does not bruise/bleed easily.   Psychiatric/Behavioral:  Negative for dysphoric mood. The patient is nervous/anxious.      Objective:     Vital Signs (Most Recent):  Temp: 97.8 °F (36.6 °C) (02/20/25 0621)  Pulse: (!) 57 (02/20/25 0411)  Resp: 15 (02/20/25 0412)  BP: (!) 146/65 (02/20/25 0411)  SpO2: 98 % (02/20/25 0411) Vital Signs (24h Range):  Temp:  [97.8 °F (36.6 °C)-98.6 °F (37 °C)] 97.8 °F (36.6 °C)  Pulse:  [57-67] 57  Resp:  [15-19] 15  SpO2:  [96 %-99 %] 98 %  BP: (132-175)/(62-77) 146/65     Weight: 39.9 kg (88 lb)  Body mass index is 16.63 kg/m².     Physical Exam  Vitals and nursing note reviewed.   Constitutional:       General: She is in acute distress.      Appearance: Normal appearance. She is ill-appearing. She is not toxic-appearing.   HENT:      Head: Normocephalic and atraumatic.      Nose: Nose normal.   Eyes:      Extraocular Movements: Extraocular movements intact.      Pupils: Pupils are equal, round, and reactive to light.   Cardiovascular:      Rate and Rhythm: Normal rate and regular rhythm.      Heart sounds: No murmur heard.     No friction rub. No gallop.   Pulmonary:      Effort: Pulmonary effort is normal. No respiratory distress.      Breath sounds: Normal breath sounds.   Abdominal:      General: Abdomen is flat. Bowel sounds are normal. There is no distension.      Palpations: Abdomen is soft.      Tenderness: There is abdominal tenderness (epigastric). There is guarding.      Hernia: No hernia is present.   Musculoskeletal:         General: Tenderness present. No swelling or deformity.      Cervical back: Normal range of motion and neck supple.   Skin:     General: Skin is warm and dry.      Capillary Refill: Capillary refill takes less than 2 seconds.      Findings: Bruising present.   Neurological:      General: No focal deficit present.      Mental Status: She is alert and oriented to person, place, and time. Mental status is at baseline.      Motor: Weakness present.       Gait: Gait abnormal.   Psychiatric:         Mood and Affect: Mood normal.         Behavior: Behavior normal.              CRANIAL NERVES     CN III, IV, VI   Pupils are equal, round, and reactive to light.       Significant Labs: All pertinent labs within the past 24 hours have been reviewed.    Significant Imaging: I have reviewed all pertinent imaging results/findings within the past 24 hours.    Assessment and Plan     * Colitis  Patient with evidence yet again on CT scan of long segment nonspecific colitis.  In the transverse colon.  Continue Zosyn for now.  Awaiting further evaluation by General surgery PCR GI panel being processed    02/20/2025: Improvement noted      Unintentional weight loss  Nutrition consulted. Most recent weight and BMI monitored-     Measurements:  Wt Readings from Last 1 Encounters:   02/19/25 39.9 kg (88 lb)   Body mass index is 16.63 kg/m².    Patient has been screened and assessed by RD.    Malnutrition Type:  Context:    Level:      Malnutrition Characteristic Summary:       Interventions/Recommendations (treatment strategy):         Moderate major depression, single episode  Continue home medical therapy while hospitalized.    PUD (peptic ulcer disease)  Continue Pepcid and Carafate.  General surgery on board      Chronic back pain  Continue opioid regimen while hospitalized.      Pain of upper abdomen  Patient with evidence of ongoing thickening of the gastric antrum possibly related to gastritis.  Continue IV Pepcid and General surgery evaluation.    02/20/2025: Patient with evidence of healing ulcers well as ongoing gastritis on upper endoscopy.  Appreciate General surgery recommendations.      Anxiety  Patient with severe underlying baseline anxiety will continue home medical therapy while hospitalized.        VTE Risk Mitigation (From admission, onward)           Ordered     IP VTE LOW RISK PATIENT  Once         02/18/25 1527     Place VANDANA hose  Until discontinued          02/18/25 1527     Place sequential compression device  Until discontinued         02/18/25 1527                    Discharge Planning   DEEPTI:      Code Status: Full Code   Medical Readiness for Discharge Date:   Discharge Plan A: Home with family, Home                        Kt Pozo Jr, MD  Department of Hospital Medicine   [facility]

## 2025-02-20 NOTE — PROGRESS NOTES
Individual Follow-Up Form    2/20/2025    Quit Date: TBD    Clinical Status of Patient: Inpatient    Length of Service: 15 minutes    Continuing Medication: yes  Patches     Target Symptoms: Withdrawal and medication side effects. The following were  rated moderate (3) to severe (4) on TCRS:  Moderate (3): desire/crave tobacco  Severe (4): none    Comments: Spoke with patient regarding [facility] smoking cessation program. (1 ppd current smoker) NRT- 21 mg nicotine patch QD currently being prescribed during this admission. Pt states not ready to quit at this time, but willing to receive information regarding future treatment options. Provided pt with explanation of program structure, medication/NRT options, and general outline of counseling sessions. Brochures and contact information for smoking cessation department left at the pt's bedside. Encouraged pt to contact smoking cessation department should she feel ready to quit smoking in the future.    Diagnosis: F17.200

## 2025-02-20 NOTE — PROGRESS NOTES
Pharmacist Intervention IV to PO Note    Desiree Blake is a 69 y.o. female being treated with IV medication famotidine    Patient Data:    Vital Signs (Most Recent):  Temp: 97.8 °F (36.6 °C) (02/20/25 0621)  Pulse: 71 (02/20/25 0841)  Resp: 18 (02/20/25 0923)  BP: 137/60 (02/20/25 0923)  SpO2: 98 % (02/20/25 0841) Vital Signs (72h Range):  Temp:  [97.8 °F (36.6 °C)-98.6 °F (37 °C)]   Pulse:  [56-86]   Resp:  [15-20]   BP: (102-239)/(54-99)   SpO2:  [94 %-100 %]      CBC:  Recent Labs   Lab 02/18/25  1529 02/19/25  0539 02/20/25  0507   WBC 13.73* 9.29 8.95   RBC 4.46 4.12 4.02   HGB 13.2 11.9* 11.6*   HCT 40.7 38.1 36.7*    270 252   MCV 91 93 91   MCH 29.6 28.9 28.9   MCHC 32.4 31.2* 31.6*     CMP:     Recent Labs   Lab 02/18/25  1529 02/19/25  0539 02/20/25  0507   GLU 99 109 94   CALCIUM 8.8 8.7 8.6*   ALBUMIN 3.8 3.6 3.4*   PROT 7.0 6.5 6.1    141 142   K 4.3 3.9 3.9   CO2 22* 24 25    110 108   BUN 15 11 12   CREATININE 0.5 0.6 0.5   ALKPHOS 84 80 82   ALT 10 10 8*   AST 19 12 13   BILITOT 0.2 0.3 0.2       Dietary Orders:  Diet Orders            Diet Heart Healthy: Heart Healthy starting at 02/20 0737            Based on the following criteria, this patient qualifies for intravenous to oral conversion:  [x] The patients gastrointestinal tract is functioning (tolerating medications via oral or enteral route for 24 hours and tolerating food or enteral feeds for 24 hours).  [x] The patient is hemodynamically stable for 24 hours (heart rate <100 beats per minute, systolic blood pressure >99 mm Hg, and respiratory rate <20 breaths per minute).  [x] The patient shows clinical improvement (afebrile for at least 24 hours and white blood cell count downtrending or normalized). Additionally, the patient must be non-neutropenic (absolute neutrophil count >500 cells/mm3).  [x] For antimicrobials, the patient has received IV therapy for at least 24 hours.    IV medication famotidine will be changed to  oral medication  famotidine    Pharmacist's Name: Varinder Olmedo  Pharmacist's Extension: [phone]

## 2025-02-20 NOTE — PLAN OF CARE
POC reviewed with pt, VSS. IV site clean/Dry/Intact. Pt denies pain/needs at this time,CB in reach, bed in lowest position, lighting adjusted to pt's preference will continue to monitor.   Problem: Adult Inpatient Plan of Care  Goal: Plan of Care Review  Outcome: Progressing  Goal: Patient-Specific Goal (Individualized)  Outcome: Progressing  Goal: Absence of Hospital-Acquired Illness or Injury  Outcome: Progressing  Goal: Optimal Comfort and Wellbeing  Outcome: Progressing  Goal: Readiness for Transition of Care  Outcome: Progressing     Problem: Pain Chronic (Persistent)  Goal: Optimal Pain Control and Function  Outcome: Progressing     Problem: Fall Injury Risk  Goal: Absence of Fall and Fall-Related Injury  Outcome: Progressing

## 2025-02-20 NOTE — OP NOTE
[facility] - OR Periop Services  General Surgery Department  Operative Note    SUMMARY     Date of Procedure: 2/19/2025     Procedure: Procedure(s) (LRB):  EGD, WITH CLOSED BIOPSY: 28488 (CPT®)       Surgeon(s) and Role:  Meg Ayon MD     Pre-Operative Diagnosis: Pre-Op Diagnosis Codes:      * Chronic abdominal pain [R10.9, G89.29]     * PUD (peptic ulcer disease) [K27.9]    Post-Operative Diagnosis: Same         Anesthesia: Local MAC    Findings:  --GEJ 35 from the incisors   --Normal Z line   --Marked gastritis - s/p cold antral bx  --Moderate sized pyloric ulcer - improved from EGD in 2024 - s/p multiple cold biopsies  --Normal duodenum   --No hiatal hernia on retroflexion     Indications for the Procedure:  68 yo female with history of pyloric ulcerwho presents with refractory abdominal pain.     Operative Conduct in Detail:     Risks, benefits, and alternatives were thoroughly discussed with the patient. Despite the risks, the patient wished to proceed. Informed consent was signed and will be scanned to the electronic chart.     The patient was placed on left lateral decubitus, with mouth block protection. After achieving moderate sedation, a standard gastroscope was introduced through the mouth down the esophagus, stomach, up to the Third portion of the  duodenum with the following findings:  Esophagus:  Normal  Esophagogastric Junction was normal at 35 cm from teeth line.  Stomach  Marked gastritis - s/p cold antral bx  Moderate sized pyloric ulcer - improved from EGD in 2024 - s/p multiple cold biopsies  Retroflexion view was normal  Duodenum  Normal duodenum     Specimens :  Specimens (From admission, onward)       Start     Ordered    02/19/25 7172  Specimen to Pathology, Surgery Gastrointestinal tract  Once        Comments: Pre-op Diagnosis: Chronic abdominal pain [R10.9, G89.29]PUD (peptic ulcer disease) [K27.9]Procedure(s):EGD, WITH CLOSED BIOPSY (ESOPHAGOGASTRODUODENOSCOPY) Number of  specimens: 2Name of specimens: Biopsies of Pyloric Ulcer @ 1517                                   Biopsies of Antrum @ 1519     References:    Click here for ordering Quick Tip   Question Answer Comment   Procedure Type: Gastrointestinal tract    Release to patient Immediate        02/19/25 1527                      Diagnostic Impression:  Reduction in size of pyloric ulcer   Follow up pathology   Carafate AC&HS + Protonix BID         Complications: No    Estimated Blood Loss (EBL): Minimal           Implants: None             Condition: Good    Disposition: PACU - hemodynamically stable.    Meg Ayon MD  General Surgery   [phone]

## 2025-02-20 NOTE — PLAN OF CARE
02/20/25 1243   Medicare Message   Important Message from Medicare regarding Discharge Appeal Rights Given to patient/caregiver;Explained to patient/caregiver;Signed/date by patient/caregiver   Date IMM was signed 02/20/25   Time IMM was signed 1232

## 2025-02-21 VITALS
SYSTOLIC BLOOD PRESSURE: 135 MMHG | TEMPERATURE: 98 F | HEIGHT: 61 IN | BODY MASS INDEX: 16.62 KG/M2 | HEART RATE: 57 BPM | DIASTOLIC BLOOD PRESSURE: 60 MMHG | RESPIRATION RATE: 18 BRPM | WEIGHT: 88 LBS | OXYGEN SATURATION: 95 %

## 2025-02-21 LAB
ALBUMIN SERPL BCP-MCNC: 3.8 G/DL (ref 3.5–5.2)
ALP SERPL-CCNC: 100 U/L (ref 55–135)
ALT SERPL W/O P-5'-P-CCNC: 9 U/L (ref 10–44)
ANION GAP SERPL CALC-SCNC: 9 MMOL/L (ref 8–16)
AST SERPL-CCNC: 14 U/L (ref 10–40)
BASOPHILS # BLD AUTO: 0.04 K/UL (ref 0–0.2)
BASOPHILS NFR BLD: 0.3 % (ref 0–1.9)
BILIRUB SERPL-MCNC: 0.2 MG/DL (ref 0.1–1)
BUN SERPL-MCNC: 16 MG/DL (ref 8–23)
CALCIUM SERPL-MCNC: 9.3 MG/DL (ref 8.7–10.5)
CHLORIDE SERPL-SCNC: 107 MMOL/L (ref 95–110)
CO2 SERPL-SCNC: 26 MMOL/L (ref 23–29)
CREAT SERPL-MCNC: 0.6 MG/DL (ref 0.5–1.4)
DIFFERENTIAL METHOD BLD: ABNORMAL
EOSINOPHIL # BLD AUTO: 0.1 K/UL (ref 0–0.5)
EOSINOPHIL NFR BLD: 0.7 % (ref 0–8)
ERYTHROCYTE [DISTWIDTH] IN BLOOD BY AUTOMATED COUNT: 17.9 % (ref 11.5–14.5)
EST. GFR  (NO RACE VARIABLE): >60 ML/MIN/1.73 M^2
GLUCOSE SERPL-MCNC: 117 MG/DL (ref 70–110)
HCT VFR BLD AUTO: 42.3 % (ref 37–48.5)
HGB BLD-MCNC: 13.2 G/DL (ref 12–16)
IMM GRANULOCYTES # BLD AUTO: 0.06 K/UL (ref 0–0.04)
IMM GRANULOCYTES NFR BLD AUTO: 0.5 % (ref 0–0.5)
LYMPHOCYTES # BLD AUTO: 1.5 K/UL (ref 1–4.8)
LYMPHOCYTES NFR BLD: 12.4 % (ref 18–48)
MAGNESIUM SERPL-MCNC: 2.1 MG/DL (ref 1.6–2.6)
MCH RBC QN AUTO: 28.8 PG (ref 27–31)
MCHC RBC AUTO-ENTMCNC: 31.2 G/DL (ref 32–36)
MCV RBC AUTO: 92 FL (ref 82–98)
MONOCYTES # BLD AUTO: 1 K/UL (ref 0.3–1)
MONOCYTES NFR BLD: 8.1 % (ref 4–15)
NEUTROPHILS # BLD AUTO: 9.4 K/UL (ref 1.8–7.7)
NEUTROPHILS NFR BLD: 78 % (ref 38–73)
NRBC BLD-RTO: 0 /100 WBC
PLATELET # BLD AUTO: 272 K/UL (ref 150–450)
PMV BLD AUTO: 10.1 FL (ref 9.2–12.9)
POTASSIUM SERPL-SCNC: 3.8 MMOL/L (ref 3.5–5.1)
PROT SERPL-MCNC: 6.9 G/DL (ref 6–8.4)
RBC # BLD AUTO: 4.58 M/UL (ref 4–5.4)
SODIUM SERPL-SCNC: 142 MMOL/L (ref 136–145)
WBC # BLD AUTO: 12.08 K/UL (ref 3.9–12.7)

## 2025-02-21 PROCEDURE — 80053 COMPREHEN METABOLIC PANEL: CPT | Performed by: INTERNAL MEDICINE

## 2025-02-21 PROCEDURE — 63600175 PHARM REV CODE 636 W HCPCS: Mod: JZ,TB | Performed by: NURSE PRACTITIONER

## 2025-02-21 PROCEDURE — 63600175 PHARM REV CODE 636 W HCPCS: Performed by: INTERNAL MEDICINE

## 2025-02-21 PROCEDURE — 36415 COLL VENOUS BLD VENIPUNCTURE: CPT | Performed by: INTERNAL MEDICINE

## 2025-02-21 PROCEDURE — 97530 THERAPEUTIC ACTIVITIES: CPT

## 2025-02-21 PROCEDURE — 25000003 PHARM REV CODE 250: Performed by: INTERNAL MEDICINE

## 2025-02-21 PROCEDURE — 83735 ASSAY OF MAGNESIUM: CPT | Performed by: INTERNAL MEDICINE

## 2025-02-21 PROCEDURE — 85025 COMPLETE CBC W/AUTO DIFF WBC: CPT | Performed by: INTERNAL MEDICINE

## 2025-02-21 PROCEDURE — 25000003 PHARM REV CODE 250: Performed by: EMERGENCY MEDICINE

## 2025-02-21 RX ORDER — METRONIDAZOLE 500 MG/1
500 TABLET ORAL EVERY 8 HOURS
Qty: 15 TABLET | Refills: 0 | Status: SHIPPED | OUTPATIENT
Start: 2025-02-21 | End: 2025-02-26

## 2025-02-21 RX ORDER — ALPRAZOLAM 0.5 MG/1
0.5 TABLET ORAL 3 TIMES DAILY PRN
Qty: 60 TABLET | Refills: 0 | Status: SHIPPED | OUTPATIENT
Start: 2025-02-21 | End: 2025-03-23

## 2025-02-21 RX ORDER — HYDROMORPHONE HYDROCHLORIDE 4 MG/1
4 TABLET ORAL EVERY 8 HOURS PRN
Qty: 30 TABLET | Refills: 0 | Status: SHIPPED | OUTPATIENT
Start: 2025-02-21 | End: 2025-02-21 | Stop reason: DRUGHIGH

## 2025-02-21 RX ORDER — CIPROFLOXACIN 500 MG/1
500 TABLET ORAL EVERY 12 HOURS
Qty: 10 TABLET | Refills: 0 | Status: SHIPPED | OUTPATIENT
Start: 2025-02-21 | End: 2025-02-27

## 2025-02-21 RX ORDER — FAMOTIDINE 20 MG/1
20 TABLET, FILM COATED ORAL 2 TIMES DAILY
Qty: 60 TABLET | Refills: 2 | Status: SHIPPED | OUTPATIENT
Start: 2025-02-21 | End: 2026-02-21

## 2025-02-21 RX ORDER — PREDNISONE 20 MG/1
20 TABLET ORAL DAILY
Qty: 7 TABLET | Refills: 0 | Status: SHIPPED | OUTPATIENT
Start: 2025-02-21

## 2025-02-21 RX ADMIN — HYDROMORPHONE HYDROCHLORIDE 0.5 MG: 1 INJECTION, SOLUTION INTRAMUSCULAR; INTRAVENOUS; SUBCUTANEOUS at 05:02

## 2025-02-21 RX ADMIN — ALPRAZOLAM 0.5 MG: 0.5 TABLET ORAL at 06:02

## 2025-02-21 RX ADMIN — PREDNISONE 20 MG: 20 TABLET ORAL at 08:02

## 2025-02-21 RX ADMIN — FAMOTIDINE 20 MG: 20 TABLET, FILM COATED ORAL at 08:02

## 2025-02-21 RX ADMIN — AMLODIPINE BESYLATE 10 MG: 10 TABLET ORAL at 08:02

## 2025-02-21 RX ADMIN — SUCRALFATE 1 G: 1 TABLET ORAL at 05:02

## 2025-02-21 RX ADMIN — LOSARTAN POTASSIUM 25 MG: 25 TABLET, FILM COATED ORAL at 08:02

## 2025-02-21 RX ADMIN — PANTOPRAZOLE SODIUM 40 MG: 40 TABLET, DELAYED RELEASE ORAL at 08:02

## 2025-02-21 NOTE — DISCHARGE INSTRUCTIONS
Our goal at [facility] is to always give you outstanding care and exceptional service. You may receive a survey by mail, text or e-mail in the next 7-10 days from Felicita Hernandez and our leadership team asking about the care you received with us. The survey should only take 5-10 minutes to complete and is very important to us.     Your feedback provides us with a way to recognize our staff who work tirelessly to provide the best care! Also, your responses help us learn how to improve when your experience was below our aspiration of excellence. We WILL use your feedback to continue making improvements to help us provide the highest quality care. We keep your personal information and feedback confidential. We appreciate your time completing this survey and can't wait to hear from you!!!     We want you to leave us today feeling like you can DEFINITELY recommend us to others! We look forward to your continued care with us! Thanks so much for choosing [facility] for your healthcare needs!

## 2025-02-21 NOTE — PT/OT/SLP DISCHARGE
Physical Therapy Discharge Summary    Name: Desiree Blake  MRN: [id number]   Principal Problem: Colitis     Patient Discharged from acute Physical Therapy on 2025.  Please refer to prior PT note dated  2025 for functional status.     Assessment:     Patient appropriate for care in another setting.    Objective:     GOALS:   Multidisciplinary Problems       Physical Therapy Goals          Problem: Physical Therapy    Goal Priority Disciplines Outcome Interventions   Physical Therapy Goal     PT, PT/OT Adequate for Care Transition    Description: Patient will increase functional independence with mobility by performin. Sit to stand transfer with Modified [place] with Rolling Walker or appropriate assistive device   2. Bed to chair transfer with Modified [place] using Rolling Walker or appropriate assistive device.  3. Gait  x 150 feet with Modified [place] using Rolling Walker or appropriate assistive device.   4. Ascend/descend 4 steps without handrails with Rolling Walker or appropriate assistive device.                           Reasons for Discontinuation of Therapy Services  Transfer to alternate level of care.      Plan:     Patient Discharged to: Home no PT services needed.      2025

## 2025-02-21 NOTE — PLAN OF CARE
[place] [facility] S[facility]  Discharge Final Note    Primary Care Provider: Kt Pozo Jr., MD    Expected Discharge Date: 2/21/2025    Final Discharge Note (most recent)       Final Note - 02/21/25 0914          Final Note    Assessment Type Final Discharge Note     Anticipated Discharge Disposition Home or Self Care     Hospital Resources/Appts/Education Provided Appointments scheduled and added to AVS        Post-Acute Status    Post-Acute Authorization Home Health     Home Health Status Patient declined/refused     [facility]age [employer] [facility] [employer] [facility] SECURE SNP     Discharge Delays None known at this time                     Important Message from Medicare  Important Message from Medicare regarding Discharge Appeal Rights: Given to patient/caregiver, Explained to patient/caregiver, Signed/date by patient/caregiver     Date IMM was signed: 02/20/25  Time IMM was signed: 1232    Contact Info       Kt Pozo Jr., MD   Specialty: Internal Medicine   Relationship: PCP - General    [facility], [address] [address]   Phone: [phone]       Next Steps: Go on 2/25/2025    Instructions: 10:15        Final note is completed. The patient discharge home with self care.

## 2025-02-21 NOTE — PT/OT/SLP PROGRESS
"Physical Therapy Treatment    Patient Name:  Desiree Blake   MRN:  [id number]    Recommendations:     Discharge Recommendations: No Therapy Indicated  Discharge Equipment Recommendations: none  Barriers to discharge: None    Assessment:     Desiree Blake is a 69 y.o. female admitted with a medical diagnosis of Colitis.  She presents with the following impairments/functional limitations: impaired cardiopulmonary response to activity Patient is ready for discharge, she was observed ambulating in the  hallway; denies pain.  She was able to ambulate ~210 feet with no A.D. independently with no LOB or SOB.  She climbed up and down 4 steps using both side rails modified independently.  Discharge to home today with no follow up from therapy and no DME needed. .    Rehab Prognosis: Fair; patient would benefit from acute skilled PT services to address these deficits and reach maximum level of function.    Recent Surgery: Procedure(s) (LRB):  EGD, WITH CLOSED BIOPSY (N/A) 2 Days Post-Op    Plan:     During this hospitalization, patient to be seen 5 x/week to address the identified rehab impairments via gait training, therapeutic activities, therapeutic exercises and progress toward the following goals:    Plan of Care Expires:  02/27/25    Subjective     Chief Complaint: "I am ready to go home."  Patient/Family Comments/goals: "I will set up my follow up appointments."   Pain/Comfort:  Pain Rating 1: 0/10  Pain Rating Post-Intervention 1: 0/10      Objective:     Communicated with nurse and patient and   prior to session.  Patient found ambulatory in room/varela with Other (comments) (none) upon PT entry to room.     General Precautions: Standard, fall  Orthopedic Precautions: N/A  Braces: N/A  Respiratory Status: Room air     Functional Mobility:  Transfers:     Sit to Stand:  independence with no AD  Gait:  ~210 feet with no A.D. independently with no LOB or SOB.    Stairs:   climbed up and down 4 steps using both side " rails modified independently.  , reciprocal step pattern       AM-PAC 6 CLICK MOBILITY  Turning over in bed (including adjusting bedclothes, sheets and blankets)?: 4  Sitting down on and standing up from a chair with arms (e.g., wheelchair, bedside commode, etc.): 4  Moving from lying on back to sitting on the side of the bed?: 4  Moving to and from a bed to a chair (including a wheelchair)?: 4  Need to walk in hospital room?: 4  Climbing 3-5 steps with a railing?: 4  Basic Mobility Total Score: 24       Treatment & Education:  Sit <> stand with no A.D.   Gait with no A.D.   Stair training     Patient left  standing in the hallway with spouse   .    GOALS:   Multidisciplinary Problems       Physical Therapy Goals          Problem: Physical Therapy    Goal Priority Disciplines Outcome Interventions   Physical Therapy Goal     PT, PT/OT Adequate for Care Transition    Description: Patient will increase functional independence with mobility by performin. Sit to stand transfer with Modified [place] with Rolling Walker or appropriate assistive device   2. Bed to chair transfer with Modified [place] using Rolling Walker or appropriate assistive device.  3. Gait  x 150 feet with Modified [place] using Rolling Walker or appropriate assistive device.   4. Ascend/descend 4 steps without handrails with Rolling Walker or appropriate assistive device.                           DME Justifications:  No DME recommended requiring DME justifications    Time Tracking:     PT Received On: 25  PT Start Time: 859     PT Stop Time: 09  PT Total Time (min): 8 min     Billable Minutes: Therapeutic Activity 8    Treatment Type: Treatment  PT/PTA: PT     Number of PTA visits since last PT visit: 0     2025

## 2025-02-21 NOTE — DISCHARGE SUMMARY
[facility] Medicine  Discharge Summary      Patient Name: Desiree Blake  MRN: [id number]  LEBRON: [id number]  Patient Class: IP- Inpatient  Admission Date: 2/18/2025  Hospital Length of Stay: 3 days  Discharge Date and Time:  02/21/2025 8:32 AM  Attending Physician: Kt Pozo Jr., MD   Discharging Provider: Kt Pozo Jr, MD  Primary Care Provider: Kt Pozo Jr., MD    Primary Care Team: Networked reference to record PCT     HPI:   Chief Complaint   Patient presents with    Abdominal Pain       Pt referred to ED from Dr. Pozo's office - patient with complaints of abdominal pain / nausea / dizziness / headache / cough / congestion for the past couple days. No testing PTA.       69-year-old female history of chronic abdominal pain since the emergency room by her primary care physician for abdominal pain, cough cold congestion for the past few days.  Here often for chronic abdominal pain.  Diagnosed with peptic ulcer disease, was sent here to be admitted outer to have a scope by  - patient states she has not taken her blood pressure medicine in quite some time because her daughter has been sick, and daughter usually prepares her medications for her.  Denies headache.  Chest pain or shortness of breath     Updated HPI: Patient well known to me with a history of chronic pain syndrome and noncompliance.  She has previous diagnosis of severe peptic ulcer disease.  Treatment has been difficult to ensure.  Patient presented to the office with ongoing weight loss and overall debility.  Discussions with General surgery were pursued.  Our plan is to admit the patient for IV fluids and nutritional evaluation as well as endoscopy.  CT scan does reveal colitis as well as likely underlying peptic ulcer disease and inflammation.    Procedure(s) (LRB):  EGD, WITH CLOSED BIOPSY (N/A)      Hospital Course:   02/20/2025: Patient clinically improved.  Titrate diet as tolerated.  Appreciate  General surgery input.  Evidence of healing ulcer and ongoing gastritis.  02/21/2025: Patient doing very well today.  No issues overnight.  Tolerating p.o..  Appears to be much more comfortable.  Ultrasound of the kidneys reveals a dilated right renal pelvis but no khushbu hydronephrosis.  Overall the patient is stable for discharge home with close outpatient follow-up.     Goals of Care Treatment Preferences:  Code Status: Full Code      SDOH Screening:  The patient declined to be screened for utility difficulties, food insecurity, transport difficulties, housing insecurity, and interpersonal safety, so no concerns could be identified this admission.     Consults:   Consults (From admission, onward)          Status Ordering Provider     Inpatient consult to Registered Dietitian/Nutritionist  Once        Provider:  (Not yet assigned)    Acknowledged VIRGINIA BARRIENTOS JR     Inpatient consult to General Surgery  Once        Provider:  Meg Ayon MD    Completed DIAMOND MORE            * Colitis  Patient with evidence yet again on CT scan of long segment nonspecific colitis.  In the transverse colon.  Continue Zosyn for now.  Awaiting further evaluation by General surgery PCR GI panel being processed    02/20/2025: Improvement noted  02/21/2025: Improvement noted.  Tolerating p.o. intake.      Unintentional weight loss  Nutrition consulted. Most recent weight and BMI monitored-     Measurements:  Wt Readings from Last 1 Encounters:   02/19/25 39.9 kg (88 lb)   Body mass index is 16.63 kg/m².    Patient has been screened and assessed by RD.    Malnutrition Type:  Context:    Level:      Malnutrition Characteristic Summary:       Interventions/Recommendations (treatment strategy):         Moderate major depression, single episode  Continue home medical therapy while hospitalized.    PUD (peptic ulcer disease)  Continue Pepcid and Carafate.  General surgery on board      Chronic back pain  Continue opioid  regimen while hospitalized.      Pain of upper abdomen  Patient with evidence of ongoing thickening of the gastric antrum possibly related to gastritis.  Continue IV Pepcid and General surgery evaluation.    02/20/2025: Patient with evidence of healing ulcers well as ongoing gastritis on upper endoscopy.  Appreciate General surgery recommendations.      Anxiety  Patient with severe underlying baseline anxiety will continue home medical therapy while hospitalized.        Final Active Diagnoses:    Diagnosis Date Noted POA    PRINCIPAL PROBLEM:  Colitis [K52.9] 04/13/2021 Yes    Unintentional weight loss [R63.4] 02/19/2025 Yes    Moderate major depression, single episode [F32.1] 01/31/2025 Yes    PUD (peptic ulcer disease) [K27.9] 04/09/2024 Yes    Chronic back pain [M54.9, G89.29] 08/04/2021 Yes    Pain of upper abdomen [R10.10] 05/18/2021 Yes    Anxiety [F41.9] 02/05/2021 Yes      Problems Resolved During this Admission:       Discharged Condition: good    Disposition: Home or Self Care    Follow Up:   Follow-up Information       Kt Pozo Jr., MD Follow up in 3 day(s).    Specialty: Internal Medicine  Contact information:  [address] [place] [id number] [phone]                           Patient Instructions:      Reason for not Ordering Smoking Cessation Referral     Order Specific Question Answer Comments   Reason for not ordering: Not medically appropriate at this time      Reason for not Prescribing Nicotine Replacement     Order Specific Question Answer Comments   Reason for not Prescribing: Not medically appropriate at this time        Significant Diagnostic Studies: Labs: All labs within the past 24 hours have been reviewed    Pending Diagnostic Studies:       Procedure Component Value Units Date/Time    Specimen to Pathology, Surgery Gastrointestinal tract [[id number]] Collected: 02/19/25 1519    Order Status: Sent Lab Status: In process Updated: 02/19/25 2346    Specimen: Tissue             Medications:  Reconciled Home Medications:      Medication List        START taking these medications      ALPRAZolam 0.5 MG tablet  Commonly known as: XANAX  Take 1 tablet (0.5 mg total) by mouth 3 (three) times daily as needed for Anxiety.     ciprofloxacin HCl 500 MG tablet  Commonly known as: CIPRO  Take 1 tablet (500 mg total) by mouth every 12 (twelve) hours. for 5 days     famotidine 20 MG tablet  Commonly known as: PEPCID  Take 1 tablet (20 mg total) by mouth 2 (two) times daily.     HYDROmorphone 4 MG tablet  Commonly known as: DILAUDID  Take 1 tablet (4 mg total) by mouth every 8 (eight) hours as needed for Pain.     metroNIDAZOLE 500 MG tablet  Commonly known as: FLAGYL  Take 1 tablet (500 mg total) by mouth every 8 (eight) hours. for 5 days     predniSONE 20 MG tablet  Commonly known as: DELTASONE  Take 1 tablet (20 mg total) by mouth once daily.            CONTINUE taking these medications      OLANZapine 5 MG tablet  Commonly known as: ZyPREXA  Take 1 tablet (5 mg total) by mouth every evening.     ondansetron 4 MG Tbdl  Commonly known as: ZOFRAN-ODT  Take 1 tablet (4 mg total) by mouth every 6 (six) hours as needed.     pantoprazole 40 MG tablet  Commonly known as: PROTONIX  Take 1 tablet (40 mg total) by mouth 2 (two) times daily.     pregabalin 75 MG capsule  Commonly known as: LYRICA  Take 1 capsule (75 mg total) by mouth 2 (two) times daily.     sucralfate 1 gram tablet  Commonly known as: CARAFATE  Take 1 tablet (1 g total) by mouth 4 (four) times daily before meals and nightly.     sumatriptan 50 MG tablet  Commonly known as: IMITREX  Take 1 tablet (50 mg total) by mouth daily as needed for Migraine.     traZODone 150 MG tablet  Commonly known as: DESYREL  Take 1 tablet (150 mg total) by mouth every evening.            STOP taking these medications      dicyclomine 10 MG capsule  Commonly known as: BENTYL     oxyCODONE-acetaminophen  mg per tablet  Commonly known as: PERCOCET               Indwelling Lines/Drains at time of discharge:   Lines/Drains/Airways       None                   Time spent on the discharge of patient: 60 minutes         Kt Pozo Jr, MD  Department of Hospital Medicine  [facility]

## 2025-02-21 NOTE — PLAN OF CARE
Problem: Physical Therapy  Goal: Physical Therapy Goal  Description: Patient will increase functional independence with mobility by performin. Sit to stand transfer with Modified [place] with Rolling Walker or appropriate assistive device   2. Bed to chair transfer with Modified [place] using Rolling Walker or appropriate assistive device.  3. Gait  x 150 feet with Modified [place] using Rolling Walker or appropriate assistive device.   4. Ascend/descend 4 steps without handrails with Rolling Walker or appropriate assistive device.      Outcome: Adequate for Care Transition, discharge to home with no follow up from therapy

## 2025-02-21 NOTE — ANESTHESIA POSTPROCEDURE EVALUATION
Anesthesia Post Evaluation    Patient: Desiree Blake    Procedure(s) Performed: Procedure(s) (LRB):  EGD, WITH CLOSED BIOPSY (N/A)    Final Anesthesia Type: MAC      Patient location during evaluation: med/surg floor  Patient participation: Yes- Able to Participate  Level of consciousness: awake  Post-procedure vital signs: reviewed and stable  Pain management: adequate  Airway patency: patent    PONV status at discharge: No PONV  Anesthetic complications: no      Cardiovascular status: blood pressure returned to baseline  Respiratory status: spontaneous ventilation  Hydration status: euvolemic  Follow-up not needed.              Vitals Value Taken Time   /60 02/21/25 08:49   Temp 36.4 °C (97.6 °F) 02/20/25 23:42   Pulse 57 02/20/25 23:42   Resp 18 02/21/25 05:23   SpO2 95 % 02/20/25 23:42         No case tracking events are documented in the log.      Pain/Shane Score: Pain Rating Prior to Med Admin: 6 (2/21/2025  5:23 AM)  Pain Rating Post Med Admin: 1 (2/20/2025  4:06 PM)

## 2025-02-21 NOTE — ASSESSMENT & PLAN NOTE
Patient with evidence yet again on CT scan of long segment nonspecific colitis.  In the transverse colon.  Continue Zosyn for now.  Awaiting further evaluation by General surgery PCR GI panel being processed    02/20/2025: Improvement noted  02/21/2025: Improvement noted.  Tolerating p.o. intake.

## 2025-02-24 LAB — SPECIMEN TO PATHOLOGY - SURGICAL: NORMAL

## 2025-02-25 PROBLEM — R30.0 DYSURIA: Status: RESOLVED | Noted: 2023-07-26 | Resolved: 2025-02-25

## 2025-02-25 PROBLEM — N30.00 ACUTE CYSTITIS WITHOUT HEMATURIA: Status: RESOLVED | Noted: 2025-01-31 | Resolved: 2025-02-25

## 2025-02-25 PROBLEM — J31.0 CHRONIC RHINITIS: Status: RESOLVED | Noted: 2021-07-29 | Resolved: 2025-02-25

## 2025-02-25 PROBLEM — J06.9 VIRAL URI: Status: RESOLVED | Noted: 2023-07-05 | Resolved: 2025-02-25

## 2025-03-03 ENCOUNTER — RESULTS FOLLOW-UP (OUTPATIENT)
Dept: HEPATOLOGY | Facility: HOSPITAL | Age: 70
End: 2025-03-03
Payer: MEDICARE

## 2025-03-03 RX ORDER — NITROFURANTOIN 25; 75 MG/1; MG/1
100 CAPSULE ORAL 2 TIMES DAILY
Qty: 14 CAPSULE | Refills: 0 | Status: SHIPPED | OUTPATIENT
Start: 2025-03-03

## 2025-03-03 NOTE — TELEPHONE ENCOUNTER
----- Message from RISA [place] sent at 3/3/2025  8:00 AM CST -----  Start marcrobid, urine culture pending.  Orders sent to pharmacy  ----- Message -----  From: Yaw Soto  Sent: 3/1/2025   7:13 AM CST  To: Kt Pozo Jr., MD

## 2025-03-05 RX ORDER — FLUCONAZOLE 150 MG/1
150 TABLET ORAL DAILY
Qty: 5 TABLET | Refills: 0 | Status: SHIPPED | OUTPATIENT
Start: 2025-03-05 | End: 2025-03-10

## 2025-03-13 ENCOUNTER — HOSPITAL ENCOUNTER (EMERGENCY)
Facility: HOSPITAL | Age: 70
Discharge: HOME | End: 2025-03-13
Attending: EMERGENCY MEDICINE
Payer: MEDICARE

## 2025-03-13 VITALS
HEART RATE: 74 BPM | SYSTOLIC BLOOD PRESSURE: 142 MMHG | OXYGEN SATURATION: 100 % | BODY MASS INDEX: 16.99 KG/M2 | DIASTOLIC BLOOD PRESSURE: 58 MMHG | HEIGHT: 61 IN | TEMPERATURE: 98 F | RESPIRATION RATE: 18 BRPM | WEIGHT: 90 LBS

## 2025-03-13 DIAGNOSIS — G89.4 CHRONIC PAIN SYNDROME: Primary | ICD-10-CM

## 2025-03-13 DIAGNOSIS — R10.9 CHRONIC ABDOMINAL PAIN: ICD-10-CM

## 2025-03-13 DIAGNOSIS — G89.29 CHRONIC ABDOMINAL PAIN: ICD-10-CM

## 2025-03-13 PROCEDURE — 96372 THER/PROPH/DIAG INJ SC/IM: CPT | Performed by: EMERGENCY MEDICINE

## 2025-03-13 PROCEDURE — 63600175 PHARM REV CODE 636 W HCPCS: Performed by: EMERGENCY MEDICINE

## 2025-03-13 PROCEDURE — 99284 EMERGENCY DEPT VISIT MOD MDM: CPT | Mod: 25

## 2025-03-13 PROCEDURE — 25000003 PHARM REV CODE 250: Performed by: EMERGENCY MEDICINE

## 2025-03-13 RX ORDER — PROMETHAZINE HYDROCHLORIDE 25 MG/ML
25 INJECTION, SOLUTION INTRAMUSCULAR; INTRAVENOUS
Status: COMPLETED | OUTPATIENT
Start: 2025-03-13 | End: 2025-03-13

## 2025-03-13 RX ORDER — DICYCLOMINE HYDROCHLORIDE 10 MG/ML
20 INJECTION INTRAMUSCULAR
Status: COMPLETED | OUTPATIENT
Start: 2025-03-13 | End: 2025-03-13

## 2025-03-13 RX ORDER — ALUMINUM HYDROXIDE, MAGNESIUM HYDROXIDE, AND SIMETHICONE 1200; 120; 1200 MG/30ML; MG/30ML; MG/30ML
30 SUSPENSION ORAL ONCE
Status: COMPLETED | OUTPATIENT
Start: 2025-03-13 | End: 2025-03-13

## 2025-03-13 RX ORDER — LIDOCAINE HYDROCHLORIDE 20 MG/ML
15 SOLUTION OROPHARYNGEAL ONCE
Status: COMPLETED | OUTPATIENT
Start: 2025-03-13 | End: 2025-03-13

## 2025-03-13 RX ADMIN — LIDOCAINE HYDROCHLORIDE 15 ML: 20 SOLUTION ORAL at 02:03

## 2025-03-13 RX ADMIN — PROMETHAZINE HYDROCHLORIDE 25 MG: 25 INJECTION INTRAMUSCULAR; INTRAVENOUS at 02:03

## 2025-03-13 RX ADMIN — DICYCLOMINE HYDROCHLORIDE 20 MG: 10 INJECTION INTRAMUSCULAR at 02:03

## 2025-03-13 RX ADMIN — ALUMINUM HYDROXIDE, MAGNESIUM HYDROXIDE, AND DIMETHICONE 30 ML: 200; 20; 200 SUSPENSION ORAL at 02:03

## 2025-03-13 NOTE — ED NOTES
NEUROLOGICAL:   Patient is awake , alert , and oriented x 4 .   Moves all extremities without difficulty.   Patient reports no neuro complaints..  GCS 15    CARDIOVASCULAR:   S1 and S2 present, no murmurs, gallops, or rubs, rate regular , and pulses palpable (2+)    On palpation no edema noted , noted to none.   Patient reports no CV complaints.  .     RESPIRATORY:   Airway Clear, Open, and Patent.  Respirations are even and unlabored.   Breath sounds clear  to all lung fields.   Patient reports no respiratory complaints.     GASTROINTESTINAL:   Abdomen is soft , tender to LUQ, and tender to RUQ. Bowel sounds are normoactive to all quadrants .   Patient reports no GI complaints .   .

## 2025-03-13 NOTE — ED PROVIDER NOTES
"Encounter Date: 3/13/2025       History     Chief Complaint   Patient presents with    Vomiting     Pt stated that she spoke with Dr. Pozo's office and referred to ED with complaints of nausea / vomiting that started an hour ago with ongoing abdominal pain.      69-year-old female with a history of chronic abdominal pain, chronic pain syndrome presents the ER once again with epigastric pain that began 1 hour ago associated with nausea vomiting.  States "is the same thing as always".  No alleviating factors.  Called her primary care physician and was told to come in the emergency department.  Has been here multiple times for same.  Recently admitted by primary care physician for 3 days this hospital for same issue.  She has a history of peptic ulcer disease as well.  She had a full workup then including CT scan etc..  Denies chest pain or shortness of breath.  No blood in stool or vomit.  Multiple prescriptions for narcotics per the prescription website      Review of patient's allergies indicates:  No Known Allergies  Past Medical History:   Diagnosis Date    Anticoagulant long-term use     Anxiety     Arthritis     C. difficile colitis 8/6/2022    Carotid artery disease     Cervical disc disorder     Chronic back pain     COPD (chronic obstructive pulmonary disease)     Coronary artery disease     Dementia     Depression     Diarrhea, unspecified 11/1/2021    DVT (deep venous thrombosis)     CAROTID    GERD (gastroesophageal reflux disease)     Hematuria     Hiatal hernia     High cholesterol     Ill-fitting dentures     STATES DOESN'T WEAR    PVD (peripheral vascular disease)     Renal calculus     Sleep apnea     Sleep apnea     NO C PAP    Urinary frequency     Urinary urgency     Wears glasses     READING      Past Surgical History:   Procedure Laterality Date    BACK SURGERY      BREAST LUMPECTOMY Right     CAROTID ARTERY ANGIOPLASTY      CAROTID ARTERY ANGIOPLASTY      CAROTID ARTERY STENT Left     CAROTID " ENDARTERECTOMY Right     CHOLECYSTECTOMY      COLONOSCOPY      CYSTOSCOPY      EGD, WITH CLOSED BIOPSY N/A 4/10/2024    Procedure: EGD, WITH CLOSED BIOPSY;  Surgeon: Meg Ayon MD;  Location: [facility];  Service: General;  Laterality: N/A;    EGD, WITH CLOSED BIOPSY N/A 7/17/2024    Procedure: EGD, WITH CLOSED BIOPSY of pyloric lesion;  Surgeon: Meg Ayon MD;  Location: [facility];  Service: General;  Laterality: N/A;  6th 0830    EGD, WITH CLOSED BIOPSY N/A 2/19/2025    Procedure: EGD, WITH CLOSED BIOPSY;  Surgeon: Meg Ayon MD;  Location: [facility];  Service: General;  Laterality: N/A;    HYSTERECTOMY      OOPHORECTOMY      TONSILLECTOMY       Family History   Problem Relation Name Age of Onset    Cancer Mother      Stroke Father      No Known Problems Sister      No Known Problems Daughter      No Known Problems Maternal Aunt      No Known Problems Maternal Uncle      No Known Problems Paternal Aunt      No Known Problems Paternal Uncle      No Known Problems Maternal Grandmother      No Known Problems Maternal Grandfather      No Known Problems Paternal Grandmother      No Known Problems Paternal Grandfather      Breast cancer Neg Hx      Ovarian cancer Neg Hx      BRCA 1/2 Neg Hx       Social History[1]  Review of Systems   Constitutional:  Negative for fever.   HENT:  Negative for sore throat.    Respiratory:  Negative for shortness of breath.    Cardiovascular:  Negative for chest pain.   Gastrointestinal:  Positive for abdominal pain and nausea.   Genitourinary:  Negative for dysuria.   Musculoskeletal:  Negative for back pain.   Skin:  Negative for rash.   Neurological:  Negative for weakness.   Hematological:  Does not bruise/bleed easily.   All other systems reviewed and are negative.      Physical Exam     Initial Vitals   BP Pulse Resp Temp SpO2   03/13/25 1328 03/13/25 1328 03/13/25 1328 03/13/25 1329 03/13/25 1328   (!) 142/58 74 18 97.8 °F (36.6 °C) 100 %      MAP       --                 Physical Exam    Nursing note and vitals reviewed.  Constitutional: She appears well-developed and well-nourished. She is not diaphoretic. No distress.   HENT:   Head: Normocephalic and atraumatic.   Eyes: Conjunctivae and EOM are normal. Pupils are equal, round, and reactive to light.   Neck: Neck supple.   Normal range of motion.  Cardiovascular:  Normal rate, regular rhythm, normal heart sounds and intact distal pulses.           No murmur heard.  Pulmonary/Chest: Breath sounds normal. No respiratory distress. She has no wheezes. She has no rhonchi. She has no rales. She exhibits no tenderness.   Abdominal: Abdomen is soft. Bowel sounds are normal.   Musculoskeletal:         General: No tenderness or edema. Normal range of motion.      Cervical back: Normal range of motion and neck supple.     Neurological: She is alert and oriented to person, place, and time. She has normal strength. No cranial nerve deficit. GCS score is 15. GCS eye subscore is 4. GCS verbal subscore is 5. GCS motor subscore is 6.   Skin: Skin is warm and dry. Capillary refill takes less than 2 seconds.         ED Course   Procedures  Labs Reviewed - No data to display       Imaging Results    None          Medications   promethazine injection 25 mg (has no administration in time range)   dicyclomine injection 20 mg (has no administration in time range)   aluminum-magnesium hydroxide-simethicone 200-200-20 mg/5 mL suspension 30 mL (has no administration in time range)     And   LIDOcaine viscous HCl 2% oral solution 15 mL (has no administration in time range)     Medical Decision Making  Risk  OTC drugs.  Prescription drug management.                          Medical Decision Making:   Differential Diagnosis:   Chronic abdominal pain, chronic pain syndrome             Clinical Impression:  Final diagnoses:  [G89.4] Chronic pain syndrome (Primary)  [R10.9, G89.29] Chronic abdominal pain          ED Disposition Condition    Discharge  Stable          ED Prescriptions    None       Follow-up Information       Follow up With Specialties Details Why Contact Info Additional Information    Primary care physician  In 2 days       [facility] - Emergency Department Emergency Medicine  As needed [facility] [facility] [address] [phone] Floor [address]                 [1]   Social History  Tobacco Use    Smoking status: Every Day     Current packs/day: 1.00     Average packs/day: 1 pack/day for 53.2 years (53.2 ttl pk-yrs)     Types: Cigarettes     Start date: 1972     Passive exposure: Current    Smokeless tobacco: Never   Substance Use Topics    Alcohol use: No    Drug use: No        Marek Rai MD  03/13/25 7718

## 2025-03-18 ENCOUNTER — OFFICE VISIT (OUTPATIENT)
Dept: SURGERY | Facility: CLINIC | Age: 70
End: 2025-03-18
Payer: MEDICARE

## 2025-03-18 VITALS
OXYGEN SATURATION: 98 % | DIASTOLIC BLOOD PRESSURE: 62 MMHG | BODY MASS INDEX: 15.86 KG/M2 | RESPIRATION RATE: 18 BRPM | HEART RATE: 80 BPM | HEIGHT: 61 IN | WEIGHT: 84 LBS | SYSTOLIC BLOOD PRESSURE: 133 MMHG

## 2025-03-18 DIAGNOSIS — R10.13 EPIGASTRIC PAIN: ICD-10-CM

## 2025-03-18 DIAGNOSIS — Z12.11 SCREENING FOR COLON CANCER: ICD-10-CM

## 2025-03-18 DIAGNOSIS — K27.9 PEPTIC ULCER DISEASE: Primary | ICD-10-CM

## 2025-03-18 PROCEDURE — 3078F DIAST BP <80 MM HG: CPT | Mod: CPTII,S$GLB,, | Performed by: STUDENT IN AN ORGANIZED HEALTH CARE EDUCATION/TRAINING PROGRAM

## 2025-03-18 PROCEDURE — 99999 PR PBB SHADOW E&M-EST. PATIENT-LVL III: CPT | Mod: PBBFAC,,, | Performed by: STUDENT IN AN ORGANIZED HEALTH CARE EDUCATION/TRAINING PROGRAM

## 2025-03-18 PROCEDURE — 3075F SYST BP GE 130 - 139MM HG: CPT | Mod: CPTII,S$GLB,, | Performed by: STUDENT IN AN ORGANIZED HEALTH CARE EDUCATION/TRAINING PROGRAM

## 2025-03-18 PROCEDURE — 99024 POSTOP FOLLOW-UP VISIT: CPT | Mod: S$GLB,,, | Performed by: STUDENT IN AN ORGANIZED HEALTH CARE EDUCATION/TRAINING PROGRAM

## 2025-03-18 RX ORDER — COLESTIPOL HYDROCHLORIDE 1 G/1
1 TABLET ORAL 2 TIMES DAILY
Qty: 60 TABLET | Refills: 0 | Status: SHIPPED | OUTPATIENT
Start: 2025-03-18 | End: 2025-04-17

## 2025-03-20 ENCOUNTER — HOSPITAL ENCOUNTER (EMERGENCY)
Facility: HOSPITAL | Age: 70
Discharge: HOME | End: 2025-03-20
Attending: EMERGENCY MEDICINE
Payer: MEDICARE

## 2025-03-20 VITALS
BODY MASS INDEX: 15.86 KG/M2 | SYSTOLIC BLOOD PRESSURE: 168 MMHG | HEART RATE: 83 BPM | WEIGHT: 84 LBS | DIASTOLIC BLOOD PRESSURE: 74 MMHG | TEMPERATURE: 98 F | HEIGHT: 61 IN | OXYGEN SATURATION: 98 % | RESPIRATION RATE: 20 BRPM

## 2025-03-20 DIAGNOSIS — G89.29 CHRONIC ABDOMINAL PAIN: Primary | ICD-10-CM

## 2025-03-20 DIAGNOSIS — R10.9 CHRONIC ABDOMINAL PAIN: Primary | ICD-10-CM

## 2025-03-20 LAB
ALBUMIN SERPL BCP-MCNC: 3.6 G/DL (ref 3.5–5.2)
ALP SERPL-CCNC: 85 U/L (ref 55–135)
ALT SERPL W/O P-5'-P-CCNC: <8 U/L (ref 10–44)
ANION GAP SERPL CALC-SCNC: 8 MMOL/L (ref 8–16)
AST SERPL-CCNC: 13 U/L (ref 10–40)
BASOPHILS # BLD AUTO: 0.05 K/UL (ref 0–0.2)
BASOPHILS NFR BLD: 0.4 % (ref 0–1.9)
BILIRUB SERPL-MCNC: 0.1 MG/DL (ref 0.1–1)
BUN SERPL-MCNC: 17 MG/DL (ref 8–23)
CALCIUM SERPL-MCNC: 8.8 MG/DL (ref 8.7–10.5)
CHLORIDE SERPL-SCNC: 107 MMOL/L (ref 95–110)
CO2 SERPL-SCNC: 26 MMOL/L (ref 23–29)
CREAT SERPL-MCNC: 0.6 MG/DL (ref 0.5–1.4)
DIFFERENTIAL METHOD BLD: ABNORMAL
EOSINOPHIL # BLD AUTO: 0.1 K/UL (ref 0–0.5)
EOSINOPHIL NFR BLD: 0.6 % (ref 0–8)
ERYTHROCYTE [DISTWIDTH] IN BLOOD BY AUTOMATED COUNT: 15.6 % (ref 11.5–14.5)
EST. GFR  (NO RACE VARIABLE): >60 ML/MIN/1.73 M^2
GLUCOSE SERPL-MCNC: 132 MG/DL (ref 70–110)
HCT VFR BLD AUTO: 39.5 % (ref 37–48.5)
HGB BLD-MCNC: 12.9 G/DL (ref 12–16)
IMM GRANULOCYTES # BLD AUTO: 0.06 K/UL (ref 0–0.04)
IMM GRANULOCYTES NFR BLD AUTO: 0.5 % (ref 0–0.5)
LIPASE SERPL-CCNC: 24 U/L (ref 4–60)
LYMPHOCYTES # BLD AUTO: 2.1 K/UL (ref 1–4.8)
LYMPHOCYTES NFR BLD: 15.8 % (ref 18–48)
MCH RBC QN AUTO: 30.1 PG (ref 27–31)
MCHC RBC AUTO-ENTMCNC: 32.7 G/DL (ref 32–36)
MCV RBC AUTO: 92 FL (ref 82–98)
MONOCYTES # BLD AUTO: 0.7 K/UL (ref 0.3–1)
MONOCYTES NFR BLD: 5.4 % (ref 4–15)
NEUTROPHILS # BLD AUTO: 10.1 K/UL (ref 1.8–7.7)
NEUTROPHILS NFR BLD: 77.3 % (ref 38–73)
NRBC BLD-RTO: 0 /100 WBC
PLATELET # BLD AUTO: 222 K/UL (ref 150–450)
PMV BLD AUTO: 10.6 FL (ref 9.2–12.9)
POTASSIUM SERPL-SCNC: 3.3 MMOL/L (ref 3.5–5.1)
PROT SERPL-MCNC: 6.6 G/DL (ref 6–8.4)
RBC # BLD AUTO: 4.28 M/UL (ref 4–5.4)
SODIUM SERPL-SCNC: 141 MMOL/L (ref 136–145)
WBC # BLD AUTO: 13.09 K/UL (ref 3.9–12.7)

## 2025-03-20 PROCEDURE — 63600175 PHARM REV CODE 636 W HCPCS: Performed by: EMERGENCY MEDICINE

## 2025-03-20 PROCEDURE — 83690 ASSAY OF LIPASE: CPT | Performed by: EMERGENCY MEDICINE

## 2025-03-20 PROCEDURE — 96372 THER/PROPH/DIAG INJ SC/IM: CPT | Performed by: EMERGENCY MEDICINE

## 2025-03-20 PROCEDURE — 80053 COMPREHEN METABOLIC PANEL: CPT | Performed by: EMERGENCY MEDICINE

## 2025-03-20 PROCEDURE — 25000003 PHARM REV CODE 250: Performed by: EMERGENCY MEDICINE

## 2025-03-20 PROCEDURE — 36415 COLL VENOUS BLD VENIPUNCTURE: CPT | Performed by: EMERGENCY MEDICINE

## 2025-03-20 PROCEDURE — 99284 EMERGENCY DEPT VISIT MOD MDM: CPT | Mod: 25

## 2025-03-20 PROCEDURE — 85025 COMPLETE CBC W/AUTO DIFF WBC: CPT | Performed by: EMERGENCY MEDICINE

## 2025-03-20 RX ORDER — ALUMINUM HYDROXIDE, MAGNESIUM HYDROXIDE, AND SIMETHICONE 1200; 120; 1200 MG/30ML; MG/30ML; MG/30ML
30 SUSPENSION ORAL ONCE
Status: COMPLETED | OUTPATIENT
Start: 2025-03-20 | End: 2025-03-20

## 2025-03-20 RX ORDER — DICYCLOMINE HYDROCHLORIDE 10 MG/ML
20 INJECTION INTRAMUSCULAR
Status: COMPLETED | OUTPATIENT
Start: 2025-03-20 | End: 2025-03-20

## 2025-03-20 RX ORDER — LIDOCAINE HYDROCHLORIDE 20 MG/ML
15 SOLUTION OROPHARYNGEAL ONCE
Status: COMPLETED | OUTPATIENT
Start: 2025-03-20 | End: 2025-03-20

## 2025-03-20 RX ADMIN — LIDOCAINE HYDROCHLORIDE 15 ML: 20 SOLUTION ORAL at 10:03

## 2025-03-20 RX ADMIN — ALUMINUM HYDROXIDE, MAGNESIUM HYDROXIDE, AND DIMETHICONE 30 ML: 200; 20; 200 SUSPENSION ORAL at 10:03

## 2025-03-20 RX ADMIN — DICYCLOMINE HYDROCHLORIDE 20 MG: 10 INJECTION INTRAMUSCULAR at 10:03

## 2025-03-20 NOTE — ED NOTES
"Pt reports pain is not better, although she is now calm, not shaking and BP much improved.  She states, "I'm just ready to go home."  "

## 2025-03-20 NOTE — ED NOTES
..AAOx4, skin W/D/P, cap refill<3 sec, MAEW, NAD, gait unsteady, patient wheeled and assisted into vehicle with ease.

## 2025-03-20 NOTE — ED PROVIDER NOTES
Encounter Date: 3/20/2025       History     Chief Complaint   Patient presents with    Abdominal Pain     Patient to the ER with complaints of abdominal pain, nausea, dizziness, body aches, and weakness onset three days ago.     69-year-old female with chronic abdominal pain, chronic pain syndrome presents once again to the emergency department with chronic abdominal pain.  Called her physician today and once again referred to the emergency department.  Complaining of generalized weakness, occasional diarrhea.  She is on chronic pain medication as well.  Has been here multiple times for same issue with multiple workups including CT scans etc.      Review of patient's allergies indicates:  No Known Allergies  Past Medical History:   Diagnosis Date    Anticoagulant long-term use     Anxiety     Arthritis     C. difficile colitis 8/6/2022    Carotid artery disease     Cervical disc disorder     Chronic back pain     COPD (chronic obstructive pulmonary disease)     Coronary artery disease     Dementia     Depression     Diarrhea, unspecified 11/1/2021    DVT (deep venous thrombosis)     CAROTID    GERD (gastroesophageal reflux disease)     Hematuria     Hiatal hernia     High cholesterol     Ill-fitting dentures     STATES DOESN'T WEAR    PVD (peripheral vascular disease)     Renal calculus     Sleep apnea     Sleep apnea     NO C PAP    Urinary frequency     Urinary urgency     Wears glasses     READING      Past Surgical History:   Procedure Laterality Date    BACK SURGERY      BREAST LUMPECTOMY Right     CAROTID ARTERY ANGIOPLASTY      CAROTID ARTERY ANGIOPLASTY      CAROTID ARTERY STENT Left     CAROTID ENDARTERECTOMY Right     CHOLECYSTECTOMY      COLONOSCOPY      CYSTOSCOPY      EGD, WITH CLOSED BIOPSY N/A 4/10/2024    Procedure: EGD, WITH CLOSED BIOPSY;  Surgeon: Meg Ayon MD;  Location: [facility];  Service: General;  Laterality: N/A;    EGD, WITH CLOSED BIOPSY N/A 7/17/2024    Procedure: EGD, WITH  CLOSED BIOPSY of pyloric lesion;  Surgeon: Meg Ayon MD;  Location: [facility];  Service: General;  Laterality: N/A;  6th 0830    EGD, WITH CLOSED BIOPSY N/A 2/19/2025    Procedure: EGD, WITH CLOSED BIOPSY;  Surgeon: Meg Ayon MD;  Location: [facility];  Service: General;  Laterality: N/A;    HYSTERECTOMY      OOPHORECTOMY      TONSILLECTOMY       Family History   Problem Relation Name Age of Onset    Cancer Mother      Stroke Father      No Known Problems Sister      No Known Problems Daughter      No Known Problems Maternal Aunt      No Known Problems Maternal Uncle      No Known Problems Paternal Aunt      No Known Problems Paternal Uncle      No Known Problems Maternal Grandmother      No Known Problems Maternal Grandfather      No Known Problems Paternal Grandmother      No Known Problems Paternal Grandfather      Breast cancer Neg Hx      Ovarian cancer Neg Hx      BRCA 1/2 Neg Hx       Social History[1]  Review of Systems   Constitutional:  Negative for fever.   HENT:  Negative for sore throat.    Respiratory:  Negative for shortness of breath.    Cardiovascular:  Negative for chest pain.   Gastrointestinal:  Positive for abdominal pain. Negative for nausea.   Genitourinary:  Negative for dysuria.   Musculoskeletal:  Negative for back pain.   Skin:  Negative for rash.   Neurological:  Negative for weakness.   Hematological:  Does not bruise/bleed easily.   All other systems reviewed and are negative.      Physical Exam     Initial Vitals [03/20/25 1028]   BP Pulse Resp Temp SpO2   (!) 219/95 83 20 98 °F (36.7 °C) 98 %      MAP       --         Physical Exam    Nursing note and vitals reviewed.  Constitutional: She appears well-developed and well-nourished. She is not diaphoretic. No distress.   HENT:   Head: Normocephalic and atraumatic.   Eyes: Conjunctivae and EOM are normal. Pupils are equal, round, and reactive to light.   Neck: Neck supple.   Normal range of motion.  Cardiovascular:   Normal rate, regular rhythm, normal heart sounds and intact distal pulses.           No murmur heard.  Pulmonary/Chest: Breath sounds normal. No respiratory distress. She has no wheezes. She has no rhonchi. She has no rales. She exhibits no tenderness.   Abdominal: Abdomen is soft. Bowel sounds are normal.   Musculoskeletal:         General: No tenderness or edema. Normal range of motion.      Cervical back: Normal range of motion and neck supple.     Neurological: She is alert and oriented to person, place, and time. She has normal strength. No cranial nerve deficit. GCS score is 15. GCS eye subscore is 4. GCS verbal subscore is 5. GCS motor subscore is 6.   Skin: Skin is warm and dry. Capillary refill takes less than 2 seconds.         ED Course   Procedures  Labs Reviewed   CBC W/ AUTO DIFFERENTIAL - Abnormal       Result Value    WBC 13.09 (*)     RBC 4.28      Hemoglobin 12.9      Hematocrit 39.5      MCV 92      MCH 30.1      MCHC 32.7      RDW 15.6 (*)     Platelets 222      MPV 10.6      Immature Granulocytes 0.5      Gran # (ANC) 10.1 (*)     Immature Grans (Abs) 0.06 (*)     Lymph # 2.1      Mono # 0.7      Eos # 0.1      Baso # 0.05      nRBC 0      Gran % 77.3 (*)     Lymph % 15.8 (*)     Mono % 5.4      Eosinophil % 0.6      Basophil % 0.4      Differential Method Automated     COMPREHENSIVE METABOLIC PANEL - Abnormal    Sodium 141      Potassium 3.3 (*)     Chloride 107      CO2 26      Glucose 132 (*)     BUN 17      Creatinine 0.6      Calcium 8.8      Total Protein 6.6      Albumin 3.6      Total Bilirubin 0.1      Alkaline Phosphatase 85      AST 13      ALT <8 (*)     eGFR >60.0      Anion Gap 8     LIPASE    Lipase 24            Imaging Results    None          Medications   aluminum-magnesium hydroxide-simethicone 200-200-20 mg/5 mL suspension 30 mL (30 mLs Oral Given 3/20/25 1051)     And   LIDOcaine viscous HCl 2% oral solution 15 mL (15 mLs Oral Given 3/20/25 1051)   dicyclomine injection 20  mg (20 mg Intramuscular Given 3/20/25 1051)     Medical Decision Making  Amount and/or Complexity of Data Reviewed  Labs: ordered.    Risk  OTC drugs.  Prescription drug management.               ED Course as of 03/20/25 1140   Thu Mar 20, 2025   1139 Labs are unremarkable.  Stable for discharge and follow up [SD]      ED Course User Index  [SD] Marek Rai MD               Medical Decision Making:   Differential Diagnosis:   Chronic pain syndrome, chronic abdominal pain             Clinical Impression:  Final diagnoses:  [R10.9, G89.29] Chronic abdominal pain (Primary)          ED Disposition Condition    Discharge Stable          ED Prescriptions    None       Follow-up Information       Follow up With Specialties Details Why Contact Info    Primary care physician  In 2 days                 [1]   Social History  Tobacco Use    Smoking status: Every Day     Current packs/day: 1.00     Average packs/day: 1 pack/day for 53.2 years (53.2 ttl pk-yrs)     Types: Cigarettes     Start date: 1972     Passive exposure: Current    Smokeless tobacco: Never   Substance Use Topics    Alcohol use: No    Drug use: No        Marek Rai MD  03/20/25 1140

## 2025-03-30 NOTE — PROGRESS NOTES
"[facility] Progress Note    HPI:  Desiree Blake is a 69 y.o. female with history of pre-pyloric ulcer with GI bleed s/p EGD who presents for follow up.  Epigastric pain slightly improved, but more concerning with bilious diarrhea.     ROS:  Negative except above    PE:  Vitals:    03/18/25 1353   BP: 133/62   BP Location: Right arm   Patient Position: Sitting   Pulse: 80   Resp: 18   SpO2: 98%   Weight: 38.1 kg (84 lb)   Height: 5' 1" (1.549 m)        Wt Readings from Last 2 Encounters:   03/20/25 38.1 kg (84 lb)   03/18/25 38.1 kg (84 lb)           Gen: Alert and oriented x3, judgement intact, NAD  CV: RRR  Resp: CTAB; breaths non-labored and symmetrical  Abd: Soft, NT, ND, BS+  Extremities: No c/c/e    Pathology:      A/P:  69 y.o. female with history of pyloric ulcer status post EGD who presents for follow-up  -Ulcer reduced in size;  biopsies negative   -Continue protonix BID  -Carafate AC&HS   -Add colestipol AC&HS for diarrhea   -Smoking cessation emphasized   -Would like to schedule colonoscopy after diarrhea improved   -RTC 2 weeks     Meg Ayon MD  General Surgery   [phone]        3/30/2025  6:51 PM    "

## 2025-04-01 ENCOUNTER — OFFICE VISIT (OUTPATIENT)
Dept: SURGERY | Facility: CLINIC | Age: 70
End: 2025-04-01
Payer: MEDICARE

## 2025-04-01 VITALS
OXYGEN SATURATION: 98 % | RESPIRATION RATE: 18 BRPM | SYSTOLIC BLOOD PRESSURE: 137 MMHG | HEART RATE: 85 BPM | WEIGHT: 88.69 LBS | DIASTOLIC BLOOD PRESSURE: 60 MMHG | HEIGHT: 61 IN | BODY MASS INDEX: 16.75 KG/M2

## 2025-04-01 DIAGNOSIS — R10.13 EPIGASTRIC PAIN: ICD-10-CM

## 2025-04-01 DIAGNOSIS — K27.9 PEPTIC ULCER DISEASE: Primary | ICD-10-CM

## 2025-04-01 PROCEDURE — 99213 OFFICE O/P EST LOW 20 MIN: CPT | Mod: S$GLB,,, | Performed by: STUDENT IN AN ORGANIZED HEALTH CARE EDUCATION/TRAINING PROGRAM

## 2025-04-01 PROCEDURE — 3075F SYST BP GE 130 - 139MM HG: CPT | Mod: CPTII,S$GLB,, | Performed by: STUDENT IN AN ORGANIZED HEALTH CARE EDUCATION/TRAINING PROGRAM

## 2025-04-01 PROCEDURE — 1159F MED LIST DOCD IN RCRD: CPT | Mod: CPTII,S$GLB,, | Performed by: STUDENT IN AN ORGANIZED HEALTH CARE EDUCATION/TRAINING PROGRAM

## 2025-04-01 PROCEDURE — 3008F BODY MASS INDEX DOCD: CPT | Mod: CPTII,S$GLB,, | Performed by: STUDENT IN AN ORGANIZED HEALTH CARE EDUCATION/TRAINING PROGRAM

## 2025-04-01 PROCEDURE — 3078F DIAST BP <80 MM HG: CPT | Mod: CPTII,S$GLB,, | Performed by: STUDENT IN AN ORGANIZED HEALTH CARE EDUCATION/TRAINING PROGRAM

## 2025-04-01 PROCEDURE — 99999 PR PBB SHADOW E&M-EST. PATIENT-LVL III: CPT | Mod: PBBFAC,,, | Performed by: STUDENT IN AN ORGANIZED HEALTH CARE EDUCATION/TRAINING PROGRAM

## 2025-04-01 RX ORDER — METRONIDAZOLE 500 MG/1
500 TABLET ORAL EVERY 8 HOURS
Qty: 30 TABLET | Refills: 0 | Status: SHIPPED | OUTPATIENT
Start: 2025-04-01 | End: 2025-04-11

## 2025-04-01 RX ORDER — METHOCARBAMOL 750 MG/1
750 TABLET, FILM COATED ORAL 4 TIMES DAILY
Qty: 120 TABLET | Refills: 0 | Status: SHIPPED | OUTPATIENT
Start: 2025-04-01 | End: 2025-05-01

## 2025-04-01 NOTE — PROGRESS NOTES
"[facility] Progress Note    HPI:  Desiree Blake is a 69 y.o. female with history of pre-pyloric ulcer with GI bleed s/p EGD who presents for follow up.  Diarrhea and abdominal pain unchanged.  Admits to eating [employer] almost daily.  Poor fiber intake.  Still smokes > 1 ppd.      ROS:  Negative except above    PE:  Vitals:    04/01/25 1346   BP: 137/60   BP Location: Left arm   Patient Position: Sitting   Pulse: 85   Resp: 18   SpO2: 98%   Weight: 40.2 kg (88 lb 11.2 oz)   Height: 5' 1" (1.549 m)        Wt Readings from Last 2 Encounters:   04/01/25 40.2 kg (88 lb 11.2 oz)   03/20/25 38.1 kg (84 lb)           Gen: Alert and oriented x3, judgement intact, NAD  CV: RRR  Resp: CTAB; breaths non-labored and symmetrical  Abd: Soft, NT, ND, BS+  Extremities: No c/c/e    Pathology:      A/P:  69 y.o. female with history of pyloric ulcer status post EGD who presents for follow-up  -Flagyl PO for 7 days for suspected bacterial overgrowth   -Robaxin for abdominal wall cramps  -Diet modification discussed - emphasized restricting fast foods and greasy foods as part post cholecystectomy diet   -Smoking cessation also emphasized   -RTC 1 month     Meg Ayon MD  General Surgery   [phone]        4/1/2025  6:51 PM      "

## 2025-04-09 ENCOUNTER — HOSPITAL ENCOUNTER (EMERGENCY)
Facility: HOSPITAL | Age: 70
Discharge: HOME | End: 2025-04-09
Attending: EMERGENCY MEDICINE
Payer: MEDICARE

## 2025-04-09 ENCOUNTER — RESULTS FOLLOW-UP (OUTPATIENT)
Dept: HEPATOLOGY | Facility: HOSPITAL | Age: 70
End: 2025-04-09

## 2025-04-09 VITALS
SYSTOLIC BLOOD PRESSURE: 188 MMHG | RESPIRATION RATE: 18 BRPM | BODY MASS INDEX: 16.99 KG/M2 | OXYGEN SATURATION: 100 % | DIASTOLIC BLOOD PRESSURE: 87 MMHG | HEIGHT: 61 IN | HEART RATE: 78 BPM | TEMPERATURE: 98 F | WEIGHT: 90 LBS

## 2025-04-09 DIAGNOSIS — G89.29 OTHER CHRONIC PAIN: Primary | ICD-10-CM

## 2025-04-09 DIAGNOSIS — M25.512 LEFT SHOULDER PAIN, UNSPECIFIED CHRONICITY: ICD-10-CM

## 2025-04-09 PROCEDURE — 96372 THER/PROPH/DIAG INJ SC/IM: CPT | Performed by: NURSE PRACTITIONER

## 2025-04-09 PROCEDURE — 25000003 PHARM REV CODE 250: Performed by: NURSE PRACTITIONER

## 2025-04-09 PROCEDURE — 63600175 PHARM REV CODE 636 W HCPCS: Performed by: NURSE PRACTITIONER

## 2025-04-09 PROCEDURE — 99284 EMERGENCY DEPT VISIT MOD MDM: CPT | Mod: 25

## 2025-04-09 RX ORDER — HYDROMORPHONE HYDROCHLORIDE 1 MG/ML
1 INJECTION, SOLUTION INTRAMUSCULAR; INTRAVENOUS; SUBCUTANEOUS
Refills: 0 | Status: COMPLETED | OUTPATIENT
Start: 2025-04-09 | End: 2025-04-09

## 2025-04-09 RX ORDER — ONDANSETRON 4 MG/1
4 TABLET, ORALLY DISINTEGRATING ORAL
Status: COMPLETED | OUTPATIENT
Start: 2025-04-09 | End: 2025-04-09

## 2025-04-09 RX ORDER — KETOROLAC TROMETHAMINE 30 MG/ML
30 INJECTION, SOLUTION INTRAMUSCULAR; INTRAVENOUS
Status: COMPLETED | OUTPATIENT
Start: 2025-04-09 | End: 2025-04-09

## 2025-04-09 RX ADMIN — HYDROMORPHONE HYDROCHLORIDE 1 MG: 1 INJECTION, SOLUTION INTRAMUSCULAR; INTRAVENOUS; SUBCUTANEOUS at 11:04

## 2025-04-09 RX ADMIN — ONDANSETRON 4 MG: 4 TABLET, ORALLY DISINTEGRATING ORAL at 11:04

## 2025-04-09 RX ADMIN — KETOROLAC TROMETHAMINE 30 MG: 30 INJECTION, SOLUTION INTRAMUSCULAR at 11:04

## 2025-04-09 NOTE — DISCHARGE INSTRUCTIONS
Continue home medications as directed.  Follow-up with your primary doctor for further evaluation and management of your symptoms.  Return to the emergency department for worsening condition.

## 2025-04-09 NOTE — ED PROVIDER NOTES
Encounter Date: 4/9/2025       History     Chief Complaint   Patient presents with    Back Pain     Pt stated that she has been experiencing upper back pain for the past week. Denied fall / injury.      This is a 69-year-old white female with multiple medical problems including CAD, chronic back pain established with pain management takes Dilaudid, COPD who presents to the emergency department with complaints of left upper back pain that began a few days ago, characterized by sharp pain near the shoulder blade, worse with movement.  She denies known injury, radiation of symptoms, chest pain, shortness of breath, or any other relative symptoms at this time.      Review of patient's allergies indicates:  No Known Allergies  Past Medical History:   Diagnosis Date    Anticoagulant long-term use     Anxiety     Arthritis     C. difficile colitis 8/6/2022    Carotid artery disease     Cervical disc disorder     Chronic back pain     COPD (chronic obstructive pulmonary disease)     Coronary artery disease     Dementia     Depression     Diarrhea, unspecified 11/1/2021    DVT (deep venous thrombosis)     CAROTID    GERD (gastroesophageal reflux disease)     Hematuria     Hiatal hernia     High cholesterol     Ill-fitting dentures     STATES DOESN'T WEAR    PVD (peripheral vascular disease)     Renal calculus     Sleep apnea     Sleep apnea     NO C PAP    Urinary frequency     Urinary urgency     Wears glasses     READING      Past Surgical History:   Procedure Laterality Date    BACK SURGERY      BREAST LUMPECTOMY Right     CAROTID ARTERY ANGIOPLASTY      CAROTID ARTERY ANGIOPLASTY      CAROTID ARTERY STENT Left     CAROTID ENDARTERECTOMY Right     CHOLECYSTECTOMY      COLONOSCOPY      CYSTOSCOPY      EGD, WITH CLOSED BIOPSY N/A 4/10/2024    Procedure: EGD, WITH CLOSED BIOPSY;  Surgeon: Meg Ayon MD;  Location: [facility];  Service: General;  Laterality: N/A;    EGD, WITH CLOSED BIOPSY N/A 7/17/2024    Procedure:  EGD, WITH CLOSED BIOPSY of pyloric lesion;  Surgeon: Meg Ayon MD;  Location: [facility];  Service: General;  Laterality: N/A;  6th 0830    EGD, WITH CLOSED BIOPSY N/A 2/19/2025    Procedure: EGD, WITH CLOSED BIOPSY;  Surgeon: Meg Ayon MD;  Location: [facility];  Service: General;  Laterality: N/A;    HYSTERECTOMY      OOPHORECTOMY      TONSILLECTOMY       Family History   Problem Relation Name Age of Onset    Cancer Mother      Stroke Father      No Known Problems Sister      No Known Problems Daughter      No Known Problems Maternal Aunt      No Known Problems Maternal Uncle      No Known Problems Paternal Aunt      No Known Problems Paternal Uncle      No Known Problems Maternal Grandmother      No Known Problems Maternal Grandfather      No Known Problems Paternal Grandmother      No Known Problems Paternal Grandfather      Breast cancer Neg Hx      Ovarian cancer Neg Hx      BRCA 1/2 Neg Hx       Social History[1]  Review of Systems   Respiratory:  Negative for shortness of breath.    Cardiovascular:  Negative for chest pain.   Gastrointestinal:  Negative for diarrhea and vomiting.   Musculoskeletal:  Positive for back pain.       Physical Exam     Initial Vitals   BP Pulse Resp Temp SpO2   04/09/25 1136 04/09/25 1134 04/09/25 1134 04/09/25 1134 04/09/25 1134   (!) 173/105 80 16 98 °F (36.7 °C) 100 %      MAP       --                Physical Exam    Nursing note and vitals reviewed.  Constitutional: She appears well-developed and well-nourished. She is active. No distress.   HENT:   Head: Normocephalic and atraumatic.   Eyes: EOM are normal. Pupils are equal, round, and reactive to light.   Neck: Neck supple.   Normal range of motion.  Cardiovascular:  Normal rate, regular rhythm and normal heart sounds.           Pulmonary/Chest: Breath sounds normal. No respiratory distress.   Musculoskeletal:      Cervical back: Normal range of motion and neck supple.      Comments: The left upper back  appears normal upon inspection, no rash or discoloration noted.  Patient does experience tenderness with palpation along the medial aspect of the left scapula.     Neurological: She is alert and oriented to person, place, and time. GCS score is 15. GCS eye subscore is 4. GCS verbal subscore is 5. GCS motor subscore is 6.   Skin: Skin is warm and dry. Capillary refill takes less than 2 seconds.   Psychiatric: She has a normal mood and affect. Her behavior is normal. Thought content normal.         ED Course   Procedures  Labs Reviewed - No data to display       Imaging Results    None          Medications   HYDROmorphone injection 1 mg (1 mg Intramuscular Given 4/9/25 1156)   ondansetron disintegrating tablet 4 mg (4 mg Oral Given 4/9/25 1156)   ketorolac injection 30 mg (30 mg Intramuscular Given 4/9/25 1156)     Medical Decision Making  Risk  Prescription drug management.                                      Clinical Impression:  Final diagnoses:  [G89.29] Other chronic pain (Primary)  [M25.512] Left shoulder pain, unspecified chronicity          ED Disposition Condition    Discharge Stable          ED Prescriptions    None       Follow-up Information       Follow up With Specialties Details Why Contact Info    PCP Follow UP  Schedule an appointment as soon as possible for a visit in 2 days for follow-up, for re-evaluation of today's complaint                [1]   Social History  Tobacco Use    Smoking status: Every Day     Current packs/day: 1.00     Average packs/day: 1 pack/day for 53.3 years (53.3 ttl pk-yrs)     Types: Cigarettes     Start date: 1972     Passive exposure: Current    Smokeless tobacco: Never   Substance Use Topics    Alcohol use: No    Drug use: No        Jyotsna Chilel NP  04/09/25 3412

## 2025-04-14 ENCOUNTER — TELEPHONE (OUTPATIENT)
Dept: SURGERY | Facility: CLINIC | Age: 70
End: 2025-04-14
Payer: MEDICARE

## 2025-05-05 PROBLEM — R29.898 RIGHT ARM WEAKNESS: Status: ACTIVE | Noted: 2025-05-05

## 2025-05-05 PROBLEM — R42 DYSEQUILIBRIUM: Status: ACTIVE | Noted: 2025-05-05

## 2025-05-21 ENCOUNTER — HOSPITAL ENCOUNTER (EMERGENCY)
Facility: HOSPITAL | Age: 70
Discharge: HOME | End: 2025-05-21
Attending: FAMILY MEDICINE
Payer: MEDICARE

## 2025-05-21 VITALS
BODY MASS INDEX: 17.36 KG/M2 | HEIGHT: 61 IN | HEART RATE: 78 BPM | RESPIRATION RATE: 18 BRPM | OXYGEN SATURATION: 100 % | TEMPERATURE: 98 F | DIASTOLIC BLOOD PRESSURE: 93 MMHG | WEIGHT: 91.94 LBS | SYSTOLIC BLOOD PRESSURE: 148 MMHG

## 2025-05-21 DIAGNOSIS — G89.29 OTHER CHRONIC PAIN: Primary | ICD-10-CM

## 2025-05-21 PROCEDURE — 99284 EMERGENCY DEPT VISIT MOD MDM: CPT | Mod: 25

## 2025-05-21 PROCEDURE — 96372 THER/PROPH/DIAG INJ SC/IM: CPT | Performed by: NURSE PRACTITIONER

## 2025-05-21 PROCEDURE — 63600175 PHARM REV CODE 636 W HCPCS: Performed by: NURSE PRACTITIONER

## 2025-05-21 PROCEDURE — 25000003 PHARM REV CODE 250: Performed by: NURSE PRACTITIONER

## 2025-05-21 RX ORDER — DEXAMETHASONE SODIUM PHOSPHATE 4 MG/ML
8 INJECTION, SOLUTION INTRA-ARTICULAR; INTRALESIONAL; INTRAMUSCULAR; INTRAVENOUS; SOFT TISSUE
Status: COMPLETED | OUTPATIENT
Start: 2025-05-21 | End: 2025-05-21

## 2025-05-21 RX ORDER — OXYCODONE AND ACETAMINOPHEN 5; 325 MG/1; MG/1
2 TABLET ORAL
Refills: 0 | Status: COMPLETED | OUTPATIENT
Start: 2025-05-21 | End: 2025-05-21

## 2025-05-21 RX ORDER — ORPHENADRINE CITRATE 30 MG/ML
60 INJECTION INTRAMUSCULAR; INTRAVENOUS
Status: COMPLETED | OUTPATIENT
Start: 2025-05-21 | End: 2025-05-21

## 2025-05-21 RX ADMIN — DEXAMETHASONE SODIUM PHOSPHATE 8 MG: 4 INJECTION, SOLUTION INTRA-ARTICULAR; INTRALESIONAL; INTRAMUSCULAR; INTRAVENOUS; SOFT TISSUE at 01:05

## 2025-05-21 RX ADMIN — ORPHENADRINE CITRATE 60 MG: 30 INJECTION, SOLUTION INTRAMUSCULAR; INTRAVENOUS at 01:05

## 2025-05-21 RX ADMIN — OXYCODONE HYDROCHLORIDE AND ACETAMINOPHEN 2 TABLET: 5; 325 TABLET ORAL at 01:05

## 2025-05-21 NOTE — ED PROVIDER NOTES
"Encounter Date: 5/21/2025       History     Chief Complaint   Patient presents with    Back Pain     Pt stated that she has been experiencing lower back pain for the past couple days accompanied with worsened chronic neck / right shoulder pain. Denied recent falls/trauma. Denied dysuria.      This is a 69-year-old white female with multiple medical problems including chronic pain, COPD, and osteoarthritis who presents to the emergency department with complaints of flare-up of generalized body pain" all over".  Patient reports symptoms have been present and worsening over the last several days, no known or recent injury.  She denies change in symptoms from previous, fever, nausea/vomiting, or any other relative symptoms at this time.  Patient requesting shot for pain.  Has experienced these symptoms multiple times in the past.      Review of patient's allergies indicates:  No Known Allergies  Past Medical History:   Diagnosis Date    Anticoagulant long-term use     Anxiety     Arthritis     C. difficile colitis 8/6/2022    Carotid artery disease     Cervical disc disorder     Chronic back pain     COPD (chronic obstructive pulmonary disease)     Coronary artery disease     Dementia     Depression     Diarrhea, unspecified 11/1/2021    DVT (deep venous thrombosis)     CAROTID    GERD (gastroesophageal reflux disease)     Hematuria     Hiatal hernia     High cholesterol     Ill-fitting dentures     STATES DOESN'T WEAR    PVD (peripheral vascular disease)     Renal calculus     Sleep apnea     Sleep apnea     NO C PAP    Urinary frequency     Urinary urgency     Wears glasses     READING      Past Surgical History:   Procedure Laterality Date    BACK SURGERY      BREAST LUMPECTOMY Right     CAROTID ARTERY ANGIOPLASTY      CAROTID ARTERY ANGIOPLASTY      CAROTID ARTERY STENT Left     CAROTID ENDARTERECTOMY Right     CHOLECYSTECTOMY      COLONOSCOPY      CYSTOSCOPY      EGD, WITH CLOSED BIOPSY N/A 4/10/2024    Procedure: " EGD, WITH CLOSED BIOPSY;  Surgeon: Meg Ayon MD;  Location: [facility];  Service: General;  Laterality: N/A;    EGD, WITH CLOSED BIOPSY N/A 7/17/2024    Procedure: EGD, WITH CLOSED BIOPSY of pyloric lesion;  Surgeon: Meg Ayon MD;  Location: [facility];  Service: General;  Laterality: N/A;  6th 0830    EGD, WITH CLOSED BIOPSY N/A 2/19/2025    Procedure: EGD, WITH CLOSED BIOPSY;  Surgeon: Meg Ayon MD;  Location: [facility];  Service: General;  Laterality: N/A;    HYSTERECTOMY      OOPHORECTOMY      TONSILLECTOMY       Family History   Problem Relation Name Age of Onset    Cancer Mother      Stroke Father      No Known Problems Sister      No Known Problems Daughter      No Known Problems Maternal Aunt      No Known Problems Maternal Uncle      No Known Problems Paternal Aunt      No Known Problems Paternal Uncle      No Known Problems Maternal Grandmother      No Known Problems Maternal Grandfather      No Known Problems Paternal Grandmother      No Known Problems Paternal Grandfather      Breast cancer Neg Hx      Ovarian cancer Neg Hx      BRCA 1/2 Neg Hx       Social History[1]  Review of Systems   Constitutional:  Negative for appetite change, chills and fever.   Gastrointestinal:  Negative for diarrhea and vomiting.   Genitourinary:  Negative for dysuria.   Musculoskeletal:  Positive for arthralgias, back pain and neck pain.   Skin: Negative.        Physical Exam     Initial Vitals [05/21/25 1255]   BP Pulse Resp Temp SpO2   (!) 148/93 78 18 98.1 °F (36.7 °C) 100 %      MAP       --         Physical Exam    Nursing note and vitals reviewed.  Constitutional: She appears well-developed. She is active. No distress.   HENT:   Head: Normocephalic and atraumatic.   Eyes: EOM are normal. Pupils are equal, round, and reactive to light.   Neck: Neck supple.   Normal range of motion.  Cardiovascular:  Normal rate, regular rhythm and normal heart sounds.           Pulmonary/Chest: Breath sounds  normal. No respiratory distress.   Musculoskeletal:         General: Tenderness present.      Cervical back: Normal range of motion and neck supple.      Comments: The neck, back, joints appear normal, no apparent swelling or discoloration.  Generalized tenderness present     Neurological: She is alert and oriented to person, place, and time. GCS score is 15. GCS eye subscore is 4. GCS verbal subscore is 5. GCS motor subscore is 6.   Skin: Skin is warm and dry. Capillary refill takes less than 2 seconds.   Psychiatric: She has a normal mood and affect. Her behavior is normal. Thought content normal.         ED Course   Procedures  Labs Reviewed - No data to display       Imaging Results    None          Medications   oxyCODONE-acetaminophen 5-325 mg per tablet 2 tablet (has no administration in time range)   dexAMETHasone injection 8 mg (has no administration in time range)   orphenadrine injection 60 mg (has no administration in time range)     Medical Decision Making  Risk  Prescription drug management.                                      Clinical Impression:  Final diagnoses:  [G89.29] Other chronic pain (Primary)          ED Disposition Condition    Discharge Stable          ED Prescriptions    None       Follow-up Information       Follow up With Specialties Details Why Contact Info    PCP Follow UP  Schedule an appointment as soon as possible for a visit in 2 days for follow-up, for re-evaluation of today's complaint                    [1]   Social History  Tobacco Use    Smoking status: Every Day     Current packs/day: 1.00     Average packs/day: 1 pack/day for 53.4 years (53.4 ttl pk-yrs)     Types: Cigarettes     Start date: 1972     Passive exposure: Current    Smokeless tobacco: Never   Substance Use Topics    Alcohol use: No    Drug use: No        Jyotsna Chilel NP  05/21/25 9400

## 2025-05-21 NOTE — DISCHARGE INSTRUCTIONS
Continue home medications as directed.  Plan to follow-up with your primary doctor in 1-2 days and return to the emergency department for worsening condition.

## 2025-05-23 PROBLEM — E87.6 HYPOKALEMIA: Status: RESOLVED | Noted: 2022-08-06 | Resolved: 2025-05-23

## 2025-06-16 ENCOUNTER — HOSPITAL ENCOUNTER (OUTPATIENT)
Dept: RADIOLOGY | Facility: HOSPITAL | Age: 70
Discharge: HOME | End: 2025-06-16
Attending: NURSE PRACTITIONER
Payer: MEDICARE

## 2025-06-16 DIAGNOSIS — R42 DISEQUILIBRIUM: ICD-10-CM

## 2025-06-16 DIAGNOSIS — R29.898 RUE WEAKNESS: ICD-10-CM

## 2025-06-16 DIAGNOSIS — R42 DIZZINESS AND GIDDINESS: ICD-10-CM

## 2025-06-16 PROCEDURE — 70551 MRI BRAIN STEM W/O DYE: CPT | Mod: TC

## 2025-06-17 ENCOUNTER — HOSPITAL ENCOUNTER (EMERGENCY)
Facility: HOSPITAL | Age: 70
Discharge: HOME | End: 2025-06-17
Attending: FAMILY MEDICINE
Payer: MEDICARE

## 2025-06-17 VITALS
BODY MASS INDEX: 17.01 KG/M2 | OXYGEN SATURATION: 94 % | HEART RATE: 87 BPM | DIASTOLIC BLOOD PRESSURE: 95 MMHG | RESPIRATION RATE: 18 BRPM | HEIGHT: 61 IN | TEMPERATURE: 99 F | SYSTOLIC BLOOD PRESSURE: 148 MMHG

## 2025-06-17 DIAGNOSIS — R11.2 NAUSEA AND VOMITING, UNSPECIFIED VOMITING TYPE: Primary | ICD-10-CM

## 2025-06-17 DIAGNOSIS — R10.9 CHRONIC ABDOMINAL PAIN: ICD-10-CM

## 2025-06-17 DIAGNOSIS — G89.29 CHRONIC ABDOMINAL PAIN: ICD-10-CM

## 2025-06-17 LAB
ABSOLUTE EOSINOPHIL (OHS): 0.09 K/UL
ABSOLUTE MONOCYTE (OHS): 0.55 K/UL (ref 0.3–1)
ABSOLUTE NEUTROPHIL COUNT (OHS): 9.47 K/UL (ref 1.8–7.7)
ALBUMIN SERPL BCP-MCNC: 4 G/DL (ref 3.5–5.2)
ALP SERPL-CCNC: 93 UNIT/L (ref 40–150)
ALT SERPL W/O P-5'-P-CCNC: 9 UNIT/L (ref 10–44)
ANION GAP (OHS): 11 MMOL/L (ref 8–16)
AST SERPL-CCNC: 16 UNIT/L (ref 11–45)
BASOPHILS # BLD AUTO: 0.05 K/UL
BASOPHILS NFR BLD AUTO: 0.4 %
BILIRUB SERPL-MCNC: 0.2 MG/DL (ref 0.1–1)
BUN SERPL-MCNC: 13 MG/DL (ref 8–23)
CALCIUM SERPL-MCNC: 9.3 MG/DL (ref 8.7–10.5)
CHLORIDE SERPL-SCNC: 103 MMOL/L (ref 95–110)
CO2 SERPL-SCNC: 27 MMOL/L (ref 23–29)
CREAT SERPL-MCNC: 0.5 MG/DL (ref 0.5–1.4)
ERYTHROCYTE [DISTWIDTH] IN BLOOD BY AUTOMATED COUNT: 15.9 % (ref 11.5–14.5)
GFR SERPLBLD CREATININE-BSD FMLA CKD-EPI: >60 ML/MIN/1.73/M2
GLUCOSE SERPL-MCNC: 126 MG/DL (ref 70–110)
HCT VFR BLD AUTO: 39.7 % (ref 37–48.5)
HGB BLD-MCNC: 13.1 GM/DL (ref 12–16)
HOLD SPECIMEN: NORMAL
IMM GRANULOCYTES # BLD AUTO: 0.05 K/UL (ref 0–0.04)
IMM GRANULOCYTES NFR BLD AUTO: 0.4 % (ref 0–0.5)
LIPASE SERPL-CCNC: 8 U/L (ref 4–60)
LYMPHOCYTES # BLD AUTO: 0.97 K/UL (ref 1–4.8)
MCH RBC QN AUTO: 30.2 PG (ref 27–31)
MCHC RBC AUTO-ENTMCNC: 33 G/DL (ref 32–36)
MCV RBC AUTO: 92 FL (ref 82–98)
NUCLEATED RBC (/100WBC) (OHS): 0 /100 WBC
PLATELET # BLD AUTO: 230 K/UL (ref 150–450)
PMV BLD AUTO: 10 FL (ref 9.2–12.9)
POTASSIUM SERPL-SCNC: 3.5 MMOL/L (ref 3.5–5.1)
PROT SERPL-MCNC: 7.6 GM/DL (ref 6–8.4)
RBC # BLD AUTO: 4.34 M/UL (ref 4–5.4)
RELATIVE EOSINOPHIL (OHS): 0.8 %
RELATIVE LYMPHOCYTE (OHS): 8.7 % (ref 18–48)
RELATIVE MONOCYTE (OHS): 4.9 % (ref 4–15)
RELATIVE NEUTROPHIL (OHS): 84.8 % (ref 38–73)
SODIUM SERPL-SCNC: 141 MMOL/L (ref 136–145)
WBC # BLD AUTO: 11.18 K/UL (ref 3.9–12.7)

## 2025-06-17 PROCEDURE — 82040 ASSAY OF SERUM ALBUMIN: CPT | Performed by: CLINICAL NURSE SPECIALIST

## 2025-06-17 PROCEDURE — 63600175 PHARM REV CODE 636 W HCPCS: Performed by: CLINICAL NURSE SPECIALIST

## 2025-06-17 PROCEDURE — 25000003 PHARM REV CODE 250: Performed by: CLINICAL NURSE SPECIALIST

## 2025-06-17 PROCEDURE — 83690 ASSAY OF LIPASE: CPT | Performed by: CLINICAL NURSE SPECIALIST

## 2025-06-17 PROCEDURE — 99284 EMERGENCY DEPT VISIT MOD MDM: CPT | Mod: 25

## 2025-06-17 PROCEDURE — 96361 HYDRATE IV INFUSION ADD-ON: CPT

## 2025-06-17 PROCEDURE — 36415 COLL VENOUS BLD VENIPUNCTURE: CPT | Performed by: CLINICAL NURSE SPECIALIST

## 2025-06-17 PROCEDURE — 96375 TX/PRO/DX INJ NEW DRUG ADDON: CPT

## 2025-06-17 PROCEDURE — 96365 THER/PROPH/DIAG IV INF INIT: CPT

## 2025-06-17 PROCEDURE — 85025 COMPLETE CBC W/AUTO DIFF WBC: CPT | Performed by: CLINICAL NURSE SPECIALIST

## 2025-06-17 RX ORDER — PROMETHAZINE HYDROCHLORIDE 25 MG/1
25 SUPPOSITORY RECTAL EVERY 6 HOURS PRN
Qty: 10 SUPPOSITORY | Refills: 0 | Status: SHIPPED | OUTPATIENT
Start: 2025-06-17

## 2025-06-17 RX ORDER — HYDROMORPHONE HYDROCHLORIDE 1 MG/ML
1 INJECTION, SOLUTION INTRAMUSCULAR; INTRAVENOUS; SUBCUTANEOUS ONCE
Refills: 0 | Status: COMPLETED | OUTPATIENT
Start: 2025-06-17 | End: 2025-06-17

## 2025-06-17 RX ORDER — CEFTRIAXONE 1 G/1
1 INJECTION, POWDER, FOR SOLUTION INTRAMUSCULAR; INTRAVENOUS
Status: DISCONTINUED | OUTPATIENT
Start: 2025-06-17 | End: 2025-06-17

## 2025-06-17 RX ORDER — ONDANSETRON 4 MG/1
4 TABLET, ORALLY DISINTEGRATING ORAL EVERY 6 HOURS PRN
Qty: 10 TABLET | Refills: 0 | Status: SHIPPED | OUTPATIENT
Start: 2025-06-17

## 2025-06-17 RX ORDER — ONDANSETRON HYDROCHLORIDE 2 MG/ML
8 INJECTION, SOLUTION INTRAVENOUS ONCE
Status: COMPLETED | OUTPATIENT
Start: 2025-06-17 | End: 2025-06-17

## 2025-06-17 RX ADMIN — SODIUM CHLORIDE 1000 ML: 9 INJECTION, SOLUTION INTRAVENOUS at 01:06

## 2025-06-17 RX ADMIN — ONDANSETRON 8 MG: 2 INJECTION INTRAMUSCULAR; INTRAVENOUS at 01:06

## 2025-06-17 RX ADMIN — PROMETHAZINE HYDROCHLORIDE 25 MG: 25 INJECTION INTRAMUSCULAR; INTRAVENOUS at 01:06

## 2025-06-17 RX ADMIN — HYDROMORPHONE HYDROCHLORIDE 1 MG: 1 INJECTION, SOLUTION INTRAMUSCULAR; INTRAVENOUS; SUBCUTANEOUS at 01:06

## 2025-06-17 NOTE — ED PROVIDER NOTES
Encounter Date: 6/17/2025       History     Chief Complaint   Patient presents with    Vomiting     Pt reports abdominal pain and vomiting x 2 days.      69-year-old female presents emergency room with generalized abdominal pain, nausea, vomiting for the last 2 days.  Patient takes morphine for her chronic pain.  History of chronic abdominal pain as well.  Patient states unable to hold anything down.  Recently treated for UTI but is unable to hold antibiotics down as well.        Review of patient's allergies indicates:  No Known Allergies  Past Medical History:   Diagnosis Date    Anticoagulant long-term use     Anxiety     Arthritis     C. difficile colitis 8/6/2022    Carotid artery disease     Cervical disc disorder     Chronic back pain     COPD (chronic obstructive pulmonary disease)     Coronary artery disease     Dementia     Depression     Diarrhea, unspecified 11/1/2021    DVT (deep venous thrombosis)     CAROTID    GERD (gastroesophageal reflux disease)     Hematuria     Hiatal hernia     High cholesterol     Ill-fitting dentures     STATES DOESN'T WEAR    PVD (peripheral vascular disease)     Renal calculus     Sleep apnea     Sleep apnea     NO C PAP    Urinary frequency     Urinary urgency     Wears glasses     READING      Past Surgical History:   Procedure Laterality Date    BACK SURGERY      BREAST LUMPECTOMY Right     CAROTID ARTERY ANGIOPLASTY      CAROTID ARTERY ANGIOPLASTY      CAROTID ARTERY STENT Left     CAROTID ENDARTERECTOMY Right     CHOLECYSTECTOMY      COLONOSCOPY      CYSTOSCOPY      EGD, WITH CLOSED BIOPSY N/A 4/10/2024    Procedure: EGD, WITH CLOSED BIOPSY;  Surgeon: Meg Ayon MD;  Location: [facility];  Service: General;  Laterality: N/A;    EGD, WITH CLOSED BIOPSY N/A 7/17/2024    Procedure: EGD, WITH CLOSED BIOPSY of pyloric lesion;  Surgeon: Meg Ayon MD;  Location: [facility];  Service: General;  Laterality: N/A;  6th 0830    EGD, WITH CLOSED BIOPSY N/A 2/19/2025     Procedure: EGD, WITH CLOSED BIOPSY;  Surgeon: Meg Ayon MD;  Location: [facility];  Service: General;  Laterality: N/A;    HYSTERECTOMY      OOPHORECTOMY      TONSILLECTOMY       Family History   Problem Relation Name Age of Onset    Cancer Mother      Stroke Father      No Known Problems Sister      No Known Problems Daughter      No Known Problems Maternal Aunt      No Known Problems Maternal Uncle      No Known Problems Paternal Aunt      No Known Problems Paternal Uncle      No Known Problems Maternal Grandmother      No Known Problems Maternal Grandfather      No Known Problems Paternal Grandmother      No Known Problems Paternal Grandfather      Breast cancer Neg Hx      Ovarian cancer Neg Hx      BRCA 1/2 Neg Hx       Social History[1]  Review of Systems   Constitutional:  Positive for activity change and appetite change. Negative for fever.   HENT:  Negative for sore throat.    Respiratory:  Negative for shortness of breath.    Cardiovascular:  Negative for chest pain.   Gastrointestinal:  Positive for abdominal pain, nausea and vomiting.   Genitourinary:  Negative for dysuria.   Musculoskeletal:  Negative for back pain.   Skin:  Negative for rash.   Neurological:  Positive for weakness.   Hematological:  Does not bruise/bleed easily.   All other systems reviewed and are negative.      Physical Exam     Initial Vitals [06/17/25 1240]   BP Pulse Resp Temp SpO2   (!) 166/107 95 16 98.7 °F (37.1 °C) 98 %      MAP       --         Physical Exam    Nursing note and vitals reviewed.  Constitutional: She appears well-developed and well-nourished.   HENT:   Head: Normocephalic and atraumatic.   Eyes: Pupils are equal, round, and reactive to light.   Neck:   Normal range of motion.  Cardiovascular:  Normal rate and regular rhythm.           Pulmonary/Chest: Breath sounds normal.   Abdominal: Abdomen is soft. Bowel sounds are normal. There is abdominal tenderness.   Musculoskeletal:         General: Normal  range of motion.      Cervical back: Normal range of motion.     Neurological: She is alert and oriented to person, place, and time.   Skin: Skin is warm and dry.   Psychiatric: She has a normal mood and affect.         ED Course   Procedures  Labs Reviewed   COMPREHENSIVE METABOLIC PANEL - Abnormal       Result Value    Sodium 141      Potassium 3.5      Chloride 103      CO2 27      Glucose 126 (*)     BUN 13      Creatinine 0.5      Calcium 9.3      Protein Total 7.6      Albumin 4.0      Bilirubin Total 0.2      ALP 93      AST 16      ALT 9 (*)     Anion Gap 11      eGFR >60     CBC WITH DIFFERENTIAL - Abnormal    WBC 11.18      RBC 4.34      HGB 13.1      HCT 39.7      MCV 92      MCH 30.2      MCHC 33.0      RDW 15.9 (*)     Platelet Count 230      MPV 10.0      Nucleated RBC 0      Neut % 84.8 (*)     Lymph % 8.7 (*)     Mono % 4.9      Eos % 0.8      Basophil % 0.4      Imm Grans % 0.4      Neut # 9.47 (*)     Lymph # 0.97 (*)     Mono # 0.55      Eos # 0.09      Baso # 0.05      Imm Grans # 0.05 (*)    LIPASE - Normal    Lipase Level 8     CBC W/ AUTO DIFFERENTIAL    Narrative:     The following orders were created for panel order CBC auto differential.  Procedure                               Abnormality         Status                     ---------                               -----------         ------                     CBC with Differential[[id number]]       Abnormal            Final result                 Please view results for these tests on the individual orders.   EXTRA TUBES    Narrative:     The following orders were created for panel order EXTRA TUBES.  Procedure                               Abnormality         Status                     ---------                               -----------         ------                     Gold Top Hold[[id number]]                                   Final result                 Please view results for these tests on the individual orders.   GOLD TOP HOLD     Extra Tube Hold            Imaging Results    None          Medications   sodium chloride 0.9% bolus 1,000 mL 1,000 mL (1,000 mLs Intravenous New Bag 6/17/25 1312)   ondansetron injection 8 mg (8 mg Intravenous Given 6/17/25 1314)   promethazine (PHENERGAN) 25 mg in 0.9% NaCl 50 mL IVPB (25 mg Intravenous New Bag 6/17/25 1312)   HYDROmorphone injection 1 mg (1 mg Intravenous Given 6/17/25 1313)     Medical Decision Making  Amount and/or Complexity of Data Reviewed  Labs: ordered.    Risk  Prescription drug management.                                      Clinical Impression:  Final diagnoses:  [R11.2] Nausea and vomiting, unspecified vomiting type (Primary)  [R10.9, G89.29] Chronic abdominal pain          ED Disposition Condition    Discharge Stable          ED Prescriptions       Medication Sig Dispense Start Date End Date Auth. Provider    ondansetron (ZOFRAN-ODT) 4 MG TbDL Take 1 tablet (4 mg total) by mouth every 6 (six) hours as needed. 10 tablet 6/17/2025 -- Honey Hartman NP    promethazine (PHENERGAN) 25 MG suppository Place 1 suppository (25 mg total) rectally every 6 (six) hours as needed for Nausea. 10 suppository 6/17/2025 -- Honey Hartman NP          Follow-up Information       Follow up With Specialties Details Why Contact Info    Kt Pozo Jr., MD Internal Medicine  As needed [address] [facility] [address]  [phone]                     [1]   Social History  Tobacco Use    Smoking status: Every Day     Current packs/day: 1.00     Average packs/day: 1 pack/day for 53.5 years (53.5 ttl pk-yrs)     Types: Cigarettes     Start date: 1972     Passive exposure: Current    Smokeless tobacco: Never   Vaping Use    Vaping status: Unknown   Substance Use Topics    Alcohol use: No    Drug use: No        Honey Hartman NP  06/17/25 3232

## 2025-06-21 ENCOUNTER — HOSPITAL ENCOUNTER (EMERGENCY)
Facility: HOSPITAL | Age: 70
Discharge: HOME | End: 2025-06-21
Attending: FAMILY MEDICINE
Payer: MEDICARE

## 2025-06-21 VITALS
HEIGHT: 61 IN | HEART RATE: 88 BPM | TEMPERATURE: 99 F | SYSTOLIC BLOOD PRESSURE: 163 MMHG | RESPIRATION RATE: 22 BRPM | DIASTOLIC BLOOD PRESSURE: 83 MMHG | BODY MASS INDEX: 16.99 KG/M2 | WEIGHT: 90 LBS | OXYGEN SATURATION: 99 %

## 2025-06-21 DIAGNOSIS — K52.9 CHRONIC DIARRHEA: ICD-10-CM

## 2025-06-21 DIAGNOSIS — R10.9 CHRONIC ABDOMINAL PAIN: Primary | ICD-10-CM

## 2025-06-21 DIAGNOSIS — F41.9 ANXIETY: ICD-10-CM

## 2025-06-21 DIAGNOSIS — G89.29 CHRONIC ABDOMINAL PAIN: Primary | ICD-10-CM

## 2025-06-21 PROCEDURE — 96372 THER/PROPH/DIAG INJ SC/IM: CPT | Performed by: NURSE PRACTITIONER

## 2025-06-21 PROCEDURE — 99284 EMERGENCY DEPT VISIT MOD MDM: CPT | Mod: 25

## 2025-06-21 PROCEDURE — 63600175 PHARM REV CODE 636 W HCPCS: Performed by: NURSE PRACTITIONER

## 2025-06-21 RX ORDER — DICYCLOMINE HYDROCHLORIDE 10 MG/ML
20 INJECTION INTRAMUSCULAR
Status: COMPLETED | OUTPATIENT
Start: 2025-06-21 | End: 2025-06-21

## 2025-06-21 RX ORDER — LORAZEPAM 2 MG/ML
1 INJECTION INTRAMUSCULAR
Status: COMPLETED | OUTPATIENT
Start: 2025-06-21 | End: 2025-06-21

## 2025-06-21 RX ADMIN — DICYCLOMINE HYDROCHLORIDE 20 MG: 10 INJECTION, SOLUTION INTRAMUSCULAR at 02:06

## 2025-06-21 RX ADMIN — LORAZEPAM 1 MG: 2 INJECTION INTRAMUSCULAR; INTRAVENOUS at 02:06

## 2025-06-21 NOTE — ED PROVIDER NOTES
"Encounter Date: 6/21/2025       History     Chief Complaint   Patient presents with    Diarrhea     "Still diarrhea and real weak. Not really the vomiting."     This is a 69-year-old white female with a history of chronic abdominal pain, colitis, and anxiety who presents to the emergency department with complaints of ongoing diarrhea.  She was initially evaluated here in the emergency department 4 days ago for chronic vomiting and diarrhea.  Labs were relatively unremarkable and she was treated here in the ED with Dilaudid.  Patient reports that vomiting has improved, however generalized abdominal cramping in diarrhea continue.  She attributes her symptoms to "flare-up" and is refusing IV fluids, lab work, or stool specimen.  She reports that her " nerves are really bad because of all of this".  She denies change in symptoms from previous, fever, URI signs and symptoms, or black/bloody stools.  She has been evaluated by General surgery for these symptoms with findings of PUD. Has been evaluated here in the ED numerous times for these symptoms; most recent CT abd/pelvis performed 4 months ago; showed nonspecific colitis, gastritis.       Review of patient's allergies indicates:  No Known Allergies  Past Medical History:   Diagnosis Date    Anticoagulant long-term use     Anxiety     Arthritis     C. difficile colitis 8/6/2022    Carotid artery disease     Cervical disc disorder     Chronic back pain     COPD (chronic obstructive pulmonary disease)     Coronary artery disease     Dementia     Depression     Diarrhea, unspecified 11/1/2021    DVT (deep venous thrombosis)     CAROTID    GERD (gastroesophageal reflux disease)     Hematuria     Hiatal hernia     High cholesterol     Ill-fitting dentures     STATES DOESN'T WEAR    PVD (peripheral vascular disease)     Renal calculus     Sleep apnea     Sleep apnea     NO C PAP    Urinary frequency     Urinary urgency     Wears glasses     READING      Past Surgical " History:   Procedure Laterality Date    BACK SURGERY      BREAST LUMPECTOMY Right     CAROTID ARTERY ANGIOPLASTY      CAROTID ARTERY ANGIOPLASTY      CAROTID ARTERY STENT Left     CAROTID ENDARTERECTOMY Right     CHOLECYSTECTOMY      COLONOSCOPY      CYSTOSCOPY      EGD, WITH CLOSED BIOPSY N/A 4/10/2024    Procedure: EGD, WITH CLOSED BIOPSY;  Surgeon: Meg Ayon MD;  Location: [facility];  Service: General;  Laterality: N/A;    EGD, WITH CLOSED BIOPSY N/A 7/17/2024    Procedure: EGD, WITH CLOSED BIOPSY of pyloric lesion;  Surgeon: Meg Ayon MD;  Location: [facility];  Service: General;  Laterality: N/A;  6th 0830    EGD, WITH CLOSED BIOPSY N/A 2/19/2025    Procedure: EGD, WITH CLOSED BIOPSY;  Surgeon: Meg Ayon MD;  Location: [facility];  Service: General;  Laterality: N/A;    HYSTERECTOMY      OOPHORECTOMY      TONSILLECTOMY       Family History   Problem Relation Name Age of Onset    Cancer Mother      Stroke Father      No Known Problems Sister      No Known Problems Daughter      No Known Problems Maternal Aunt      No Known Problems Maternal Uncle      No Known Problems Paternal Aunt      No Known Problems Paternal Uncle      No Known Problems Maternal Grandmother      No Known Problems Maternal Grandfather      No Known Problems Paternal Grandmother      No Known Problems Paternal Grandfather      Breast cancer Neg Hx      Ovarian cancer Neg Hx      BRCA 1/2 Neg Hx       Social History[1]  Review of Systems   Constitutional:  Positive for appetite change. Negative for fever.   Gastrointestinal:  Positive for diarrhea and nausea. Negative for blood in stool and vomiting.   Neurological:  Positive for weakness.       Physical Exam     Initial Vitals [06/21/25 1411]   BP Pulse Resp Temp SpO2   (!) 163/83 88 (!) 22 98.6 °F (37 °C) 99 %      MAP       --         Physical Exam    Nursing note and vitals reviewed.  Constitutional: She appears well-developed. She is active. No distress.    Appears frail, malnurished   HENT:   Head: Normocephalic and atraumatic. Mouth/Throat: Oropharynx is clear and moist.   Eyes: EOM are normal. Pupils are equal, round, and reactive to light.   Neck: Neck supple.   Normal range of motion.  Cardiovascular:  Normal rate, regular rhythm and normal heart sounds.           Pulmonary/Chest: Breath sounds normal. No respiratory distress.   Abdominal: Abdomen is soft. Bowel sounds are normal. She exhibits no distension. There is no abdominal tenderness.   Musculoskeletal:      Cervical back: Normal range of motion and neck supple.     Neurological: She is alert and oriented to person, place, and time. GCS score is 15. GCS eye subscore is 4. GCS verbal subscore is 5. GCS motor subscore is 6.   Skin: Skin is warm and dry. Capillary refill takes less than 2 seconds.   Psychiatric: Thought content normal.   Appears anxious, jittery, tearful at times         ED Course   Procedures  Labs Reviewed - No data to display       Imaging Results    None          Medications   dicyclomine injection 20 mg (20 mg Intramuscular Given 6/21/25 1450)   LORazepam injection 1 mg (1 mg Intramuscular Given 6/21/25 1451)     Medical Decision Making  Risk  Prescription drug management.               ED Course as of 06/21/25 1529   Sat Jun 21, 2025   1527 Pt reports improvement after ativan and bentyl. Encouraged to f/u with pcp for possible GI referral. Return precautions discussed.  [CB]      ED Course User Index  [CB] Jyotsna Chilel NP                           Clinical Impression:  Final diagnoses:  [R10.9, G89.29] Chronic abdominal pain (Primary)  [K52.9] Chronic diarrhea  [F41.9] Anxiety          ED Disposition Condition    Discharge Stable          ED Prescriptions    None       Follow-up Information       Follow up With Specialties Details Why Contact Info    PCP Follow UP  Schedule an appointment as soon as possible for a visit in 2 days for follow-up, for re-evaluation of today's  complaint and possible GI referral.                    [1]   Social History  Tobacco Use    Smoking status: Every Day     Current packs/day: 1.00     Average packs/day: 1 pack/day for 53.5 years (53.5 ttl pk-yrs)     Types: Cigarettes     Start date: 1972     Passive exposure: Current    Smokeless tobacco: Never   Vaping Use    Vaping status: Unknown   Substance Use Topics    Alcohol use: No    Drug use: No        Jyotsna Chilel NP  06/21/25 1526

## 2025-06-21 NOTE — DISCHARGE INSTRUCTIONS
Continue home medications as directed.  Plan to follow-up with your primary doctor for possible GI referral.  Return to the emergency department for worsening condition.  Be sure to follow a bland diet while experiencing symptoms of vomiting and/or diarrhea. Recommended options include bananas, applesauce, plain toast/rice. Focus on ensuring adequate hydration with electrolyte beverages, such as Pedialyte/Gatorade.   It is recommended that you avoid dairy products and fried, greasy foods while experiencing symptoms.

## 2025-07-17 PROBLEM — K86.1 IDIOPATHIC CHRONIC PANCREATITIS: Status: ACTIVE | Noted: 2025-07-17

## 2025-07-17 PROBLEM — J06.9 UPPER RESPIRATORY INFECTION: Status: RESOLVED | Noted: 2023-07-05 | Resolved: 2025-07-17

## 2025-08-26 ENCOUNTER — HOSPITAL ENCOUNTER (EMERGENCY)
Facility: HOSPITAL | Age: 70
Discharge: HOME | End: 2025-08-26
Attending: EMERGENCY MEDICINE
Payer: MEDICARE

## (undated) DEVICE — LINER SUCTION CANNISTER REGUGA

## (undated) DEVICE — KIT BIOGUARD AIR WTR SUC VALVE

## (undated) DEVICE — GOWN NONREINF SET-IN SLV XL

## (undated) DEVICE — UNDERPAD DELUXE FLUFF 30X30IN

## (undated) DEVICE — SOL IRRI STRL WATER 1000ML

## (undated) DEVICE — TUBING IRRIGATION W/ AIR

## (undated) DEVICE — BLOCK BITE ADULT 60FR

## (undated) DEVICE — CANNULA SSOFT C02 MALE 14FT

## (undated) DEVICE — ELECTRODE MEDI-TRACE 855 FOAM

## (undated) DEVICE — FORCEP ENDOJAW OVL NDL 2X1550

## (undated) DEVICE — CONNECTOR WATERJET ENDO

## (undated) DEVICE — SPONGE DRY VIA GREEN

## (undated) DEVICE — FORCEP ENDOJAW HOT OVAL 2.8MM

## (undated) DEVICE — KIT VIA CUSTOM PROCEDURE

## (undated) DEVICE — ELECTRODE REM PLYHSV RETURN 9

## (undated) DEVICE — BITE BLOCK ADULT LATEX FREE